# Patient Record
Sex: FEMALE | Race: WHITE | NOT HISPANIC OR LATINO
[De-identification: names, ages, dates, MRNs, and addresses within clinical notes are randomized per-mention and may not be internally consistent; named-entity substitution may affect disease eponyms.]

---

## 2018-01-21 ENCOUNTER — TRANSCRIPTION ENCOUNTER (OUTPATIENT)
Age: 53
End: 2018-01-21

## 2018-10-04 ENCOUNTER — TRANSCRIPTION ENCOUNTER (OUTPATIENT)
Age: 53
End: 2018-10-04

## 2018-10-24 ENCOUNTER — APPOINTMENT (OUTPATIENT)
Dept: OTOLARYNGOLOGY | Facility: CLINIC | Age: 53
End: 2018-10-24
Payer: COMMERCIAL

## 2018-10-24 VITALS
HEART RATE: 101 BPM | SYSTOLIC BLOOD PRESSURE: 132 MMHG | DIASTOLIC BLOOD PRESSURE: 93 MMHG | BODY MASS INDEX: 35.59 KG/M2 | WEIGHT: 211 LBS | HEIGHT: 64.5 IN

## 2018-10-24 DIAGNOSIS — Z82.49 FAMILY HISTORY OF ISCHEMIC HEART DISEASE AND OTHER DISEASES OF THE CIRCULATORY SYSTEM: ICD-10-CM

## 2018-10-24 DIAGNOSIS — Z86.19 PERSONAL HISTORY OF OTHER INFECTIOUS AND PARASITIC DISEASES: ICD-10-CM

## 2018-10-24 DIAGNOSIS — Z80.3 FAMILY HISTORY OF MALIGNANT NEOPLASM OF BREAST: ICD-10-CM

## 2018-10-24 DIAGNOSIS — Z78.9 OTHER SPECIFIED HEALTH STATUS: ICD-10-CM

## 2018-10-24 DIAGNOSIS — Z83.3 FAMILY HISTORY OF DIABETES MELLITUS: ICD-10-CM

## 2018-10-24 DIAGNOSIS — Z87.09 PERSONAL HISTORY OF OTHER DISEASES OF THE RESPIRATORY SYSTEM: ICD-10-CM

## 2018-10-24 DIAGNOSIS — Z83.42 FAMILY HISTORY OF FAMILIAL HYPERCHOLESTEROLEMIA: ICD-10-CM

## 2018-10-24 DIAGNOSIS — Z86.79 PERSONAL HISTORY OF OTHER DISEASES OF THE CIRCULATORY SYSTEM: ICD-10-CM

## 2018-10-24 DIAGNOSIS — Z87.19 PERSONAL HISTORY OF OTHER DISEASES OF THE DIGESTIVE SYSTEM: ICD-10-CM

## 2018-10-24 PROCEDURE — 31231 NASAL ENDOSCOPY DX: CPT

## 2018-10-24 PROCEDURE — 99204 OFFICE O/P NEW MOD 45 MIN: CPT | Mod: 25

## 2018-10-24 PROCEDURE — 92557 COMPREHENSIVE HEARING TEST: CPT

## 2018-10-24 PROCEDURE — 92550 TYMPANOMETRY & REFLEX THRESH: CPT

## 2018-10-24 RX ORDER — FLUTICASONE PROPIONATE 50 UG/1
50 SPRAY, METERED NASAL DAILY
Qty: 1 | Refills: 3 | Status: ACTIVE | COMMUNITY
Start: 2018-10-24

## 2018-10-25 RX ORDER — DM/P-EPHED/ACETAMINOPH/DOXYLAM
CAPSULE ORAL
Refills: 0 | Status: ACTIVE | COMMUNITY

## 2018-10-25 RX ORDER — LOSARTAN POTASSIUM 50 MG/1
50 TABLET, FILM COATED ORAL
Refills: 0 | Status: ACTIVE | COMMUNITY

## 2018-10-31 ENCOUNTER — APPOINTMENT (OUTPATIENT)
Dept: OTOLARYNGOLOGY | Facility: CLINIC | Age: 53
End: 2018-10-31
Payer: COMMERCIAL

## 2018-10-31 VITALS
DIASTOLIC BLOOD PRESSURE: 92 MMHG | BODY MASS INDEX: 34.74 KG/M2 | HEIGHT: 64.5 IN | HEART RATE: 110 BPM | WEIGHT: 206 LBS | SYSTOLIC BLOOD PRESSURE: 127 MMHG

## 2018-10-31 DIAGNOSIS — J01.80 OTHER ACUTE SINUSITIS: ICD-10-CM

## 2018-10-31 DIAGNOSIS — H90.42 SENSORINEURAL HEARING LOSS, UNILATERAL, LEFT EAR, WITH UNRESTRICTED HEARING ON THE CONTRALATERAL SIDE: ICD-10-CM

## 2018-10-31 DIAGNOSIS — H65.02 ACUTE SEROUS OTITIS MEDIA, LEFT EAR: ICD-10-CM

## 2018-10-31 DIAGNOSIS — J32.8 OTHER CHRONIC SINUSITIS: ICD-10-CM

## 2018-10-31 PROBLEM — Z87.19 HISTORY OF GALLBLADDER DISEASE: Status: RESOLVED | Noted: 2018-10-31 | Resolved: 2018-10-31

## 2018-10-31 PROBLEM — Z86.19 HISTORY OF INFECTIOUS MONONUCLEOSIS: Status: RESOLVED | Noted: 2018-10-31 | Resolved: 2018-10-31

## 2018-10-31 PROBLEM — Z87.09 HISTORY OF NASAL POLYP: Status: RESOLVED | Noted: 2018-10-31 | Resolved: 2018-10-31

## 2018-10-31 PROBLEM — Z86.19 HISTORY OF HERPES ZOSTER: Status: RESOLVED | Noted: 2018-10-31 | Resolved: 2018-10-31

## 2018-10-31 PROCEDURE — 99214 OFFICE O/P EST MOD 30 MIN: CPT

## 2018-10-31 PROCEDURE — 92550 TYMPANOMETRY & REFLEX THRESH: CPT

## 2018-10-31 PROCEDURE — 92557 COMPREHENSIVE HEARING TEST: CPT

## 2018-10-31 RX ORDER — PREDNISONE 20 MG/1
20 TABLET ORAL DAILY
Qty: 3 | Refills: 0 | Status: DISCONTINUED | COMMUNITY
Start: 2018-10-24 | End: 2018-10-31

## 2018-10-31 RX ORDER — ESCITALOPRAM OXALATE 20 MG/1
20 TABLET, FILM COATED ORAL
Refills: 0 | Status: ACTIVE | COMMUNITY

## 2018-10-31 RX ORDER — ASPIRIN 81 MG
81 TABLET, DELAYED RELEASE (ENTERIC COATED) ORAL
Refills: 0 | Status: ACTIVE | COMMUNITY

## 2018-12-05 PROBLEM — J32.8 OTHER CHRONIC SINUSITIS: Status: ACTIVE | Noted: 2018-10-24

## 2018-12-05 PROBLEM — J01.80 OTHER ACUTE SINUSITIS, RECURRENCE NOT SPECIFIED: Status: ACTIVE | Noted: 2018-10-31

## 2018-12-05 PROBLEM — H65.02: Status: ACTIVE | Noted: 2018-10-31

## 2018-12-05 PROBLEM — H90.42 ASYMMETRICAL LEFT SENSORINEURAL HEARING LOSS: Status: ACTIVE | Noted: 2018-10-31

## 2019-02-11 ENCOUNTER — APPOINTMENT (OUTPATIENT)
Dept: GASTROENTEROLOGY | Facility: CLINIC | Age: 54
End: 2019-02-11
Payer: COMMERCIAL

## 2019-02-11 VITALS
HEIGHT: 64.5 IN | DIASTOLIC BLOOD PRESSURE: 64 MMHG | TEMPERATURE: 97.5 F | WEIGHT: 207 LBS | HEART RATE: 118 BPM | SYSTOLIC BLOOD PRESSURE: 110 MMHG | RESPIRATION RATE: 15 BRPM | OXYGEN SATURATION: 98 % | BODY MASS INDEX: 34.91 KG/M2

## 2019-02-11 DIAGNOSIS — Z12.11 ENCOUNTER FOR SCREENING FOR MALIGNANT NEOPLASM OF COLON: ICD-10-CM

## 2019-02-11 PROCEDURE — 99203 OFFICE O/P NEW LOW 30 MIN: CPT

## 2019-02-11 RX ORDER — FLUCONAZOLE 150 MG/1
150 TABLET ORAL
Qty: 1 | Refills: 1 | Status: DISCONTINUED | COMMUNITY
Start: 2018-10-24 | End: 2019-02-11

## 2019-02-11 RX ORDER — AMOXICILLIN AND CLAVULANATE POTASSIUM 875; 125 MG/1; MG/1
875-125 TABLET, COATED ORAL TWICE DAILY
Qty: 28 | Refills: 0 | Status: DISCONTINUED | COMMUNITY
Start: 2018-10-24 | End: 2019-02-11

## 2019-02-11 RX ORDER — ROSUVASTATIN CALCIUM 10 MG/1
10 TABLET, FILM COATED ORAL
Refills: 0 | Status: ACTIVE | COMMUNITY

## 2019-02-11 RX ORDER — UBIDECARENONE/VIT E ACET 100MG-5
CAPSULE ORAL
Refills: 0 | Status: ACTIVE | COMMUNITY

## 2019-02-11 RX ORDER — SODIUM SULFATE, POTASSIUM SULFATE, MAGNESIUM SULFATE 17.5; 3.13; 1.6 G/ML; G/ML; G/ML
17.5-3.13-1.6 SOLUTION, CONCENTRATE ORAL
Qty: 1 | Refills: 0 | Status: ACTIVE | COMMUNITY
Start: 2019-02-11 | End: 1900-01-01

## 2019-02-11 NOTE — HISTORY OF PRESENT ILLNESS
[FreeTextEntry1] : This is a 54-year-old female with history of hypertension and hyperlipidemia presenting for colon cancer screening evaluation. She never had a lower GI evaluation. She denies abdominal pain, nausea or vomiting. She denies changes in her bowel habits. She denies rectal bleeding or melena. She denies weight loss or anemia. There is no family history of colon cancer.

## 2019-02-11 NOTE — ASSESSMENT
[FreeTextEntry1] : This is a 54 year old female presenting for colon cancer screening evaluation. I explained to her the risks, alternatives and benefits with colonoscopy. Risk including but not limited to bleeding, perforation, infection adverse medication reaction. Questions were answered. She stated understanding is agreeable to proceed with the planned procedure appeared

## 2019-04-05 ENCOUNTER — APPOINTMENT (OUTPATIENT)
Dept: GASTROENTEROLOGY | Facility: AMBULATORY MEDICAL SERVICES | Age: 54
End: 2019-04-05
Payer: COMMERCIAL

## 2019-04-05 PROCEDURE — 45378 DIAGNOSTIC COLONOSCOPY: CPT

## 2019-08-29 ENCOUNTER — APPOINTMENT (OUTPATIENT)
Dept: RHEUMATOLOGY | Facility: CLINIC | Age: 54
End: 2019-08-29
Payer: COMMERCIAL

## 2019-08-29 DIAGNOSIS — E55.9 VITAMIN D DEFICIENCY, UNSPECIFIED: ICD-10-CM

## 2019-08-29 DIAGNOSIS — M25.541 PAIN IN JOINTS OF RIGHT HAND: ICD-10-CM

## 2019-08-29 DIAGNOSIS — L40.9 PSORIASIS, UNSPECIFIED: ICD-10-CM

## 2019-08-29 DIAGNOSIS — M25.542 PAIN IN JOINTS OF RIGHT HAND: ICD-10-CM

## 2019-08-29 DIAGNOSIS — E66.9 OBESITY, UNSPECIFIED: ICD-10-CM

## 2019-08-29 PROCEDURE — 99204 OFFICE O/P NEW MOD 45 MIN: CPT

## 2019-08-29 RX ORDER — NAPROXEN 500 MG/1
500 TABLET ORAL
Qty: 14 | Refills: 0 | Status: ACTIVE | COMMUNITY
Start: 2019-08-29 | End: 1900-01-01

## 2019-08-29 NOTE — CONSULT LETTER
[Dear  ___] : Dear  [unfilled], [Consult Letter:] : I had the pleasure of evaluating your patient, [unfilled]. [Please see my note below.] : Please see my note below. [Consult Closing:] : Thank you very much for allowing me to participate in the care of this patient.  If you have any questions, please do not hesitate to contact me. [Sincerely,] : Sincerely, [FreeTextEntry2] :  [address], [place] [address]\par Phone: [phone] [FreeTextEntry3] : Carri Kahn MD\par

## 2019-08-29 NOTE — REVIEW OF SYSTEMS
[Fever] : no fever [Chills] : no chills [As Noted in HPI] : as noted in HPI [Negative] : Gastrointestinal [FreeTextEntry1] : fatigue and low energy

## 2019-08-29 NOTE — PHYSICAL EXAM
[Sclera] : the sclera and conjunctiva were normal [General Appearance - Alert] : alert [General Appearance - In No Acute Distress] : in no acute distress [Auscultation Breath Sounds / Voice Sounds] : lungs were clear to auscultation bilaterally [Heart Sounds] : normal S1 and S2 [Abdomen Soft] : soft [Abdomen Tenderness] : non-tender [FreeTextEntry1] : erythematous line at the back of the hair non scaly. no nail pitting  [Oriented To Time, Place, And Person] : oriented to person, place, and time

## 2019-08-29 NOTE — HISTORY OF PRESENT ILLNESS
[FreeTextEntry1] : 54 Juan C, with PMH of HTN, DL, Anxiety, Vitamin D deficiency, Psoriasis, presenting for evaluation of bilateral hand pain.\par \par - Symptoms started over a year ago, pain affecting mostly the dorsum of the hands and fingers. The pain occurs throughout the day but usually exacerbated by activity and worse at the end of the day compared to the morning. No associated redness or swelling, no morning stiffness \par Denies any history of dactylitis, no nail disease except nail of 5th finger (right hand) is always thin.\par She uses tylenol and very rarely Aleve with partial relief. \par - Skin psoriasis has always been limited to the back of the hair, no other skin involvement \par diagnosed by PCP,never seen by a dermatologist \par uses topical steroids with improvement \par \par - She has a longstanding history of low back pain s/p spinal fusion-follows with ortho/spine\par \par \par

## 2019-08-29 NOTE — DATA REVIEWED
[FreeTextEntry1] : Old MRI of the hips reviewed\par Xray of the L-S spine \par No recent blood work\par

## 2019-08-29 NOTE — ASSESSMENT
[FreeTextEntry1] : 53 yo W, with PMH of HTN, DL, Anxiety, Vitamin D deficiency, Psoriasis, presenting for evaluation of bilateral hand pain.\par \par - No evidence of active arthritis on exam \par - No evidence of enthesitis. No nail disease\par - Psoriasis limited to the hair line, treated with topicals\par \par = Discussed with patient that psoriatic arthritis is an inflammatory arthritis therefore would expect features of inflammation ( swelling of the joints, redness/warmth, morning stiffness..)\par Would recommend obtaining baseline Xrays of hands for evaluation of any erosions\par Check ESR/CRP\par = Recommend NSAIDs course \par advised about potential SEs, use with meals and PPIs ( higher risk since of lexapro)\par = Discussed with patient the association between psoriasis/PsA and obesity/ metabolic syndrome and higher CV risk\par advised about the importance of weight loss and exercises\par = Bone Health:\par check Vitamin D, continue supplements\par Had a previous BMD reported as normal\par \par RTO in 2-3 months or earlier if needed\par

## 2020-04-25 ENCOUNTER — MESSAGE (OUTPATIENT)
Age: 55
End: 2020-04-25

## 2020-05-05 ENCOUNTER — APPOINTMENT (OUTPATIENT)
Dept: DISASTER EMERGENCY | Facility: HOSPITAL | Age: 55
End: 2020-05-05

## 2020-05-05 LAB
SARS-COV-2 IGG SERPL IA-ACNC: 0.1 INDEX
SARS-COV-2 IGG SERPL QL IA: NEGATIVE

## 2020-08-27 ENCOUNTER — APPOINTMENT (OUTPATIENT)
Dept: OBGYN | Facility: CLINIC | Age: 55
End: 2020-08-27
Payer: COMMERCIAL

## 2020-08-27 VITALS
SYSTOLIC BLOOD PRESSURE: 105 MMHG | HEIGHT: 64 IN | DIASTOLIC BLOOD PRESSURE: 72 MMHG | BODY MASS INDEX: 30.56 KG/M2 | WEIGHT: 179 LBS

## 2020-08-27 PROCEDURE — 99386 PREV VISIT NEW AGE 40-64: CPT

## 2020-08-27 NOTE — HISTORY OF PRESENT ILLNESS
[Last Bone Density ___] : Last bone density studies [unfilled] [Last Colonoscopy ___] : Last colonoscopy [unfilled] [Last Pap ___] : Last cervical pap smear was [unfilled]

## 2020-08-27 NOTE — PHYSICAL EXAM
[Awake] : awake [LAD] : no lymphadenopathy [Acute Distress] : no acute distress [Alert] : alert [Thyroid Nodule] : no thyroid nodule [Goiter] : no goiter [Mass] : no breast mass [Nipple Discharge] : no nipple discharge [Axillary LAD] : no axillary lymphadenopathy [Soft] : soft [Tender] : non tender [Distended] : not distended [H/Smegaly] : no hepatosplenomegaly [Oriented x3] : oriented to person, place, and time [Flat Affect] : affect not flat [Depressed Mood] : not depressed [No Bleeding] : there was no active vaginal bleeding [Normal] : uterus [Exam Deferred] : was deferred [Uterine Adnexae] : were not tender and not enlarged

## 2020-08-31 LAB — HPV HIGH+LOW RISK DNA PNL CVX: NOT DETECTED

## 2020-09-02 LAB — CYTOLOGY CVX/VAG DOC THIN PREP: ABNORMAL

## 2021-10-09 ENCOUNTER — TRANSCRIPTION ENCOUNTER (OUTPATIENT)
Age: 56
End: 2021-10-09

## 2021-10-09 ENCOUNTER — APPOINTMENT (OUTPATIENT)
Dept: OBGYN | Facility: CLINIC | Age: 56
End: 2021-10-09
Payer: COMMERCIAL

## 2021-10-09 VITALS
WEIGHT: 203 LBS | HEART RATE: 102 BPM | DIASTOLIC BLOOD PRESSURE: 85 MMHG | SYSTOLIC BLOOD PRESSURE: 116 MMHG | BODY MASS INDEX: 34.66 KG/M2 | HEIGHT: 64 IN

## 2021-10-09 DIAGNOSIS — N95.2 POSTMENOPAUSAL ATROPHIC VAGINITIS: ICD-10-CM

## 2021-10-09 DIAGNOSIS — Z78.0 ENCOUNTER FOR SCREENING FOR OSTEOPOROSIS: ICD-10-CM

## 2021-10-09 DIAGNOSIS — Z13.820 ENCOUNTER FOR SCREENING FOR OSTEOPOROSIS: ICD-10-CM

## 2021-10-09 DIAGNOSIS — Z12.31 ENCOUNTER FOR SCREENING MAMMOGRAM FOR MALIGNANT NEOPLASM OF BREAST: ICD-10-CM

## 2021-10-09 PROCEDURE — 99396 PREV VISIT EST AGE 40-64: CPT

## 2021-10-09 PROCEDURE — 99214 OFFICE O/P EST MOD 30 MIN: CPT | Mod: 25

## 2021-10-09 NOTE — PHYSICAL EXAM
[Awake] : awake [Alert] : alert [Soft] : soft [Oriented x3] : oriented to person, place, and time [Normal] : uterus [No Bleeding] : there was no active vaginal bleeding [Uterine Adnexae] : were not tender and not enlarged [Exam Deferred] : was deferred [Vulvar Atrophy] : vulvar atrophy [Atrophy] : atrophy [Acute Distress] : no acute distress [LAD] : no lymphadenopathy [Thyroid Nodule] : no thyroid nodule [Goiter] : no goiter [Mass] : no breast mass [Nipple Discharge] : no nipple discharge [Axillary LAD] : no axillary lymphadenopathy [Tender] : non tender [Distended] : not distended [H/Smegaly] : no hepatosplenomegaly [Depressed Mood] : not depressed [Flat Affect] : affect not flat

## 2021-10-12 LAB — HPV HIGH+LOW RISK DNA PNL CVX: NOT DETECTED

## 2021-10-14 LAB — BACTERIA UR CULT: NORMAL

## 2021-10-15 LAB — CYTOLOGY CVX/VAG DOC THIN PREP: ABNORMAL

## 2021-11-19 ENCOUNTER — APPOINTMENT (OUTPATIENT)
Dept: OTOLARYNGOLOGY | Facility: CLINIC | Age: 56
End: 2021-11-19
Payer: COMMERCIAL

## 2021-11-19 VITALS
WEIGHT: 206 LBS | BODY MASS INDEX: 35.17 KG/M2 | HEIGHT: 64 IN | HEART RATE: 106 BPM | TEMPERATURE: 98 F | SYSTOLIC BLOOD PRESSURE: 122 MMHG | DIASTOLIC BLOOD PRESSURE: 87 MMHG

## 2021-11-19 DIAGNOSIS — R42 DIZZINESS AND GIDDINESS: ICD-10-CM

## 2021-11-19 DIAGNOSIS — H90.5 UNSPECIFIED SENSORINEURAL HEARING LOSS: ICD-10-CM

## 2021-11-19 DIAGNOSIS — H69.82 OTHER SPECIFIED DISORDERS OF EUSTACHIAN TUBE, LEFT EAR: ICD-10-CM

## 2021-11-19 PROCEDURE — 99204 OFFICE O/P NEW MOD 45 MIN: CPT

## 2021-11-19 NOTE — END OF VISIT
[FreeTextEntry3] : I personally saw and examined CAROLEE AN in detail. I spoke to ELIU Young regarding the assessment and plan of care. I reviewed the above assessment and plan of care, and agree. I have made changes in changes in the body of the note where appropriate.I personally reviewed the HPI, PMH, FH, SH, ROS and medications/allergies. I have spoken to ELIU Young regarding the history and have personally determined the assessment and plan of care, and documented this myself. I was present and participated in all key portions of the encounter and all procedures noted above. I have made changes in the body of the note where appropriate.\par \par Attesting Faculty: See Attending Signature Below \par \par \par  [Time Spent: ___ minutes] : I have spent [unfilled] minutes of time on the encounter.

## 2021-11-19 NOTE — PHYSICAL EXAM
[Midline] : trachea located in midline position [Normal] : horizontal positional vertigo maneuver was normal [Fukuda Step Test] : Fukuda Step Test was Positive [Hearing Loss Right Only] : normal [Hearing Loss Left Only] : normal [Nystagmus] : ~T no ~M nystagmus was seen [Gregor-Hallharishke] : [place]-Hallpike: Negative [de-identified] : abn [de-identified] : sway to the right

## 2021-11-19 NOTE — HISTORY OF PRESENT ILLNESS
[de-identified] : 57 yo female\par Patient complains of vertigo x 1 month. States it started off by getting dizzy when laying down and getting up, now turning any directions makes her dizzy. When she first got dizzy episodes would last several hours now they last several seconds. In between episodes she feels off balance. She complains that she feels very nauseas with the dizziness. No change in hearing or tinnitus with vertigo. She saw , he did an audio which was wnl as per patient and he did the Epley and she had no improvement. Pt has no ear pain, ear drainage, hearing loss, tinnitus, nasal congestion, nasal discharge, epistaxis, sinus infections, facial pain, facial pressure, throat pain, dysphagia or fevers\par \par  [Vertigo] : vertigo [Dizziness] : dizziness [Eustachian Tube Dysfunction] : no eustachian tube dysfunction [Cholesteatoma] : no cholesteatoma [Early Onset Hearing Loss] : no early onset hearing loss [Stroke] : no stroke [Allergic Rhinitis] : no allergic rhinitis [Asthma] : no asthma [Adenoidectomy] : no adenoidectomy [Hyperthyroidism] : no hyperthyroidism [Sialadenitis] : no sialadenitis [Hodgkin Disease] : no hodgkin disease [Non-Hodgkin Lymphoma] : no non-hodgkin lymphoma [None] : No risk factors have been identified. [Graves Disease] : no graves disease [Thyroid Cancer] : no thyroid cancer

## 2021-11-19 NOTE — ASSESSMENT
[FreeTextEntry1] : Patient complains of vertigo x 1 month, the first 2 weeks dizziness last several hours now its lasting seconds to minutes when looking to the left,right and up .\par \par Menieres Disease vs Vestibulopathy:\par -low salt diet handout given\par -EcoG ordered \par -will obtain previous audio from  \par \par f/u 2 weeks or prn

## 2021-12-10 ENCOUNTER — APPOINTMENT (OUTPATIENT)
Dept: OTOLARYNGOLOGY | Facility: CLINIC | Age: 56
End: 2021-12-10
Payer: COMMERCIAL

## 2021-12-10 PROCEDURE — 92584 ELECTROCOCHLEOGRAPHY: CPT

## 2021-12-10 PROCEDURE — ZZZZZ: CPT

## 2021-12-14 ENCOUNTER — NON-APPOINTMENT (OUTPATIENT)
Age: 56
End: 2021-12-14

## 2022-02-15 ENCOUNTER — INPATIENT (INPATIENT)
Facility: HOSPITAL | Age: 57
LOS: 0 days | Discharge: HOME | DRG: 694 | End: 2022-02-16
Attending: INTERNAL MEDICINE | Admitting: HOSPITALIST
Payer: COMMERCIAL

## 2022-02-15 VITALS
TEMPERATURE: 98 F | DIASTOLIC BLOOD PRESSURE: 95 MMHG | OXYGEN SATURATION: 97 % | WEIGHT: 210.1 LBS | HEART RATE: 113 BPM | SYSTOLIC BLOOD PRESSURE: 147 MMHG | RESPIRATION RATE: 20 BRPM | HEIGHT: 64 IN

## 2022-02-15 DIAGNOSIS — N20.0 CALCULUS OF KIDNEY: ICD-10-CM

## 2022-02-15 LAB
ALBUMIN SERPL ELPH-MCNC: 4.8 G/DL — SIGNIFICANT CHANGE UP (ref 3.3–5)
ALP SERPL-CCNC: 101 U/L — SIGNIFICANT CHANGE UP (ref 40–120)
ALT FLD-CCNC: 33 U/L — SIGNIFICANT CHANGE UP (ref 10–45)
ANION GAP SERPL CALC-SCNC: 15 MMOL/L — SIGNIFICANT CHANGE UP (ref 5–17)
APPEARANCE UR: CLEAR — SIGNIFICANT CHANGE UP
AST SERPL-CCNC: 29 U/L — SIGNIFICANT CHANGE UP (ref 10–40)
BACTERIA # UR AUTO: NEGATIVE — SIGNIFICANT CHANGE UP
BASE EXCESS BLDV CALC-SCNC: 2.8 MMOL/L — HIGH (ref -2–2)
BASOPHILS # BLD AUTO: 0.1 K/UL — SIGNIFICANT CHANGE UP (ref 0–0.2)
BASOPHILS NFR BLD AUTO: 0.9 % — SIGNIFICANT CHANGE UP (ref 0–2)
BILIRUB SERPL-MCNC: 0.4 MG/DL — SIGNIFICANT CHANGE UP (ref 0.2–1.2)
BILIRUB UR-MCNC: NEGATIVE — SIGNIFICANT CHANGE UP
BUN SERPL-MCNC: 18 MG/DL — SIGNIFICANT CHANGE UP (ref 7–23)
CA-I SERPL-SCNC: 1.3 MMOL/L — SIGNIFICANT CHANGE UP (ref 1.15–1.33)
CALCIUM SERPL-MCNC: 11.3 MG/DL — HIGH (ref 8.4–10.5)
CHLORIDE BLDV-SCNC: 104 MMOL/L — SIGNIFICANT CHANGE UP (ref 96–108)
CHLORIDE SERPL-SCNC: 102 MMOL/L — SIGNIFICANT CHANGE UP (ref 96–108)
CO2 BLDV-SCNC: 31 MMOL/L — HIGH (ref 22–26)
CO2 SERPL-SCNC: 26 MMOL/L — SIGNIFICANT CHANGE UP (ref 22–31)
COD CRY URNS QL: ABNORMAL
COLOR SPEC: YELLOW — SIGNIFICANT CHANGE UP
COMMENT - URINE: SIGNIFICANT CHANGE UP
CREAT SERPL-MCNC: 0.91 MG/DL — SIGNIFICANT CHANGE UP (ref 0.5–1.3)
DIFF PNL FLD: ABNORMAL
EOSINOPHIL # BLD AUTO: 0.17 K/UL — SIGNIFICANT CHANGE UP (ref 0–0.5)
EOSINOPHIL NFR BLD AUTO: 1.5 % — SIGNIFICANT CHANGE UP (ref 0–6)
EPI CELLS # UR: 3 /HPF — SIGNIFICANT CHANGE UP
GAS PNL BLDV: 136 MMOL/L — SIGNIFICANT CHANGE UP (ref 136–145)
GAS PNL BLDV: SIGNIFICANT CHANGE UP
GAS PNL BLDV: SIGNIFICANT CHANGE UP
GLUCOSE BLDV-MCNC: 116 MG/DL — HIGH (ref 70–99)
GLUCOSE SERPL-MCNC: 108 MG/DL — HIGH (ref 70–99)
GLUCOSE UR QL: NEGATIVE — SIGNIFICANT CHANGE UP
HCO3 BLDV-SCNC: 29 MMOL/L — SIGNIFICANT CHANGE UP (ref 22–29)
HCT VFR BLD CALC: 45.5 % — HIGH (ref 34.5–45)
HCT VFR BLDA CALC: 42 % — SIGNIFICANT CHANGE UP (ref 34.5–46.5)
HGB BLD CALC-MCNC: 14.1 G/DL — SIGNIFICANT CHANGE UP (ref 11.7–16.1)
HGB BLD-MCNC: 14.9 G/DL — SIGNIFICANT CHANGE UP (ref 11.5–15.5)
HYALINE CASTS # UR AUTO: 3 /LPF — HIGH (ref 0–2)
IMM GRANULOCYTES NFR BLD AUTO: 0.3 % — SIGNIFICANT CHANGE UP (ref 0–1.5)
KETONES UR-MCNC: NEGATIVE — SIGNIFICANT CHANGE UP
LACTATE BLDV-MCNC: 1.6 MMOL/L — SIGNIFICANT CHANGE UP (ref 0.7–2)
LEUKOCYTE ESTERASE UR-ACNC: ABNORMAL
LIDOCAIN IGE QN: 24 U/L — SIGNIFICANT CHANGE UP (ref 7–60)
LYMPHOCYTES # BLD AUTO: 27.1 % — SIGNIFICANT CHANGE UP (ref 13–44)
LYMPHOCYTES # BLD AUTO: 3.15 K/UL — SIGNIFICANT CHANGE UP (ref 1–3.3)
MCHC RBC-ENTMCNC: 28 PG — SIGNIFICANT CHANGE UP (ref 27–34)
MCHC RBC-ENTMCNC: 32.7 GM/DL — SIGNIFICANT CHANGE UP (ref 32–36)
MCV RBC AUTO: 85.5 FL — SIGNIFICANT CHANGE UP (ref 80–100)
MONOCYTES # BLD AUTO: 0.99 K/UL — HIGH (ref 0–0.9)
MONOCYTES NFR BLD AUTO: 8.5 % — SIGNIFICANT CHANGE UP (ref 2–14)
NEUTROPHILS # BLD AUTO: 7.17 K/UL — SIGNIFICANT CHANGE UP (ref 1.8–7.4)
NEUTROPHILS NFR BLD AUTO: 61.7 % — SIGNIFICANT CHANGE UP (ref 43–77)
NITRITE UR-MCNC: NEGATIVE — SIGNIFICANT CHANGE UP
NRBC # BLD: 0 /100 WBCS — SIGNIFICANT CHANGE UP (ref 0–0)
PCO2 BLDV: 52 MMHG — HIGH (ref 39–42)
PH BLDV: 7.36 — SIGNIFICANT CHANGE UP (ref 7.32–7.43)
PH UR: 5.5 — SIGNIFICANT CHANGE UP (ref 5–8)
PLATELET # BLD AUTO: 310 K/UL — SIGNIFICANT CHANGE UP (ref 150–400)
PO2 BLDV: 29 MMHG — SIGNIFICANT CHANGE UP (ref 25–45)
POTASSIUM BLDV-SCNC: 4.4 MMOL/L — SIGNIFICANT CHANGE UP (ref 3.5–5.1)
POTASSIUM SERPL-MCNC: 4.6 MMOL/L — SIGNIFICANT CHANGE UP (ref 3.5–5.3)
POTASSIUM SERPL-SCNC: 4.6 MMOL/L — SIGNIFICANT CHANGE UP (ref 3.5–5.3)
PROT SERPL-MCNC: 7.9 G/DL — SIGNIFICANT CHANGE UP (ref 6–8.3)
PROT UR-MCNC: ABNORMAL
RBC # BLD: 5.32 M/UL — HIGH (ref 3.8–5.2)
RBC # FLD: 12.4 % — SIGNIFICANT CHANGE UP (ref 10.3–14.5)
RBC CASTS # UR COMP ASSIST: 1 /HPF — SIGNIFICANT CHANGE UP (ref 0–4)
SAO2 % BLDV: 46.4 % — LOW (ref 67–88)
SODIUM SERPL-SCNC: 143 MMOL/L — SIGNIFICANT CHANGE UP (ref 135–145)
SP GR SPEC: 1.03 — HIGH (ref 1.01–1.02)
UROBILINOGEN FLD QL: NEGATIVE — SIGNIFICANT CHANGE UP
WBC # BLD: 11.62 K/UL — HIGH (ref 3.8–10.5)
WBC # FLD AUTO: 11.62 K/UL — HIGH (ref 3.8–10.5)
WBC UR QL: 5 /HPF — SIGNIFICANT CHANGE UP (ref 0–5)

## 2022-02-15 PROCEDURE — 99285 EMERGENCY DEPT VISIT HI MDM: CPT

## 2022-02-15 PROCEDURE — 74176 CT ABD & PELVIS W/O CONTRAST: CPT | Mod: 26,MA

## 2022-02-15 RX ORDER — KETOROLAC TROMETHAMINE 30 MG/ML
15 SYRINGE (ML) INJECTION ONCE
Refills: 0 | Status: DISCONTINUED | OUTPATIENT
Start: 2022-02-15 | End: 2022-02-15

## 2022-02-15 RX ORDER — MORPHINE SULFATE 50 MG/1
4 CAPSULE, EXTENDED RELEASE ORAL ONCE
Refills: 0 | Status: DISCONTINUED | OUTPATIENT
Start: 2022-02-15 | End: 2022-02-15

## 2022-02-15 RX ORDER — ONDANSETRON 8 MG/1
4 TABLET, FILM COATED ORAL ONCE
Refills: 0 | Status: COMPLETED | OUTPATIENT
Start: 2022-02-15 | End: 2022-02-15

## 2022-02-15 RX ORDER — SODIUM CHLORIDE 9 MG/ML
1000 INJECTION, SOLUTION INTRAVENOUS ONCE
Refills: 0 | Status: COMPLETED | OUTPATIENT
Start: 2022-02-15 | End: 2022-02-15

## 2022-02-15 RX ORDER — ONDANSETRON 8 MG/1
4 TABLET, FILM COATED ORAL ONCE
Refills: 0 | Status: DISCONTINUED | OUTPATIENT
Start: 2022-02-15 | End: 2022-02-15

## 2022-02-15 RX ORDER — HYDROMORPHONE HYDROCHLORIDE 2 MG/ML
0.5 INJECTION INTRAMUSCULAR; INTRAVENOUS; SUBCUTANEOUS ONCE
Refills: 0 | Status: DISCONTINUED | OUTPATIENT
Start: 2022-02-15 | End: 2022-02-15

## 2022-02-15 RX ORDER — TAMSULOSIN HYDROCHLORIDE 0.4 MG/1
0.4 CAPSULE ORAL ONCE
Refills: 0 | Status: COMPLETED | OUTPATIENT
Start: 2022-02-15 | End: 2022-02-15

## 2022-02-15 RX ADMIN — TAMSULOSIN HYDROCHLORIDE 0.4 MILLIGRAM(S): 0.4 CAPSULE ORAL at 21:16

## 2022-02-15 RX ADMIN — HYDROMORPHONE HYDROCHLORIDE 0.5 MILLIGRAM(S): 2 INJECTION INTRAMUSCULAR; INTRAVENOUS; SUBCUTANEOUS at 18:58

## 2022-02-15 RX ADMIN — Medication 15 MILLIGRAM(S): at 17:05

## 2022-02-15 RX ADMIN — MORPHINE SULFATE 4 MILLIGRAM(S): 50 CAPSULE, EXTENDED RELEASE ORAL at 18:04

## 2022-02-15 RX ADMIN — MORPHINE SULFATE 4 MILLIGRAM(S): 50 CAPSULE, EXTENDED RELEASE ORAL at 18:29

## 2022-02-15 RX ADMIN — SODIUM CHLORIDE 1000 MILLILITER(S): 9 INJECTION, SOLUTION INTRAVENOUS at 21:16

## 2022-02-15 RX ADMIN — SODIUM CHLORIDE 1000 MILLILITER(S): 9 INJECTION, SOLUTION INTRAVENOUS at 15:20

## 2022-02-15 RX ADMIN — Medication 15 MILLIGRAM(S): at 14:42

## 2022-02-15 RX ADMIN — ONDANSETRON 4 MILLIGRAM(S): 8 TABLET, FILM COATED ORAL at 14:42

## 2022-02-15 RX ADMIN — Medication 15 MILLIGRAM(S): at 17:29

## 2022-02-15 RX ADMIN — ONDANSETRON 4 MILLIGRAM(S): 8 TABLET, FILM COATED ORAL at 18:29

## 2022-02-15 RX ADMIN — SODIUM CHLORIDE 1000 MILLILITER(S): 9 INJECTION, SOLUTION INTRAVENOUS at 14:41

## 2022-02-15 RX ADMIN — Medication 15 MILLIGRAM(S): at 15:20

## 2022-02-15 NOTE — CONSULT NOTE ADULT - ASSESSMENT
57 year old female with a 4mm right ureteral calculus    -IV fluid hydration  -flomax qd  -analgesia as needed  -NPO after midnight  -re-eval in AM  -case d/w 57 year old female with a 4mm right ureteral calculus    -IV fluid hydration  -flomax qd  -analgesia as needed  -NPO after midnight  -re-eval in AM  -case d/w Dr Brown

## 2022-02-15 NOTE — ED PROVIDER NOTE - ATTENDING CONTRIBUTION TO CARE
56 yo female remote (1990s) hx of renal colic p/w intermittent moderate R flank pain a/w n/v.  uncomfortable on exam, strongly suspect recurrent renal colic.  check labs, UA, CT and reassess.  not torsion.

## 2022-02-15 NOTE — ED PROVIDER NOTE - CLINICAL SUMMARY MEDICAL DECISION MAKING FREE TEXT BOX
Detail Level: Zone Plan: New diagnosis of LS&A, likely of long standing\\n- will start topical steroid twice daily to affected areas for 2 weeks \\n- likely flaring in setting of recent illness with loose stool \\n- recheck again in 3 mo \\n- advised her to perform once monthly self checks to feel for firm, raw or painful areas in genital or perianal area \\n\\nCc PCP Plan: Tinea pedis (“athlete’s foot”) in setting of onychomycosis (“nail fungus”)\\n- skin of feet completely clear on econazole 1%\\n- continue econazole 2-3 applications per week to keep the feet skin clear 58 yo F hx of renal colic, hld, htn presents with intermittent rlq pain accompanied frequency- pt tachycardic, no abd tenderness. suspicion for renal colic vs appendicitis. low suspicion for torsion given age.

## 2022-02-15 NOTE — ED ADULT NURSE REASSESSMENT NOTE - NS ED NURSE REASSESS COMMENT FT1
Pt asleep but arouses to verbal stimuli. A&ox4, spontaneous breathing and equal chest rise. She reports 1/10 RLQ pain at this time. VSS on RA. Will continue to monitor pt.

## 2022-02-15 NOTE — ED ADULT NURSE NOTE - OBJECTIVE STATEMENT
Pt presents to ED c/o of RLQ pain since Saturday. Pt denies any vomiting or diarrhea. Pt have some nausea. Pt states pain comes and goes. Had constipation took senna.

## 2022-02-15 NOTE — ED PROVIDER NOTE - PHYSICAL EXAMINATION
GENERAL: +acute distress due to pain, non-toxic appearing  HEAD: normocephalic, atraumatic  HEENT: normal conjunctiva, oral mucosa moist, neck supple  CARDIAC: regular rate and rhythm, normal S1 and S2,  no appreciable murmurs  PULM: clear to ascultation bilaterally, no crackles, rales, rhonchi, or wheezing  GI: abdomen nondistended, soft, nontender, no guarding or rebound tenderness  : no CVA tenderness, no suprapubic tenderness  NEURO: alert and oriented x 3, normal speech, moving all extremities   MSK: no visible deformities, no peripheral edema, calf tenderness/redness/swelling  SKIN: no visible rashes, dry, well-perfused  PSYCH: appropriate mood and affect

## 2022-02-15 NOTE — ED PROVIDER NOTE - NS ED ROS FT
GENERAL: no fever, no chills, no weight loss  EYES: no change in vision, no irritation, no discharge, no redness, no pain  HEENT: no trouble swallowing or speaking, no throat pain, no ear pain  CARDIAC: no chest pain, no palpitations   PULMONARY: no cough, no shortness of breath, no wheezing  GI: +rlq abdominal pain, + nausea, + vomiting, no diarrhea, no constipation, no melena, no hematochezia, no hematemesis  : +frequency, no changes in urination, no dysuria, no hematuria, no discharge  SKIN: no rashes  NEURO: no headache, no numbness, no weakness  MSK: no joint pain, no muscle pain, no back pain, no calf pain

## 2022-02-15 NOTE — CONSULT NOTE ADULT - SUBJECTIVE AND OBJECTIVE BOX
UROLOGY CONSULT NOTE    HPI:  This is a 57y old Female with PMHx of HTN, HLD, nephrolithiasis who presents with right renal colic x 4 days.  States the pain was intermittent and associated with increased urinary urgency and nausea + vomiting today.  She denies fevers, chills, hematuria.  Currently not pain controlled after receiving IV toradol, morphine, and dilaudid.  States she passed a stone in .      PAST MEDICAL HISTORY    No pertinent past medical history      PAST SURGICAL HISTORY    No significant past surgical history      FAMILY HISTORY    noncontributory      SOCIAL HISTORY    No smoking history      HOME MEDICATIONS    Losartan  Crestor  Lexapro    DRUG ALLERGIES    NKDA    REVIEW OF SYSTEMS: Pertinent positives and negatives as stated in HPI, otherwise negative      VITAL SIGNS    T(F): 97.6, Max: 97.6 (02-15-22 @ 14:00)  HR: 113  BP: 147/95  RR: 20  SpO2: 97%      PHYSICAL EXAM    Gen: Well groomed, well dressed, well nourished  Abd: Soft, NT/ND  Back: no CVAT bilaterally      LABS:                        14.9   11.62 )-----------( 310               45.5     143  |  102  |  18  ----------------------------<  108  4.6   |  26  |  0.91    Ca    11.3    TPro  7.9  /  Alb  4.8  /  TBili  0.4  /  DBili  x   /  AST  29  /  ALT  33  /  AlkPhos  101          Urinalysis Basic:    Color: Yellow / Appearance: Clear / S.026 / pH: x  Gluc: x / Ketone: Negative  / Bili: Negative / Urobili: Negative   Blood: x / Protein: Trace / Nitrite: Negative   Leuk Esterase: Small / RBC: 1 /hpf / WBC 5 /HPF   Sq Epi: x / Non Sq Epi: 3 /hpf / Bacteria: Negative      Urine culture: pending      IMAGING:    CT: Mild right hydroureteronephrosis to the level of an obstructing 0.4 cm calculus at the right ureterovesical junction.

## 2022-02-15 NOTE — ED PROVIDER NOTE - PROGRESS NOTE DETAILS
Patient continues to have pain despite 15 and then 15 of Toradol and 4 of morphine. Urology to see patient.    Sola Fuller MD, PGY1

## 2022-02-15 NOTE — ED PROVIDER NOTE - OBJECTIVE STATEMENT
56 yo hx of HTN, HLD presenting with RLQ pain, started 11.30 am after she ate something. Accompanied by nausea, vomiting, urinary frequency. Had this pain in the last couple of days. No fevers, vaginal bleeding/discharge, hematuria.

## 2022-02-16 ENCOUNTER — TRANSCRIPTION ENCOUNTER (OUTPATIENT)
Age: 57
End: 2022-02-16

## 2022-02-16 VITALS
OXYGEN SATURATION: 96 % | HEART RATE: 94 BPM | TEMPERATURE: 98 F | RESPIRATION RATE: 18 BRPM | DIASTOLIC BLOOD PRESSURE: 69 MMHG | SYSTOLIC BLOOD PRESSURE: 105 MMHG

## 2022-02-16 DIAGNOSIS — F41.9 ANXIETY DISORDER, UNSPECIFIED: ICD-10-CM

## 2022-02-16 DIAGNOSIS — N20.0 CALCULUS OF KIDNEY: ICD-10-CM

## 2022-02-16 DIAGNOSIS — E78.5 HYPERLIPIDEMIA, UNSPECIFIED: ICD-10-CM

## 2022-02-16 DIAGNOSIS — Z29.9 ENCOUNTER FOR PROPHYLACTIC MEASURES, UNSPECIFIED: ICD-10-CM

## 2022-02-16 DIAGNOSIS — I10 ESSENTIAL (PRIMARY) HYPERTENSION: ICD-10-CM

## 2022-02-16 DIAGNOSIS — Z98.890 OTHER SPECIFIED POSTPROCEDURAL STATES: Chronic | ICD-10-CM

## 2022-02-16 DIAGNOSIS — Z90.49 ACQUIRED ABSENCE OF OTHER SPECIFIED PARTS OF DIGESTIVE TRACT: Chronic | ICD-10-CM

## 2022-02-16 LAB
ALBUMIN SERPL ELPH-MCNC: 4 G/DL — SIGNIFICANT CHANGE UP (ref 3.3–5)
ALP SERPL-CCNC: 78 U/L — SIGNIFICANT CHANGE UP (ref 40–120)
ALT FLD-CCNC: 23 U/L — SIGNIFICANT CHANGE UP (ref 10–45)
ANION GAP SERPL CALC-SCNC: 10 MMOL/L — SIGNIFICANT CHANGE UP (ref 5–17)
APTT BLD: 28.4 SEC — SIGNIFICANT CHANGE UP (ref 27.5–35.5)
AST SERPL-CCNC: 27 U/L — SIGNIFICANT CHANGE UP (ref 10–40)
BILIRUB SERPL-MCNC: 0.4 MG/DL — SIGNIFICANT CHANGE UP (ref 0.2–1.2)
BUN SERPL-MCNC: 24 MG/DL — HIGH (ref 7–23)
CALCIUM SERPL-MCNC: 9.6 MG/DL — SIGNIFICANT CHANGE UP (ref 8.4–10.5)
CHLORIDE SERPL-SCNC: 104 MMOL/L — SIGNIFICANT CHANGE UP (ref 96–108)
CO2 SERPL-SCNC: 25 MMOL/L — SIGNIFICANT CHANGE UP (ref 22–31)
CREAT SERPL-MCNC: 1.24 MG/DL — SIGNIFICANT CHANGE UP (ref 0.5–1.3)
GLUCOSE SERPL-MCNC: 118 MG/DL — HIGH (ref 70–99)
HCT VFR BLD CALC: 39.1 % — SIGNIFICANT CHANGE UP (ref 34.5–45)
HGB BLD-MCNC: 12.8 G/DL — SIGNIFICANT CHANGE UP (ref 11.5–15.5)
INR BLD: 1.08 RATIO — SIGNIFICANT CHANGE UP (ref 0.88–1.16)
MAGNESIUM SERPL-MCNC: 1.8 MG/DL — SIGNIFICANT CHANGE UP (ref 1.6–2.6)
MCHC RBC-ENTMCNC: 28.8 PG — SIGNIFICANT CHANGE UP (ref 27–34)
MCHC RBC-ENTMCNC: 32.7 GM/DL — SIGNIFICANT CHANGE UP (ref 32–36)
MCV RBC AUTO: 87.9 FL — SIGNIFICANT CHANGE UP (ref 80–100)
NRBC # BLD: 0 /100 WBCS — SIGNIFICANT CHANGE UP (ref 0–0)
PHOSPHATE SERPL-MCNC: 4.4 MG/DL — SIGNIFICANT CHANGE UP (ref 2.5–4.5)
PLATELET # BLD AUTO: 220 K/UL — SIGNIFICANT CHANGE UP (ref 150–400)
POTASSIUM SERPL-MCNC: 4.4 MMOL/L — SIGNIFICANT CHANGE UP (ref 3.5–5.3)
POTASSIUM SERPL-SCNC: 4.4 MMOL/L — SIGNIFICANT CHANGE UP (ref 3.5–5.3)
PROT SERPL-MCNC: 6.4 G/DL — SIGNIFICANT CHANGE UP (ref 6–8.3)
PROTHROM AB SERPL-ACNC: 12.9 SEC — SIGNIFICANT CHANGE UP (ref 10.6–13.6)
RBC # BLD: 4.45 M/UL — SIGNIFICANT CHANGE UP (ref 3.8–5.2)
RBC # FLD: 12.4 % — SIGNIFICANT CHANGE UP (ref 10.3–14.5)
SARS-COV-2 RNA SPEC QL NAA+PROBE: SIGNIFICANT CHANGE UP
SODIUM SERPL-SCNC: 139 MMOL/L — SIGNIFICANT CHANGE UP (ref 135–145)
WBC # BLD: 11.06 K/UL — HIGH (ref 3.8–10.5)
WBC # FLD AUTO: 11.06 K/UL — HIGH (ref 3.8–10.5)

## 2022-02-16 PROCEDURE — 12345: CPT | Mod: NC

## 2022-02-16 PROCEDURE — 85610 PROTHROMBIN TIME: CPT

## 2022-02-16 PROCEDURE — 84295 ASSAY OF SERUM SODIUM: CPT

## 2022-02-16 PROCEDURE — 83690 ASSAY OF LIPASE: CPT

## 2022-02-16 PROCEDURE — 96376 TX/PRO/DX INJ SAME DRUG ADON: CPT

## 2022-02-16 PROCEDURE — 83735 ASSAY OF MAGNESIUM: CPT

## 2022-02-16 PROCEDURE — 96374 THER/PROPH/DIAG INJ IV PUSH: CPT

## 2022-02-16 PROCEDURE — 85025 COMPLETE CBC W/AUTO DIFF WBC: CPT

## 2022-02-16 PROCEDURE — 96375 TX/PRO/DX INJ NEW DRUG ADDON: CPT

## 2022-02-16 PROCEDURE — 99232 SBSQ HOSP IP/OBS MODERATE 35: CPT

## 2022-02-16 PROCEDURE — 81001 URINALYSIS AUTO W/SCOPE: CPT

## 2022-02-16 PROCEDURE — 82803 BLOOD GASES ANY COMBINATION: CPT

## 2022-02-16 PROCEDURE — 82330 ASSAY OF CALCIUM: CPT

## 2022-02-16 PROCEDURE — 84100 ASSAY OF PHOSPHORUS: CPT

## 2022-02-16 PROCEDURE — 85027 COMPLETE CBC AUTOMATED: CPT

## 2022-02-16 PROCEDURE — 99285 EMERGENCY DEPT VISIT HI MDM: CPT | Mod: 25

## 2022-02-16 PROCEDURE — 80053 COMPREHEN METABOLIC PANEL: CPT

## 2022-02-16 PROCEDURE — 85730 THROMBOPLASTIN TIME PARTIAL: CPT

## 2022-02-16 PROCEDURE — 82947 ASSAY GLUCOSE BLOOD QUANT: CPT

## 2022-02-16 PROCEDURE — 36415 COLL VENOUS BLD VENIPUNCTURE: CPT

## 2022-02-16 PROCEDURE — 84132 ASSAY OF SERUM POTASSIUM: CPT

## 2022-02-16 PROCEDURE — 83605 ASSAY OF LACTIC ACID: CPT

## 2022-02-16 PROCEDURE — 74176 CT ABD & PELVIS W/O CONTRAST: CPT | Mod: MA

## 2022-02-16 PROCEDURE — 82435 ASSAY OF BLOOD CHLORIDE: CPT

## 2022-02-16 PROCEDURE — 87635 SARS-COV-2 COVID-19 AMP PRB: CPT

## 2022-02-16 PROCEDURE — 85018 HEMOGLOBIN: CPT

## 2022-02-16 PROCEDURE — 87086 URINE CULTURE/COLONY COUNT: CPT

## 2022-02-16 PROCEDURE — 85014 HEMATOCRIT: CPT

## 2022-02-16 PROCEDURE — 99223 1ST HOSP IP/OBS HIGH 75: CPT

## 2022-02-16 RX ORDER — LOSARTAN POTASSIUM 100 MG/1
25 TABLET, FILM COATED ORAL DAILY
Refills: 0 | Status: DISCONTINUED | OUTPATIENT
Start: 2022-02-16 | End: 2022-02-16

## 2022-02-16 RX ORDER — HEPARIN SODIUM 5000 [USP'U]/ML
5000 INJECTION INTRAVENOUS; SUBCUTANEOUS EVERY 12 HOURS
Refills: 0 | Status: DISCONTINUED | OUTPATIENT
Start: 2022-02-16 | End: 2022-02-16

## 2022-02-16 RX ORDER — TAMSULOSIN HYDROCHLORIDE 0.4 MG/1
1 CAPSULE ORAL
Qty: 30 | Refills: 0
Start: 2022-02-16 | End: 2022-03-17

## 2022-02-16 RX ORDER — ACETAMINOPHEN 500 MG
650 TABLET ORAL ONCE
Refills: 0 | Status: COMPLETED | OUTPATIENT
Start: 2022-02-16 | End: 2022-02-16

## 2022-02-16 RX ORDER — HYDROMORPHONE HYDROCHLORIDE 2 MG/ML
0.5 INJECTION INTRAMUSCULAR; INTRAVENOUS; SUBCUTANEOUS EVERY 4 HOURS
Refills: 0 | Status: DISCONTINUED | OUTPATIENT
Start: 2022-02-16 | End: 2022-02-16

## 2022-02-16 RX ORDER — SIMETHICONE 80 MG/1
80 TABLET, CHEWABLE ORAL ONCE
Refills: 0 | Status: DISCONTINUED | OUTPATIENT
Start: 2022-02-16 | End: 2022-02-16

## 2022-02-16 RX ORDER — ESCITALOPRAM OXALATE 10 MG/1
40 TABLET, FILM COATED ORAL DAILY
Refills: 0 | Status: DISCONTINUED | OUTPATIENT
Start: 2022-02-16 | End: 2022-02-16

## 2022-02-16 RX ORDER — ACETAMINOPHEN 500 MG
650 TABLET ORAL EVERY 6 HOURS
Refills: 0 | Status: DISCONTINUED | OUTPATIENT
Start: 2022-02-16 | End: 2022-02-16

## 2022-02-16 RX ORDER — TAMSULOSIN HYDROCHLORIDE 0.4 MG/1
0.4 CAPSULE ORAL AT BEDTIME
Refills: 0 | Status: DISCONTINUED | OUTPATIENT
Start: 2022-02-16 | End: 2022-02-16

## 2022-02-16 RX ORDER — OXYCODONE HYDROCHLORIDE 5 MG/1
5 TABLET ORAL EVERY 6 HOURS
Refills: 0 | Status: DISCONTINUED | OUTPATIENT
Start: 2022-02-16 | End: 2022-02-16

## 2022-02-16 RX ORDER — ATORVASTATIN CALCIUM 80 MG/1
40 TABLET, FILM COATED ORAL AT BEDTIME
Refills: 0 | Status: DISCONTINUED | OUTPATIENT
Start: 2022-02-16 | End: 2022-02-16

## 2022-02-16 RX ORDER — ONDANSETRON 8 MG/1
4 TABLET, FILM COATED ORAL EVERY 4 HOURS
Refills: 0 | Status: DISCONTINUED | OUTPATIENT
Start: 2022-02-16 | End: 2022-02-16

## 2022-02-16 RX ORDER — HYDROMORPHONE HYDROCHLORIDE 2 MG/ML
0.5 INJECTION INTRAMUSCULAR; INTRAVENOUS; SUBCUTANEOUS
Refills: 0 | Status: DISCONTINUED | OUTPATIENT
Start: 2022-02-16 | End: 2022-02-16

## 2022-02-16 RX ORDER — SODIUM CHLORIDE 9 MG/ML
1000 INJECTION, SOLUTION INTRAVENOUS
Refills: 0 | Status: DISCONTINUED | OUTPATIENT
Start: 2022-02-16 | End: 2022-02-16

## 2022-02-16 RX ORDER — OXYCODONE AND ACETAMINOPHEN 5; 325 MG/1; MG/1
1 TABLET ORAL EVERY 6 HOURS
Refills: 0 | Status: DISCONTINUED | OUTPATIENT
Start: 2022-02-16 | End: 2022-02-16

## 2022-02-16 RX ADMIN — OXYCODONE HYDROCHLORIDE 5 MILLIGRAM(S): 5 TABLET ORAL at 16:48

## 2022-02-16 RX ADMIN — Medication 650 MILLIGRAM(S): at 12:00

## 2022-02-16 RX ADMIN — HYDROMORPHONE HYDROCHLORIDE 0.5 MILLIGRAM(S): 2 INJECTION INTRAMUSCULAR; INTRAVENOUS; SUBCUTANEOUS at 01:44

## 2022-02-16 RX ADMIN — HYDROMORPHONE HYDROCHLORIDE 0.5 MILLIGRAM(S): 2 INJECTION INTRAMUSCULAR; INTRAVENOUS; SUBCUTANEOUS at 01:42

## 2022-02-16 RX ADMIN — Medication 650 MILLIGRAM(S): at 11:20

## 2022-02-16 RX ADMIN — ONDANSETRON 4 MILLIGRAM(S): 8 TABLET, FILM COATED ORAL at 15:23

## 2022-02-16 RX ADMIN — Medication 1 TABLET(S): at 11:20

## 2022-02-16 RX ADMIN — SODIUM CHLORIDE 120 MILLILITER(S): 9 INJECTION, SOLUTION INTRAVENOUS at 11:20

## 2022-02-16 RX ADMIN — Medication 650 MILLIGRAM(S): at 01:58

## 2022-02-16 RX ADMIN — OXYCODONE HYDROCHLORIDE 5 MILLIGRAM(S): 5 TABLET ORAL at 15:24

## 2022-02-16 RX ADMIN — HEPARIN SODIUM 5000 UNIT(S): 5000 INJECTION INTRAVENOUS; SUBCUTANEOUS at 05:39

## 2022-02-16 RX ADMIN — ONDANSETRON 4 MILLIGRAM(S): 8 TABLET, FILM COATED ORAL at 01:44

## 2022-02-16 RX ADMIN — ONDANSETRON 4 MILLIGRAM(S): 8 TABLET, FILM COATED ORAL at 10:04

## 2022-02-16 RX ADMIN — ESCITALOPRAM OXALATE 40 MILLIGRAM(S): 10 TABLET, FILM COATED ORAL at 12:36

## 2022-02-16 RX ADMIN — ONDANSETRON 4 MILLIGRAM(S): 8 TABLET, FILM COATED ORAL at 05:39

## 2022-02-16 RX ADMIN — SODIUM CHLORIDE 120 MILLILITER(S): 9 INJECTION, SOLUTION INTRAVENOUS at 02:04

## 2022-02-16 RX ADMIN — LOSARTAN POTASSIUM 25 MILLIGRAM(S): 100 TABLET, FILM COATED ORAL at 05:39

## 2022-02-16 NOTE — H&P ADULT - NSHPREVIEWOFSYSTEMS_GEN_ALL_CORE
CONSTITUTIONAL: No fever, no chills  EYES: No eye pain, no acute blindness  ENMT: no pain in mouth, no cuts on tongue  RESPIRATORY: No cough, No sob  CARDIOVASCULAR: No CP, no palpitations  GASTROINTESTINAL: abdominal pain+, nausea+  GENITOURINARY: No dysuria, no hematuria, increased urinary frequency+  Heme/Lymph: No easy bruising, no swelling of neck lymph nodes  NEUROLOGICAL: No seizure, No acute paralysis  SKIN: No itching, no rashes  MUSCULOSKELETAL: No acute joint pain, no joint swelling

## 2022-02-16 NOTE — H&P ADULT - ASSESSMENT
57F w/ hx of kidney stone, HTN, HLD, anxiety/depression p/w abdominal pain and urinary frequency found to have nephrolithiasis admit to medicine for further management

## 2022-02-16 NOTE — DISCHARGE NOTE PROVIDER - NSDCCPCAREPLAN_GEN_ALL_CORE_FT
PRINCIPAL DISCHARGE DIAGNOSIS  Diagnosis: Kidney stone  Assessment and Plan of Treatment: she was found on Ct imaging showing a  4mm right ureteral calculus now pain controlled   -tolerating PO challenge   -Percocet for severe pain prn if needed and continue flomax daily   -UA negative, cleared by Urology  -Follow up with  urologist Dr. Brown as an outpatient  in 1 week       PRINCIPAL DISCHARGE DIAGNOSIS  Diagnosis: Kidney stone  Assessment and Plan of Treatment: she was found on Ct imaging showing a  4mm right ureteral calculus now pain controlled   -tolerating PO challenge   -Percocet for severe pain prn if needed and continue flomax daily   -UA negative, cleared by Urology  -Follow up with  urologist Dr. Brown as an outpatient  in 1 week.   Please call your Dr. or report to the ER if you develop fever >100.4 severe persistent nausea, vomiting, diarrhea, chest pain, shortness of breath, abdomnial pain, weakness, difficulty urinating.

## 2022-02-16 NOTE — H&P ADULT - NSHPADDITIONALINFOADULT_GEN_ALL_CORE
Jeffery Boyer MD  Medicine Attending  Department of Hospital Medicine  pager: [phone] (available from 20:00 to 08:00)

## 2022-02-16 NOTE — DISCHARGE NOTE PROVIDER - CARE PROVIDERS DIRECT ADDRESSES
,DirectAddress_Unknown,[email or web address]

## 2022-02-16 NOTE — DISCHARGE NOTE NURSING/CASE MANAGEMENT/SOCIAL WORK - NSDCPEFALRISK_GEN_ALL_CORE
For information on Fall & Injury Prevention, visit: [email or web address] OR  [email or web address] OR  https://www.cdc.gov/steadi/patient.html

## 2022-02-16 NOTE — DISCHARGE NOTE PROVIDER - PROVIDER TOKENS
FREE:[LAST:[diana],FIRST:[darrel],PHONE:[phone]],FAX:[(   )    -],ADDRESS:[Address: [address] [place] [address]  Phone: [phone]],FOLLOWUP:[1 week]],PROVIDER:[TOKEN:[[id number]],FOLLOWUP:[1 week]]

## 2022-02-16 NOTE — H&P ADULT - HISTORY OF PRESENT ILLNESS
57F w/ hx of kidney stone, HTN, HLD, anxiety/depression p/w abdominal pain and urinary frequency. The patient reports that on 2/11 she began experiencing intermittent nonradiating RUQ crampy abdominal pain initially moderate severity and then intensifying associated w/ urinary frequency w/o painful urination or bloody urination and associated w/ nausea/NBNB emesis. No reported fever, chills, cp, palpitations, sob, cough, or diarrhea.

## 2022-02-16 NOTE — DISCHARGE NOTE NURSING/CASE MANAGEMENT/SOCIAL WORK - PATIENT PORTAL LINK FT
You can access the FollowMyHealth Patient Portal offered by [facility] by registering at the following website: [email or web address]. By joining [employer]’s FollowMyHealth portal, you will also be able to view your health information using other applications (apps) compatible with our system.

## 2022-02-16 NOTE — PROGRESS NOTE ADULT - ASSESSMENT
57F w/ hx of kidney stone, HTN, HLD, anxiety/depression p/w abdominal pain and urinary frequency found to have nephrolithiasis admit to medicine for further management
58y/o female with a 4mm right ureteral calculus now pain controlled     -PO challenge   -Percocet for severe pain if needed and continue flomax daily   -Strain urine   -Follow up with Dr. Brown as an outpatient   -Case d/w Dr. Brown    [facility]  [address] [place] [phone]

## 2022-02-16 NOTE — H&P ADULT - NSHPPHYSICALEXAM_GEN_ALL_CORE
Vital Signs Last 24 Hrs  T(C): 36.7 (16 Feb 2022 01:46), Max: 36.7 (16 Feb 2022 01:46)  T(F): 98.1 (16 Feb 2022 01:46), Max: 98.1 (16 Feb 2022 01:46)  HR: 101 (16 Feb 2022 01:46) (97 - 113)  BP: 127/86 (16 Feb 2022 01:46) (127/86 - 149/98)  BP(mean): --  RR: 16 (16 Feb 2022 01:46) (16 - 20)  SpO2: 97% (16 Feb 2022 01:46) (95% - 97%)    GENERAL: NAD, well-developed  HEAD:  Atraumatic, Normocephalic  EYES: EOMI, PERRL, conjunctiva and sclera clear  ENMT: MMM, good dentition  NECK: Supple, trachea midline  Lung: normal work of breathing, cta b/l  Cardiovascular: S1&S2+, rrr, no m/r/g appreciated; no pitting edema appreciated in b/l LE  ABDOMEN: bs+, soft, nt, nd, no appreciable masses  : No monae catheter  Neuro: A&Ox3, no flaccid paralysis in extremities appreciated  SKIN: warm and dry, no visible purulence in exposed areas, normal skin turgor

## 2022-02-16 NOTE — H&P ADULT - NSICDXPASTMEDICALHX_GEN_ALL_CORE_FT
PAST MEDICAL HISTORY:  Anxiety and depression     HLD (hyperlipidemia)     HTN (hypertension)     Nephrolithiasis

## 2022-02-16 NOTE — ED ADULT NURSE REASSESSMENT NOTE - NS ED NURSE REASSESS COMMENT FT1
Pt asleep but arouses to verbal stimuli. Pt appears comfortable, spontaneous breathing with equal chest rise. Pt receiving continuous IVF. Pt to go to ED orange holding.

## 2022-02-16 NOTE — DISCHARGE NOTE PROVIDER - HOSPITAL COURSE
57yoF PMHx of HTN, HLD p/w RLQ pain, started 11.30 am after she ate something a/w nausea, vomiting, urinary frequency. Had this pain in the last couple of days.    she was found on Ct imaging showing a  4mm right ureteral calculus now pain controlled     -PO challenge   -Percocet for severe pain prn if needed and continue flomax daily   -UA negative  -Follow up with Dr. Brown as an outpatient

## 2022-02-16 NOTE — PROGRESS NOTE ADULT - NSPROGADDITIONALINFOA_GEN_ALL_CORE
Karla Colorado   HOspitalist   I have spoken with the Urology team( Rhoda/ [phone])who recommended to change her to regular diet, change to oral pain meds as she is currently pain free and she tolerates oral intake and no pain, then she can be DC and F/up  with the urologist , as per urology team , pt will need to call the office and schedule the apt and the mild increase in creatinine is of no major concern as per urology.  will prepare for DC / DC time 45mns if she is dc today           Karla Colorado   HOspitalist  [phone]

## 2022-02-16 NOTE — H&P ADULT - PROBLEM SELECTOR PLAN 1
- evaluated by urology. NPO for now. IV fluids  - pain control with hydromorphone 0.5mg IV every 4 hr as needed for severe pain, acetaminophen for mild to moderate pain  - mild hypercalcemia on initial cmp- repeat in am to confirm. if present will need hypercalcemia w/o

## 2022-02-16 NOTE — DISCHARGE NOTE PROVIDER - CARE PROVIDER_API CALL
darrel ortiz  Address: [address] [place] [address]  Phone: [phone]  Phone: [phone]  Fax: (   )    -  Follow Up Time: 1 week    Maryam Brown)  Urology  [address] [place] [address]  Phone: [phone]  Fax: [phone]  Follow Up Time: 1 week

## 2022-02-16 NOTE — DISCHARGE NOTE PROVIDER - NSDCMRMEDTOKEN_GEN_ALL_CORE_FT
Lexapro 20 mg oral tablet: 2 tab(s) orally once a day  losartan 25 mg oral tablet: 1 tab(s) orally once a day  Multiple Vitamins oral tablet: 1 tab(s) orally once a day  oxycodone-acetaminophen 5 mg-325 mg oral tablet: 1 tab(s) orally every 6 hours, As needed, Severe Pain (7 - 10) MDD:4 tabs  rosuvastatin 10 mg oral capsule: 1 cap(s) orally once a day  tamsulosin 0.4 mg oral capsule: 1 cap(s) orally once a day (at bedtime)

## 2022-02-16 NOTE — PROGRESS NOTE ADULT - PROBLEM SELECTOR PLAN 1
- evaluated by urology. NPO/except for meds for now. IV fluids/ FLomax /   - pain control with hydromorphone 0.5mg IV every 4 hr as needed for severe pain, acetaminophen for mild to moderate pain   resolved Hypercalcemia

## 2022-02-16 NOTE — ED ADULT NURSE REASSESSMENT NOTE - NS ED NURSE REASSESS COMMENT FT1
Pt awake and alert. Reports 6/10 abdominal pain and  nausea. RN to notify provider for medication order. VSS on RA. Will continue to closely monitor pt.

## 2022-02-16 NOTE — PROGRESS NOTE ADULT - SUBJECTIVE AND OBJECTIVE BOX
UROLOGY DAILY PROGRESS NOTE:     Subjective: Patient seen and examined at bedside. Currently pain controlled with Tylenol, voiding well. No IV pain meds since 1am. Vomiting has resolved as well.    Objective:  Vital signs  T(F): , Max: 98.2 (02-16-22 @ 05:23)  HR: 88 (02-16-22 @ 10:59)  BP: 101/65 (02-16-22 @ 10:59)  SpO2: 95% (02-16-22 @ 10:59)     Gen: NAD  Pulm: No respiratory distress, no subcostal retractions  CV: RRR, no JVD  Abd: Soft, NT, ND  MSK: No CVAT b/l    Labs:  02-16  11.06 / 39.1  /1.24   02-15  11.62 / 45.5  /0.91                           12.8   11.06 )-----------( 220      ( 16 Feb 2022 05:13 )             39.1     02-16    139  |  104  |  24<H>  ----------------------------<  118<H>  4.4   |  25  |  1.24    Ca    9.6      16 Feb 2022 05:13  Phos  4.4     02-16  Mg     1.8     02-16    TPro  6.4  /  Alb  4.0  /  TBili  0.4  /  DBili  x   /  AST  27  /  ALT  23  /  AlkPhos  78  02-16    PT/INR - ( 16 Feb 2022 05:13 )   PT: 12.9 sec;   INR: 1.08 ratio         PTT - ( 16 Feb 2022 05:13 )  PTT:28.4 sec    Urine Cx: pending   
  Patient is a 57y old  Female who presents with a chief complaint of 57F p/w abdominal pain, urinary frequency (2022 02:03)      SUBJECTIVE / OVERNIGHT EVENTS:    57F w/ hx of kidney stone, HTN, HLD, anxiety/depression p/w abdominal pain and urinary frequency. The patient reports that on  she began experiencing intermittent nonradiating RUQ crampy abdominal pain initially moderate severity and then intensifying associated w/ urinary frequency w/o painful urination or bloody urination and associated w/ nausea/NBNB emesis. No reported fever, chills, cp, palpitations, sob, cough, or diarrhea.  In the ED , patient is afebrile and hemodynamically stable.  She was seen by Urology team who recommends for patient is to be placed on NPO and awaiting reevaluation.    T Max: 98.2F  HR: 88  (88 - 113)  BP: 101/65 (95/66 - 149/98)  RR: 18  (16 - 20)  SpO2: 95% (93% - 97%)  Patient is seen now and states to be without pain.        ADDITIONAL REVIEW OF SYSTEMS: Negative except for above    MEDICATIONS  (STANDING):  atorvastatin 40 milliGRAM(s) Oral at bedtime  escitalopram 40 milliGRAM(s) Oral daily  heparin   Injectable 5000 Unit(s) SubCutaneous every 12 hours  lactated ringers. 1000 milliLiter(s) (120 mL/Hr) IV Continuous <Continuous>  losartan 25 milliGRAM(s) Oral daily  multivitamin 1 Tablet(s) Oral daily  ondansetron Injectable 4 milliGRAM(s) IV Push every 4 hours  tamsulosin 0.4 milliGRAM(s) Oral at bedtime    MEDICATIONS  (PRN):  acetaminophen     Tablet .. 650 milliGRAM(s) Oral every 6 hours PRN Mild Pain (1 - 3), Moderate Pain (4 - 6)  HYDROmorphone  Injectable 0.5 milliGRAM(s) IV Push every 4 hours PRN Severe Pain (7 - 10)      CAPILLARY BLOOD GLUCOSE        I&O's Summary      PHYSICAL EXAM:  Vital Signs Last 24 Hrs  T(C): 36.5 (2022 10:59), Max: 36.8 (2022 05:23)  T(F): 97.7 (2022 10:59), Max: 98.2 (2022 05:23)  HR: 88 (2022 10:59) (88 - 113)  BP: 101/65 (2022 10:59) (95/66 - 149/98)  BP(mean): --  RR: 18 (2022 10:59) (16 - 20)  SpO2: 95% (2022 10:59) (93% - 97%)    PHYSICAL EXAM:  GENERAL: NAD, well-developed  HEAD:  Atraumatic, Normocephalic  EYES:  conjunctiva and sclera clear  NECK: Supple, No JVD  CHEST/LUNG: Clear to auscultation bilaterally; No wheeze  HEART: Regular rate and rhythm; No murmurs, rubs, or gallops  ABDOMEN: Soft, Nontender, Nondistended; Bowel sounds present  EXTREMITIES:  2+ Peripheral Pulses, No clubbing, cyanosis, or edema  PSYCH: AAOx3  NEUROLOGY: non-focal  SKIN: No rashes or lesions      LABS:                        12.8   11.06 )-----------( 220      ( 2022 05:13 )             39.1     02-16    139  |  104  |  24<H>  ----------------------------<  118<H>  4.4   |  25  |  1.24    Ca    9.6      2022 05:13  Phos  4.4     02-16  Mg     1.8     02-16    TPro  6.4  /  Alb  4.0  /  TBili  0.4  /  DBili  x   /  AST  27  /  ALT  23  /  AlkPhos  78  02-16    PT/INR - ( 2022 05:13 )   PT: 12.9 sec;   INR: 1.08 ratio         PTT - ( 2022 05:13 )  PTT:28.4 sec      Urinalysis Basic - ( 15 Feb 2022 16:41 )    Color: Yellow / Appearance: Clear / S.026 / pH: x  Gluc: x / Ketone: Negative  / Bili: Negative / Urobili: Negative   Blood: x / Protein: Trace / Nitrite: Negative   Leuk Esterase: Small / RBC: 1 /hpf / WBC 5 /HPF   Sq Epi: x / Non Sq Epi: 3 /hpf / Bacteria: Negative          RADIOLOGY & ADDITIONAL TESTS:    Imaging Personally Reviewed:    Electrocardiogram Personally Reviewed:    COORDINATION OF CARE:  Care Discussed with Consultants/Other Providers [Y/N]:  Prior or Outpatient Records Reviewed [Y/N]:

## 2022-02-16 NOTE — H&P ADULT - NSHPLABSRESULTS_GEN_ALL_CORE
Personally reviewed available labs, imaging and ekg  [1]  CBC Full  -  ( 15 Feb 2022 14:44 )  WBC Count : 11.62 K/uL  RBC Count : 5.32 M/uL  Hemoglobin : 14.9 g/dL  Hematocrit : 45.5 %  Platelet Count - Automated : 310 K/uL  Mean Cell Volume : 85.5 fl  Mean Cell Hemoglobin : 28.0 pg  Mean Cell Hemoglobin Concentration : 32.7 gm/dL  Auto Neutrophil # : 7.17 K/uL  Auto Lymphocyte # : 3.15 K/uL  Auto Monocyte # : 0.99 K/uL  Auto Eosinophil # : 0.17 K/uL  Auto Basophil # : 0.10 K/uL  Auto Neutrophil % : 61.7 %  Auto Lymphocyte % : 27.1 %  Auto Monocyte % : 8.5 %  Auto Eosinophil % : 1.5 %  Auto Basophil % : 0.9 %    02-15    143  |  102  |  18  ----------------------------<  108<H>  4.6   |  26  |  0.91    Ca    11.3<H>      15 Feb 2022 14:44    TPro  7.9  /  Alb  4.8  /  TBili  0.4  /  DBili  x   /  AST  29  /  ALT  33  /  AlkPhos  101  02-15      Imaging:  [ 2] I independently reviewed CT a/p and NO bowel obstruction appreciated.   < from: CT Abdomen and Pelvis No Cont (02.15.22 @ 15:25) >    IMPRESSION:  Mild right hydroureteronephrosis to the level of an obstructing 0.4 cm   calculus at the right ureterovesicle junction.    < end of copied text >      EKG:  [2] I independently reviewed bedside cardiac tracing and NSR appreciated

## 2022-02-17 PROBLEM — E78.5 HYPERLIPIDEMIA, UNSPECIFIED: Chronic | Status: ACTIVE | Noted: 2022-02-16

## 2022-02-17 PROBLEM — N20.0 CALCULUS OF KIDNEY: Chronic | Status: ACTIVE | Noted: 2022-02-16

## 2022-02-17 PROBLEM — I10 ESSENTIAL (PRIMARY) HYPERTENSION: Chronic | Status: ACTIVE | Noted: 2022-02-16

## 2022-02-17 PROBLEM — F41.9 ANXIETY DISORDER, UNSPECIFIED: Chronic | Status: ACTIVE | Noted: 2022-02-16

## 2022-02-17 LAB
CULTURE RESULTS: SIGNIFICANT CHANGE UP
SPECIMEN SOURCE: SIGNIFICANT CHANGE UP

## 2022-02-18 ENCOUNTER — APPOINTMENT (OUTPATIENT)
Dept: UROLOGY | Facility: CLINIC | Age: 57
End: 2022-02-18
Payer: COMMERCIAL

## 2022-02-18 VITALS
DIASTOLIC BLOOD PRESSURE: 79 MMHG | SYSTOLIC BLOOD PRESSURE: 113 MMHG | BODY MASS INDEX: 35.85 KG/M2 | WEIGHT: 210 LBS | HEART RATE: 101 BPM | HEIGHT: 64 IN

## 2022-02-18 DIAGNOSIS — R11.0 NAUSEA: ICD-10-CM

## 2022-02-18 DIAGNOSIS — R39.15 URGENCY OF URINATION: ICD-10-CM

## 2022-02-18 PROCEDURE — 99204 OFFICE O/P NEW MOD 45 MIN: CPT

## 2022-02-18 RX ORDER — ONDANSETRON 4 MG/1
4 TABLET, ORALLY DISINTEGRATING ORAL EVERY 8 HOURS
Qty: 10 | Refills: 0 | Status: ACTIVE | COMMUNITY
Start: 2022-02-18 | End: 1900-01-01

## 2022-02-19 ENCOUNTER — EMERGENCY (EMERGENCY)
Facility: HOSPITAL | Age: 57
LOS: 1 days | Discharge: HOME | End: 2022-02-19
Attending: EMERGENCY MEDICINE
Payer: COMMERCIAL

## 2022-02-19 VITALS
RESPIRATION RATE: 18 BRPM | WEIGHT: 210.1 LBS | TEMPERATURE: 98 F | HEART RATE: 95 BPM | OXYGEN SATURATION: 96 % | DIASTOLIC BLOOD PRESSURE: 77 MMHG | SYSTOLIC BLOOD PRESSURE: 120 MMHG | HEIGHT: 64 IN

## 2022-02-19 DIAGNOSIS — Z90.49 ACQUIRED ABSENCE OF OTHER SPECIFIED PARTS OF DIGESTIVE TRACT: Chronic | ICD-10-CM

## 2022-02-19 DIAGNOSIS — Z98.890 OTHER SPECIFIED POSTPROCEDURAL STATES: Chronic | ICD-10-CM

## 2022-02-19 LAB
ALBUMIN SERPL ELPH-MCNC: 4.3 G/DL — SIGNIFICANT CHANGE UP (ref 3.3–5.2)
ALP SERPL-CCNC: 84 U/L — SIGNIFICANT CHANGE UP (ref 40–120)
ALT FLD-CCNC: 45 U/L — HIGH
ANION GAP SERPL CALC-SCNC: 11 MMOL/L — SIGNIFICANT CHANGE UP (ref 5–17)
AST SERPL-CCNC: 50 U/L — HIGH
BILIRUB SERPL-MCNC: 0.4 MG/DL — SIGNIFICANT CHANGE UP (ref 0.4–2)
BUN SERPL-MCNC: 12.6 MG/DL — SIGNIFICANT CHANGE UP (ref 8–20)
CALCIUM SERPL-MCNC: 9.8 MG/DL — SIGNIFICANT CHANGE UP (ref 8.6–10.2)
CHLORIDE SERPL-SCNC: 101 MMOL/L — SIGNIFICANT CHANGE UP (ref 98–107)
CO2 SERPL-SCNC: 27 MMOL/L — SIGNIFICANT CHANGE UP (ref 22–29)
CREAT SERPL-MCNC: 0.82 MG/DL — SIGNIFICANT CHANGE UP (ref 0.5–1.3)
GLUCOSE SERPL-MCNC: 107 MG/DL — HIGH (ref 70–99)
POTASSIUM SERPL-MCNC: 5.3 MMOL/L — SIGNIFICANT CHANGE UP (ref 3.5–5.3)
POTASSIUM SERPL-SCNC: 5.3 MMOL/L — SIGNIFICANT CHANGE UP (ref 3.5–5.3)
PROT SERPL-MCNC: 7.6 G/DL — SIGNIFICANT CHANGE UP (ref 6.6–8.7)
SODIUM SERPL-SCNC: 139 MMOL/L — SIGNIFICANT CHANGE UP (ref 135–145)

## 2022-02-19 PROCEDURE — 80053 COMPREHEN METABOLIC PANEL: CPT

## 2022-02-19 PROCEDURE — 99283 EMERGENCY DEPT VISIT LOW MDM: CPT

## 2022-02-19 PROCEDURE — 36415 COLL VENOUS BLD VENIPUNCTURE: CPT

## 2022-02-19 PROCEDURE — 93010 ELECTROCARDIOGRAM REPORT: CPT

## 2022-02-19 PROCEDURE — 93005 ELECTROCARDIOGRAM TRACING: CPT

## 2022-02-19 PROCEDURE — 99284 EMERGENCY DEPT VISIT MOD MDM: CPT

## 2022-02-19 NOTE — ED STATDOCS - PATIENT PORTAL LINK FT
You can access the FollowMyHealth Patient Portal offered by [facility] by registering at the following website: [email or web address]. By joining [employer]’s FollowMyHealth portal, you will also be able to view your health information using other applications (apps) compatible with our system.

## 2022-02-19 NOTE — ED ADULT TRIAGE NOTE - AS TEMP SITE
Problem: Patient Care Overview  Goal: Plan of Care Review  Outcome: Ongoing (interventions implemented as appropriate)   09/20/18 0128   Coping/Psychosocial   Plan of Care Reviewed With patient   Plan of Care Review   Progress no change   OTHER   Outcome Summary Patient arrived to unit during this shift. VS stable. Patient has no c/o pain or shows any s/s of distress at this time. Will continue to monitor this patient.      Goal: Discharge Needs Assessment  Outcome: Ongoing (interventions implemented as appropriate)      Problem: Fall Risk (Adult)  Goal: Identify Related Risk Factors and Signs and Symptoms  Outcome: Ongoing (interventions implemented as appropriate)    Goal: Absence of Fall  Outcome: Ongoing (interventions implemented as appropriate)      Problem: Skin Injury Risk (Adult)  Goal: Identify Related Risk Factors and Signs and Symptoms  Outcome: Ongoing (interventions implemented as appropriate)    Goal: Skin Health and Integrity  Outcome: Ongoing (interventions implemented as appropriate)      Problem: Anemia (Adult)  Goal: Identify Related Risk Factors and Signs and Symptoms  Outcome: Ongoing (interventions implemented as appropriate)    Goal: Symptom Improvement  Outcome: Ongoing (interventions implemented as appropriate)         oral

## 2022-02-19 NOTE — ED STATDOCS - NSICDXPASTMEDICALHX_GEN_ALL_CORE_FT
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

## 2022-02-19 NOTE — ED ADULT TRIAGE NOTE - CHIEF COMPLAINT QUOTE
Patient arrived to ED today with c/o after blood work at MD office yesterday showed K of 6.9.  Patient reports nausea and vomiting for the past 4 days and she was told has a stone after CT.

## 2022-02-19 NOTE — ED STATDOCS - OBJECTIVE STATEMENT
56 Y/O female w/ PMHx. of HTN, HLD, spinal stenosis, colitis presents to ED 56 Y/O female w/ PMHx. of HTN, HLD, spinal stenosis, colitis presents to ED s/p urology visit yesterday for passing kidney stone and lab work now w/ potassium of 6.9. PT endorses normal creatinine results and normal renal function. PT denies weakness or muscle pain.

## 2022-02-19 NOTE — ED STATDOCS - ATTENDING CONTRIBUTION TO CARE
I, Dory Xiong, performed the initial face to face bedside interview with this patient regarding history of present illness, review of symptoms and relevant past medical, social and family history.  I completed an independent physical examination.  I was the initial provider who evaluated this patient. I have signed out the follow up of any pending tests (i.e. labs, radiological studies) to the ACP.  I have communicated the patient’s plan of care and disposition with the ACP.  The history, relevant review of systems, past medical and surgical history, medical decision making, and physical examination was documented by the scribe in my presence and I attest to the accuracy of the documentation.

## 2022-02-19 NOTE — ED STATDOCS - PROGRESS NOTE DETAILS
EKG and Potassium wnl. Outpt potassium lvl likely elevated due to being hemolyzed. PT stable for d/c.

## 2022-02-21 ENCOUNTER — TRANSCRIPTION ENCOUNTER (OUTPATIENT)
Age: 57
End: 2022-02-21

## 2022-02-21 PROBLEM — R39.15 URINARY URGENCY: Status: ACTIVE | Noted: 2022-02-21

## 2022-02-21 LAB
ANION GAP SERPL CALC-SCNC: 12 MMOL/L
BASOPHILS # BLD AUTO: 0.07 K/UL
BASOPHILS NFR BLD AUTO: 1.1 %
BUN SERPL-MCNC: 13 MG/DL
CALCIUM SERPL-MCNC: 9.4 MG/DL
CHLORIDE SERPL-SCNC: 103 MMOL/L
CO2 SERPL-SCNC: 26 MMOL/L
CREAT SERPL-MCNC: 0.92 MG/DL
EOSINOPHIL # BLD AUTO: 0.2 K/UL
EOSINOPHIL NFR BLD AUTO: 3.1 %
ESTIMATED AVERAGE GLUCOSE: 126 MG/DL
GLUCOSE SERPL-MCNC: 82 MG/DL
HBA1C MFR BLD HPLC: 6 %
HCT VFR BLD CALC: 41.5 %
HGB BLD-MCNC: 13.3 G/DL
IMM GRANULOCYTES NFR BLD AUTO: 0.2 %
LYMPHOCYTES # BLD AUTO: 1.61 K/UL
LYMPHOCYTES NFR BLD AUTO: 25 %
MAN DIFF?: NORMAL
MCHC RBC-ENTMCNC: 28.6 PG
MCHC RBC-ENTMCNC: 32 GM/DL
MCV RBC AUTO: 89.2 FL
MONOCYTES # BLD AUTO: 0.53 K/UL
MONOCYTES NFR BLD AUTO: 8.2 %
NEUTROPHILS # BLD AUTO: 4.01 K/UL
NEUTROPHILS NFR BLD AUTO: 62.4 %
PLATELET # BLD AUTO: 232 K/UL
POTASSIUM SERPL-SCNC: 6.9 MMOL/L
RBC # BLD: 4.65 M/UL
RBC # FLD: 12.5 %
SODIUM SERPL-SCNC: 140 MMOL/L
WBC # FLD AUTO: 6.43 K/UL

## 2022-02-21 NOTE — HISTORY OF PRESENT ILLNESS
[FreeTextEntry1] : 58 yo F presents with history of right flank pain starting about a week ago - intermittent\par Became severe with vomiting about 3 days ago - went to ER\par CT showed 4mm right mid ureteral stone\par Pain is controlled and last took a percocet yesterday\par Still feeling nauseous so not drinking much fluids\par Does have LUTS at baseline - urinary urgency for 6 months\par Voiding every 3 hours, nocturia 1/night\par no dysuria, no gross hematuria\par Drinks 3-4 cups of water, 1-2 cups of black tea daily\par History of stones starting  - s/p spontaneous passage\par  - s/p spontaneous passage\par no history of procedures for stones\par LMP = 13 yrs ago, 1 child, \par Not sexually active\par taking tamsulosin

## 2022-02-21 NOTE — ASSESSMENT
[FreeTextEntry1] : 58 yo F with nephrolithiasis, LUTS\par \par - Reviewed CT scan from ER visit through PACS and confirmed findings of right ureteral stone\par - Reviewed labwork \par -I discussed the different treatment modalities for nephrolithiasis with the patient, including medical management, spontaneous stone passage, percutaneous stone extraction, extracorporeal shock wave lithotripsy, and ureteroscopy with laser lithotripsy and stone extraction. Given the size and location of the stone, I recommend and the patient opted to proceed with ureteroscopy. The risks, benefits, and alternatives to ureteroscopy were discussed with the patient, including but not limited to pain, infection, bleeding, bladder injury, ureteral injury, renal injury, and treatment failure. I also discussed the possible need for a temporary ureteral stent. I discussed the possible side effects of a temporary ureteral stent, including flank pain, hematuria, and bladder spasms. The patient understands that a stent is a temporary implant that must be removed in the future. The patient wishes to proceed and we will schedule the surgery for the near future. \par - Schedule right URS/HLL in the near future\par - CBC, BMP, hgb a1c\par - Discussed possible etiologies for LUTS. Discussed ways to manage them including behavioral modifications such as adequate hydration, controlling constipation, restricting fluids in the evening\par - Discussed possible etiologies for nephrolithiasis. Reviewed behavioral modifications including adequate hydration, cutting back on coffee, dark sodas, dark teas, low sodium diet, increasing citrate levels with lemon juice. \par - Discussed importance of seeking medical attention should intractable flank pain with nausea, vomiting or fever occur\par

## 2022-02-22 ENCOUNTER — RESULT REVIEW (OUTPATIENT)
Age: 57
End: 2022-02-22

## 2022-02-22 ENCOUNTER — OUTPATIENT (OUTPATIENT)
Dept: OUTPATIENT SERVICES | Facility: HOSPITAL | Age: 57
LOS: 1 days | End: 2022-02-22
Payer: COMMERCIAL

## 2022-02-22 ENCOUNTER — APPOINTMENT (OUTPATIENT)
Age: 57
End: 2022-02-22

## 2022-02-22 VITALS
TEMPERATURE: 98 F | OXYGEN SATURATION: 98 % | DIASTOLIC BLOOD PRESSURE: 74 MMHG | HEART RATE: 98 BPM | SYSTOLIC BLOOD PRESSURE: 111 MMHG | RESPIRATION RATE: 20 BRPM

## 2022-02-22 VITALS
WEIGHT: 210.1 LBS | DIASTOLIC BLOOD PRESSURE: 79 MMHG | HEIGHT: 64 IN | OXYGEN SATURATION: 97 % | RESPIRATION RATE: 18 BRPM | SYSTOLIC BLOOD PRESSURE: 110 MMHG | TEMPERATURE: 99 F | HEART RATE: 102 BPM

## 2022-02-22 DIAGNOSIS — Z98.890 OTHER SPECIFIED POSTPROCEDURAL STATES: Chronic | ICD-10-CM

## 2022-02-22 DIAGNOSIS — N20.0 CALCULUS OF KIDNEY: ICD-10-CM

## 2022-02-22 DIAGNOSIS — Z91.89 OTHER SPECIFIED PERSONAL RISK FACTORS, NOT ELSEWHERE CLASSIFIED: ICD-10-CM

## 2022-02-22 DIAGNOSIS — Z90.49 ACQUIRED ABSENCE OF OTHER SPECIFIED PARTS OF DIGESTIVE TRACT: Chronic | ICD-10-CM

## 2022-02-22 DIAGNOSIS — R73.03 PREDIABETES: ICD-10-CM

## 2022-02-22 DIAGNOSIS — I10 ESSENTIAL (PRIMARY) HYPERTENSION: ICD-10-CM

## 2022-02-22 DIAGNOSIS — F41.9 ANXIETY DISORDER, UNSPECIFIED: ICD-10-CM

## 2022-02-22 DIAGNOSIS — E78.5 HYPERLIPIDEMIA, UNSPECIFIED: ICD-10-CM

## 2022-02-22 DIAGNOSIS — H81.09 MENIERE'S DISEASE, UNSPECIFIED EAR: ICD-10-CM

## 2022-02-22 LAB — GLUCOSE BLDC GLUCOMTR-MCNC: 125 MG/DL — HIGH (ref 70–99)

## 2022-02-22 PROCEDURE — 52352 CYSTOURETERO W/STONE REMOVE: CPT | Mod: 59

## 2022-02-22 PROCEDURE — 52352 CYSTOURETERO W/STONE REMOVE: CPT | Mod: RT

## 2022-02-22 PROCEDURE — 76000 FLUOROSCOPY <1 HR PHYS/QHP: CPT

## 2022-02-22 PROCEDURE — 88300 SURGICAL PATH GROSS: CPT

## 2022-02-22 PROCEDURE — 74420 UROGRAPHY RTRGR +-KUB: CPT | Mod: 26

## 2022-02-22 PROCEDURE — C1889: CPT

## 2022-02-22 PROCEDURE — 88300 SURGICAL PATH GROSS: CPT | Mod: 26

## 2022-02-22 PROCEDURE — 82962 GLUCOSE BLOOD TEST: CPT

## 2022-02-22 PROCEDURE — 82365 CALCULUS SPECTROSCOPY: CPT

## 2022-02-22 DEVICE — CATH URET 5FR 70CM: Type: IMPLANTABLE DEVICE | Site: RIGHT | Status: FUNCTIONAL

## 2022-02-22 DEVICE — STENT URETHRAL PIGTL DBL 6FRX22CM: Type: IMPLANTABLE DEVICE | Site: RIGHT | Status: FUNCTIONAL

## 2022-02-22 DEVICE — STENT URET PERCFLX PLUS 6X26 W/O GDWIRE: Type: IMPLANTABLE DEVICE | Site: RIGHT | Status: FUNCTIONAL

## 2022-02-22 DEVICE — BASKET ZEROTIP NITINOL 1.9FR 120CM X 12MM 4 WIRES: Type: IMPLANTABLE DEVICE | Site: RIGHT | Status: FUNCTIONAL

## 2022-02-22 DEVICE — STENT URET PERCFLX PLUS 6F 2MM 24CM: Type: IMPLANTABLE DEVICE | Site: RIGHT | Status: FUNCTIONAL

## 2022-02-22 DEVICE — GWIRE SENS NIT 0.035INX150CM: Type: IMPLANTABLE DEVICE | Site: RIGHT | Status: FUNCTIONAL

## 2022-02-22 RX ORDER — LOSARTAN POTASSIUM 100 MG/1
1 TABLET, FILM COATED ORAL
Qty: 0 | Refills: 0 | DISCHARGE

## 2022-02-22 RX ORDER — SODIUM CHLORIDE 9 MG/ML
1000 INJECTION, SOLUTION INTRAVENOUS
Refills: 0 | Status: DISCONTINUED | OUTPATIENT
Start: 2022-02-22 | End: 2022-03-01

## 2022-02-22 RX ORDER — AZTREONAM 2 G
1 VIAL (EA) INJECTION
Qty: 6 | Refills: 0
Start: 2022-02-22 | End: 2022-02-24

## 2022-02-22 RX ORDER — IBUPROFEN 200 MG
1 TABLET ORAL
Qty: 0 | Refills: 0 | DISCHARGE

## 2022-02-22 RX ORDER — ESCITALOPRAM OXALATE 10 MG/1
2 TABLET, FILM COATED ORAL
Qty: 0 | Refills: 0 | DISCHARGE

## 2022-02-22 RX ORDER — CHOLECALCIFEROL (VITAMIN D3) 125 MCG
1 CAPSULE ORAL
Qty: 0 | Refills: 0 | DISCHARGE

## 2022-02-22 RX ORDER — FENTANYL CITRATE 50 UG/ML
50 INJECTION INTRAVENOUS
Refills: 0 | Status: DISCONTINUED | OUTPATIENT
Start: 2022-02-22 | End: 2022-02-22

## 2022-02-22 RX ORDER — CHLORHEXIDINE GLUCONATE 213 G/1000ML
1 SOLUTION TOPICAL ONCE
Refills: 0 | Status: COMPLETED | OUTPATIENT
Start: 2022-02-22 | End: 2022-02-22

## 2022-02-22 RX ORDER — ASPIRIN/CALCIUM CARB/MAGNESIUM 324 MG
1 TABLET ORAL
Qty: 0 | Refills: 0 | DISCHARGE

## 2022-02-22 RX ORDER — SODIUM CHLORIDE 9 MG/ML
1000 INJECTION, SOLUTION INTRAVENOUS
Refills: 0 | Status: DISCONTINUED | OUTPATIENT
Start: 2022-02-22 | End: 2022-02-22

## 2022-02-22 RX ORDER — FENTANYL CITRATE 50 UG/ML
25 INJECTION INTRAVENOUS
Refills: 0 | Status: DISCONTINUED | OUTPATIENT
Start: 2022-02-22 | End: 2022-02-22

## 2022-02-22 RX ORDER — ROSUVASTATIN CALCIUM 5 MG/1
1 TABLET ORAL
Qty: 0 | Refills: 0 | DISCHARGE

## 2022-02-22 RX ORDER — ACETAMINOPHEN 500 MG
2 TABLET ORAL
Qty: 24 | Refills: 0
Start: 2022-02-22 | End: 2022-02-24

## 2022-02-22 RX ORDER — ACETAMINOPHEN 500 MG
650 TABLET ORAL EVERY 6 HOURS
Refills: 0 | Status: DISCONTINUED | OUTPATIENT
Start: 2022-02-22 | End: 2022-03-01

## 2022-02-22 RX ORDER — SODIUM CHLORIDE 9 MG/ML
3 INJECTION INTRAMUSCULAR; INTRAVENOUS; SUBCUTANEOUS EVERY 8 HOURS
Refills: 0 | Status: DISCONTINUED | OUTPATIENT
Start: 2022-02-22 | End: 2022-02-22

## 2022-02-22 RX ADMIN — SODIUM CHLORIDE 3 MILLILITER(S): 9 INJECTION INTRAMUSCULAR; INTRAVENOUS; SUBCUTANEOUS at 11:55

## 2022-02-22 RX ADMIN — FENTANYL CITRATE 50 MICROGRAM(S): 50 INJECTION INTRAVENOUS at 16:16

## 2022-02-22 RX ADMIN — FENTANYL CITRATE 50 MICROGRAM(S): 50 INJECTION INTRAVENOUS at 15:12

## 2022-02-22 RX ADMIN — Medication 650 MILLIGRAM(S): at 16:50

## 2022-02-22 RX ADMIN — CHLORHEXIDINE GLUCONATE 1 APPLICATION(S): 213 SOLUTION TOPICAL at 13:04

## 2022-02-22 RX ADMIN — Medication 650 MILLIGRAM(S): at 16:19

## 2022-02-22 NOTE — H&P PST ADULT - NSANTHOSAYNRD_GEN_A_CORE
No. JD screening performed.  STOP BANG Legend: 0-2 = LOW Risk; 3-4 = INTERMEDIATE Risk; 5-8 = HIGH Risk

## 2022-02-22 NOTE — ASU PATIENT PROFILE, ADULT - NSICDXPASTMEDICALHX_GEN_ALL_CORE_FT
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

## 2022-02-22 NOTE — H&P PST ADULT - PROBLEM SELECTOR PLAN 5
Last Hemoglobin ALC 6.0 on 2/19/2022. Perioperative glucose monitoring and coverage as needed. Follow-up with PCP for diabetic management.

## 2022-02-22 NOTE — BRIEF OPERATIVE NOTE - NSICDXBRIEFPROCEDURE_GEN_ALL_CORE_FT
PROCEDURES:  Cystoscopy with ureteroscopy with extraction of calculus using stone retrieval basket 22-Feb-2022 15:28:46  Claudia Castillo  Retrograde pyelogram 22-Feb-2022 15:28:53  Claudia Castillo

## 2022-02-22 NOTE — CONSULT NOTE ADULT - SUBJECTIVE AND OBJECTIVE BOX
DATE OF SERVICE: 2022   Patient was seen,examined and evaluated  by me.ER evaluation, Labs and Hospital course was reviewed,    CHIEF COMPLAINT:Right renal calculi    HPI:57F w/ hx of kidney stone, HTN, HLD, anxiety/depression p/w abdominal pain and urinary frequency. The patient reports that on  she began experiencing intermittent nonradiating RUQ crampy abdominal pain initially moderate severity and then intensifying associated w/ urinary frequency w/o painful urination or bloody urination and associated w/ nausea/NBNB emesis. No reported fever, chills, cp, palpitations, sob, cough, or diarrhea.  In the ED , patient is afebrile and hemodynamically stable.    Now scheduled for Right renal calculus exreaction  No cardiac Hx denies chest pain dyspnea able to walk 6-8 blocks.      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    Colitis, nonspecific    Nephrolithiasis    HTN (hypertension)    HLD (hyperlipidemia)    Anxiety and depression     delivery NOS      S/P cholecystectomy    S/P laminectomy        MEDICATIONS  (STANDING):  · 	losartan 25 mg oral tablet: 1 tab(s) orally once a day  · 	rosuvastatin 10 mg oral capsule: 1 cap(s) orally once a day  · 	Lexapro 20 mg oral tablet: 2 tab(s) orally once a day  · 	Multiple Vitamins oral tablet: 1 tab(s) orally once a day  · 	oxycodone-acetaminophen 5 mg-325 mg oral tablet: 1 tab(s) orally every 6 hours, As needed, Severe Pain (7 - 10) MDD:4 tabs  · 	tamsulosin 0.4 mg oral capsule: 1 cap(s) orally once a day (at bedtime)    FAMILY HISTORY:  No pertinent family history in first degree relatives      No family history of premature coronary artery disease or sudden cardiac death    SOCIAL HISTORY:  Smoking-[ ] Active  [ ] Former [ x] Non Smoker  Alcohol-[x ] Denies [ ] Social [ ] Daily  Ilicit Drug use-[x ] Denies [ ] Active user    REVIEW OF SYSTEMS:  Constitutional: [ ] fever, [ ]weight loss, [ ]fatigue   Activity [ ] Bedbound,[ x] Ambulates [x] Unassisted[ ] Cane/Walker [ ] Assistence.  Effort tolerance:[ ] Excellent [ ] Good [x ] Fair [ ] Poor [ ]  Eyes: [ ] visual changes  Respiratory: [ ]shortness of breath;  [ ] cough, [ ]wheezing, [ ]chills, [ ]hemoptysis  Cardiovascular: [ ] chest pain, [ ]palpitations, [ ]dizziness,  [ ]leg swelling[ ]orthopnea [ ]PND  Gastrointestinal: [ ] abdominal pain, [ ]nausea, [ ]vomiting,  [ ]diarrhea,[ ]constipation  Genitourinary: [ ] dysuria, [ ] hematuria  Neurologic: [ ] headaches [ ] tremors[ ] weakness  Skin: [ ] itching, [ ]burning, [ ] rashes  Endocrine: [ ] heat or cold intolerance  Musculoskeletal: [ ] joint pain or swelling; [ ] muscle, back, or extremity pain  Psychiatric: [ ] depression, [ ]anxiety, [ ]mood swings, or [ ]difficulty sleeping  Hematologic: [ ] easy bruising, [ ] bleeding gums       [ x] All others negative	  [ ] Unable to obtain    Vital Signs  · Temp (F): 98.1  · Temp (C) Temp (C): 36.7  · Temp site Temp Site: oral  · Heart Rate Heart Rate (beats/min): 95  · BP Systolic Systolic: 120  · BP Diastolic Diastolic (mm Hg): 77  · Respiration Rate (breaths/min) Respiration Rate (breaths/min): 18  · SpO2 (%) SpO2 (%): 96  · O2 Delivery/Oxygen Delivery Method Patient On (Oxygen Delivery Method): room air    PHYSICAL EXAM:  General: No acute distress BMI-32  HEENT: EOMI, PERRL[ ] Icteric  Neck: Supple, No JVD  Lungs: Equal air entry bilaterally; [ ] Rales [ ] Rhonchi [ ] Wheezing  Heart: Regular rate and rhythm;[x ] Murmurs-   2/6 [x ] Systolic [ ] Diastolic [ ] Radiation,No rubs, or gallops  Abdomen: Nontender, bowel sounds present  Extremities: No clubbing, cyanosis, or edema[ ] Calf tenderness  Nervous system:  Alert & Oriented X3, no focal deficits  Psychiatric: Normal affect  Skin: No rashes or lesions      LABS:        Creatinine Trend: 0.82<--, 1.24<--, 0.91<--            RADIOLOGY:  CT ABDOMEN  IMPRESSION:  Mild right hydroureteronephrosis to the level of an obstructing 0.4 cm   calculus at the right ureterovesicle junction.      ECG [my interpretation]:  Sinus rhythm  Nonspecific ST and T wave abnormality  Noisy background/poor baseline

## 2022-02-22 NOTE — ASU DISCHARGE PLAN (ADULT/PEDIATRIC) - NS MD DC FALL RISK RISK
For information on Fall & Injury Prevention, visit: [email or web address] OR  [email or web address] OR  https://www.cdc.gov/steadi/patient.html

## 2022-02-22 NOTE — H&P PST ADULT - PROBLEM SELECTOR PROBLEM 2
[Follow-Up] : a follow-up visit for [Complete Atrioventricular Canal] : a complete atrioventricular canal [Patient] : patient [Mother] : mother [FreeTextEntry1] : trisomy 21 At risk for sleep apnea

## 2022-02-22 NOTE — CONSULT NOTE ADULT - TIME BILLING
- Review of records, telemetry, vital signs and daily labs.   - General and cardiovascular physical examination.  - Generation of cardiovascular treatment plan.  - Coordination of care.      Patient was seen and examined by me on 02/22/2022,interim events noted,labs and radiology studies reviewed.  Juan Guerrero MD,FACC.  [address] [place], [place] [id number].  [phone]

## 2022-02-22 NOTE — H&P PST ADULT - PROBLEM SELECTOR PLAN 1
Cystoscopy, right retrograde pyelogram, right ureteroscopy with Holmium laser lithotripsy, right ureteral stent placement on 2/22/2022. Optimized for surgery by Dr Guerrero.   NPO since midnight as per pt. COVID-19 test done on 2/19/2022=negative.

## 2022-02-22 NOTE — H&P PST ADULT - PROBLEM SELECTOR PLAN 2
JD stop bang score 3. Pt at intermediate risk for sleep apnea and at risk for pulmonary complications. Perioperative pulmonary precautions to be maintained.

## 2022-02-22 NOTE — H&P PST ADULT - NSICDXFAMILYHX_GEN_ALL_CORE_FT
FAMILY HISTORY:  Father  Still living? No  Family history of cerebrovascular accident (CVA) in father, Age at diagnosis: Age Unknown  Family history of hypertension, Age at diagnosis: Age Unknown  Family history of renal failure, Age at diagnosis: Age Unknown    Mother  Still living? No  Family history of breast cancer in mother, Age at diagnosis: Age Unknown  Family history of hypertension, Age at diagnosis: Age Unknown    Sibling  Still living? Yes, Estimated age: Age Unknown  Family history of diabetes mellitus, Age at diagnosis: Age Unknown

## 2022-02-22 NOTE — CONSULT NOTE ADULT - ASSESSMENT
57F w/ hx of kidney stone, HTN, HLD, anxiety/depression p/w abdominal pain and urinary frequency found to have nephrolithiasis    Scheduled for Right renal stone extraction  Patient's Revised Cardiac Risk Index (RCRI) score is 0  ( 3.9 % risk)  Patient is at low  risk of  adverse perioperative cardiac events undergoing low  risk surgery. No further cardiac testing is needed prior to patient's surgery.

## 2022-02-22 NOTE — H&P PST ADULT - HISTORY OF PRESENT ILLNESS
This is a 57 yr old female with PMH of HTN, HLD, anxiety presents with c/o lower abdominal quadrant pain and flank pain due to renal stone. Denies any nausea, hematuria, dysuria. Pt for cystoscopy, ureteroscopy with laser lithotripsy  This is a 57 yr old female with PMH of HTN, HLD, prediabetes, meniere's disease, anxiety, depression, PSH of lumbar laminectomy presents with c/o intermittent flank pain and urinary frequency due to renal stone. Reports mild nausea. Denies any hematuria, dysuria. Pt is scheduled for cystoscopy, right retrograde pyelogram, right ureteroscopy with Holmium laser lithotripsy, right ureteral stent placement on 2/22/2022.

## 2022-02-22 NOTE — ASU DISCHARGE PLAN (ADULT/PEDIATRIC) - CARE PROVIDER_API CALL
Claudia Castillo; MPH)  Urology  [address] [facility] [address] [place] [address]  Phone: [phone]  Fax: [phone]  Follow Up Time:

## 2022-02-22 NOTE — H&P PST ADULT - ASSESSMENT
This is a 57 yr old female with PMH of HTN, HLD, prediabetes, meniere's disease, anxiety, depression, PSH of lumbar laminectomy presents with calculus of kidney. Pt is scheduled for cystoscopy, right retrograde pyelogram, right ureteroscopy with Holmium laser lithotripsy, right ureteral stent placement on 2/22/2022.  JD stop bang score 3. Pt never had sleep study done, is at intermediate risk for sleep apnea and at risk for pulmonary complications.

## 2022-02-22 NOTE — H&P PST ADULT - NSICDXPASTMEDICALHX_GEN_ALL_CORE_FT
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

## 2022-02-22 NOTE — ASU PREOP CHECKLIST - VIA
I have reviewed the notes, assessments, and/or procedures performed , I concur with her/his documentation of Anitra Schaefer 
   Procedure: Percutaneous Tibial Nerve Stimulation (PTNS)   Date onset of symptoms SINCE 2-3 YEARS AGO  Behavior modification: NO  Biofeedback: NO  E-Stim: NO  Drugs: VESICARE GAVE HER SIDE EFFECTS SHE COULDN'T TOLERATE  Other: NONE  Session number: ONE MONTH F/U     Patient Goals and Progress   Decreased urgency: YES   Decreased frequency: YES  No accidents: YES  Sleeps through the night: YES  No related health and social factors: NO     Caffeine - cups per day: 0  Alcohol - number of drinks per day: 0   Daytime voids - number per day: 4-5   Nighttime voids - number per night: 1-2  Urgency: 2  Incontinence - episodes per day: 2     Treatment Plan   Increase fluids: NO   Decrease fluids: YES   Decrease caffeine: NO  Urge reduction techniques: YES  Kegels: NO   Toilet every 3-4 hours     No fluids before bed YES     Ankle Used: LEFT     Treatment settin  Duration of treatment: 30 MINUTES  Lead lot #: [id number]  Expiration Date: 2020       Feeling/Response:  Toe flex and foot sensation
stretcher

## 2022-02-28 PROBLEM — H81.09 MENIERE'S DISEASE, UNSPECIFIED EAR: Chronic | Status: ACTIVE | Noted: 2022-02-22

## 2022-03-01 LAB — NIDUS STONE QN: SIGNIFICANT CHANGE UP

## 2022-03-07 LAB — SURGICAL PATHOLOGY STUDY: SIGNIFICANT CHANGE UP

## 2022-03-11 ENCOUNTER — APPOINTMENT (OUTPATIENT)
Age: 57
End: 2022-03-11
Payer: COMMERCIAL

## 2022-03-11 ENCOUNTER — LABORATORY RESULT (OUTPATIENT)
Age: 57
End: 2022-03-11

## 2022-03-11 DIAGNOSIS — N20.0 CALCULUS OF KIDNEY: ICD-10-CM

## 2022-03-11 PROCEDURE — 99213 OFFICE O/P EST LOW 20 MIN: CPT

## 2022-03-11 NOTE — HISTORY OF PRESENT ILLNESS
[FreeTextEntry1] : 56 yo F presents with history of right flank pain starting about a week ago - intermittent\par Became severe with vomiting about 3 days ago - went to ER\par CT showed 4mm right mid ureteral stone\par Pain is controlled and last took a percocet yesterday\par Still feeling nauseous so not drinking much fluids\par Does have LUTS at baseline - urinary urgency for 6 months\par Voiding every 3 hours, nocturia 1/night\par no dysuria, no gross hematuria\par Drinks 3-4 cups of water, 1-2 cups of black tea daily\par History of stones starting  - s/p spontaneous passage\par  - s/p spontaneous passage\par no history of procedures for stones\par LMP = 13 yrs ago, 1 child, \par Not sexually active\par taking tamsulosin\par \par 3/11/22 Interval history: pt is s/p right URS with stone extraction\par Doing well since procedure\par No recurrence of symptoms\par

## 2022-03-11 NOTE — ASSESSMENT
[FreeTextEntry1] : 56 yo F with history of recurrent nephrolithiasis\par \par - Reviewed stone analysis results with pt\par - Discussed diet and behavioral modifications for stone prevention\par - Metabolic stone workup - litholink, PTH, uric acid, BMP\par - FU in 6 months for repeat renal US\par

## 2022-03-14 LAB
ANION GAP SERPL CALC-SCNC: 10 MMOL/L
BUN SERPL-MCNC: 16 MG/DL
CALCIUM SERPL-MCNC: 10.2 MG/DL
CALCIUM SERPL-MCNC: 10.2 MG/DL
CHLORIDE SERPL-SCNC: 105 MMOL/L
CO2 SERPL-SCNC: 25 MMOL/L
CREAT SERPL-MCNC: 0.76 MG/DL
EGFR: 90 ML/MIN/1.73M2
GLUCOSE SERPL-MCNC: 132 MG/DL
PARATHYROID HORMONE INTACT: 25 PG/ML
POTASSIUM SERPL-SCNC: 5.1 MMOL/L
SODIUM SERPL-SCNC: 140 MMOL/L
URATE SERPL-MCNC: 4.7 MG/DL

## 2022-07-05 ENCOUNTER — NON-APPOINTMENT (OUTPATIENT)
Age: 57
End: 2022-07-05

## 2022-08-20 ENCOUNTER — RX RENEWAL (OUTPATIENT)
Age: 57
End: 2022-08-20

## 2022-08-31 ENCOUNTER — EMERGENCY (EMERGENCY)
Facility: HOSPITAL | Age: 57
LOS: 1 days | Discharge: HOME | End: 2022-08-31
Attending: STUDENT IN AN ORGANIZED HEALTH CARE EDUCATION/TRAINING PROGRAM
Payer: COMMERCIAL

## 2022-08-31 VITALS
DIASTOLIC BLOOD PRESSURE: 120 MMHG | HEIGHT: 64 IN | TEMPERATURE: 98 F | RESPIRATION RATE: 16 BRPM | SYSTOLIC BLOOD PRESSURE: 165 MMHG | OXYGEN SATURATION: 97 % | HEART RATE: 93 BPM

## 2022-08-31 DIAGNOSIS — Z98.890 OTHER SPECIFIED POSTPROCEDURAL STATES: Chronic | ICD-10-CM

## 2022-08-31 DIAGNOSIS — Z90.49 ACQUIRED ABSENCE OF OTHER SPECIFIED PARTS OF DIGESTIVE TRACT: Chronic | ICD-10-CM

## 2022-08-31 LAB
ALBUMIN SERPL ELPH-MCNC: 4.6 G/DL — SIGNIFICANT CHANGE UP (ref 3.3–5)
ALP SERPL-CCNC: 99 U/L — SIGNIFICANT CHANGE UP (ref 40–120)
ALT FLD-CCNC: 21 U/L — SIGNIFICANT CHANGE UP (ref 10–45)
ANION GAP SERPL CALC-SCNC: 10 MMOL/L — SIGNIFICANT CHANGE UP (ref 5–17)
APTT BLD: 28.7 SEC — SIGNIFICANT CHANGE UP (ref 27.5–35.5)
AST SERPL-CCNC: 21 U/L — SIGNIFICANT CHANGE UP (ref 10–40)
BASE EXCESS BLDV CALC-SCNC: 4 MMOL/L — HIGH (ref -2–3)
BASOPHILS # BLD AUTO: 0.1 K/UL — SIGNIFICANT CHANGE UP (ref 0–0.2)
BASOPHILS NFR BLD AUTO: 1.1 % — SIGNIFICANT CHANGE UP (ref 0–2)
BILIRUB SERPL-MCNC: 0.3 MG/DL — SIGNIFICANT CHANGE UP (ref 0.2–1.2)
BUN SERPL-MCNC: 17 MG/DL — SIGNIFICANT CHANGE UP (ref 7–23)
CA-I SERPL-SCNC: 1.27 MMOL/L — SIGNIFICANT CHANGE UP (ref 1.15–1.33)
CALCIUM SERPL-MCNC: 9.7 MG/DL — SIGNIFICANT CHANGE UP (ref 8.4–10.5)
CHLORIDE BLDV-SCNC: 102 MMOL/L — SIGNIFICANT CHANGE UP (ref 96–108)
CHLORIDE SERPL-SCNC: 102 MMOL/L — SIGNIFICANT CHANGE UP (ref 96–108)
CO2 BLDV-SCNC: 32 MMOL/L — HIGH (ref 22–26)
CO2 SERPL-SCNC: 26 MMOL/L — SIGNIFICANT CHANGE UP (ref 22–31)
CREAT SERPL-MCNC: 1.08 MG/DL — SIGNIFICANT CHANGE UP (ref 0.5–1.3)
EGFR: 59 ML/MIN/1.73M2 — LOW
EOSINOPHIL # BLD AUTO: 0.24 K/UL — SIGNIFICANT CHANGE UP (ref 0–0.5)
EOSINOPHIL NFR BLD AUTO: 2.7 % — SIGNIFICANT CHANGE UP (ref 0–6)
GAS PNL BLDV: 137 MMOL/L — SIGNIFICANT CHANGE UP (ref 136–145)
GAS PNL BLDV: SIGNIFICANT CHANGE UP
GAS PNL BLDV: SIGNIFICANT CHANGE UP
GLUCOSE BLDV-MCNC: 101 MG/DL — HIGH (ref 70–99)
GLUCOSE SERPL-MCNC: 94 MG/DL — SIGNIFICANT CHANGE UP (ref 70–99)
HCO3 BLDV-SCNC: 31 MMOL/L — HIGH (ref 22–29)
HCT VFR BLD CALC: 41.9 % — SIGNIFICANT CHANGE UP (ref 34.5–45)
HCT VFR BLDA CALC: 42 % — SIGNIFICANT CHANGE UP (ref 34.5–46.5)
HGB BLD CALC-MCNC: 14 G/DL — SIGNIFICANT CHANGE UP (ref 11.7–16.1)
HGB BLD-MCNC: 13.6 G/DL — SIGNIFICANT CHANGE UP (ref 11.5–15.5)
IMM GRANULOCYTES NFR BLD AUTO: 0.3 % — SIGNIFICANT CHANGE UP (ref 0–1.5)
INR BLD: 0.98 RATIO — SIGNIFICANT CHANGE UP (ref 0.88–1.16)
LACTATE BLDV-MCNC: 1.1 MMOL/L — SIGNIFICANT CHANGE UP (ref 0.5–2)
LYMPHOCYTES # BLD AUTO: 2.45 K/UL — SIGNIFICANT CHANGE UP (ref 1–3.3)
LYMPHOCYTES # BLD AUTO: 27.8 % — SIGNIFICANT CHANGE UP (ref 13–44)
MCHC RBC-ENTMCNC: 28.5 PG — SIGNIFICANT CHANGE UP (ref 27–34)
MCHC RBC-ENTMCNC: 32.5 GM/DL — SIGNIFICANT CHANGE UP (ref 32–36)
MCV RBC AUTO: 87.8 FL — SIGNIFICANT CHANGE UP (ref 80–100)
MONOCYTES # BLD AUTO: 0.63 K/UL — SIGNIFICANT CHANGE UP (ref 0–0.9)
MONOCYTES NFR BLD AUTO: 7.2 % — SIGNIFICANT CHANGE UP (ref 2–14)
NEUTROPHILS # BLD AUTO: 5.36 K/UL — SIGNIFICANT CHANGE UP (ref 1.8–7.4)
NEUTROPHILS NFR BLD AUTO: 60.9 % — SIGNIFICANT CHANGE UP (ref 43–77)
NRBC # BLD: 0 /100 WBCS — SIGNIFICANT CHANGE UP (ref 0–0)
PCO2 BLDV: 53 MMHG — HIGH (ref 39–42)
PH BLDV: 7.37 — SIGNIFICANT CHANGE UP (ref 7.32–7.43)
PLATELET # BLD AUTO: 299 K/UL — SIGNIFICANT CHANGE UP (ref 150–400)
PO2 BLDV: 19 MMHG — LOW (ref 25–45)
POTASSIUM BLDV-SCNC: 5 MMOL/L — SIGNIFICANT CHANGE UP (ref 3.5–5.1)
POTASSIUM SERPL-MCNC: 4.7 MMOL/L — SIGNIFICANT CHANGE UP (ref 3.5–5.3)
POTASSIUM SERPL-SCNC: 4.7 MMOL/L — SIGNIFICANT CHANGE UP (ref 3.5–5.3)
PROT SERPL-MCNC: 7.4 G/DL — SIGNIFICANT CHANGE UP (ref 6–8.3)
PROTHROM AB SERPL-ACNC: 11.4 SEC — SIGNIFICANT CHANGE UP (ref 10.5–13.4)
RBC # BLD: 4.77 M/UL — SIGNIFICANT CHANGE UP (ref 3.8–5.2)
RBC # FLD: 12.6 % — SIGNIFICANT CHANGE UP (ref 10.3–14.5)
SAO2 % BLDV: 27.6 % — LOW (ref 67–88)
SARS-COV-2 RNA SPEC QL NAA+PROBE: SIGNIFICANT CHANGE UP
SODIUM SERPL-SCNC: 138 MMOL/L — SIGNIFICANT CHANGE UP (ref 135–145)
TROPONIN T, HIGH SENSITIVITY RESULT: 6 NG/L — SIGNIFICANT CHANGE UP (ref 0–51)
WBC # BLD: 8.81 K/UL — SIGNIFICANT CHANGE UP (ref 3.8–10.5)
WBC # FLD AUTO: 8.81 K/UL — SIGNIFICANT CHANGE UP (ref 3.8–10.5)

## 2022-08-31 PROCEDURE — 70498 CT ANGIOGRAPHY NECK: CPT | Mod: 26,MA

## 2022-08-31 PROCEDURE — 71045 X-RAY EXAM CHEST 1 VIEW: CPT | Mod: 26

## 2022-08-31 PROCEDURE — 70496 CT ANGIOGRAPHY HEAD: CPT | Mod: 26,MA

## 2022-08-31 PROCEDURE — 70551 MRI BRAIN STEM W/O DYE: CPT | Mod: 26,MA

## 2022-08-31 PROCEDURE — 99204 OFFICE O/P NEW MOD 45 MIN: CPT

## 2022-08-31 PROCEDURE — 95816 EEG AWAKE AND DROWSY: CPT | Mod: 26

## 2022-08-31 RX ORDER — ATORVASTATIN CALCIUM 80 MG/1
80 TABLET, FILM COATED ORAL AT BEDTIME
Refills: 0 | Status: DISCONTINUED | OUTPATIENT
Start: 2022-08-31 | End: 2022-09-03

## 2022-08-31 RX ORDER — ASPIRIN/CALCIUM CARB/MAGNESIUM 324 MG
81 TABLET ORAL DAILY
Refills: 0 | Status: DISCONTINUED | OUTPATIENT
Start: 2022-08-31 | End: 2022-09-03

## 2022-08-31 RX ORDER — PIPERACILLIN AND TAZOBACTAM 4; .5 G/20ML; G/20ML
3.38 INJECTION, POWDER, LYOPHILIZED, FOR SOLUTION INTRAVENOUS ONCE
Refills: 0 | Status: DISCONTINUED | OUTPATIENT
Start: 2022-08-31 | End: 2022-08-31

## 2022-08-31 RX ORDER — DIAZEPAM 5 MG
5 TABLET ORAL ONCE
Refills: 0 | Status: DISCONTINUED | OUTPATIENT
Start: 2022-08-31 | End: 2022-08-31

## 2022-08-31 RX ORDER — ONDANSETRON 8 MG/1
4 TABLET, FILM COATED ORAL ONCE
Refills: 0 | Status: COMPLETED | OUTPATIENT
Start: 2022-08-31 | End: 2022-08-31

## 2022-08-31 RX ADMIN — ATORVASTATIN CALCIUM 80 MILLIGRAM(S): 80 TABLET, FILM COATED ORAL at 23:50

## 2022-08-31 RX ADMIN — ONDANSETRON 4 MILLIGRAM(S): 8 TABLET, FILM COATED ORAL at 10:50

## 2022-08-31 RX ADMIN — Medication 5 MILLIGRAM(S): at 21:38

## 2022-08-31 NOTE — ED ADULT TRIAGE NOTE - DIRECT TO ROOM CARE INITIATED:
Subjective   Jose Miguel Faustin is a 72 y.o. male. Presents today for   Chief Complaint   Patient presents with   • Edema     pt's legs have been swelling, was having difficulty walking too       Leg Swelling   This is a recurrent (Fell 1 month ago, and thinks swelling worse since then.) problem. Episode onset: 1 month. The problem occurs intermittently. The problem has been waxing and waning. Associated symptoms include arthralgias and weakness (feels more weak in legs). Pertinent negatives include no abdominal pain, chest pain, chills, coughing, fever, nausea or vomiting. Associated symptoms comments: Patient with active history.  No calf or leg pain.     Drank more water yesterday and swelling better this am, so thinks needs drink more.  Is more soa this am per patient.  I discussed hx of chf, do not push fluids.    Has hx of COPD as well;  Needing more oxygen of late.    Patient has hx of a. Fib and is on chronic anticoagulation of which currently managed by oncology as hx of lung cancer (active stage IV, non-small cell).  Patient reports INR 2.1 7/17/17.. Exacerbated by: Sitting long periods. Treatments tried: on diuretic. The treatment provided mild relief.   Congestive Heart Failure   Presents for follow-up visit. Associated symptoms include edema, nocturia and shortness of breath. Pertinent negatives include no abdominal pain, chest pain, chest pressure, near-syncope, orthopnea, palpitations, paroxysmal nocturnal dyspnea or unexpected weight change. Symptom course: waxing and waning. Compliance with total regimen is %. Compliance with medications is %.       Review of Systems   Constitutional: Negative for chills, fever and unexpected weight change.   Respiratory: Positive for shortness of breath. Negative for cough and wheezing.    Cardiovascular: Positive for leg swelling. Negative for chest pain, palpitations and near-syncope.   Gastrointestinal: Negative for abdominal pain, nausea and vomiting.    Genitourinary: Positive for nocturia.   Musculoskeletal: Positive for arthralgias and gait problem.   Neurological: Positive for weakness (feels more weak in legs). Negative for syncope.       The following portions of the patient's history were reviewed and updated as appropriate: allergies, current medications, past medical history and problem list.    Patient Active Problem List   Diagnosis   • Anemia   • Atrial fibrillation   • Benign essential hypertension   • Gastroesophageal reflux disease with esophagitis   • Chronic kidney disease, stage III (moderate)   • Acute on chronic diastolic congestive heart failure   • COPD with chronic bronchitis   • Type 2 diabetes mellitus with chronic kidney disease, with long-term current use of insulin   • Dry skin dermatitis   • Shortness of breath   • Edema   • Chronic gout of multiple sites   • Hyperlipidemia   • Hypoxia   • Adiposity   • Obstructive sleep apnea syndrome   • Pulmonary hypertension   • Reactive airway disease   • Tinea pedis   • Chronic obstructive pulmonary disease   • Left heart failure   • Venous stasis   • Injury of kidney   • Echocardiogram abnormal   • Iron deficiency anemia   • Pericardial effusion   • Lung mass   • Iron malabsorption   • Non-small cell carcinoma of lung, stage 4   • Pleural effusion       No Known Allergies    Current Outpatient Prescriptions on File Prior to Visit   Medication Sig Dispense Refill   • albuterol (PROVENTIL) (2.5 MG/3ML) 0.083% nebulizer solution Albuterol Sulfate (2.5 MG/3ML) 0.083% Inhalation Nebulization Solution; Patient Sig: Albuterol Sulfate (2.5 MG/3ML) 0.083% Inhalation Nebulization Solution USE 1 UNIT DOSE IN NEBULIZER EVERY 4 TO 6 HOURS AS NEEDED.; 1; 11; 28-Apr-2014; Active     • allopurinol (ZYLOPRIM) 300 MG tablet TAKE 1 TABLET DAILY 90 tablet 3   • BIOTIN 5000 PO Take  by mouth.     • Calcium Citrate-Vitamin D (CALCIUM CITRATE + D3 PO) Take  by mouth.     • Cholecalciferol (VITAMIN D3) 5000 UNITS  capsule capsule Take 5,000 Units by mouth daily.     • Chromium-Cinnamon (CINNAMON PLUS CHROMIUM PO) Take  by mouth.     • diltiaZEM (CARDIZEM) 60 MG tablet Take 1 tablet by mouth 3 (Three) Times a Day. 270 tablet 1   • furosemide (LASIX) 40 MG tablet TAKE 1 TABLET TWICE DAILY 180 tablet 3   • Glucosamine-Chondroit-Vit C-Mn (GLUCOSAMINE CHONDR 1500 COMPLX PO) Take  by mouth.     • Glucose Blood (BLOOD GLUCOSE TEST) strip Check twice daily. Disp Dm2 testing supplies as needs.  Dx: E11.9 Type 2 diabetes on long-term insulin use. [facility] [phone]. 100 each 12   • glucose blood test strip 1 each by Other route 2 (Two) Times a Day. 100 each 3   • hydrALAZINE (APRESOLINE) 100 MG tablet Take 1 tablet by mouth 3 (Three) Times a Day.     • Lancet Devices (ACCU-CHEK SOFTCLIX) lancets Test bid 100 each 3   • Multiple Vitamins-Minerals (MULTIVITAMIN ADULT PO) Take  by mouth.     • Omega-3 Fatty Acids (FISH OIL) 1000 MG capsule capsule Take  by mouth daily with breakfast.     • potassium chloride (K-DUR,KLOR-CON) 20 MEQ CR tablet take 1 tablet by mouth once daily  0   • pravastatin (PRAVACHOL) 80 MG tablet TAKE 1 TABLET EVERY DAY 90 tablet 3   • saxagliptin (ONGLYZA) 5 MG tablet Take  by mouth.     • tiotropium (SPIRIVA) 18 MCG per inhalation capsule daily.     • vitamin E 400 UNIT capsule Take 400 Units by mouth daily.     • warfarin (COUMADIN) 5 MG tablet Currently on 7mg daily, mgmt by Cardiology     • insulin glargine (LANTUS) 100 UNIT/ML injection Inject 8 Units under the skin every night (Patient taking differently: Inject 10 Units under the skin Every Night.) 1 each 12   • [DISCONTINUED] amLODIPine (NORVASC) 10 MG tablet TAKE 1 TABLET EVERY DAY 90 tablet 3   • [DISCONTINUED] diltiaZEM (CARDIZEM) 60 MG tablet TAKE 1 TABLET BY MOUTH THREE TIMES DAILY 90 tablet 0   • [DISCONTINUED] Ferrous Gluconate 325 (36 FE) MG tablet Take  by mouth.     • [DISCONTINUED] glipiZIDE (GLUCOTROL) 10 MG tablet TAKE 2 TABLETS BEFORE BREAKFAST AND  "TAKE 1 TABLET BEFORE SUPPER 270 tablet 3     No current facility-administered medications on file prior to visit.        Objective   Vitals:    07/26/17 0852   BP: 104/64   BP Location: Right arm   Patient Position: Sitting   Cuff Size: Large Adult   Pulse: 63   Temp: 97.7 °F (36.5 °C)   TempSrc: Oral   SpO2: 95%   Weight: 250 lb (113 kg)   Height: 74\" (188 cm)       Physical Exam   Constitutional: He appears well-developed and well-nourished.   HENT:   Head: Normocephalic and atraumatic.   Neck: Neck supple. No JVD present. No thyromegaly present.   Cardiovascular: Normal rate, regular rhythm and normal heart sounds.  Exam reveals no gallop and no friction rub.    No murmur heard.  Pulmonary/Chest: Effort normal. No respiratory distress. He has decreased breath sounds in the right lower field and the left lower field. He has no wheezes. He has rales in the right lower field and the left lower field.   Abdominal: Soft. Bowel sounds are normal. He exhibits no distension. There is no tenderness. There is no rebound and no guarding.   Musculoskeletal: He exhibits edema (symmetric;  Negative calf pain with squeeze;  negative tyshawn's sign.).   Neurological: He is alert.   Skin: Skin is warm and dry.   Psychiatric: He has a normal mood and affect. His behavior is normal.   Nursing note and vitals reviewed.  CT CHEST: IMPRESSION:  1. There is a large, greater than 9 cm mass involving the posterior aspect of the right upper lobe but also the superior segment of the right lower lobe highly indicative of a primary lung cancer. There is surrounding satellite nodularity as well as   bulky right hilar and subcarinal adenopathy most compatible with metastatic disease. There are a few additional indeterminate noncalcified nodules involving the right lung. Tissue sampling by CT guided biopsy is recommended.  2. Mild pulmonary edema.  3. Bilateral pleural effusions, larger on the right.  4. Large pericardial pericardial " 31-Aug-2022 10:38 effusion.  5. Groundglass infiltrate involving the right upper lobe and right middle lobe most compatible with postobstructive pneumonitis.  6. Enlarged multinodular thyroid goiter    Dictated by: Yoni Wise M.D.    Images and Report reviewed and interpreted by: Yoni Wise M.D.    <PS><Electronically signed by: Yoni Wise M.D.>    ECHO 01/19/2017 1525 - prior 2015 diastolic dysfunction  Conclusions:  Technically difficult/limited study.  The left ventricular chamber size is normal.  There is normal left ventricular systolic function.  The EF 4ch was calculated at 58%.  The left atrium is mildly enlarged.  The right atrial cavity size is mildly enlarged.  There is a trace of mitral regurgitation.  There is trivial to mild tricuspid regurgitation observed.  The right ventricular systolic pressure is calculated at 44 mmHg.   There is evidence of mild to moderate pulmonary hypertension.      Thank you for the courtesy of this referral.  Electronically signed by:  _______________________________  James Kammerling MD     01/23/2017 21:47:13    XRAY: cxr  INDICATION:  Dyspnea, hx of lung cancer; hx of chf  Wet read:  pulm vasc congestion, RUL mass  No Comparative data  Imagine independently viewed by myself  Radiology reading pending.    POC CBC   Order: [id number]   Status:  Final result   Visible to patient:  No (Not Released) Dx:  Shortness of breath      Ref Range & Units 9:41 AM     Hematocrit % 36.8   Hemoglobin g/dL 11.5   RBC  4.09   WBC  6.7   MCV  89.9   MCH  28.1   MCHC  31.2 (L)   RDW-CV  18.6 (H)   Platelets 10*3/mm3 155   MPV  6.6 (L)   Resulting [facility]      Specimen Collected: 07/26/17  9:41 AM Last Resulted: 07/26/17  9:41 AM                            POC INR   Order: [id number]   Status:  Final result   Visible to patient:  No (Not Released) Dx:  Chronic anticoagulation      Ref Range & Units 9:33 AM     INR 0.9 - 1.1 1.8 (A)   Resulting [facility]      Specimen Collected: 07/26/17  9:33 AM Last Resulted: 07/26/17  9:33 AM                Assessment/Plan   Jose Miguel was seen today for edema.    Diagnoses and all orders for this visit:    Shortness of breath  -     POC CBC  -     Comprehensive Metabolic Panel  -     BNP  -     XR Chest PA & Lateral    Benign essential hypertension    Acute on chronic diastolic congestive heart failure  -     BNP  -     XR Chest PA & Lateral    Non-small cell lung cancer, unspecified laterality  -     XR Chest PA & Lateral  -     Cancel: Duplex Venous Upper Extremity - Bilateral CAR; Future  -     Duplex Venous Lower Extremity - Bilateral CAR; Future    Chronic anticoagulation  -     POC INR    Swelling of lower extremity  -     Cancel: Duplex Venous Upper Extremity - Bilateral CAR; Future  -     Duplex Venous Lower Extremity - Bilateral CAR; Future    Subtherapeutic international normalized ratio (INR)  -     Cancel: Duplex Venous Upper Extremity - Bilateral CAR; Future  -     Duplex Venous Lower Extremity - Bilateral CAR; Future        Increase lasix to 40mg 2 tabs twice daily x 5 days, then 1 twice daily;  If wrosens, go ER;  WBC normal, no cough or infectious symptoms.  Suspect in volume overload.  No cp or tachycardia to suggest PE;  Does have lower ext edema, but is anticoagulated.  I doubt DVT, however given hx of cancer and INR subtherapeutic will check dopplers as certainly at risk.  I d/w patient and wife to not push fluids as will worsen volume overload.  I also directed to contact oncology regarding INR of 1.8, will fax results to as well.       -Follow up: 2 weeks       Current Outpatient Prescriptions:   •  albuterol (PROVENTIL) (2.5 MG/3ML) 0.083% nebulizer solution, Albuterol Sulfate (2.5 MG/3ML) 0.083% Inhalation Nebulization Solution; Patient Sig: Albuterol Sulfate (2.5 MG/3ML) 0.083% Inhalation Nebulization Solution USE 1 UNIT DOSE IN NEBULIZER EVERY 4 TO 6 HOURS AS NEEDED.; 1; 11;  28-Apr-2014; Active, Disp: , Rfl:   •  allopurinol (ZYLOPRIM) 300 MG tablet, TAKE 1 TABLET DAILY, Disp: 90 tablet, Rfl: 3  •  BIOTIN 5000 PO, Take  by mouth., Disp: , Rfl:   •  Calcium Citrate-Vitamin D (CALCIUM CITRATE + D3 PO), Take  by mouth., Disp: , Rfl:   •  Cholecalciferol (VITAMIN D3) 5000 UNITS capsule capsule, Take 5,000 Units by mouth daily., Disp: , Rfl:   •  Chromium-Cinnamon (CINNAMON PLUS CHROMIUM PO), Take  by mouth., Disp: , Rfl:   •  diltiaZEM (CARDIZEM) 60 MG tablet, Take 1 tablet by mouth 3 (Three) Times a Day., Disp: 270 tablet, Rfl: 1  •  furosemide (LASIX) 40 MG tablet, TAKE 1 TABLET TWICE DAILY, Disp: 180 tablet, Rfl: 3  •  Glucosamine-Chondroit-Vit C-Mn (GLUCOSAMINE CHONDR 1500 COMPLX PO), Take  by mouth., Disp: , Rfl:   •  Glucose Blood (BLOOD GLUCOSE TEST) strip, Check twice daily. Disp Dm2 testing supplies as needs.  Dx: E11.9 Type 2 diabetes on long-term insulin use. NPI [id number]., Disp: 100 each, Rfl: 12  •  glucose blood test strip, 1 each by Other route 2 (Two) Times a Day., Disp: 100 each, Rfl: 3  •  hydrALAZINE (APRESOLINE) 100 MG tablet, Take 1 tablet by mouth 3 (Three) Times a Day., Disp: , Rfl:   •  Lancet Devices (ACCU-CHEK SOFTCLIX) lancets, Test bid, Disp: 100 each, Rfl: 3  •  Multiple Vitamins-Minerals (MULTIVITAMIN ADULT PO), Take  by mouth., Disp: , Rfl:   •  Omega-3 Fatty Acids (FISH OIL) 1000 MG capsule capsule, Take  by mouth daily with breakfast., Disp: , Rfl:   •  ondansetron (ZOFRAN) 8 MG tablet, Take  by mouth Every 8 (Eight) Hours As Needed for Nausea or Vomiting., Disp: , Rfl:   •  potassium chloride (K-DUR,KLOR-CON) 20 MEQ CR tablet, take 1 tablet by mouth once daily, Disp: , Rfl: 0  •  pravastatin (PRAVACHOL) 80 MG tablet, TAKE 1 TABLET EVERY DAY, Disp: 90 tablet, Rfl: 3  •  promethazine (PHENERGAN) 25 MG tablet, Take 25 mg by mouth Every 6 (Six) Hours As Needed for Nausea or Vomiting., Disp: , Rfl:   •  raNITIdine (ZANTAC) 150 MG tablet, Take 150 mg by mouth 2  (Two) Times a Day., Disp: , Rfl:   •  saxagliptin (ONGLYZA) 5 MG tablet, Take  by mouth., Disp: , Rfl:   •  tiotropium (SPIRIVA) 18 MCG per inhalation capsule, daily., Disp: , Rfl:   •  vitamin B-12 (CYANOCOBALAMIN) 100 MCG tablet, Take 100 mcg by mouth Daily., Disp: , Rfl:   •  vitamin E 400 UNIT capsule, Take 400 Units by mouth daily., Disp: , Rfl:   •  warfarin (COUMADIN) 5 MG tablet, Currently on 7mg daily, mgmt by Cardiology, Disp: , Rfl:   •  insulin glargine (LANTUS) 100 UNIT/ML injection, Inject 8 Units under the skin every night (Patient taking differently: Inject 10 Units under the skin Every Night.), Disp: 1 each, Rfl: 12

## 2022-08-31 NOTE — ED CDU PROVIDER INITIAL DAY NOTE - ATTENDING APP SHARED VISIT CONTRIBUTION OF CARE
I, Gama Moreno, performed a history and physical exam of the patient and discussed their management with the resident and/or advanced care provider. I reviewed the resident and/or advanced care provider's note and agree with the documented findings and plan of care except where noted. I was present and available for all procedures.     I wrote mdm. see above

## 2022-08-31 NOTE — ED CDU PROVIDER INITIAL DAY NOTE - MEDICAL DECISION MAKING DETAILS
Gama Moreno MD.   56 yo R handed F PMHx HTN, HLD, presents to ED c/o confusion, word finding difficulty, last known well 9:30AM 8/31/22 (today). Pt was working when this occurred. Pt feels like her symptoms are improving since onset. She denies slurred speech, facial droop, n/v/d, headache, blurred vision, diplopia, fevers, neck pain, abd pain, cough, cp, sob.   Pt neurologically intact. CTs negative. Neuro recommending CDU for MR, frequent VS checks.

## 2022-08-31 NOTE — ED PROVIDER NOTE - ATTENDING CONTRIBUTION TO CARE
I, Gama Moreno, performed a history and physical exam of the patient and discussed their management with the resident and/or advanced care provider. I reviewed the resident and/or advanced care provider's note and agree with the documented findings and plan of care except where noted. I was present and available for all procedures.     Patient is a 57 year old female with past medical history significant for hypertension, anxiety, and depression presents to the Emergency Department with chief complaint of confusion.  Approximately 1 hour and 15 minutes prior to presentation, the patient acutely developed confusion and word finding difficulty.  Patient was in her usual state of health prior to onset of symptoms.  Patient denies any weakness, fever, headache, or other physical complaints.  No sick contacts or recent travel.  No gross focal neurologic deficits although pt states still feels a little confused. possible CVA/TIA. possible TGA. possible metabolic cause. code stroke was called. neuro at bedside. will f/u CTs, labs, reassess

## 2022-08-31 NOTE — ED CDU PROVIDER INITIAL DAY NOTE - NS ED ATTENDING STATEMENT MOD
This was a shared visit with the SLY. I reviewed and verified the documentation and independently performed the documented:

## 2022-08-31 NOTE — CONSULT NOTE ADULT - SUBJECTIVE AND OBJECTIVE BOX
Neurology - Consult Note    -  Spectra: [phone] ([facility]), [phone] ([facility])  -    HPI: Patient CAROLEE AN is a 57y (1965) wo/man with a PMHx significant for ***      Review of Systems:  INCOMPLETE   CONSTITUTIONAL: No fevers or chills  EYES AND ENT: No visual changes or no throat pain   NECK: No pain or stiffness  RESPIRATORY: No hemoptysis or shortness of breath  CARDIOVASCULAR: No chest pain or palpitations  GASTROINTESTINAL: No melena or hematochezia  GENITOURINARY: No dysuria or hematuria  NEUROLOGICAL: +As stated in HPI above  SKIN: No itching, burning, rashes, or lesions   All other review of systems is negative unless indicated above.    Allergies:      PMHx/PSHx/Family Hx: As above, otherwise see below   Spinal stenosis    Hip bursitis    HTN (hypertension)    Kidney stones    Heartburn    Depression    Anxiety    Hypercholesterolemia    Syncope    Colitis, nonspecific    No pertinent past medical history    Nephrolithiasis    HTN (hypertension)    HLD (hyperlipidemia)    Anxiety and depression    Menieres disease        Social Hx:  No current use of tobacco, alcohol, or illicit drugs  Lives with ***    Medications:  MEDICATIONS  (STANDING):    MEDICATIONS  (PRN):      Vitals:  T(C): 36.7 (08-31-22 @ 10:35), Max: 36.7 (08-31-22 @ 10:35)  HR: 93 (08-31-22 @ 10:35) (93 - 93)  BP: 165/120 (08-31-22 @ 10:35) (165/120 - 165/120)  RR: 16 (08-31-22 @ 10:35) (16 - 16)  SpO2: 97% (08-31-22 @ 10:35) (97% - 97%)    Physical Examination: INCOMPLETE  General - NAD  Cardiovascular - Peripheral pulses palpable, no edema  Eyes - Fundoscopy with flat, sharp optic discs and no hemorrhage or exudates; Fundoscopy not well visualized; Fundoscopy not performed due to safety precautions in the setting of the COVID-19 pandemic    Neurologic Exam:  Mental status - Awake, Alert, Oriented to person, place, and time. Speech fluent, repetition and naming intact. Follows simple and complex commands. Attention/concentration, recent and remote memory (including registration and recall), and fund of knowledge intact    Cranial nerves - PERRLA, VFF, EOMI, face sensation (V1-V3) intact b/l, facial strength intact without asymmetry b/l, hearing intact b/l, palate with symmetric elevation, trapezius OR sternocleidomastiod 5/5 strength b/l, tongue midline on protrusion with full lateral movement    Motor - Normal bulk and tone throughout. No pronator drift.  Strength testing            Deltoid      Biceps      Triceps     Wrist Extension    Wrist Flexion     Interossei         R            5                 5               5                     5                              5                        5                 5  L             5                 5               5                     5                              5                        5                 5              Hip Flexion    Hip Extension    Knee Flexion    Knee Extension    Dorsiflexion    Plantar Flexion  R              5                           5                       5                           5                            5                          5  L              5                           5                        5                           5                            5                          5    Sensation - Light touch/temperature OR pain/vibration intact throughout    DTR's -             Biceps      Triceps     Brachioradialis      Patellar    Ankle    Toes/plantar response  R             2+             2+                  2+                       2+            2+                 Down  L              2+             2+                 2+                        2+           2+                 Down    Coordination - Finger to Nose intact b/l. No tremors appreciated    Gait and station - Normal casual gait. Romberg (-)    Labs:          CAPILLARY BLOOD GLUCOSE      POCT Blood Glucose.: 92 mg/dL (31 Aug 2022 10:36)          CSF:                  Radiology:     Neurology - Consult Note    -  Spectra: [id number] ([facility]), [phone] ([facility])  -    HPI: A 57 year old R handed female with PMH of HTN, HLD has presented to the ED as a code stroke for reported trouble remember recent things and word finding difficulty since 9:30 AM on 8/31/22 (LKW). Currently symptoms resolved. Pt has been able to tell me where she was born, where she went to college and other hx from the past. Pt works in the hospital. Per primary team, pt has a similar episode in the past, at that time concerning for seizures. Pt denies any HA, n/v, numbness, tingling, focal weakness, changes in vision/hearing, head trauma, hx of strokes, seizures, AC/AP use. Pt not a TPA candidate as deficits has resolved.     NIH 0  preMRS 0  ABCD 3        Review of Systems:   CONSTITUTIONAL: No fevers or chills  EYES AND ENT: No visual changes or no throat pain   NECK: No pain or stiffness  RESPIRATORY: No hemoptysis or shortness of breath  CARDIOVASCULAR: No chest pain or palpitations  GASTROINTESTINAL: No melena or hematochezia  GENITOURINARY: No dysuria or hematuria  NEUROLOGICAL: +As stated in HPI above  SKIN: No itching, burning, rashes, or lesions   All other review of systems is negative unless indicated above.    Allergies:      PMHx/PSHx/Family Hx: As above, otherwise see below   Spinal stenosis    Hip bursitis    HTN (hypertension)    Kidney stones    Heartburn    Depression    Anxiety    Hypercholesterolemia    Syncope    Colitis, nonspecific    No pertinent past medical history    Nephrolithiasis    HTN (hypertension)    HLD (hyperlipidemia)    Anxiety and depression    Menieres disease        Social Hx:  No current use of tobacco, alcohol, or illicit drugs  Lives with ***    Medications:  MEDICATIONS  (STANDING):    MEDICATIONS  (PRN):      Vitals:  T(C): 36.7 (08-31-22 @ 10:35), Max: 36.7 (08-31-22 @ 10:35)  HR: 93 (08-31-22 @ 10:35) (93 - 93)  BP: 165/120 (08-31-22 @ 10:35) (165/120 - 165/120)  RR: 16 (08-31-22 @ 10:35) (16 - 16)  SpO2: 97% (08-31-22 @ 10:35) (97% - 97%)    Physical Examination:   General - NAD  Cardiovascular - Peripheral pulses palpable, no edema  Eyes - Fundoscopy with flat, sharp optic discs and no hemorrhage or exudates; Fundoscopy not well visualized; Fundoscopy not performed due to safety precautions in the setting of the COVID-19 pandemic    Neurologic Exam:  Mental status - Awake, Alert, Oriented to person, place, and time. Speech fluent, repetition and naming intact. Follows simple and complex commands. Attention/concentration, recent and remote memory (including registration and recall), and fund of knowledge intact    Cranial nerves - PERRLA, VFF, EOMI, face sensation (V1-V3) intact b/l, facial strength intact without asymmetry b/l, hearing intact b/l, palate with symmetric elevation, trapezius OR sternocleidomastiod 5/5 strength b/l, tongue midline on protrusion with full lateral movement    Motor - Normal bulk and tone throughout. No pronator drift.  Strength testing            Deltoid      Biceps      Triceps     Wrist Extension    Wrist Flexion     Interossei         R            5                 5               5                     5                              5                        5                 5  L             5                 5               5                     5                              5                        5                 5              Hip Flexion    Hip Extension    Knee Flexion    Knee Extension    Dorsiflexion    Plantar Flexion  R              5                           5                       5                           5                            5                          5  L              5                           5                        5                           5                            5                          5    Sensation - Light touch/temperature OR pain/vibration intact throughout    DTR's -             Biceps      Triceps     Brachioradialis      Patellar    Ankle    Toes/plantar response  R             2+             2+                  2+                       2+            2+                 Down  L              2+             2+                 2+                        2+           2+                 Down    Coordination - Finger to Nose intact b/l. No tremors appreciated    Gait and station - Normal casual gait. Romberg (-)    Labs:          CAPILLARY BLOOD GLUCOSE      POCT Blood Glucose.: 92 mg/dL (31 Aug 2022 10:36)          CSF:                  Radiology:     Neurology - Consult Note    -  Spectra: [phone] ([facility]), [phone] ([facility])  -    HPI: A 57 year old R handed female with PMH of HTN, HLD has presented to the ED as a code stroke for reported trouble remember recent things and word finding difficulty since 9:30 AM on 8/31/22 (LKW). Currently symptoms resolved, symptoms lasted about an hour. Pt has been able to tell me where she was born, where she went to college and other hx from the past. Pt works in the hospital. Per primary team, pt has a similar episode in the past, at that time concerning for seizures. Pt denies any HA, n/v, numbness, tingling, focal weakness, changes in vision/hearing, head trauma, hx of strokes, seizures, AC/AP use. Pt not a TPA candidate as deficits has resolved.     NIH 0  preMRS 0  ABCD 3        Review of Systems:   CONSTITUTIONAL: No fevers or chills  EYES AND ENT: No visual changes or no throat pain   NECK: No pain or stiffness  RESPIRATORY: No hemoptysis or shortness of breath  CARDIOVASCULAR: No chest pain or palpitations  GASTROINTESTINAL: No melena or hematochezia  GENITOURINARY: No dysuria or hematuria  NEUROLOGICAL: +As stated in HPI above  SKIN: No itching, burning, rashes, or lesions   All other review of systems is negative unless indicated above.    Allergies:      PMHx/PSHx/Family Hx: As above, otherwise see below   Spinal stenosis    Hip bursitis    HTN (hypertension)    Kidney stones    Heartburn    Depression    Anxiety    Hypercholesterolemia    Syncope    Colitis, nonspecific    No pertinent past medical history    Nephrolithiasis    HTN (hypertension)    HLD (hyperlipidemia)    Anxiety and depression    Menieres disease        Social Hx:  No current use of tobacco, alcohol, or illicit drugs  Lives with ***    Medications:  MEDICATIONS  (STANDING):    MEDICATIONS  (PRN):      Vitals:  T(C): 36.7 (08-31-22 @ 10:35), Max: 36.7 (08-31-22 @ 10:35)  HR: 93 (08-31-22 @ 10:35) (93 - 93)  BP: 165/120 (08-31-22 @ 10:35) (165/120 - 165/120)  RR: 16 (08-31-22 @ 10:35) (16 - 16)  SpO2: 97% (08-31-22 @ 10:35) (97% - 97%)    Physical Examination:   General - NAD  Cardiovascular - Peripheral pulses palpable, no edema  Eyes - Fundoscopy with flat, sharp optic discs and no hemorrhage or exudates; Fundoscopy not well visualized; Fundoscopy not performed due to safety precautions in the setting of the COVID-19 pandemic    Neurologic Exam:  Mental status - Awake, Alert, Oriented to person, place, and time. Speech fluent, repetition and naming intact. Follows simple and complex commands. Attention/concentration, recent and remote memory (including registration and recall), and fund of knowledge intact    Cranial nerves - PERRLA, VFF, EOMI, face sensation (V1-V3) intact b/l, facial strength intact without asymmetry b/l, hearing intact b/l, palate with symmetric elevation, trapezius OR sternocleidomastiod 5/5 strength b/l, tongue midline on protrusion with full lateral movement    Motor - Normal bulk and tone throughout. No pronator drift.  Strength testing            Deltoid      Biceps      Triceps     Wrist Extension    Wrist Flexion     Interossei         R            5                 5               5                     5                              5                        5                 5  L             5                 5               5                     5                              5                        5                 5              Hip Flexion    Hip Extension    Knee Flexion    Knee Extension    Dorsiflexion    Plantar Flexion  R              5                           5                       5                           5                            5                          5  L              5                           5                        5                           5                            5                          5    Sensation - Light touch/temperature OR pain/vibration intact throughout    DTR's -             Biceps      Triceps     Brachioradialis      Patellar    Ankle    Toes/plantar response  R             2+             2+                  2+                       2+            2+                 Down  L              2+             2+                 2+                        2+           2+                 Down    Coordination - Finger to Nose intact b/l. No tremors appreciated    Gait and station - Normal casual gait. Romberg (-)    Labs:          CAPILLARY BLOOD GLUCOSE      POCT Blood Glucose.: 92 mg/dL (31 Aug 2022 10:36)          CSF:                  Radiology:     Neurology - Consult Note    -  Spectra: [phone] ([facility]), [phone] ([facility])  -    HPI: A 57 year old R handed female with PMH of HTN, HLD has presented to the ED as a code stroke for reported trouble remember recent things and word finding difficulty since 9:30 AM on 8/31/22 (LKW). Currently symptoms resolved, symptoms lasted about an hour. Pt has been able to tell me where she was born, where she went to college and other hx from the past. Pt works in the hospital. Per primary team, pt has a similar episode in the past, at that time concerning for seizures. Pt denies any HA, n/v, numbness, tingling, focal weakness, changes in vision/hearing, head trauma, hx of strokes, seizures, AC/AP use. Pt not a TPA candidate as deficits has resolved.     NIH 0  preMRS 0  ABCD 3        Review of Systems:   CONSTITUTIONAL: No fevers or chills  EYES AND ENT: No visual changes or no throat pain   NECK: No pain or stiffness  RESPIRATORY: No hemoptysis or shortness of breath  CARDIOVASCULAR: No chest pain or palpitations  GASTROINTESTINAL: No melena or hematochezia  GENITOURINARY: No dysuria or hematuria  NEUROLOGICAL: +As stated in HPI above  SKIN: No itching, burning, rashes, or lesions   All other review of systems is negative unless indicated above.    Allergies: NKDA      PMHx/PSHx/Family Hx: As above, otherwise see below   Spinal stenosis    Hip bursitis    HTN (hypertension)    Kidney stones    Heartburn    Depression    Anxiety    Hypercholesterolemia    Syncope    Colitis, nonspecific    No pertinent past medical history    Nephrolithiasis    HTN (hypertension)    HLD (hyperlipidemia)    Anxiety and depression    Menieres disease        Social Hx:  No current use of tobacco, alcohol, or illicit drugs  Lives with     Medications:  MEDICATIONS  (STANDING):    MEDICATIONS  (PRN):      Vitals:  T(C): 36.7 (08-31-22 @ 10:35), Max: 36.7 (08-31-22 @ 10:35)  HR: 93 (08-31-22 @ 10:35) (93 - 93)  BP: 165/120 (08-31-22 @ 10:35) (165/120 - 165/120)  RR: 16 (08-31-22 @ 10:35) (16 - 16)  SpO2: 97% (08-31-22 @ 10:35) (97% - 97%)    Physical Examination:   General - NAD  Cardiovascular - Peripheral pulses palpable, no edema  Eyes - Fundoscopy not well visualized    Neurologic Exam:  Mental status - Awake, Alert, Oriented to person, place, and time. Speech fluent, repetition and naming intact. Follows simple and complex commands. Attention/concentration, recent and remote memory (including registration and recall), and fund of knowledge intact    Cranial nerves - PERRLA, VFF, EOMI, face sensation (V1-V3) intact b/l, facial strength intact without asymmetry b/l, hearing intact b/l, palate with symmetric elevation, trapezius OR sternocleidomastiod 5/5 strength b/l, tongue midline on protrusion with full lateral movement    Motor - Normal bulk and tone throughout. No pronator drift.  Strength testing            Deltoid      Biceps      Triceps     Wrist Extension    Wrist Flexion     Interossei         R            5                 5               5                     5                              5                        5                 5  L             5                 5               5                     5                              5                        5                 5              Hip Flexion    Hip Extension    Knee Flexion    Knee Extension    Dorsiflexion    Plantar Flexion  R              5                           5                       5                           5                            5                          5  L              5                           5                        5                           5                            5                          5    Sensation - Light touch/temperature OR pain/vibration intact throughout    DTR's -             Biceps      Triceps     Brachioradialis      Patellar    Ankle    Toes/plantar response  R             2+             2+                  2+                       2+            2+                 Down  L              2+             2+                 2+                        2+           2+                 Down    Coordination - Finger to Nose intact b/l. No tremors appreciated    Gait and station - Normal casual gait. Romberg (-)    Labs:          CAPILLARY BLOOD GLUCOSE      POCT Blood Glucose.: 92 mg/dL (31 Aug 2022 10:36)          CSF:                  Radiology:

## 2022-08-31 NOTE — ED CDU PROVIDER DISPOSITION NOTE - CLINICAL COURSE
Patient is a 57 year old female with past medical history significant for hypertension, anxiety, and depression presents to the Emergency Department with chief complaint of confusion.  Approximately 1 hour and 15 minutes prior to presentation, the patient acutely developed confusion and word finding difficulty.  Patient was in her usual state of health prior to onset of symptoms.  Patient denies any weakness, fever, headache, or other physical complaints.  No sick contacts or recent travel.  ED course: CTs negative for acute pathology. Neurology consulted. Plan for MRI and EEG in CDU. Patient is a 57 year old female with past medical history significant for hypertension, anxiety, and depression presents to the Emergency Department with chief complaint of confusion.  Approximately 1 hour and 15 minutes prior to presentation, the patient acutely developed confusion and word finding difficulty.  Patient was in her usual state of health prior to onset of symptoms.  Patient denies any weakness, fever, headache, or other physical complaints.  No sick contacts or recent travel.  ED course: CTs negative for acute pathology. Neurology consulted. Plan for MRI and EEG in CDU.  In the CDU, Patient reports that she is feeling well and back to her baseline.  No cardiac events noted on tele overnight.  MRI negative for acute pathology.  EEG negative.  Neuro recommending DC home with close outpatient follow up.  Strict return precautions provided.

## 2022-08-31 NOTE — EEG REPORT - NS EEG TEXT BOX
[facility]  Com[facility] Ep[facility]  Report of Routine EEG with Video    [facility]: [address], [place] [address], Phone [phone]  [place] [facility]: [address], [address], [place] [address] Phone [phone]    [facility]: [address] [place] [address], Phone [phone]  [place] Office: [address] [place] [address], Phone [phone]    Patient Name: Ro Bullock    Age: 57 year  : 1965  Patient ID: -, MRN #: -, Location: CDU Bed 49CDU Bed 49  Referring Physician: -  EEG #: [id number]    Study Date: 2022     Technical Information:					  On Instrument: -  Placement and Labeling of Electrodes:  The EEG was performed utilizing 20 channels referential EEG connections (coronal over temporal over parasagittal montage) using all standard 10-20 electrode placements.  Recording was at a sampling rate of 256 samples per second per channel.  Time synchronized digital video recording was done simultaneously with EEG recording.  A low light infrared camera was used for low light recording.  Joseph and seizure detection algorithms were utilized.    History:  Routine study performed bedside  COR: Awake, alert, drowsy  No HV due to COVID Protocol  No PHOTIC due to headache  56 Y/O Female  P/W: Seizures  H/O: Anxiety, depression, HLD, HTN, Nephrolithasis, syncope, spinal stenosis    Pertinent Medication  Zofran  Lipitor    Study Interpretation:  Findings: The background was continuous and reactive. During wakefulness, the posterior dominant rhythm consisted of symmetric, well-modulated 10 Hz activity, with amplitude to 30 uV, that attenuated to eye opening.     Background Slowing:  No generalized background slowing was present.    Focal Slowing:   None were present.    Sleep Background:  Drowsiness was characterized by fragmentation, attenuation, and slowing of the background activity.    Stage II sleep transients were not recorded.    Other Non-Epileptiform Findings:  None were present.    Interictal Epileptiform Activity:   None were present.    Events:  No event or seizure recorded.    Activation Procedures:   Hyperventilation was not performed.    Photic stimulation was not performed.     Artifacts:  Intermittent myogenic and movement artifacts were noted.    EEG Summary / Classification:  Normal EEG     EEG Impression / Clinical Correlate:  Normal EEG study.  No epileptiform pattern or seizure seen.    ________________________________________    Javan De La Paz MD  Attending Physician, [facility]

## 2022-08-31 NOTE — ED PROVIDER NOTE - PROGRESS NOTE DETAILS
DO Ashwin: patient re-assessed and reports resolution of symptoms at this time.  patient agrees with plan for placement in the observation unit for further evaluation and testing.  all questions answered at this time.

## 2022-08-31 NOTE — ED PROVIDER NOTE - OBJECTIVE STATEMENT
Patient is a 57 year old female with past medical history significant for hypertension, anxiety, and depression presents to the Emergency Department with chief complaint of confusion.  Approximately 1 hour and 15 minutes prior to presentation, the patient acutely developed confusion and word finding difficulty.  Patient was in her usual state of health prior to onset of symptoms.  Patient denies any weakness, fever, headache, or other physical complaints.  No sick contacts or recent travel.

## 2022-08-31 NOTE — ED PROVIDER NOTE - PHYSICAL EXAMINATION
PHYSICAL EXAM:  GENERAL: NAD, lying in bed comfortably  HEAD:  Atraumatic, Normocephalic  EYES: EOMI, PERRLA, conjunctiva and sclera clear  ENT: No erythema/pallor/petechiae/lesions; TMs clear b/l  NECK: Supple, No JVD  LUNG: CTA b/l; no r/r/w  HEART: RRR, +S1/S2; No m/r/g  ABDOMEN: soft, NT/ND; BS audible   EXTREMITIES:  2+ Peripheral Pulses, brisk cap refill. No clubbing, cyanosis, or edema  NERVOUS SYSTEM:  AAOx3, word finding difficulty; cranial nerves 2-12 grossly intact bilaterally, negative pronator drift, cerebellar signs intact - finger-to-nose exam, no meningismus, ambulates with steady gait   MSK: FROM all 4 extremities, full and equal strength  SKIN: No rashes or lesions

## 2022-08-31 NOTE — ED CDU PROVIDER DISPOSITION NOTE - ATTENDING APP SHARED VISIT CONTRIBUTION OF CARE
Attending MD Thomas:   I personally have seen and examined this patient.  Physician assistant note reviewed and agree on plan of care and except where noted.  See below for details.     Seen in CDU Edmond Mcgregor 47    57F with PMH/PSH including HTN, HLD, anxiety, depression sent to the CDU after presenting to the ED with confusion, word finding difficulty.  Reports has not had return of symptoms while in CDU.  Denies change in vision, double vision, sudden loss of vision, headache.  Denies loss of urinary or bowel continence. Denies numbness, weakness or tingling in extremities. Denies chest pain, shortness of breath, abdominal pain, nausea, vomiting, diarrhea, urinary complaints.     Exam:   General: NAD  HENT: head NCAT, airway patent  Eyes: PERRL, EOMI  Lungs: lungs CTAB with good inspiratory effort, no wheezing, no rhonchi, no rales  Cardiac: +S1S2, no obvious m/r/g  GI: abdomen soft with +BS, NT, ND  MSK: FROM at neck, no calf tenderness, swelling, erythema or warmth  Neuro: moving all extremities spontaneously with 5/5 strength, sensory grossly intact, no gross neuro deficits, able to repeat "no ifs, ands or buts", nonfocal  Psych: normal mood and affect     A/P: 57F with confusion, word finding difficulties. Patient feeling improved.  No acute issues at  this time.  Lab and radiology tests reviewed with patient including MRI which is nonactionable.  Seen by neuro, recommend outpatient follow up.  Stable for discharge. Follow up instructions given, importance of follow up emphasized, return to ED parameters reviewed and patient verbalized understanding.  All questions answered, all concerns addressed.

## 2022-08-31 NOTE — ED PROVIDER NOTE - NS ED ROS FT
CONSTITUTIONAL: No weakness, fevers or chills  EYES/ENT: No visual changes;  No vertigo or throat pain   NECK: No pain or stiffness  RESPIRATORY: No cough, wheezing, hemoptysis; No shortness of breath  CARDIOVASCULAR: No chest pain or palpitations  GASTROINTESTINAL: No abdominal or epigastric pain. No nausea, vomiting, or hematemesis; No diarrhea or constipation. No melena or hematochezia.  GENITOURINARY: No dysuria, frequency or hematuria  NEUROLOGICAL: + confusion, word finding difficulty   SKIN: No itching, burning, rashes, or lesions   All other review of systems is negative unless indicated above.

## 2022-08-31 NOTE — ED ADULT NURSE NOTE - OBJECTIVE STATEMENT
56 y/o Female presents to ED wit c/o confusion and forgetfulness. Pt states 1 hour and 15 minutes ago began having difficulty speaking and forgetting her words. PMH HTN, HLD, anxiety, depression. 56 y/o Female presents to ED wit c/o confusion and forgetfulness. Pt states 1 hour and 15 minutes ago began having difficulty speaking and forgetting her words. PMH HTN, HLD, anxiety, depression. Denies SOB, chest pain, N/V/D, dizziness, lightheaded, HA, vision changes, numbness, tingling, hematuria, dysuria. No recent falls or head hit. A&Ox3, airway patent with spontaneous unlabored breathing, equal B/L upper and lower extremity strength. Pt able to follow commands and ambulates independently with no difficulty. Placed on continuous cardiac monitor NSR, IV inserted labs drawn and sent. Safety maintained, bed in lowest position and locked, call bell in reach. 58 y/o Female presents to ED wit c/o confusion and forgetfulness. Pt states 1 hour and 15 minutes ago began having difficulty speaking and forgetting her words. PMH HTN, HLD, anxiety, depression. Denies SOB, chest pain, N/V/D, dizziness, lightheaded, HA, vision changes, numbness, tingling, hematuria, dysuria. No recent falls or head hit. A&Ox3, airway patent with spontaneous unlabored breathing, equal B/L upper and lower extremity strength. Pt able to follow commands and ambulates independently with no difficulty. Placed on continuous cardiac monitor NSR, IV inserted labs drawn and sent. Safety maintained, bed in lowest position and locked, call bell in reach. See neuro flow sheet.

## 2022-08-31 NOTE — ED CDU PROVIDER DISPOSITION NOTE - NSFOLLOWUPINSTRUCTIONS_ED_ALL_ED_FT
Hydrate.     Continue taking Asprin 81mg daily.   **Statin?    We recommend you follow up with your primary care provider within the next 2-3 days, please bring all of your results with you.     You can also follow up with neurology Dr. Santana. Call the office to make a follow up appointment.  [address] [facility], [address]  [place] [address]  Phone: [phone]  Fax: [phone]    Please return to the Emergency Department with new, worsening, or concerning symptoms, such as:  -Shortness of breath or trouble breathing  -Pressure, pain, tightness in chest  -Facial drooping, arm weakness, or speech difficulty   -Head injury or loss of consciousness     *More detailed information regarding your visit and discharge can be found by reviewing this packet 1.  Stay well hydrated  2.  Continue current home medications  3.  Follow up with your PMD in 2-3 days. Bring a copy of your results with you to your appointment  4.  Follow up with neurology upon discharge.  [address] [place] [address]. Call [phone] to schedule an appointment  5. Return to the ER for confusion, slurred speech, weakness, numbness or any other concerning symptoms

## 2022-08-31 NOTE — CONSULT NOTE ADULT - ATTENDING COMMENTS
HPI as per resident note, personally verified by me. Patient with ~1 hour of speech disturbance and "foggy thinking" but denies any true amnesia. No focal neurologic deficits at the time or now and no abnormal movements. She had a similar episode in 1981 with unrevealing work-up that was attributed to stress. Wants to go home.    Neurologic exam as per resident note with additions as below:  AAO x3, speech fluent  CN's with PERRLA, VFF, EOMI, no gross deficits  Motor strength 5/5 all  Sens intact all  FtN intact b/l    MRI brain w/o unrevealing  rEEG normal    A/P:  Other amnesia  Speech disturbance  HTN    - Etiology for episode is unclear. She lacks true amnesia, which would significantly go against transient global amnesia. Event not localizable to any specific brain region and MRI is unrevealing so would not expect cerebrovascular. Normal rEEG would go against seizure but could not rule it out completely. Could also consider toxic/metabolic and/or functional. Regardless, she is back to neurologic baseline  - No neurologic contraindications to discharge if patient is otherwise medically stable. She can follow-up with neurology as an outpatient  - Continue to address above medical problems, as you are doing  - Will sign off, please call with additional questions or concerns

## 2022-08-31 NOTE — CONSULT NOTE ADULT - ASSESSMENT
NIH 0  preMRS 0  ABCD 3      INCOMPLETE      Impression TGA vs. TIA    Recommendations:  Imaging:    CDU with MRI brain w/o contrast  routine EEG   -TTE  -Can consider STAT CT head non-contrast for change in neuro exam    Secondary prevention of stroke:  -Permissive HTN up to 220/110 for 24 hours from symptom onset, gradual normotension over 2-3 days  -Start 81 mg ASA daily  -Atorvastatin 80 mg daily (long-term goal LDL < 70)  -Tight glucose control (long-term goal HgbA1c < 6%)    Misc/additional recs:   [] Serum TSH, folate/B12, B1, B2, B5, B6, B12, MMA, homocysteine, Vit E, ammonia, Ca,  UA, ESR, CRP, tox screen,  infectious workup ( UA/Ucx ,CXR, blood cultures), iron studies   -toxic/metabolic/infectious w/u per primary team  -Neuro checks Q4  -Dysphagia screen  -Speech and swallow eval if dysphagia screen fails  -NPO except meds until dysphagia screen passed  -Head of bed > 30 degrees for aspiration prevention and aspiration precautions ordered.  -Fall precautions, aspiration precautions  -Continuous  telemetry to monitor for arrhythmia  -Stroke labwork: HgbA1C, fasting lipid panel, CBC, CMP, coag pannel, troponin  -Baseline EKG  -PT/OT  -DVT Prophylaxis: Lovenox 40 mg Sq daily unless contraindicated in which case SCDs   [] Measures to reduce delirium: frequent reorientation, maintain sleep/wake routine, avoid sleeping during the day, keep lights on during the day, use of hearing aids or glasses if patient needs them, regular toileting.     To be discussed with neurology attending and will be formally staffed by attending during morning rounds. Recommendations will be finalized once signed by attending.                                                           HPI: A 57 year old R handed female with PMH of HTN, HLD has presented to the ED as a code stroke for reported trouble remember recent things and word finding difficulty since 9:30 AM on 8/31/22 (LKW). Currently symptoms resolved. Pt has been able to tell me where she was born, where she went to college and other hx from the past. Pt works in the hospital. Per primary team, pt has a similar episode in the past, at that time concerning for seizures. Pt denies any HA, n/v, numbness, tingling, focal weakness, changes in vision/hearing, head trauma, hx of strokes, seizures, AC/AP use. Pt not a TPA candidate as deficits has resolved.     NIH 0  preMRS 0  ABCD 3      NIH 0  preMRS 0  ABCD 3    Impression Resolved AMS with word finding difficulty, trouble remembering things may be 2/2 possible TGA vs. TIA vs. other etiology    CT head negative for acute for acute findings per Radiology.     Recommendations:  Imaging:   -CDU with MRI brain w/o contrast  -routine EEG   -TTE  -Can consider STAT CT head non-contrast for change in neuro exam    Secondary prevention of stroke:  -Permissive HTN up to 220/110 for 24 hours from symptom onset, gradual normotension over 2-3 days  -Start 81 mg ASA daily  -Atorvastatin 80 mg daily (long-term goal LDL < 70)  -Tight glucose control (long-term goal HgbA1c < 6%)    Misc/additional recs:   - Serum TSH, folate/B12, B1, B2, B5, B6, B12, MMA, homocysteine, Vit E, ammonia, Ca,  UA, ESR, CRP, tox screen,  infectious workup ( UA/Ucx ,CXR, blood cultures), iron studies   -toxic/metabolic/infectious w/u per primary team  -Neuro checks Q4  -Dysphagia screen  -Speech and swallow eval if dysphagia screen fails  -NPO except meds until dysphagia screen passed  -Head of bed > 30 degrees for aspiration prevention and aspiration precautions ordered.  -Fall precautions, aspiration precautions  -Continuous  telemetry to monitor for arrhythmia  -Stroke labwork: HgbA1C, fasting lipid panel, CBC, CMP, coag pannel, troponin  -Baseline EKG  -PT/OT  -DVT Prophylaxis: Lovenox 40 mg Sq daily unless contraindicated in which case SCDs   - Measures to reduce delirium: frequent reorientation, maintain sleep/wake routine, avoid sleeping during the day, keep lights on during the day, use of hearing aids or glasses if patient needs them, regular toileting.     To be discussed with neurology attending and will be formally staffed by attending during morning rounds. Recommendations will be finalized once signed by attending.                                                           HPI: A 57 year old R handed female with PMH of HTN, HLD has presented to the ED as a code stroke for reported trouble remember recent things and word finding difficulty since 9:30 AM on 8/31/22 (LKW). Currently symptoms resolved, symptoms lasted about an hour. Pt has been able to tell me where she was born, where she went to college and other hx from the past. Pt works in the hospital. Per primary team, pt has a similar episode in the past, at that time concerning for seizures. Pt denies any HA, n/v, numbness, tingling, focal weakness, changes in vision/hearing, head trauma, hx of strokes, seizures, AC/AP use. Pt not a TPA candidate as deficits has resolved.     NIH 0  preMRS 0  ABCD 3      CT head negative for acute for acute findings per Radiology.     Impression Resolved AMS with word finding difficulty, trouble remembering things may be 2/2 possible TGA vs. TIA vs. other etiology    Recommendations:  Imaging:   -CDU with MRI brain w/o contrast  -routine EEG   -TTE  -Can consider STAT CT head non-contrast for change in neuro exam    Secondary prevention of stroke:  -Permissive HTN up to 220/110 for 24 hours from symptom onset, gradual normotension over 2-3 days  -Start 81 mg ASA daily  -Atorvastatin 80 mg daily (long-term goal LDL < 70)  -Tight glucose control (long-term goal HgbA1c < 6%)    Misc/additional recs:   - Serum TSH, folate/B12, B1, B2, B5, B6, B12, MMA, homocysteine, Vit E, ammonia, Ca,  UA, ESR, CRP, tox screen,  infectious workup ( UA/Ucx ,CXR, blood cultures), iron studies   -toxic/metabolic/infectious w/u per primary team  -Neuro checks Q4  -Dysphagia screen  -Speech and swallow eval if dysphagia screen fails  -NPO except meds until dysphagia screen passed  -Head of bed > 30 degrees for aspiration prevention and aspiration precautions ordered.  -Fall precautions, aspiration precautions  -Continuous  telemetry to monitor for arrhythmia  -Stroke labwork: HgbA1C, fasting lipid panel, CBC, CMP, coag pannel, troponin  -Baseline EKG  -PT/OT  -DVT Prophylaxis: Lovenox 40 mg Sq daily unless contraindicated in which case SCDs   - Measures to reduce delirium: frequent reorientation, maintain sleep/wake routine, avoid sleeping during the day, keep lights on during the day, use of hearing aids or glasses if patient needs them, regular toileting.     Case discussed with stroke fellow Dr. Virgil Dyer under supervision of stroke attending Dr. Santana.                                                           HPI: A 57 year old R handed female with PMH of HTN, HLD has presented to the ED as a code stroke for reported trouble remember recent things and word finding difficulty since 9:30 AM on 8/31/22 (LKW). Currently symptoms resolved, symptoms lasted about an hour. Pt has been able to tell me where she was born, where she went to college and other hx from the past. Pt works in the hospital. Per primary team, pt has a similar episode in the past, at that time concerning for seizures. Pt denies any HA, n/v, numbness, tingling, focal weakness, changes in vision/hearing, head trauma, hx of strokes, seizures, AC/AP use. Pt not a TPA candidate as deficits has resolved.     NIH 0  preMRS 0  ABCD 3      CT head negative for acute for acute findings per Radiology.     Impression Resolved AMS with word finding difficulty, anterograde amnesia may be 2/2 possible TGA vs. TIA vs. other etiology    Recommendations:  Imaging:   -CDU with MRI brain w/o contrast  -routine EEG   -TTE  -Can consider STAT CT head non-contrast for change in neuro exam    Secondary prevention of stroke:  -Permissive HTN up to 220/110 for 24 hours from symptom onset, gradual normotension over 2-3 days  -Start 81 mg ASA daily  -Atorvastatin 80 mg daily (long-term goal LDL < 70)  -Tight glucose control (long-term goal HgbA1c < 6%)    Misc/additional recs:   - Serum TSH, folate/B12, B1, B2, B5, B6, B12, MMA, homocysteine, Vit E, ammonia, Ca,  UA, ESR, CRP, tox screen,  infectious workup ( UA/Ucx ,CXR, blood cultures), iron studies   -toxic/metabolic/infectious w/u per primary team  -Neuro checks Q4  -Dysphagia screen  -Speech and swallow eval if dysphagia screen fails  -NPO except meds until dysphagia screen passed  -Head of bed > 30 degrees for aspiration prevention and aspiration precautions ordered.  -Fall precautions, aspiration precautions  -Continuous  telemetry to monitor for arrhythmia  -Stroke labwork: HgbA1C, fasting lipid panel, CBC, CMP, coag pannel, troponin  -Baseline EKG  -PT/OT  -DVT Prophylaxis: Lovenox 40 mg Sq daily unless contraindicated in which case SCDs   - Measures to reduce delirium: frequent reorientation, maintain sleep/wake routine, avoid sleeping during the day, keep lights on during the day, use of hearing aids or glasses if patient needs them, regular toileting.     Case discussed with stroke fellow Dr. Virgil Dyer under supervision of stroke attending Dr. Santana.

## 2022-08-31 NOTE — ED PROVIDER NOTE - CLINICAL SUMMARY MEDICAL DECISION MAKING FREE TEXT BOX
Patient is a 57 year old female with past medical history significant for hypertension, anxiety, and depression presents to the Emergency Department with chief complaint of confusion.  Patient was a stroke code upon presentation and was immediately taken to CT scan and assessed by neurology. Patient is a 57 year old female with past medical history significant for hypertension, anxiety, and depression presents to the Emergency Department with chief complaint of confusion.  Patient was a stroke code upon presentation and was immediately taken to CT scan and assessed by neurology.  Scans and testing were negative for acute pathology.  Patient was placed in the observation unit for further testing and evaluation.

## 2022-09-01 VITALS
DIASTOLIC BLOOD PRESSURE: 96 MMHG | TEMPERATURE: 98 F | HEART RATE: 106 BPM | RESPIRATION RATE: 19 BRPM | SYSTOLIC BLOOD PRESSURE: 142 MMHG | OXYGEN SATURATION: 97 %

## 2022-09-01 LAB
A1C WITH ESTIMATED AVERAGE GLUCOSE RESULT: 5.8 % — HIGH (ref 4–5.6)
CHOLEST SERPL-MCNC: 186 MG/DL — SIGNIFICANT CHANGE UP
ESTIMATED AVERAGE GLUCOSE: 120 MG/DL — HIGH (ref 68–114)
HDLC SERPL-MCNC: 47 MG/DL — LOW
LIPID PNL WITH DIRECT LDL SERPL: 107 MG/DL — HIGH
NON HDL CHOLESTEROL: 139 MG/DL — HIGH
TRIGL SERPL-MCNC: 161 MG/DL — HIGH

## 2022-09-01 PROCEDURE — 85018 HEMOGLOBIN: CPT

## 2022-09-01 PROCEDURE — 70496 CT ANGIOGRAPHY HEAD: CPT | Mod: MA

## 2022-09-01 PROCEDURE — 71045 X-RAY EXAM CHEST 1 VIEW: CPT

## 2022-09-01 PROCEDURE — G0378: CPT

## 2022-09-01 PROCEDURE — 82565 ASSAY OF CREATININE: CPT

## 2022-09-01 PROCEDURE — 82435 ASSAY OF BLOOD CHLORIDE: CPT

## 2022-09-01 PROCEDURE — 70551 MRI BRAIN STEM W/O DYE: CPT | Mod: MA

## 2022-09-01 PROCEDURE — 80061 LIPID PANEL: CPT

## 2022-09-01 PROCEDURE — 83036 HEMOGLOBIN GLYCOSYLATED A1C: CPT

## 2022-09-01 PROCEDURE — 96374 THER/PROPH/DIAG INJ IV PUSH: CPT | Mod: XU

## 2022-09-01 PROCEDURE — 85014 HEMATOCRIT: CPT

## 2022-09-01 PROCEDURE — U0005: CPT

## 2022-09-01 PROCEDURE — 95816 EEG AWAKE AND DROWSY: CPT

## 2022-09-01 PROCEDURE — 85025 COMPLETE CBC W/AUTO DIFF WBC: CPT

## 2022-09-01 PROCEDURE — 84484 ASSAY OF TROPONIN QUANT: CPT

## 2022-09-01 PROCEDURE — 82947 ASSAY GLUCOSE BLOOD QUANT: CPT

## 2022-09-01 PROCEDURE — 85610 PROTHROMBIN TIME: CPT

## 2022-09-01 PROCEDURE — 82803 BLOOD GASES ANY COMBINATION: CPT

## 2022-09-01 PROCEDURE — 82330 ASSAY OF CALCIUM: CPT

## 2022-09-01 PROCEDURE — 82962 GLUCOSE BLOOD TEST: CPT

## 2022-09-01 PROCEDURE — 85730 THROMBOPLASTIN TIME PARTIAL: CPT

## 2022-09-01 PROCEDURE — 80053 COMPREHEN METABOLIC PANEL: CPT

## 2022-09-01 PROCEDURE — 99285 EMERGENCY DEPT VISIT HI MDM: CPT | Mod: 25

## 2022-09-01 PROCEDURE — 84132 ASSAY OF SERUM POTASSIUM: CPT

## 2022-09-01 PROCEDURE — U0003: CPT

## 2022-09-01 PROCEDURE — 70450 CT HEAD/BRAIN W/O DYE: CPT | Mod: MA

## 2022-09-01 PROCEDURE — 70498 CT ANGIOGRAPHY NECK: CPT | Mod: MA

## 2022-09-01 PROCEDURE — 84295 ASSAY OF SERUM SODIUM: CPT

## 2022-09-01 PROCEDURE — 83605 ASSAY OF LACTIC ACID: CPT

## 2022-09-01 NOTE — ED CDU PROVIDER SUBSEQUENT DAY NOTE - ATTENDING APP SHARED VISIT CONTRIBUTION OF CARE
Attending MD Thomas:   I personally have seen and examined this patient.  Physician assistant note reviewed and agree on plan of care and except where noted.  See below for details.     Seen in CDU Edmond Mcgregor 47    57F with PMH/PSH including HTN, HLD, anxiety, depression sent to the CDU after presenting to the ED with confusion, word finding difficulty.  Reports has not had return of symptoms while in CDU.  Denies change in vision, double vision, sudden loss of vision, headache.  Denies loss of urinary or bowel continence. Denies numbness, weakness or tingling in extremities. Denies chest pain, shortness of breath, abdominal pain, nausea, vomiting, diarrhea, urinary complaints.     Exam:   General: NAD  HENT: head NCAT, airway patent  Eyes: PERRL, EOMI  Lungs: lungs CTAB with good inspiratory effort, no wheezing, no rhonchi, no rales  Cardiac: +S1S2, no obvious m/r/g  GI: abdomen soft with +BS, NT, ND  MSK: FROM at neck, no calf tenderness, swelling, erythema or warmth  Neuro: moving all extremities spontaneously with 5/5 strength, sensory grossly intact, no gross neuro deficits, able to repeat "no ifs, ands or buts", nonfocal  Psych: normal mood and affect     A/P: 57F with confusion, word finding difficulties. Patient feeling improved.  No acute issues at  this time.  Lab and radiology tests reviewed with patient including MRI which is nonactionable.  Pending neuro eval.  Will await.

## 2022-09-01 NOTE — ED CDU PROVIDER SUBSEQUENT DAY NOTE - PROGRESS NOTE DETAILS
Patient seen at bedside in NAD.  VSS.  Patient resting comfortably without complaints. Patient reports that she is feeling well and back to her baseline.  No cardiac events noted on tele overnight.  MRI negtive for acute pathology.  Neuro recommending DC home with close outpatient follow up.  STrict return precautions provided.  -Sunny Clifford PA-C

## 2022-09-01 NOTE — ED CDU PROVIDER SUBSEQUENT DAY NOTE - NS ED ROS FT
Constitutional: No fever or chills  Eyes: No visual changes, no eye pain   CV: No chest pain or lower extremity edema  Resp: No SOB no cough  GI: No abd pain. No nausea or vomiting. No diarrhea. .   : No dysuria, hematuria.   MSK: No musculoskeletal pain  Skin: No rash  Psych: No complaints   Neuro: +confusion +word finding difficulty No headache. No numbness or tingling. No weakness.  Endo: No known diabetes

## 2022-09-01 NOTE — ED CDU PROVIDER SUBSEQUENT DAY NOTE - PHYSICAL EXAMINATION
GENERAL: NAD, lying in bed comfortably  	HEAD:  Atraumatic, Normocephalic  	EYES: EOMI  	LUNG: CTA b/l  	HEART: RRR, +S1/S2; No m/r/g  	ABDOMEN: soft, nontender   	NERVOUS SYSTEM:  AAOx3, strength equal b/l UE/LE 5/5, sensation grossly intact, steady gait   PSYCH: Appropriate mood and affect   SKIN: No visible rashes or lesions

## 2022-09-01 NOTE — ED CDU PROVIDER SUBSEQUENT DAY NOTE - HISTORY
No interval changes since initial CDU provider note. Pt feels well without complaint. NAD VSS. no events on tele. Pending MRI results, in the setting of episode of confusion, resolved. Neuro following. - ELIU Parker

## 2022-09-01 NOTE — ED ADULT NURSE REASSESSMENT NOTE - NS ED NURSE REASSESS COMMENT FT1
10.45 Pt is evaluated by CDU MD William Fabian  pt is feeling better.  Pt is discharged . Ml out  By ELIU Clifford  explained the follow up care & gave the discharge summary  . Pt has stable vitals steady gait A&OX 4 at the time of Discharge
13.00 Received the Pt from  MALISSA Bettencourt . Pt is Observed for Confusion for MRI& EEG. Received the Pt A&OX 4 obeys commands Rachael N/V/D fever chills cp SOB   Comfort care & safety measures continued  IV site looks clean & dry no signs of infiltration noted pt denies  pain IV site .  Pt is advised to call for help  call bell with in the reach pt verbalized the understanding .  pending CDU  MD pelaez . GCS 15/15 A&OX 4 PERRLA  size 3 Strong upper & lower extremities steady gait   No facial droop  No Hand Leg drop denies numbness tingling Continue to monitor
Pt received from MALISSA Carr. Pt oriented to CDU & plan of care was discussed. Pt A&O x 4. Pt in CDU for tele, neuro checks q 4, MRI/MRA, neuro following. Pt denies any headache, lightheadedness, dizziness, numbness, weakness, visual or speech disturbances. Pt on a cardiac monitor in NSR, HR 80's. On neuro exam, A&O x 4, PERRLA, EOMI, strength equal, sensation intact, clear speech. V/S stable, pt afebrile,  IV in place, patent and free of signs of infiltration. Pt resting in bed. Safety & comfort measures maintained. Call bell in reach. Will continue to monitor.
07.00 Am Received the Pt from  MALISSA Perla . Pt is Observed for confusion & word finding difficulty for MRI & EEG . Received the Pt A&OX 4 obeys commands Rachael N/V/D fever chills cp SOB   Comfort care & safety measures continued  IV site looks clean & dry no signs of infiltration noted pt denies  pain IV site .  Pt is advised to call for help  call bell with in the reach pt verbalized the understanding .  pending CDU  MD pelaez . GCS 15/15 A&OX 4 PERRLA  size 3 Strong upper & lower extremities steady gait   No facial droop  No Hand Leg drop denies numbness tingling  Pt states her symptoms have resolved Continue to monitor
On neuro exam, A&O x 4, PERRLA, EOMI, strength equal, sensation intact, clear speech.

## 2022-09-02 NOTE — ED POST DISCHARGE NOTE - DETAILS
LVM for pt to return call to ED - Kalpana Blake PA-C Spoke to patient and informed of results - Kalpana Blake PA-C

## 2022-09-06 ENCOUNTER — NON-APPOINTMENT (OUTPATIENT)
Age: 57
End: 2022-09-06

## 2022-11-02 ENCOUNTER — APPOINTMENT (OUTPATIENT)
Age: 57
End: 2022-11-02

## 2023-05-13 ENCOUNTER — RX RENEWAL (OUTPATIENT)
Age: 58
End: 2023-05-13

## 2023-08-07 ENCOUNTER — NON-APPOINTMENT (OUTPATIENT)
Age: 58
End: 2023-08-07

## 2023-10-26 ENCOUNTER — NON-APPOINTMENT (OUTPATIENT)
Age: 58
End: 2023-10-26

## 2023-12-02 ENCOUNTER — APPOINTMENT (OUTPATIENT)
Dept: OBGYN | Facility: CLINIC | Age: 58
End: 2023-12-02
Payer: COMMERCIAL

## 2023-12-02 VITALS
BODY MASS INDEX: 29.02 KG/M2 | SYSTOLIC BLOOD PRESSURE: 116 MMHG | HEART RATE: 102 BPM | WEIGHT: 170 LBS | HEIGHT: 64 IN | DIASTOLIC BLOOD PRESSURE: 81 MMHG

## 2023-12-02 DIAGNOSIS — Z01.419 ENCOUNTER FOR GYNECOLOGICAL EXAMINATION (GENERAL) (ROUTINE) W/OUT ABNORMAL FINDINGS: ICD-10-CM

## 2023-12-02 PROCEDURE — 99396 PREV VISIT EST AGE 40-64: CPT

## 2023-12-02 RX ORDER — TIRZEPATIDE 15 MG/.5ML
15 INJECTION, SOLUTION SUBCUTANEOUS
Refills: 0 | Status: ACTIVE | COMMUNITY

## 2023-12-15 LAB — CYTOLOGY CVX/VAG DOC THIN PREP: NORMAL

## 2024-01-16 ENCOUNTER — RX RENEWAL (OUTPATIENT)
Age: 59
End: 2024-01-16

## 2024-01-16 RX ORDER — ESTRADIOL 0.1 MG/G
0.1 CREAM VAGINAL
Qty: 42.5 | Refills: 1 | Status: DISCONTINUED | COMMUNITY
Start: 2021-10-09 | End: 2024-01-16

## 2024-03-23 NOTE — BRIEF OPERATIVE NOTE - SPECIMENS
Message sent to SW to follow up with pt regarding PT/OT recs and discharge plans.    right ureteral stone

## 2024-04-24 ENCOUNTER — NON-APPOINTMENT (OUTPATIENT)
Age: 59
End: 2024-04-24

## 2024-05-05 NOTE — ED PROVIDER NOTE - NSCAREINITIATED _GEN_ER
Tato Christopher(Fellow) amoxicillin-clavulanate 875 mg-125 mg oral tablet: 875 milligram(s) orally every 12 hours Please take 1 tablet twice a day for 3 days MDD: 2  buPROPion 150 mg/24 hours (XL) oral tablet, extended release: 1 tab(s) orally once a day  omeprazole 40 mg oral delayed release capsule: 1 cap(s) orally 2 times a day  Paxil 40 mg oral tablet: 1 tab(s) orally once a day

## 2024-05-06 ENCOUNTER — TRANSCRIPTION ENCOUNTER (OUTPATIENT)
Age: 59
End: 2024-05-06

## 2024-06-05 ENCOUNTER — NON-APPOINTMENT (OUTPATIENT)
Age: 59
End: 2024-06-05

## 2024-06-16 ENCOUNTER — NON-APPOINTMENT (OUTPATIENT)
Age: 59
End: 2024-06-16

## 2024-06-20 ENCOUNTER — APPOINTMENT (OUTPATIENT)
Dept: ORTHOPEDIC SURGERY | Facility: CLINIC | Age: 59
End: 2024-06-20
Payer: COMMERCIAL

## 2024-06-20 VITALS — BODY MASS INDEX: 26.12 KG/M2 | HEIGHT: 64 IN | WEIGHT: 153 LBS

## 2024-06-20 DIAGNOSIS — M51.36 OTHER INTERVERTEBRAL DISC DEGENERATION, LUMBAR REGION: ICD-10-CM

## 2024-06-20 PROCEDURE — 72100 X-RAY EXAM L-S SPINE 2/3 VWS: CPT

## 2024-06-20 PROCEDURE — 99204 OFFICE O/P NEW MOD 45 MIN: CPT

## 2024-06-20 RX ORDER — DICLOFENAC SODIUM 75 MG/1
75 TABLET, DELAYED RELEASE ORAL
Qty: 60 | Refills: 1 | Status: ACTIVE | COMMUNITY
Start: 2024-06-20 | End: 1900-01-01

## 2024-06-20 NOTE — ADDENDUM
[FreeTextEntry1] : This note was written by Ino Haque on 06/20/2024 acting as scribe for Dr. Amadou Forde M.D.  I, Amadou Forde MD, have read and attest that all the information, medical decision making and discharge instructions within are true and accurate.

## 2024-06-20 NOTE — PHYSICAL EXAM
[Normal] : Gait: normal [Sales's Sign] : negative Sales's sign [Pronator Drift] : negative pronator drift [SLR] : negative straight leg raise [de-identified] : 5 out of 5 motor strength, sensation is intact and symmetrical full range of motion flexion extension and rotation, no palpatory tenderness full range of motion of hips knees shoulders and elbows (all four extremities), no atrophy, negative straight leg raise, no pathological reflexes, no swelling, normal ambulation, no apparent distress skin is intact, no palpable lymph nodes, no upper or lower extremity instability, alert and oriented x3 and normal mood. Normal finger-to nose test.  [de-identified] : XR AP Lat Lumbar 06/20/2024 -L3-L5 laminectomy and L4-5 fusion, mild degenerative changes L3-4 since 2013- reviewed with patient.

## 2024-06-20 NOTE — DISCUSSION/SUMMARY
[de-identified] : S/P L3-L5 laminectomy and L4-5 fusion with me in July 2013. Lumbago. L3-4 lumbar degenerative disc disease. Discussed all options. Diclofenac and Methocarbamol PRN. F/U if no better. All options discussed including rest, medicine, home exercise, acupuncture, Chiropractic care, Physical Therapy, Pain management, and last resort surgery. All questions were answered, all alternatives discussed, and the patient is in complete agreement with the treatment plan which the patient contributed to and discussed with me through the shared decision-making process. Follow-up appointment as instructed. Any issues and the patient will call or come in sooner.

## 2024-06-20 NOTE — HISTORY OF PRESENT ILLNESS
[de-identified] : 59 year old female presents for evaluation of chronic lower back pain, which has worsened for the past two weeks. S/P L3-L5 laminectomy and L4-5 fusion with me in July 2013.  She states she was bending over for a period of time, and when she got up she felt the pain.  Pain is localized to the lower back.  Denies numbness/tingling. Walking and standing aggravates the pain. Has been taking NSAIDs and methocarbamol prescribed at an urgent care which helps.  Has last tried PT prior to her surgery in 2013, no recent PT or chiropractic care. S/P LESI prior to her surgery. 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  No fever chills sweats nausea vomiting no bowel or bladder dysfunction, no recent weight loss or gain no night pain. This history is in addition to the intake form that I personally reviewed.  [Stable] : stable

## 2024-07-17 ENCOUNTER — APPOINTMENT (OUTPATIENT)
Dept: UROLOGY | Facility: CLINIC | Age: 59
End: 2024-07-17
Payer: COMMERCIAL

## 2024-07-17 VITALS
HEART RATE: 110 BPM | DIASTOLIC BLOOD PRESSURE: 72 MMHG | OXYGEN SATURATION: 98 % | TEMPERATURE: 97.5 F | SYSTOLIC BLOOD PRESSURE: 102 MMHG

## 2024-07-17 DIAGNOSIS — R39.15 URGENCY OF URINATION: ICD-10-CM

## 2024-07-17 DIAGNOSIS — N20.0 CALCULUS OF KIDNEY: ICD-10-CM

## 2024-07-17 PROCEDURE — 99214 OFFICE O/P EST MOD 30 MIN: CPT

## 2024-07-18 ENCOUNTER — OUTPATIENT (OUTPATIENT)
Dept: OUTPATIENT SERVICES | Facility: HOSPITAL | Age: 59
LOS: 1 days | End: 2024-07-18
Payer: COMMERCIAL

## 2024-07-18 ENCOUNTER — APPOINTMENT (OUTPATIENT)
Dept: CT IMAGING | Facility: IMAGING CENTER | Age: 59
End: 2024-07-18
Payer: COMMERCIAL

## 2024-07-18 DIAGNOSIS — Z98.890 OTHER SPECIFIED POSTPROCEDURAL STATES: Chronic | ICD-10-CM

## 2024-07-18 DIAGNOSIS — Z90.49 ACQUIRED ABSENCE OF OTHER SPECIFIED PARTS OF DIGESTIVE TRACT: Chronic | ICD-10-CM

## 2024-07-18 DIAGNOSIS — N20.0 CALCULUS OF KIDNEY: ICD-10-CM

## 2024-07-18 PROCEDURE — 74176 CT ABD & PELVIS W/O CONTRAST: CPT | Mod: 26

## 2024-07-18 PROCEDURE — 74176 CT ABD & PELVIS W/O CONTRAST: CPT

## 2024-07-19 LAB
APPEARANCE: CLEAR
BACTERIA: NEGATIVE /HPF
BILIRUBIN URINE: NEGATIVE
BLOOD URINE: ABNORMAL
CALCIUM OXALATE CRYSTALS: PRESENT
CAST: 0 /LPF
COLOR: YELLOW
EPITHELIAL CELLS: 3 /HPF
GLUCOSE QUALITATIVE U: NEGATIVE MG/DL
KETONES URINE: ABNORMAL MG/DL
LEUKOCYTE ESTERASE URINE: ABNORMAL
MICROSCOPIC-UA: NORMAL
NITRITE URINE: NEGATIVE
PH URINE: 6
PROTEIN URINE: NORMAL MG/DL
RED BLOOD CELLS URINE: 5 /HPF
REVIEW: NORMAL
SPECIFIC GRAVITY URINE: 1.03
UROBILINOGEN URINE: 0.2 MG/DL
WHITE BLOOD CELLS URINE: 2 /HPF

## 2024-07-19 RX ORDER — TAMSULOSIN HYDROCHLORIDE 0.4 MG/1
0.4 CAPSULE ORAL
Qty: 30 | Refills: 3 | Status: ACTIVE | COMMUNITY
Start: 2024-07-19 | End: 1900-01-01

## 2024-07-20 LAB — BACTERIA UR CULT: NORMAL

## 2024-07-23 ENCOUNTER — APPOINTMENT (OUTPATIENT)
Dept: OBGYN | Facility: CLINIC | Age: 59
End: 2024-07-23
Payer: COMMERCIAL

## 2024-07-23 VITALS
BODY MASS INDEX: 25.48 KG/M2 | SYSTOLIC BLOOD PRESSURE: 126 MMHG | WEIGHT: 149.25 LBS | DIASTOLIC BLOOD PRESSURE: 62 MMHG | HEIGHT: 64 IN

## 2024-07-23 DIAGNOSIS — N95.2 POSTMENOPAUSAL ATROPHIC VAGINITIS: ICD-10-CM

## 2024-07-23 PROCEDURE — 99213 OFFICE O/P EST LOW 20 MIN: CPT

## 2024-07-25 LAB — HPV HIGH+LOW RISK DNA PNL CVX: NOT DETECTED

## 2024-07-29 LAB — CYTOLOGY CVX/VAG DOC THIN PREP: ABNORMAL

## 2024-08-01 ENCOUNTER — OUTPATIENT (OUTPATIENT)
Dept: OUTPATIENT SERVICES | Facility: HOSPITAL | Age: 59
LOS: 1 days | End: 2024-08-01
Payer: COMMERCIAL

## 2024-08-01 ENCOUNTER — APPOINTMENT (OUTPATIENT)
Dept: CT IMAGING | Facility: HOSPITAL | Age: 59
End: 2024-08-01
Payer: COMMERCIAL

## 2024-08-01 DIAGNOSIS — Z98.890 OTHER SPECIFIED POSTPROCEDURAL STATES: Chronic | ICD-10-CM

## 2024-08-01 DIAGNOSIS — N20.0 CALCULUS OF KIDNEY: ICD-10-CM

## 2024-08-01 DIAGNOSIS — Z90.49 ACQUIRED ABSENCE OF OTHER SPECIFIED PARTS OF DIGESTIVE TRACT: Chronic | ICD-10-CM

## 2024-08-01 PROCEDURE — 74178 CT ABD&PLV WO CNTR FLWD CNTR: CPT | Mod: 26

## 2024-08-01 PROCEDURE — 74178 CT ABD&PLV WO CNTR FLWD CNTR: CPT

## 2024-08-13 NOTE — ASU PREOP CHECKLIST - AS BP NONINV SITE
To ED per [facility] from [facility]; hx of pt having unwitnessed fall this am. Pt relates was walking through the doorway and thinks her feet got tangled up. Doesn't think she hit her head. No pain at rest, but c/o pain to left hip with ambulation after the fall.   left upper arm

## 2024-08-21 DIAGNOSIS — N64.89 OTHER SPECIFIED DISORDERS OF BREAST: ICD-10-CM

## 2024-08-27 PROBLEM — N64.89 BREAST ASYMMETRY IN FEMALE: Status: ACTIVE | Noted: 2024-08-27

## 2024-09-13 DIAGNOSIS — R92.1 MAMMOGRAPHIC CALCIFICATION FOUND ON DIAGNOSTIC IMAGING OF BREAST: ICD-10-CM

## 2024-10-03 ENCOUNTER — NON-APPOINTMENT (OUTPATIENT)
Age: 59
End: 2024-10-03

## 2025-01-13 ENCOUNTER — NON-APPOINTMENT (OUTPATIENT)
Age: 60
End: 2025-01-13

## 2025-02-16 ENCOUNTER — NON-APPOINTMENT (OUTPATIENT)
Age: 60
End: 2025-02-16

## 2025-03-14 ENCOUNTER — NON-APPOINTMENT (OUTPATIENT)
Age: 60
End: 2025-03-14

## 2025-03-25 ENCOUNTER — APPOINTMENT (OUTPATIENT)
Dept: ORTHOPEDIC SURGERY | Facility: CLINIC | Age: 60
End: 2025-03-25
Payer: COMMERCIAL

## 2025-03-25 VITALS — WEIGHT: 138 LBS | HEIGHT: 64 IN | BODY MASS INDEX: 23.56 KG/M2

## 2025-03-25 DIAGNOSIS — M16.12 UNILATERAL PRIMARY OSTEOARTHRITIS, LEFT HIP: ICD-10-CM

## 2025-03-25 DIAGNOSIS — M25.552 PAIN IN LEFT HIP: ICD-10-CM

## 2025-03-25 DIAGNOSIS — M25.562 PAIN IN LEFT KNEE: ICD-10-CM

## 2025-03-25 PROCEDURE — G2211 COMPLEX E/M VISIT ADD ON: CPT

## 2025-03-25 PROCEDURE — 73502 X-RAY EXAM HIP UNI 2-3 VIEWS: CPT | Mod: LT

## 2025-03-25 PROCEDURE — 99205 OFFICE O/P NEW HI 60 MIN: CPT

## 2025-03-25 PROCEDURE — 73564 X-RAY EXAM KNEE 4 OR MORE: CPT | Mod: LT

## 2025-04-11 ENCOUNTER — NON-APPOINTMENT (OUTPATIENT)
Age: 60
End: 2025-04-11

## 2025-05-20 ENCOUNTER — NON-APPOINTMENT (OUTPATIENT)
Age: 60
End: 2025-05-20

## 2025-05-21 ENCOUNTER — OUTPATIENT (OUTPATIENT)
Dept: OUTPATIENT SERVICES | Facility: HOSPITAL | Age: 60
LOS: 1 days | End: 2025-05-21
Payer: COMMERCIAL

## 2025-05-21 ENCOUNTER — APPOINTMENT (OUTPATIENT)
Dept: GASTROENTEROLOGY | Facility: CLINIC | Age: 60
End: 2025-05-21
Payer: COMMERCIAL

## 2025-05-21 VITALS
OXYGEN SATURATION: 97 % | WEIGHT: 137 LBS | SYSTOLIC BLOOD PRESSURE: 117 MMHG | HEIGHT: 64 IN | DIASTOLIC BLOOD PRESSURE: 62 MMHG | HEART RATE: 95 BPM | BODY MASS INDEX: 23.39 KG/M2 | RESPIRATION RATE: 16 BRPM

## 2025-05-21 VITALS
OXYGEN SATURATION: 97 % | DIASTOLIC BLOOD PRESSURE: 88 MMHG | TEMPERATURE: 98 F | HEART RATE: 81 BPM | HEIGHT: 64 IN | SYSTOLIC BLOOD PRESSURE: 120 MMHG | RESPIRATION RATE: 16 BRPM | WEIGHT: 138.89 LBS

## 2025-05-21 DIAGNOSIS — M16.12 UNILATERAL PRIMARY OSTEOARTHRITIS, LEFT HIP: ICD-10-CM

## 2025-05-21 DIAGNOSIS — Z01.818 ENCOUNTER FOR OTHER PREPROCEDURAL EXAMINATION: ICD-10-CM

## 2025-05-21 DIAGNOSIS — Z98.890 OTHER SPECIFIED POSTPROCEDURAL STATES: Chronic | ICD-10-CM

## 2025-05-21 DIAGNOSIS — Z96.0 PRESENCE OF UROGENITAL IMPLANTS: Chronic | ICD-10-CM

## 2025-05-21 DIAGNOSIS — Z90.49 ACQUIRED ABSENCE OF OTHER SPECIFIED PARTS OF DIGESTIVE TRACT: Chronic | ICD-10-CM

## 2025-05-21 DIAGNOSIS — Z12.11 ENCOUNTER FOR SCREENING FOR MALIGNANT NEOPLASM OF COLON: ICD-10-CM

## 2025-05-21 DIAGNOSIS — K59.09 OTHER CONSTIPATION: ICD-10-CM

## 2025-05-21 LAB
A1C WITH ESTIMATED AVERAGE GLUCOSE RESULT: 5.3 % — SIGNIFICANT CHANGE UP (ref 4–5.6)
ANION GAP SERPL CALC-SCNC: 11 MMOL/L — SIGNIFICANT CHANGE UP (ref 5–17)
BUN SERPL-MCNC: 18 MG/DL — SIGNIFICANT CHANGE UP (ref 7–23)
CALCIUM SERPL-MCNC: 9.7 MG/DL — SIGNIFICANT CHANGE UP (ref 8.4–10.5)
CHLORIDE SERPL-SCNC: 102 MMOL/L — SIGNIFICANT CHANGE UP (ref 96–108)
CO2 SERPL-SCNC: 23 MMOL/L — SIGNIFICANT CHANGE UP (ref 22–31)
CREAT SERPL-MCNC: 0.85 MG/DL — SIGNIFICANT CHANGE UP (ref 0.5–1.3)
EGFR: 77 ML/MIN/1.73M2 — SIGNIFICANT CHANGE UP
EGFR: 77 ML/MIN/1.73M2 — SIGNIFICANT CHANGE UP
ESTIMATED AVERAGE GLUCOSE: 105 MG/DL — SIGNIFICANT CHANGE UP (ref 68–114)
GLUCOSE SERPL-MCNC: 96 MG/DL — SIGNIFICANT CHANGE UP (ref 70–99)
HCT VFR BLD CALC: 40.5 % — SIGNIFICANT CHANGE UP (ref 34.5–45)
HGB BLD-MCNC: 13.4 G/DL — SIGNIFICANT CHANGE UP (ref 11.5–15.5)
MCHC RBC-ENTMCNC: 29.1 PG — SIGNIFICANT CHANGE UP (ref 27–34)
MCHC RBC-ENTMCNC: 33.1 G/DL — SIGNIFICANT CHANGE UP (ref 32–36)
MCV RBC AUTO: 87.9 FL — SIGNIFICANT CHANGE UP (ref 80–100)
MRSA PCR RESULT.: SIGNIFICANT CHANGE UP
NRBC BLD AUTO-RTO: 0 /100 WBCS — SIGNIFICANT CHANGE UP (ref 0–0)
PLATELET # BLD AUTO: 251 K/UL — SIGNIFICANT CHANGE UP (ref 150–400)
POTASSIUM SERPL-MCNC: 4.2 MMOL/L — SIGNIFICANT CHANGE UP (ref 3.5–5.3)
POTASSIUM SERPL-SCNC: 4.2 MMOL/L — SIGNIFICANT CHANGE UP (ref 3.5–5.3)
RBC # BLD: 4.61 M/UL — SIGNIFICANT CHANGE UP (ref 3.8–5.2)
RBC # FLD: 12.3 % — SIGNIFICANT CHANGE UP (ref 10.3–14.5)
S AUREUS DNA NOSE QL NAA+PROBE: SIGNIFICANT CHANGE UP
SODIUM SERPL-SCNC: 136 MMOL/L — SIGNIFICANT CHANGE UP (ref 135–145)
WBC # BLD: 6.15 K/UL — SIGNIFICANT CHANGE UP (ref 3.8–10.5)
WBC # FLD AUTO: 6.15 K/UL — SIGNIFICANT CHANGE UP (ref 3.8–10.5)

## 2025-05-21 PROCEDURE — 36415 COLL VENOUS BLD VENIPUNCTURE: CPT

## 2025-05-21 PROCEDURE — 83036 HEMOGLOBIN GLYCOSYLATED A1C: CPT

## 2025-05-21 PROCEDURE — G2211 COMPLEX E/M VISIT ADD ON: CPT

## 2025-05-21 PROCEDURE — G0463: CPT

## 2025-05-21 PROCEDURE — 87641 MR-STAPH DNA AMP PROBE: CPT

## 2025-05-21 PROCEDURE — 87640 STAPH A DNA AMP PROBE: CPT

## 2025-05-21 PROCEDURE — 85027 COMPLETE CBC AUTOMATED: CPT

## 2025-05-21 PROCEDURE — 80048 BASIC METABOLIC PNL TOTAL CA: CPT

## 2025-05-21 PROCEDURE — 99203 OFFICE O/P NEW LOW 30 MIN: CPT

## 2025-05-21 RX ORDER — LIDOCAINE HCL/PF 10 MG/ML
0.2 VIAL (ML) INJECTION ONCE
Refills: 0 | Status: DISCONTINUED | OUTPATIENT
Start: 2025-06-09 | End: 2025-06-23

## 2025-05-21 RX ORDER — SODIUM CHLORIDE 9 G/1000ML
1000 INJECTION, SOLUTION INTRAVENOUS
Refills: 0 | Status: DISCONTINUED | OUTPATIENT
Start: 2025-06-09 | End: 2025-06-23

## 2025-05-21 NOTE — H&P PST ADULT - NSICDXPASTMEDICALHX_GEN_ALL_CORE_FT
PAST MEDICAL HISTORY:  Anxiety and depression     Colitis, nonspecific     Diverticulosis     Enlarged thyroid     HLD (hyperlipidemia)     HTN (hypertension)     Kidney stones x 1 pt denies surgical intervention " in my 20 s '    Menieres disease     Nephrolithiasis     Spinal stenosis     Unilateral primary osteoarthritis, left hip     Vasovagal syncope

## 2025-05-21 NOTE — H&P PST ADULT - ASSESSMENT
DASI score: 5.07 - restricted due to left hip pain  DASI activity: Able to walk 2-3 blocks, ADLs, Grocery Shopping, 1 Flight of Stairs, 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[employer]  Loose teeth or denture: denies CAPRINI SCORE    AGE RELATED RISK FACTORS                                                             [ ] Age 41-60 years                                            (1 Point)  [ ] Age: 61-74 years                                           (2 Points)                 [ ] Age= 75 years                                                (3 Points)             DISEASE RELATED RISK FACTORS                                                       [ ] Edema in the lower extremities                 (1 Point)                     [ ] Varicose veins                                               (1 Point)                                 [ ] BMI > 25 Kg/m2                                            (1 Point)                                  [ ] Serious infection (ie PNA)                            (1 Point)                     [ ] Lung disease ( COPD, Emphysema)            (1 Point)                                                                          [ ] Acute myocardial infarction                         (1 Point)                  [ ] Congestive heart failure (in the previous month)  (1 Point)         [ ] Inflammatory bowel disease                            (1 Point)                  [ ] Central venous access, PICC or Port               (2 points)       (within the last month)                                                                [ ] Stroke (in the previous month)                        (5 Points)    [ ] Previous or present malignancy                       (2 points)                                                                                                                                                         HEMATOLOGY RELATED FACTORS                                                         [ ] Prior episodes of VTE                                     (3 Points)                     [ ] Positive family history for VTE                      (3 Points)                  [ ] Prothrombin 70173 A                                     (3 Points)                     [ ] Factor V Leiden                                                (3 Points)                        [ ] Lupus anticoagulants                                      (3 Points)                                                           [ ] Anticardiolipin antibodies                              (3 Points)                                                       [ ] High homocysteine in the blood                   (3 Points)                                             [ ] Other congenital or acquired thrombophilia      (3 Points)                                                [ ] Heparin induced thrombocytopenia                  (3 Points)                                        MOBILITY RELATED FACTORS  [ ] Bed rest                                                         (1 Point)  [ ] Plaster cast                                                    (2 points)  [ ] Bed bound for more than 72 hours           (2 Points)    GENDER SPECIFIC FACTORS  [ ] Pregnancy or had a baby within the last month   (1 Point)  [ ] Post-partum < 6 weeks                                   (1 Point)  [ ] Hormonal therapy  or oral contraception   (1 Point)  [ ] History of pregnancy complications              (1 point)  [ ] Unexplained or recurrent              (1 Point)    OTHER RISK FACTORS                                           (1 Point)  [ ] BMI >40, smoking, diabetes requiring insulin, chemotherapy  blood transfusions and length of surgery over 2 hours    SURGERY RELATED RISK FACTORS  [ ]  Section within the last month     (1 Point)  [ ] Minor surgery                                                  (1 Point)  [ ] Arthroscopic surgery                                       (2 Points)  [ ] Planned major surgery lasting more            (2 Points)      than 45 minutes     [ ] Elective hip or knee joint replacement       (5 points)       surgery                                                TRAUMA RELATED RISK FACTORS  [ ] Fracture of the hip, pelvis, or leg                       (5 Points)  [ ] Spinal cord injury resulting in paralysis             (5 points)       (in the previous month)    [ ] Paralysis  (less than 1 month)                             (5 Points)  [ ] Multiple Trauma within 1 month                        (5 Points)    Total Score [        ]    Caprini Score 0-2: Low Risk, NO VTE prophylaxis required for most patients, encourage ambulation  Caprini Score 3-6: Moderate Risk , pharmacologic VTE prophylaxis is indicated for most patients (in the absence of contraindications)  Caprini Score Greater than or =7: High risk, pharmocologic VTE prophylaxis indicated for most patients (in the absence of contraindications) DASI score: 5.07 - restricted due to left hip pain  DASI activity: Able to walk 2-3 blocks, ADLs, Grocery Shopping, 1 Flight of Stairs, 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[employer]  Loose teeth or denture: denies CAPRINI SCORE    AGE RELATED RISK FACTORS                                                             [ ] Age 41-60 years                                            (1 Point)  [X ] Age: 61-74 years                                           (2 Points)                 [ ] Age= 75 years                                                (3 Points)             DISEASE RELATED RISK FACTORS                                                       [ ] Edema in the lower extremities                 (1 Point)                     [ ] Varicose veins                                               (1 Point)                                 [ ] BMI > 25 Kg/m2                                            (1 Point)                                  [ ] Serious infection (ie PNA)                            (1 Point)                     [ ] Lung disease ( COPD, Emphysema)            (1 Point)                                                                          [ ] Acute myocardial infarction                         (1 Point)                  [ ] Congestive heart failure (in the previous month)  (1 Point)         [ ] Inflammatory bowel disease                            (1 Point)                  [ ] Central venous access, PICC or Port               (2 points)       (within the last month)                                                                [ ] Stroke (in the previous month)                        (5 Points)    [ ] Previous or present malignancy                       (2 points)                                                                                                                                                         HEMATOLOGY RELATED FACTORS                                                         [ ] Prior episodes of VTE                                     (3 Points)                     [ ] Positive family history for VTE                      (3 Points)                  [ ] Prothrombin 35840 A                                     (3 Points)                     [ ] Factor V Leiden                                                (3 Points)                        [ ] Lupus anticoagulants                                      (3 Points)                                                           [ ] Anticardiolipin antibodies                              (3 Points)                                                       [ ] High homocysteine in the blood                   (3 Points)                                             [ ] Other congenital or acquired thrombophilia      (3 Points)                                                [ ] Heparin induced thrombocytopenia                  (3 Points)                                        MOBILITY RELATED FACTORS  [ ] Bed rest                                                         (1 Point)  [ ] Plaster cast                                                    (2 points)  [ ] Bed bound for more than 72 hours           (2 Points)    GENDER SPECIFIC FACTORS  [ ] Pregnancy or had a baby within the last month   (1 Point)  [ ] Post-partum < 6 weeks                                   (1 Point)  [ ] Hormonal therapy  or oral contraception   (1 Point)  [ ] History of pregnancy complications              (1 point)  [ ] Unexplained or recurrent              (1 Point)    OTHER RISK FACTORS                                           (1 Point)  [ ] BMI >40, smoking, diabetes requiring insulin, chemotherapy  blood transfusions and length of surgery over 2 hours    SURGERY RELATED RISK FACTORS  [ ]  Section within the last month     (1 Point)  [ ] Minor surgery                                                  (1 Point)  [ ] Arthroscopic surgery                                       (2 Points)  [ ] Planned major surgery lasting more            (2 Points)      than 45 minutes     [X ] Elective hip or knee joint replacement       (5 points)       surgery                                                TRAUMA RELATED RISK FACTORS  [ ] Fracture of the hip, pelvis, or leg                       (5 Points)  [ ] Spinal cord injury resulting in paralysis             (5 points)       (in the previous month)    [ ] Paralysis  (less than 1 month)                             (5 Points)  [ ] Multiple Trauma within 1 month                        (5 Points)    Total Score [    7    ]    Caprini Score 0-2: Low Risk, NO VTE prophylaxis required for most patients, encourage ambulation  Caprini Score 3-6: Moderate Risk , pharmacologic VTE prophylaxis is indicated for most patients (in the absence of contraindications)  Caprini Score Greater than or =7: High risk, pharmocologic VTE prophylaxis indicated for most patients (in the absence of contraindications)

## 2025-05-21 NOTE — H&P PST ADULT - NSICDXPASTSURGICALHX_GEN_ALL_CORE_FT
PAST SURGICAL HISTORY:   delivery NOS     S/P cholecystectomy     S/P cystoscopy with ureteral stent placement     S/P laminectomy      PAST SURGICAL HISTORY:   delivery NOS     History of hand surgery     S/P cholecystectomy     S/P cystoscopy with ureteral stent placement     S/P laminectomy

## 2025-05-21 NOTE — H&P PST ADULT - PROBLEM SELECTOR PLAN 1
Pt is scheduled for a left total hip arthroplasty, direct anterior approach with Dr. Brooks on 6/9/25 at the ambulatory care center  CBC, BMP, HGA1C and MRSA/MSSA ordered and obtained at [facility]  FS on admit  ERP medications ordered DOS  ERP diet instructions provided and reviewed with teach back understanding  Chlorhexidine soap and instructions provided and reviewed with teach back understanding   Continue ASA 81 mg DOS Pt is scheduled for a left total hip arthroplasty, direct anterior approach with Dr. Brooks on 6/9/25 at the ambulatory care center  CBC, BMP, HGA1C and MRSA/MSSA ordered and obtained at [facility]  FS on admit  ERP medications ordered DOS  ERP diet instructions provided and reviewed with teach back understanding  Chlorhexidine soap and instructions provided and reviewed with teach back understanding   Continue ASA 81 mg DOS  Pt denies any nausea, vomiting or early satiety with GLP-1 agonist medication. Hold Mounjaro 1 week prior to upcoming procedure. As per pt, she was advised by prescriber of medication to hold 2 weeks preoperatively. Pt will hold dose with last injection on 5/24/25

## 2025-05-21 NOTE — H&P PST ADULT - REASON FOR ADMISSION
"I am having a left hip replacement."  Patient Goal: "To be able to do things without all of this pain."

## 2025-05-21 NOTE — H&P PST ADULT - HISTORY OF PRESENT ILLNESS
60 year old female with PMH HTN, HLD and anxiety/depression (on lexapro) reports c/o left hip for several months that comes and goes based on how active she is. She describes the pain as a steady pain that originates in her left groin and radiates to her left knee. At rest, she rates her pain 2/10 and with activity or at its worse, she rates her pain 8/10. Climbing up and down stairs and heavy lifting will make the pain worse. Heating pads makes the pain better temporarily. Pt now presents to PST for scheduled left total hip arthroplasty, direct anterior approach with Dr. Brooks on 6/9/25 at the ambulatory care center. 60 year old female with PMH HTN, HLD, obesity (currently on mounjaro for 1.5 years with 80 lb wt loss - BMI now 23.8) and anxiety/depression (on lexapro) reports c/o left hip for several months that comes and goes based on how active she is. She describes the pain as a steady pain that originates in her left groin and radiates down to her left knee. At rest, she rates her pain 2/10 and with activity or at its worse, she rates her pain 8/10. Climbing up and down stairs and heavy lifting will make the pain worse. Heating pads and using a jacuzzi makes the pain better temporarily. Pt now presents to PST for scheduled left total hip arthroplasty, direct anterior approach with Dr. Brooks on 6/9/25 at the ambulatory care center.

## 2025-06-04 NOTE — ED ADULT TRIAGE NOTE - ESI TRIAGE ACUITY LEVEL, MLM
China Saavedra MD P Fatima, Z  Nurse Msg Pool  3 nodules reported.  Nodule 2 meets criteria for biopsy.  Please refer to endocrinology for expert management.    Referral placed.    Please call and schedule an appointment with Endocrinology below:  Inez:         Dr. Georgia Gong/Madison Hurd NP    [facility]      [address] [place] [address]        Office: [phone]      Fax: [phone]          LM for patient to return call.     LM for patient to return call. 2nd attempt    Spoke with patient/family member regarding test results. The patient/family member verbalized understanding and is agreeable to the plan. No questions or concerns at this time. Encouraged the patient/family member to call our office with any questions or concerns.        
3

## 2025-06-05 DIAGNOSIS — Z96.642 PRESENCE OF LEFT ARTIFICIAL HIP JOINT: ICD-10-CM

## 2025-06-05 RX ORDER — PANTOPRAZOLE 40 MG/1
40 TABLET, DELAYED RELEASE ORAL DAILY
Qty: 30 | Refills: 1 | Status: ACTIVE | COMMUNITY
Start: 2025-06-05 | End: 1900-01-01

## 2025-06-05 RX ORDER — NAPROXEN 500 MG/1
500 TABLET ORAL
Qty: 60 | Refills: 2 | Status: ACTIVE | COMMUNITY
Start: 2025-06-05 | End: 1900-01-01

## 2025-06-05 RX ORDER — TRAMADOL HYDROCHLORIDE 50 MG/1
50 TABLET, COATED ORAL
Qty: 28 | Refills: 0 | Status: ACTIVE | COMMUNITY
Start: 2025-06-05 | End: 1900-01-01

## 2025-06-05 RX ORDER — OXYCODONE 5 MG/1
5 TABLET ORAL
Qty: 28 | Refills: 0 | Status: ACTIVE | COMMUNITY
Start: 2025-06-05 | End: 1900-01-01

## 2025-06-05 RX ORDER — NAPROXEN SODIUM 275 MG
0 TABLET ORAL
Qty: 0 | Refills: 0 | DISCHARGE
Start: 2025-06-05

## 2025-06-05 RX ORDER — ASPIRIN 81 MG/1
81 TABLET, DELAYED RELEASE ORAL
Qty: 60 | Refills: 0 | Status: ACTIVE | COMMUNITY
Start: 2025-06-05 | End: 1900-01-01

## 2025-06-06 PROBLEM — R55 SYNCOPE AND COLLAPSE: Chronic | Status: ACTIVE | Noted: 2025-05-21

## 2025-06-06 PROBLEM — M16.12 UNILATERAL PRIMARY OSTEOARTHRITIS, LEFT HIP: Chronic | Status: ACTIVE | Noted: 2025-05-21

## 2025-06-06 PROBLEM — K57.90 DIVERTICULOSIS OF INTESTINE, PART UNSPECIFIED, WITHOUT PERFORATION OR ABSCESS WITHOUT BLEEDING: Chronic | Status: ACTIVE | Noted: 2025-05-21

## 2025-06-06 PROBLEM — E04.9 NONTOXIC GOITER, UNSPECIFIED: Chronic | Status: ACTIVE | Noted: 2025-05-21

## 2025-06-08 NOTE — ASU DISCHARGE PLAN (ADULT/PEDIATRIC) - FINANCIAL ASSISTANCE
[facility] provides services at a reduced cost to those who are determined to be eligible through [facility]’s financial assistance program. Information regarding [facility]’s financial assistance program can be found by going to [email or web address] or by calling [phone].

## 2025-06-08 NOTE — ASU DISCHARGE PLAN (ADULT/PEDIATRIC) - CARE PROVIDER_API CALL
Blade Brooks  Joint Reconstruction  [address], [address]  [place] [address]  Phone: [phone]  Fax: [phone]  Follow Up Time: 2 weeks

## 2025-06-08 NOTE — ASU DISCHARGE PLAN (ADULT/PEDIATRIC) - ASU DC SPECIAL INSTRUCTIONSFT
May shower right away (bandage is waterproof)...Remove bandage 10 days after surgery and you may continue to shower after bandage removal...For at least the first week after surgery, you should take 1,000mg of Tylenol with breakfast, lunch and dinner PLUS Naproxen 500mg late morning and late afternoon...These should be taken regardless of pain level...For moderate pain take Tramadol and for more severe pain use Oxycodone. May shower right away (bandage is waterproof)...Remove bandage 10 days after surgery and you may continue to shower after bandage removal...For at least the first week after surgery, you should take 975mg of Tylenol with breakfast, lunch and dinner PLUS Naproxen 500mg late morning and late afternoon...These should be taken regardless of pain level...For moderate pain take Tramadol and for more severe pain use Oxycodone. May shower right away (bandage is waterproof)...Remove bandage 10 days after surgery and you may continue to shower after bandage removal...For at least the first week after surgery, you should take 975mg of Tylenol with breakfast, lunch and dinner PLUS Naproxen 500mg late morning and late afternoon...These should be taken regardless of pain level...For moderate pain take Tramadol and for more severe pain use Oxycodone.    Next dose of tylenol at/after____________. Do not exceed 4000mg in a 24hour  period.   Next dose of naproxen  at/after ________ _. every 12 hours   next dose of tramadol _______________ as needed moderate pain  next dose of oxycodone _____________ as needed severe pain

## 2025-06-08 NOTE — ASU DISCHARGE PLAN (ADULT/PEDIATRIC) - CALL YOUR DOCTOR IF YOU HAVE ANY OF THE FOLLOWING:
Pain not relieved by Medications/Wound/Surgical Site with redness, or foul smelling discharge or pus/Numbness, tingling, color or temperature change to extremity Pain not relieved by Medications/Fever greater than (need to indicate Fahrenheit or Celsius)/Wound/Surgical Site with redness, or foul smelling discharge or pus/Numbness, tingling, color or temperature change to extremity/Unable to urinate

## 2025-06-08 NOTE — ASU DISCHARGE PLAN (ADULT/PEDIATRIC) - NS MD DC FALL RISK RISK
For information on Fall & Injury Prevention, visit: [email or web address] OR  [email or web address] OR  https://www.cdc.gov/steadi/patient.html

## 2025-06-09 ENCOUNTER — OUTPATIENT (OUTPATIENT)
Dept: OUTPATIENT SERVICES | Facility: HOSPITAL | Age: 60
LOS: 1 days | End: 2025-06-09
Payer: COMMERCIAL

## 2025-06-09 ENCOUNTER — APPOINTMENT (OUTPATIENT)
Dept: ORTHOPEDIC SURGERY | Facility: HOSPITAL | Age: 60
End: 2025-06-09

## 2025-06-09 ENCOUNTER — RESULT REVIEW (OUTPATIENT)
Age: 60
End: 2025-06-09

## 2025-06-09 ENCOUNTER — TRANSCRIPTION ENCOUNTER (OUTPATIENT)
Age: 60
End: 2025-06-09

## 2025-06-09 VITALS
OXYGEN SATURATION: 96 % | HEART RATE: 82 BPM | WEIGHT: 138.89 LBS | DIASTOLIC BLOOD PRESSURE: 69 MMHG | TEMPERATURE: 98 F | RESPIRATION RATE: 16 BRPM | HEIGHT: 64 IN | SYSTOLIC BLOOD PRESSURE: 104 MMHG

## 2025-06-09 VITALS
HEART RATE: 88 BPM | TEMPERATURE: 99 F | SYSTOLIC BLOOD PRESSURE: 110 MMHG | DIASTOLIC BLOOD PRESSURE: 68 MMHG | RESPIRATION RATE: 16 BRPM | OXYGEN SATURATION: 100 %

## 2025-06-09 DIAGNOSIS — Z98.890 OTHER SPECIFIED POSTPROCEDURAL STATES: Chronic | ICD-10-CM

## 2025-06-09 DIAGNOSIS — Z90.49 ACQUIRED ABSENCE OF OTHER SPECIFIED PARTS OF DIGESTIVE TRACT: Chronic | ICD-10-CM

## 2025-06-09 DIAGNOSIS — Z96.0 PRESENCE OF UROGENITAL IMPLANTS: Chronic | ICD-10-CM

## 2025-06-09 DIAGNOSIS — M16.12 UNILATERAL PRIMARY OSTEOARTHRITIS, LEFT HIP: ICD-10-CM

## 2025-06-09 PROCEDURE — 72170 X-RAY EXAM OF PELVIS: CPT

## 2025-06-09 PROCEDURE — 27130 TOTAL HIP ARTHROPLASTY: CPT

## 2025-06-09 PROCEDURE — 27130 TOTAL HIP ARTHROPLASTY: CPT | Mod: LT

## 2025-06-09 PROCEDURE — 82962 GLUCOSE BLOOD TEST: CPT

## 2025-06-09 PROCEDURE — 97165 OT EVAL LOW COMPLEX 30 MIN: CPT

## 2025-06-09 PROCEDURE — C1776: CPT

## 2025-06-09 PROCEDURE — 72170 X-RAY EXAM OF PELVIS: CPT | Mod: 26

## 2025-06-09 PROCEDURE — 97161 PT EVAL LOW COMPLEX 20 MIN: CPT

## 2025-06-09 DEVICE — LINER ACET TRIDENT X3 0 DEG 32MM D: Type: IMPLANTABLE DEVICE | Site: LEFT | Status: FUNCTIONAL

## 2025-06-09 DEVICE — SHELL ACT MULTIHOLE TRIDENT II D 48MM: Type: IMPLANTABLE DEVICE | Site: LEFT | Status: FUNCTIONAL

## 2025-06-09 DEVICE — HEAD BIOLOX DELTA CERAMIC V40 32MM PLUS0: Type: IMPLANTABLE DEVICE | Site: LEFT | Status: FUNCTIONAL

## 2025-06-09 DEVICE — IMPLANTABLE DEVICE: Type: IMPLANTABLE DEVICE | Site: LEFT | Status: FUNCTIONAL

## 2025-06-09 RX ORDER — HYDROMORPHONE/SOD CHLOR,ISO/PF 2 MG/10 ML
0.5 SYRINGE (ML) INJECTION
Refills: 0 | Status: DISCONTINUED | OUTPATIENT
Start: 2025-06-09 | End: 2025-06-09

## 2025-06-09 RX ORDER — OXYCODONE HYDROCHLORIDE 30 MG/1
1 TABLET ORAL
Qty: 0 | Refills: 0 | DISCHARGE
Start: 2025-06-09

## 2025-06-09 RX ORDER — ACETAMINOPHEN 500 MG/5ML
3 LIQUID (ML) ORAL
Qty: 63 | Refills: 0
Start: 2025-06-09 | End: 2025-06-15

## 2025-06-09 RX ORDER — POLYETHYLENE GLYCOL 3350 17 G/17G
17 POWDER, FOR SOLUTION ORAL
Qty: 0 | Refills: 0 | DISCHARGE
Start: 2025-06-09

## 2025-06-09 RX ORDER — SENNA 187 MG
2 TABLET ORAL
Qty: 0 | Refills: 0 | DISCHARGE
Start: 2025-06-09

## 2025-06-09 RX ORDER — TRAMADOL HYDROCHLORIDE 50 MG/1
50 TABLET, FILM COATED ORAL EVERY 6 HOURS
Refills: 0 | Status: DISCONTINUED | OUTPATIENT
Start: 2025-06-09 | End: 2025-06-09

## 2025-06-09 RX ORDER — ASPIRIN 325 MG
1 TABLET ORAL
Qty: 0 | Refills: 0 | DISCHARGE
Start: 2025-06-09

## 2025-06-09 RX ORDER — OXYCODONE HYDROCHLORIDE 30 MG/1
5 TABLET ORAL EVERY 4 HOURS
Refills: 0 | Status: DISCONTINUED | OUTPATIENT
Start: 2025-06-09 | End: 2025-06-09

## 2025-06-09 RX ORDER — POLYETHYLENE GLYCOL 3350 17 G/17G
17 POWDER, FOR SOLUTION ORAL AT BEDTIME
Refills: 0 | Status: DISCONTINUED | OUTPATIENT
Start: 2025-06-09 | End: 2025-06-23

## 2025-06-09 RX ORDER — ONDANSETRON HCL/PF 4 MG/2 ML
4 VIAL (ML) INJECTION ONCE
Refills: 0 | Status: COMPLETED | OUTPATIENT
Start: 2025-06-09 | End: 2025-06-09

## 2025-06-09 RX ORDER — ESCITALOPRAM OXALATE 20 MG/1
20 TABLET ORAL DAILY
Refills: 0 | Status: DISCONTINUED | OUTPATIENT
Start: 2025-06-09 | End: 2025-06-23

## 2025-06-09 RX ORDER — CEFAZOLIN SODIUM IN 0.9 % NACL 3 G/100 ML
2000 INTRAVENOUS SOLUTION, PIGGYBACK (ML) INTRAVENOUS ONCE
Refills: 0 | Status: COMPLETED | OUTPATIENT
Start: 2025-06-09 | End: 2025-06-09

## 2025-06-09 RX ORDER — OXYCODONE HYDROCHLORIDE 30 MG/1
10 TABLET ORAL EVERY 4 HOURS
Refills: 0 | Status: DISCONTINUED | OUTPATIENT
Start: 2025-06-09 | End: 2025-06-09

## 2025-06-09 RX ORDER — TRAMADOL HYDROCHLORIDE 50 MG/1
1 TABLET, FILM COATED ORAL
Qty: 0 | Refills: 0 | DISCHARGE
Start: 2025-06-09

## 2025-06-09 RX ORDER — ONDANSETRON HCL/PF 4 MG/2 ML
4 VIAL (ML) INJECTION EVERY 6 HOURS
Refills: 0 | Status: DISCONTINUED | OUTPATIENT
Start: 2025-06-09 | End: 2025-06-23

## 2025-06-09 RX ORDER — ROSUVASTATIN CALCIUM 20 MG/1
10 TABLET, FILM COATED ORAL AT BEDTIME
Refills: 0 | Status: DISCONTINUED | OUTPATIENT
Start: 2025-06-09 | End: 2025-06-23

## 2025-06-09 RX ORDER — NAPROXEN SODIUM 275 MG
1 TABLET ORAL
Qty: 30 | Refills: 0
Start: 2025-06-09

## 2025-06-09 RX ORDER — TRAMADOL HYDROCHLORIDE 50 MG/1
50 TABLET, FILM COATED ORAL ONCE
Refills: 0 | Status: DISCONTINUED | OUTPATIENT
Start: 2025-06-09 | End: 2025-06-09

## 2025-06-09 RX ORDER — METHOCARBAMOL 500 MG/1
2 TABLET, FILM COATED ORAL
Refills: 0 | DISCHARGE

## 2025-06-09 RX ORDER — CEFAZOLIN SODIUM IN 0.9 % NACL 3 G/100 ML
2000 INTRAVENOUS SOLUTION, PIGGYBACK (ML) INTRAVENOUS EVERY 8 HOURS
Refills: 0 | Status: COMPLETED | OUTPATIENT
Start: 2025-06-09 | End: 2025-06-09

## 2025-06-09 RX ORDER — SENNA 187 MG
2 TABLET ORAL AT BEDTIME
Refills: 0 | Status: DISCONTINUED | OUTPATIENT
Start: 2025-06-09 | End: 2025-06-23

## 2025-06-09 RX ORDER — LOSARTAN POTASSIUM 100 MG/1
50 TABLET, FILM COATED ORAL DAILY
Refills: 0 | Status: DISCONTINUED | OUTPATIENT
Start: 2025-06-09 | End: 2025-06-23

## 2025-06-09 RX ORDER — ASPIRIN 325 MG
81 TABLET ORAL
Refills: 0 | Status: DISCONTINUED | OUTPATIENT
Start: 2025-06-09 | End: 2025-06-23

## 2025-06-09 RX ORDER — KETOROLAC TROMETHAMINE 30 MG/ML
30 INJECTION, SOLUTION INTRAMUSCULAR; INTRAVENOUS EVERY 6 HOURS
Refills: 0 | Status: COMPLETED | OUTPATIENT
Start: 2025-06-09 | End: 2025-06-10

## 2025-06-09 RX ORDER — TIRZEPATIDE 7.5 MG/.5ML
12.5 INJECTION, SOLUTION SUBCUTANEOUS
Refills: 0 | DISCHARGE

## 2025-06-09 RX ORDER — ACETAMINOPHEN 500 MG/5ML
1000 LIQUID (ML) ORAL ONCE
Refills: 0 | Status: DISCONTINUED | OUTPATIENT
Start: 2025-06-09 | End: 2025-06-23

## 2025-06-09 RX ORDER — DICLOFENAC SODIUM 75 MG/1
1 TABLET, DELAYED RELEASE ORAL
Refills: 0 | DISCHARGE

## 2025-06-09 RX ADMIN — TRAMADOL HYDROCHLORIDE 50 MILLIGRAM(S): 50 TABLET, FILM COATED ORAL at 14:30

## 2025-06-09 RX ADMIN — Medication 1 APPLICATION(S): at 05:37

## 2025-06-09 RX ADMIN — SODIUM CHLORIDE 100 MILLILITER(S): 9 INJECTION, SOLUTION INTRAVENOUS at 05:37

## 2025-06-09 RX ADMIN — Medication 4 MILLIGRAM(S): at 13:03

## 2025-06-09 RX ADMIN — Medication 20 MILLIGRAM(S): at 13:03

## 2025-06-09 RX ADMIN — Medication 4 MILLIGRAM(S): at 09:25

## 2025-06-09 RX ADMIN — SODIUM CHLORIDE 100 MILLILITER(S): 9 INJECTION, SOLUTION INTRAVENOUS at 10:05

## 2025-06-09 RX ADMIN — Medication 500 MILLILITER(S): at 09:10

## 2025-06-09 RX ADMIN — Medication 500 MILLILITER(S): at 14:00

## 2025-06-09 RX ADMIN — Medication 100 MILLIGRAM(S): at 15:00

## 2025-06-09 RX ADMIN — Medication 40 MILLIGRAM(S): at 05:39

## 2025-06-09 RX ADMIN — TRAMADOL HYDROCHLORIDE 50 MILLIGRAM(S): 50 TABLET, FILM COATED ORAL at 07:21

## 2025-06-09 RX ADMIN — TRAMADOL HYDROCHLORIDE 50 MILLIGRAM(S): 50 TABLET, FILM COATED ORAL at 13:54

## 2025-06-09 NOTE — OCCUPATIONAL THERAPY INITIAL EVALUATION ADULT - LIGHT TOUCH SENSATION, RLE, REHAB EVAL
"Referring provider: Dr. Marbella Nina  Reason for visit:  Behavioral and qualitative analysis of voice and resonance with videostroboscopy (CPT 83391, 84335)     Subjective / History    Martin Mac is a 66 y.o. male referred for voice evaluation ([facility] [address], [id number]) by Dr. Nina.  within normal limits

## 2025-06-09 NOTE — ASU PATIENT PROFILE, ADULT - MENTAL HEALTH CONDITIONS/SYMPTOMS, PROFILE
Critical results called to this RN at 1448. WBC elevated to 31.4. MD paged and orders received to collect another CBC and total bilirubin at 24-hours of age.     Will continue to monitor.    none

## 2025-06-09 NOTE — OCCUPATIONAL THERAPY INITIAL EVALUATION ADULT - ADAPTIVE EQUIPMENT NEEDED
General Sunscreen Counseling: I recommended a broad spectrum sunscreen with a SPF of 30 or higher.  Sunscreens should be applied at least 15 minutes prior to expected sun exposure and then every 2 hours after that as long as sun exposure continues. If swimming or exercising, sunscreen should be reapplied every 45 minutes to an hour after getting wet or sweating.  One ounce, or the equivalent of a shot glass full of sunscreen, is adequate to protect the skin not covered by a bathing suit. I also recommended a lip balm with a sunscreen. Products Recommended: sunscreens containing zinc and/or titanium Detail Level: Detailed Shower chair/yes

## 2025-06-09 NOTE — PHYSICAL THERAPY INITIAL EVALUATION ADULT - PERTINENT HX OF CURRENT PROBLEM, REHAB EVAL
60 year old female with PMH HTN, HLD, obesity (currently on mounjaro for 1.5 years with 80 lb wt loss - BMI now 23.8) and anxiety/depression (on lexapro) reports c/o left hip for several months that comes and goes based on how active she is. She describes the pain as a steady pain that originates in her left groin and radiates down to her left knee. At rest, she rates her pain 2/10 and with activity or at its worse, she rates her pain 8/10. Climbing up and down stairs and heavy lifting will make the pain worse. Heating pads and using a jacuzzi makes the pain better temporarily.   Pt now presents s/p scheduled left total hip arthroplasty, direct anterior approach with Dr. Brooks on 6/9/25 at the ambulatory care center.

## 2025-06-09 NOTE — ASU PATIENT PROFILE, ADULT - NSICDXPASTSURGICALHX_GEN_ALL_CORE_FT
PAST SURGICAL HISTORY:   delivery NOS     History of hand surgery     S/P cholecystectomy     S/P cystoscopy with ureteral stent placement     S/P laminectomy

## 2025-06-09 NOTE — PHYSICAL THERAPY INITIAL EVALUATION ADULT - ADDITIONAL COMMENTS
Pt lives with her  in a private house with 3 DAMI +HRs, 1 flight inside. Pt was independent with all ADLS and functional mobility PTA without an AD. Owns rolling walker, shower chair.

## 2025-06-09 NOTE — ASU PATIENT PROFILE, ADULT - FALL HARM RISK - HARM RISK INTERVENTIONS

## 2025-06-09 NOTE — OCCUPATIONAL THERAPY INITIAL EVALUATION ADULT - LIVES WITH, PROFILE
Pt lives with  in  with 3 DAMI and 2nd floor set-up. Pt has a walk in shower with shower chair./spouse

## 2025-06-09 NOTE — OCCUPATIONAL THERAPY INITIAL EVALUATION ADULT - PERTINENT HX OF CURRENT PROBLEM, REHAB EVAL
60 year old female with PMH HTN, HLD, obesity (currently on mounjaro for 1.5 years with 80 lb wt loss - BMI now 23.8) and anxiety/depression (on lexapro) reports c/o left hip for several months that comes and goes based on how active she is. She describes the pain as a steady pain that originates in her left groin and radiates down to her left knee. At rest, she rates her pain 2/10 and with activity or at its worse, she rates her pain 8/10. Climbing up and down stairs and heavy lifting will make the pain worse. Heating pads and using a jacuzzi makes the pain better temporarily. Pt now s/p scheduled left total hip arthroplasty, direct anterior approach with Dr. Brooks on 6/9/25 at the ambulatory care center.

## 2025-06-11 ENCOUNTER — NON-APPOINTMENT (OUTPATIENT)
Age: 60
End: 2025-06-11

## 2025-06-15 ENCOUNTER — INPATIENT (INPATIENT)
Facility: HOSPITAL | Age: 60
LOS: 14 days | Discharge: HOME | DRG: 392 | End: 2025-06-30
Attending: STUDENT IN AN ORGANIZED HEALTH CARE EDUCATION/TRAINING PROGRAM | Admitting: HOSPITALIST
Payer: COMMERCIAL

## 2025-06-15 VITALS
OXYGEN SATURATION: 95 % | SYSTOLIC BLOOD PRESSURE: 101 MMHG | WEIGHT: 134.92 LBS | RESPIRATION RATE: 20 BRPM | TEMPERATURE: 98 F | DIASTOLIC BLOOD PRESSURE: 70 MMHG | HEIGHT: 64 IN | HEART RATE: 114 BPM

## 2025-06-15 DIAGNOSIS — Z96.0 PRESENCE OF UROGENITAL IMPLANTS: Chronic | ICD-10-CM

## 2025-06-15 DIAGNOSIS — Z90.49 ACQUIRED ABSENCE OF OTHER SPECIFIED PARTS OF DIGESTIVE TRACT: Chronic | ICD-10-CM

## 2025-06-15 DIAGNOSIS — Z98.890 OTHER SPECIFIED POSTPROCEDURAL STATES: Chronic | ICD-10-CM

## 2025-06-15 LAB
ALBUMIN SERPL ELPH-MCNC: 2.8 G/DL — LOW (ref 3.3–5.2)
ALBUMIN SERPL ELPH-MCNC: 3.1 G/DL — LOW (ref 3.3–5.2)
ALP SERPL-CCNC: 169 U/L — HIGH (ref 40–120)
ALP SERPL-CCNC: 220 U/L — HIGH (ref 40–120)
ALT FLD-CCNC: 356 U/L — HIGH
ALT FLD-CCNC: 449 U/L — HIGH
ANION GAP SERPL CALC-SCNC: 17 MMOL/L — SIGNIFICANT CHANGE UP (ref 5–17)
ANION GAP SERPL CALC-SCNC: 21 MMOL/L — HIGH (ref 5–17)
APTT BLD: 35 SEC — SIGNIFICANT CHANGE UP (ref 26.1–36.8)
AST SERPL-CCNC: 160 U/L — HIGH
AST SERPL-CCNC: 212 U/L — HIGH
BASOPHILS # BLD AUTO: 0.07 K/UL — SIGNIFICANT CHANGE UP (ref 0–0.2)
BASOPHILS # BLD MANUAL: 0 K/UL — SIGNIFICANT CHANGE UP (ref 0–0.2)
BASOPHILS NFR BLD AUTO: 0.3 % — SIGNIFICANT CHANGE UP (ref 0–2)
BASOPHILS NFR BLD MANUAL: 0 % — SIGNIFICANT CHANGE UP (ref 0–2)
BILIRUB SERPL-MCNC: 0.5 MG/DL — SIGNIFICANT CHANGE UP (ref 0.4–2)
BILIRUB SERPL-MCNC: 0.6 MG/DL — SIGNIFICANT CHANGE UP (ref 0.4–2)
BLD GP AB SCN SERPL QL: SIGNIFICANT CHANGE UP
BUN SERPL-MCNC: 62.2 MG/DL — HIGH (ref 8–20)
BUN SERPL-MCNC: 64 MG/DL — HIGH (ref 8–20)
BURR CELLS BLD QL SMEAR: SLIGHT — SIGNIFICANT CHANGE UP
CALCIUM SERPL-MCNC: 10 MG/DL — SIGNIFICANT CHANGE UP (ref 8.4–10.5)
CALCIUM SERPL-MCNC: 9.1 MG/DL — SIGNIFICANT CHANGE UP (ref 8.4–10.5)
CHLORIDE SERPL-SCNC: 93 MMOL/L — LOW (ref 96–108)
CHLORIDE SERPL-SCNC: 96 MMOL/L — SIGNIFICANT CHANGE UP (ref 96–108)
CK MB CFR SERPL CALC: 4.1 NG/ML — SIGNIFICANT CHANGE UP (ref 0–6.7)
CK SERPL-CCNC: 200 U/L — HIGH (ref 25–170)
CO2 SERPL-SCNC: 19 MMOL/L — LOW (ref 22–29)
CO2 SERPL-SCNC: 21 MMOL/L — LOW (ref 22–29)
CREAT SERPL-MCNC: 5 MG/DL — HIGH (ref 0.5–1.3)
CREAT SERPL-MCNC: 5.22 MG/DL — HIGH (ref 0.5–1.3)
EGFR: 9 ML/MIN/1.73M2 — LOW
EOSINOPHIL # BLD AUTO: 0.03 K/UL — SIGNIFICANT CHANGE UP (ref 0–0.5)
EOSINOPHIL # BLD MANUAL: 0 K/UL — SIGNIFICANT CHANGE UP (ref 0–0.5)
EOSINOPHIL NFR BLD AUTO: 0.1 % — SIGNIFICANT CHANGE UP (ref 0–6)
EOSINOPHIL NFR BLD MANUAL: 0 % — SIGNIFICANT CHANGE UP (ref 0–6)
FLUAV AG NPH QL: SIGNIFICANT CHANGE UP
FLUBV AG NPH QL: SIGNIFICANT CHANGE UP
GLUCOSE SERPL-MCNC: 102 MG/DL — HIGH (ref 70–99)
GLUCOSE SERPL-MCNC: 95 MG/DL — SIGNIFICANT CHANGE UP (ref 70–99)
HCT VFR BLD CALC: 40 % — SIGNIFICANT CHANGE UP (ref 34.5–45)
HGB BLD-MCNC: 13.6 G/DL — SIGNIFICANT CHANGE UP (ref 11.5–15.5)
IMM GRANULOCYTES # BLD AUTO: 0.52 K/UL — HIGH (ref 0–0.07)
IMM GRANULOCYTES NFR BLD AUTO: 2.2 % — HIGH (ref 0–0.9)
INR BLD: 1 RATIO — SIGNIFICANT CHANGE UP (ref 0.85–1.16)
LACTATE BLDV-MCNC: 1.8 MMOL/L — SIGNIFICANT CHANGE UP (ref 0.5–2)
LIDOCAIN IGE QN: 18 U/L — LOW (ref 22–51)
LYMPHOCYTES # BLD AUTO: 1.75 K/UL — SIGNIFICANT CHANGE UP (ref 1–3.3)
LYMPHOCYTES # BLD MANUAL: 3.35 K/UL — HIGH (ref 1–3.3)
LYMPHOCYTES NFR BLD AUTO: 7.6 % — LOW (ref 13–44)
LYMPHOCYTES NFR BLD MANUAL: 14.5 % — SIGNIFICANT CHANGE UP (ref 13–44)
MCHC RBC-ENTMCNC: 29.1 PG — SIGNIFICANT CHANGE UP (ref 27–34)
MCHC RBC-ENTMCNC: 34 G/DL — SIGNIFICANT CHANGE UP (ref 32–36)
MCV RBC AUTO: 85.5 FL — SIGNIFICANT CHANGE UP (ref 80–100)
MONOCYTES # BLD AUTO: 2.15 K/UL — HIGH (ref 0–0.9)
MONOCYTES # BLD MANUAL: 1.78 K/UL — HIGH (ref 0–0.9)
MONOCYTES NFR BLD AUTO: 9.3 % — SIGNIFICANT CHANGE UP (ref 2–14)
MONOCYTES NFR BLD MANUAL: 7.7 % — SIGNIFICANT CHANGE UP (ref 2–14)
NEUTROPHILS # BLD AUTO: 18.6 K/UL — HIGH (ref 1.8–7.4)
NEUTROPHILS # BLD MANUAL: 17.99 K/UL — HIGH (ref 1.8–7.4)
NEUTROPHILS NFR BLD AUTO: 80.5 % — HIGH (ref 43–77)
NEUTROPHILS NFR BLD MANUAL: 77.8 % — HIGH (ref 43–77)
NRBC # BLD AUTO: 0 K/UL — SIGNIFICANT CHANGE UP (ref 0–0)
NRBC # FLD: 0 K/UL — SIGNIFICANT CHANGE UP (ref 0–0)
NRBC BLD AUTO-RTO: 0 /100 WBCS — SIGNIFICANT CHANGE UP (ref 0–0)
OVALOCYTES BLD QL SMEAR: SLIGHT — SIGNIFICANT CHANGE UP
PLAT MORPH BLD: NORMAL — SIGNIFICANT CHANGE UP
PLATELET # BLD AUTO: 312 K/UL — SIGNIFICANT CHANGE UP (ref 150–400)
PMV BLD: 9.6 FL — SIGNIFICANT CHANGE UP (ref 7–13)
POIKILOCYTOSIS BLD QL AUTO: SLIGHT — SIGNIFICANT CHANGE UP
POTASSIUM SERPL-MCNC: 6 MMOL/L — HIGH (ref 3.5–5.3)
POTASSIUM SERPL-MCNC: 6.5 MMOL/L — CRITICAL HIGH (ref 3.5–5.3)
POTASSIUM SERPL-SCNC: 6 MMOL/L — HIGH (ref 3.5–5.3)
POTASSIUM SERPL-SCNC: 6.5 MMOL/L — CRITICAL HIGH (ref 3.5–5.3)
PROT SERPL-MCNC: 5.5 G/DL — LOW (ref 6.6–8.7)
PROT SERPL-MCNC: 6.9 G/DL — SIGNIFICANT CHANGE UP (ref 6.6–8.7)
PROTHROM AB SERPL-ACNC: 11.6 SEC — SIGNIFICANT CHANGE UP (ref 9.9–13.4)
RBC # BLD: 4.68 M/UL — SIGNIFICANT CHANGE UP (ref 3.8–5.2)
RBC # FLD: 12.8 % — SIGNIFICANT CHANGE UP (ref 10.3–14.5)
RBC BLD AUTO: ABNORMAL
RSV RNA NPH QL NAA+NON-PROBE: SIGNIFICANT CHANGE UP
SARS-COV-2 RNA SPEC QL NAA+PROBE: SIGNIFICANT CHANGE UP
SODIUM SERPL-SCNC: 133 MMOL/L — LOW (ref 135–145)
SODIUM SERPL-SCNC: 134 MMOL/L — LOW (ref 135–145)
SOURCE RESPIRATORY: SIGNIFICANT CHANGE UP
WBC # BLD: 23.12 K/UL — HIGH (ref 3.8–10.5)
WBC # FLD AUTO: 23.12 K/UL — HIGH (ref 3.8–10.5)

## 2025-06-15 PROCEDURE — 99291 CRITICAL CARE FIRST HOUR: CPT

## 2025-06-15 PROCEDURE — 74176 CT ABD & PELVIS W/O CONTRAST: CPT | Mod: 26

## 2025-06-15 PROCEDURE — 71045 X-RAY EXAM CHEST 1 VIEW: CPT | Mod: 26

## 2025-06-15 RX ORDER — DEXTROSE 50 % IN WATER 50 %
12.5 SYRINGE (ML) INTRAVENOUS ONCE
Refills: 0 | Status: DISCONTINUED | OUTPATIENT
Start: 2025-06-15 | End: 2025-06-30

## 2025-06-15 RX ORDER — DEXTROSE 50 % IN WATER 50 %
25 SYRINGE (ML) INTRAVENOUS ONCE
Refills: 0 | Status: DISCONTINUED | OUTPATIENT
Start: 2025-06-15 | End: 2025-06-30

## 2025-06-15 RX ORDER — PIPERACILLIN-TAZO-DEXTROSE,ISO 3.375G/5
3.38 IV SOLUTION, PIGGYBACK PREMIX FROZEN(ML) INTRAVENOUS ONCE
Refills: 0 | Status: COMPLETED | OUTPATIENT
Start: 2025-06-15 | End: 2025-06-15

## 2025-06-15 RX ORDER — ONDANSETRON HCL/PF 4 MG/2 ML
4 VIAL (ML) INJECTION ONCE
Refills: 0 | Status: COMPLETED | OUTPATIENT
Start: 2025-06-15 | End: 2025-06-15

## 2025-06-15 RX ORDER — CALCIUM GLUCONATE 20 MG/ML
2 INJECTION, SOLUTION INTRAVENOUS ONCE
Refills: 0 | Status: COMPLETED | OUTPATIENT
Start: 2025-06-15 | End: 2025-06-15

## 2025-06-15 RX ORDER — SODIUM ZIRCONIUM CYCLOSILICATE 5 G/5G
10 POWDER, FOR SUSPENSION ORAL ONCE
Refills: 0 | Status: COMPLETED | OUTPATIENT
Start: 2025-06-15 | End: 2025-06-15

## 2025-06-15 RX ORDER — SODIUM CHLORIDE 9 G/1000ML
2000 INJECTION, SOLUTION INTRAVENOUS ONCE
Refills: 0 | Status: COMPLETED | OUTPATIENT
Start: 2025-06-15 | End: 2025-06-15

## 2025-06-15 RX ORDER — ACETAMINOPHEN 500 MG/5ML
1000 LIQUID (ML) ORAL ONCE
Refills: 0 | Status: COMPLETED | OUTPATIENT
Start: 2025-06-15 | End: 2025-06-15

## 2025-06-15 RX ORDER — CALCIUM GLUCONATE 20 MG/ML
2 INJECTION, SOLUTION INTRAVENOUS ONCE
Refills: 0 | Status: DISCONTINUED | OUTPATIENT
Start: 2025-06-15 | End: 2025-06-15

## 2025-06-15 RX ORDER — SODIUM CHLORIDE 9 G/1000ML
1000 INJECTION, SOLUTION INTRAVENOUS
Refills: 0 | Status: DISCONTINUED | OUTPATIENT
Start: 2025-06-15 | End: 2025-06-30

## 2025-06-15 RX ORDER — DEXTROSE 50 % IN WATER 50 %
50 SYRINGE (ML) INTRAVENOUS ONCE
Refills: 0 | Status: COMPLETED | OUTPATIENT
Start: 2025-06-15 | End: 2025-06-15

## 2025-06-15 RX ORDER — SODIUM CHLORIDE 9 G/1000ML
1000 INJECTION, SOLUTION INTRAVENOUS ONCE
Refills: 0 | Status: COMPLETED | OUTPATIENT
Start: 2025-06-15 | End: 2025-06-15

## 2025-06-15 RX ADMIN — Medication 4 MILLIGRAM(S): at 20:33

## 2025-06-15 RX ADMIN — Medication 400 MILLIGRAM(S): at 20:34

## 2025-06-15 RX ADMIN — Medication 5 UNIT(S): at 22:14

## 2025-06-15 RX ADMIN — Medication 4 MILLIGRAM(S): at 20:34

## 2025-06-15 RX ADMIN — SODIUM CHLORIDE 2000 MILLILITER(S): 9 INJECTION, SOLUTION INTRAVENOUS at 20:33

## 2025-06-15 RX ADMIN — SODIUM CHLORIDE 1000 MILLILITER(S): 9 INJECTION, SOLUTION INTRAVENOUS at 23:31

## 2025-06-15 RX ADMIN — Medication 4 MILLIGRAM(S): at 22:36

## 2025-06-15 RX ADMIN — Medication 200 GRAM(S): at 22:36

## 2025-06-15 RX ADMIN — Medication 50 MILLILITER(S): at 22:05

## 2025-06-15 RX ADMIN — SODIUM ZIRCONIUM CYCLOSILICATE 10 GRAM(S): 5 POWDER, FOR SUSPENSION ORAL at 22:36

## 2025-06-15 RX ADMIN — CALCIUM GLUCONATE 200 GRAM(S): 20 INJECTION, SOLUTION INTRAVENOUS at 22:05

## 2025-06-15 NOTE — ED ADULT TRIAGE NOTE - CHIEF COMPLAINT QUOTE
ABUNDIO Patient had Left hip replacement on 6/9 since Saturday morning patient has been experiencing abdominal pain, N/V/D. HX of orthostatic HTN. Denies CP, SOB, Denies blood thinners. ABUNDIO Patient had Left hip replacement on 6/9 since Saturday morning patient has been experiencing generalized  abdominal pain, N/V/D. HX of orthostatic HTN. Denies CP, SOB, Denies blood thinners.

## 2025-06-15 NOTE — ED PROVIDER NOTE - CARE PLAN
Principal Discharge DX:	Acute colitis  Secondary Diagnosis:	Sepsis with acute renal failure  Secondary Diagnosis:	Acute hyperkalemia  Secondary Diagnosis:	Severe dehydration   1

## 2025-06-15 NOTE — ED PROVIDER NOTE - PHYSICAL EXAMINATION
Gen: mild to moderate distress, AOx3  Head: NCAT  HEENT: EOMI, oral mucosa moist, normal conjunctiva, neck supple  Lung: CTAB, no respiratory distress  CV: tachycardic, regular rhythm, no murmur, Normal perfusion  Abd: soft, ND, +suprapubic TTP. No guarding, no rigidity  MSK: No edema. L LICHA dressing clean/dry/intact, no erythema or TTP around dressing.   Neuro: No focal neurologic deficits  Skin: No rash   Psych: normal affect

## 2025-06-15 NOTE — ED ADULT NURSE NOTE - CHIEF COMPLAINT QUOTE
ABUNDIO Patient had Left hip replacement on 6/9 since Saturday morning patient has been experiencing generalized  abdominal pain, N/V/D. HX of orthostatic HTN. Denies CP, SOB, Denies blood thinners.

## 2025-06-15 NOTE — ED ADULT NURSE NOTE - NSFALLHARMRISKINTERV_ED_ALL_ED
Assistance OOB with selected safe patient handling equipment if applicable/Communicate risk of Fall with Harm to all staff, patient, and family/Provide visual cue: red socks, yellow wristband, yellow gown, etc/Reinforce activity limits and safety measures with patient and family/Bed in lowest position, wheels locked, appropriate side rails in place/Call bell, personal items and telephone in reach/Instruct patient to call for assistance before getting out of bed/chair/stretcher/Non-slip footwear applied when patient is off stretcher/[place] to call system/Physically safe environment - no spills, clutter or unnecessary equipment/Purposeful Proactive Rounding/Room/bathroom lighting operational, light cord in reach

## 2025-06-15 NOTE — ED PROVIDER NOTE - ATTENDING CONTRIBUTION TO CARE
I, Samina Miller DO, have personally provided 40 minutes of critical care time exclusive of time spent on separately billable procedures. Time includes review of laboratory data, radiology results, discussion with consultants, and monitoring for potential decompensation. Interventions were performed as documented above.     I personally saw the patient with the resident, and completed the key components of the history and physical exam. I then discussed the management plan with the resident.    59 y/o F with PMH HTN, HLD, s/p recent L LICHA on 6/9/25 [facility] presents for abdominal pain, vomiting and diarrhea, decreased PO intake, syncope, no urine output for the past 12 hours. She complains of abdominal pain.    Non-toxic appearing, tachycardic, no respiratory distress, abd soft, + suprapubic TTP, no LE edema.    Patient found to be in acute renal failure, monae placed with no urine output - hyperK cocktail given - will monitor on tele, CT A/P, aggressive IV hydration.

## 2025-06-15 NOTE — ED ADULT NURSE NOTE - OBJECTIVE STATEMENT
Pt. BIBEMS c/o cramping 5/10 abdominal pain since 1pm Saturday.  Pt. had L hip replacement outpatient on Friday, no issues noted with surgery as per patient. Pt. is not on antibiotics currently. Pt. states abdominal pain is generalized, also c/o N+V+D.

## 2025-06-15 NOTE — ED PROVIDER NOTE - OBJECTIVE STATEMENT
59 yo F PMH HTN, HLD, obesity (on mounjaro), s/p recent L LICHA on 6/9/25 ([facility], Dr. Brooks), presents for abdominal pain. Pt was discharged after L LICHA, sent with tramadol, oxycodone, senna, miralax. Taking all meds and did not have any BM until yesterday. Pt states that she suddenly felt diffuse abdominal cramping yesterday and had nausea with emesis, followed by multiple episodes of diarrhea. Pt appears to have syncopized in bathroom and was on ground for ~10 hours until family member found her this morning. Pt has been passing flatus and continues to hae multiple episodes of nonbloody diarrhea. No fevers, sweats, chills. Not tolerating any PO intake, vomiting anything she takes in. Has not taken her medications since day before yesterday. Unable to urinate since onset of this pain and diarrhea yesterday. No chest pain, SOB.

## 2025-06-15 NOTE — ED PROVIDER NOTE - CLINICAL SUMMARY MEDICAL DECISION MAKING FREE TEXT BOX
59 yo F PMH HTN, HLD, obesity (on mounjaro), s/p recent L LICHA on 6/9/25 ([facility], Dr. Brooks), presents for abdominal pain with nonbloody vomiting and diarrhea, inability to tolerate PO intake. Pt syncopized and on ground for ~10 hours. Tachycardic on arrival, afebrile. +suprapubic TTP. Will obtain labs, lipase, lactate, UA via straight cath as pt unable to urinate, CK level, EKG, CTAP w IV con. IVF bolus, zofran, pain control. NPO, cardiac monitor, pulse ox.

## 2025-06-16 DIAGNOSIS — K52.9 NONINFECTIVE GASTROENTERITIS AND COLITIS, UNSPECIFIED: ICD-10-CM

## 2025-06-16 LAB
ACETONE SERPL-MCNC: NEGATIVE — SIGNIFICANT CHANGE UP
ALBUMIN SERPL ELPH-MCNC: 2.6 G/DL — LOW (ref 3.3–5.2)
ALBUMIN SERPL ELPH-MCNC: 2.6 G/DL — LOW (ref 3.3–5.2)
ALP SERPL-CCNC: 140 U/L — HIGH (ref 40–120)
ALP SERPL-CCNC: 153 U/L — HIGH (ref 40–120)
ALT FLD-CCNC: 261 U/L — HIGH
ALT FLD-CCNC: 315 U/L — HIGH
ANION GAP SERPL CALC-SCNC: 18 MMOL/L — HIGH (ref 5–17)
ANION GAP SERPL CALC-SCNC: 19 MMOL/L — HIGH (ref 5–17)
ANISOCYTOSIS BLD QL: SLIGHT — SIGNIFICANT CHANGE UP
AST SERPL-CCNC: 103 U/L — HIGH
AST SERPL-CCNC: 129 U/L — HIGH
B-OH-BUTYR SERPL-SCNC: 0.7 MMOL/L — HIGH
BASOPHILS # BLD AUTO: 0.09 K/UL — SIGNIFICANT CHANGE UP (ref 0–0.2)
BASOPHILS # BLD MANUAL: 0 K/UL — SIGNIFICANT CHANGE UP (ref 0–0.2)
BASOPHILS NFR BLD AUTO: 0.7 % — SIGNIFICANT CHANGE UP (ref 0–2)
BASOPHILS NFR BLD MANUAL: 0 % — SIGNIFICANT CHANGE UP (ref 0–2)
BILIRUB SERPL-MCNC: 0.4 MG/DL — SIGNIFICANT CHANGE UP (ref 0.4–2)
BILIRUB SERPL-MCNC: 0.5 MG/DL — SIGNIFICANT CHANGE UP (ref 0.4–2)
BUN SERPL-MCNC: 62 MG/DL — HIGH (ref 8–20)
BUN SERPL-MCNC: 62.5 MG/DL — HIGH (ref 8–20)
BURR CELLS BLD QL SMEAR: SLIGHT — SIGNIFICANT CHANGE UP
C DIFF BY PCR RESULT: SIGNIFICANT CHANGE UP
CALCIUM SERPL-MCNC: 8.9 MG/DL — SIGNIFICANT CHANGE UP (ref 8.4–10.5)
CALCIUM SERPL-MCNC: 9.5 MG/DL — SIGNIFICANT CHANGE UP (ref 8.4–10.5)
CHLORIDE SERPL-SCNC: 96 MMOL/L — SIGNIFICANT CHANGE UP (ref 96–108)
CHLORIDE SERPL-SCNC: 96 MMOL/L — SIGNIFICANT CHANGE UP (ref 96–108)
CK SERPL-CCNC: 137 U/L — SIGNIFICANT CHANGE UP (ref 25–170)
CO2 SERPL-SCNC: 18 MMOL/L — LOW (ref 22–29)
CO2 SERPL-SCNC: 19 MMOL/L — LOW (ref 22–29)
CREAT SERPL-MCNC: 5.08 MG/DL — HIGH (ref 0.5–1.3)
CREAT SERPL-MCNC: 5.16 MG/DL — HIGH (ref 0.5–1.3)
EGFR: 9 ML/MIN/1.73M2 — LOW
EOSINOPHIL # BLD AUTO: 0.29 K/UL — SIGNIFICANT CHANGE UP (ref 0–0.5)
EOSINOPHIL # BLD MANUAL: 0.23 K/UL — SIGNIFICANT CHANGE UP (ref 0–0.5)
EOSINOPHIL NFR BLD AUTO: 2.1 % — SIGNIFICANT CHANGE UP (ref 0–6)
EOSINOPHIL NFR BLD MANUAL: 1.7 % — SIGNIFICANT CHANGE UP (ref 0–6)
GAS PNL BLDV: SIGNIFICANT CHANGE UP
GLUCOSE BLDC GLUCOMTR-MCNC: 90 MG/DL — SIGNIFICANT CHANGE UP (ref 70–99)
GLUCOSE SERPL-MCNC: 64 MG/DL — LOW (ref 70–99)
GLUCOSE SERPL-MCNC: 84 MG/DL — SIGNIFICANT CHANGE UP (ref 70–99)
HCT VFR BLD CALC: 29.2 % — LOW (ref 34.5–45)
HGB BLD-MCNC: 9.9 G/DL — LOW (ref 11.5–15.5)
IMM GRANULOCYTES # BLD AUTO: 0.16 K/UL — HIGH (ref 0–0.07)
IMM GRANULOCYTES NFR BLD AUTO: 1.2 % — HIGH (ref 0–0.9)
LYMPHOCYTES # BLD AUTO: 2.04 K/UL — SIGNIFICANT CHANGE UP (ref 1–3.3)
LYMPHOCYTES # BLD MANUAL: 2.64 K/UL — SIGNIFICANT CHANGE UP (ref 1–3.3)
LYMPHOCYTES NFR BLD AUTO: 14.9 % — SIGNIFICANT CHANGE UP (ref 13–44)
LYMPHOCYTES NFR BLD MANUAL: 19.3 % — SIGNIFICANT CHANGE UP (ref 13–44)
MAGNESIUM SERPL-MCNC: 2.2 MG/DL — SIGNIFICANT CHANGE UP (ref 1.6–2.6)
MANUAL METAMYELOCYTE #: 0.11 K/UL — HIGH (ref 0–0)
MANUAL NEUTROPHIL BANDS #: 0.34 K/UL — SIGNIFICANT CHANGE UP (ref 0–0.84)
MANUAL REACTIVE LYMPHOCYTES #: 0.23 K/UL — SIGNIFICANT CHANGE UP (ref 0–0.63)
MCHC RBC-ENTMCNC: 28.9 PG — SIGNIFICANT CHANGE UP (ref 27–34)
MCHC RBC-ENTMCNC: 33.9 G/DL — SIGNIFICANT CHANGE UP (ref 32–36)
MCV RBC AUTO: 85.4 FL — SIGNIFICANT CHANGE UP (ref 80–100)
METAMYELOCYTES # FLD: 0.8 % — HIGH (ref 0–0)
METAMYELOCYTES NFR BLD: 0.8 % — HIGH (ref 0–0)
MONOCYTES # BLD AUTO: 1.17 K/UL — HIGH (ref 0–0.9)
MONOCYTES # BLD MANUAL: 0.58 K/UL — SIGNIFICANT CHANGE UP (ref 0–0.9)
MONOCYTES NFR BLD AUTO: 8.5 % — SIGNIFICANT CHANGE UP (ref 2–14)
MONOCYTES NFR BLD MANUAL: 4.2 % — SIGNIFICANT CHANGE UP (ref 2–14)
NEUTROPHILS # BLD AUTO: 9.95 K/UL — HIGH (ref 1.8–7.4)
NEUTROPHILS # BLD MANUAL: 9.56 K/UL — HIGH (ref 1.8–7.4)
NEUTROPHILS NFR BLD AUTO: 72.6 % — SIGNIFICANT CHANGE UP (ref 43–77)
NEUTROPHILS NFR BLD MANUAL: 69.8 % — SIGNIFICANT CHANGE UP (ref 43–77)
NEUTS BAND # BLD: 2.5 % — SIGNIFICANT CHANGE UP (ref 0–8)
NEUTS BAND NFR BLD: 2.5 % — SIGNIFICANT CHANGE UP (ref 0–8)
NRBC # BLD AUTO: 0 K/UL — SIGNIFICANT CHANGE UP (ref 0–0)
NRBC # FLD: 0 K/UL — SIGNIFICANT CHANGE UP (ref 0–0)
NRBC BLD AUTO-RTO: 0 /100 WBCS — SIGNIFICANT CHANGE UP (ref 0–0)
OSMOLALITY SERPL: 303 MOSMOL/KG — HIGH (ref 280–301)
OVALOCYTES BLD QL SMEAR: SLIGHT — SIGNIFICANT CHANGE UP
PHOSPHATE SERPL-MCNC: 5.5 MG/DL — HIGH (ref 2.4–4.7)
PLAT MORPH BLD: NORMAL — SIGNIFICANT CHANGE UP
PLATELET # BLD AUTO: 262 K/UL — SIGNIFICANT CHANGE UP (ref 150–400)
PMV BLD: 9.8 FL — SIGNIFICANT CHANGE UP (ref 7–13)
POIKILOCYTOSIS BLD QL AUTO: SLIGHT — SIGNIFICANT CHANGE UP
POLYCHROMASIA BLD QL SMEAR: SLIGHT — SIGNIFICANT CHANGE UP
POTASSIUM SERPL-MCNC: 4.5 MMOL/L — SIGNIFICANT CHANGE UP (ref 3.5–5.3)
POTASSIUM SERPL-MCNC: 4.9 MMOL/L — SIGNIFICANT CHANGE UP (ref 3.5–5.3)
POTASSIUM SERPL-SCNC: 4.5 MMOL/L — SIGNIFICANT CHANGE UP (ref 3.5–5.3)
POTASSIUM SERPL-SCNC: 4.9 MMOL/L — SIGNIFICANT CHANGE UP (ref 3.5–5.3)
PROT SERPL-MCNC: 5.1 G/DL — LOW (ref 6.6–8.7)
PROT SERPL-MCNC: 5.3 G/DL — LOW (ref 6.6–8.7)
RBC # BLD: 3.42 M/UL — LOW (ref 3.8–5.2)
RBC # FLD: 12.8 % — SIGNIFICANT CHANGE UP (ref 10.3–14.5)
RBC BLD AUTO: ABNORMAL
SODIUM SERPL-SCNC: 133 MMOL/L — LOW (ref 135–145)
SODIUM SERPL-SCNC: 133 MMOL/L — LOW (ref 135–145)
URATE SERPL-MCNC: 5.8 MG/DL — HIGH (ref 2.4–5.7)
VARIANT LYMPHS # BLD: 1.7 % — SIGNIFICANT CHANGE UP (ref 0–6)
VARIANT LYMPHS NFR BLD MANUAL: 1.7 % — SIGNIFICANT CHANGE UP (ref 0–6)
WBC # BLD: 13.7 K/UL — HIGH (ref 3.8–10.5)
WBC # FLD AUTO: 13.7 K/UL — HIGH (ref 3.8–10.5)

## 2025-06-16 PROCEDURE — 99223 1ST HOSP IP/OBS HIGH 75: CPT

## 2025-06-16 RX ORDER — HEPARIN SODIUM 1000 [USP'U]/ML
5000 INJECTION INTRAVENOUS; SUBCUTANEOUS EVERY 8 HOURS
Refills: 0 | Status: DISCONTINUED | OUTPATIENT
Start: 2025-06-16 | End: 2025-06-30

## 2025-06-16 RX ORDER — DEXTROSE 50 % IN WATER 50 %
15 SYRINGE (ML) INTRAVENOUS ONCE
Refills: 0 | Status: DISCONTINUED | OUTPATIENT
Start: 2025-06-16 | End: 2025-06-30

## 2025-06-16 RX ORDER — PIPERACILLIN-TAZO-DEXTROSE,ISO 3.375G/5
3.38 IV SOLUTION, PIGGYBACK PREMIX FROZEN(ML) INTRAVENOUS EVERY 12 HOURS
Refills: 0 | Status: DISCONTINUED | OUTPATIENT
Start: 2025-06-16 | End: 2025-06-17

## 2025-06-16 RX ORDER — ESCITALOPRAM OXALATE 20 MG/1
20 TABLET ORAL DAILY
Refills: 0 | Status: DISCONTINUED | OUTPATIENT
Start: 2025-06-16 | End: 2025-06-30

## 2025-06-16 RX ORDER — MAGNESIUM, ALUMINUM HYDROXIDE 200-200 MG
30 TABLET,CHEWABLE ORAL EVERY 4 HOURS
Refills: 0 | Status: DISCONTINUED | OUTPATIENT
Start: 2025-06-16 | End: 2025-06-30

## 2025-06-16 RX ORDER — MELATONIN 5 MG
3 TABLET ORAL AT BEDTIME
Refills: 0 | Status: DISCONTINUED | OUTPATIENT
Start: 2025-06-16 | End: 2025-06-30

## 2025-06-16 RX ORDER — ACETAMINOPHEN 500 MG/5ML
650 LIQUID (ML) ORAL EVERY 6 HOURS
Refills: 0 | Status: DISCONTINUED | OUTPATIENT
Start: 2025-06-16 | End: 2025-06-30

## 2025-06-16 RX ORDER — SODIUM CHLORIDE 9 G/1000ML
1000 INJECTION, SOLUTION INTRAVENOUS
Refills: 0 | Status: DISCONTINUED | OUTPATIENT
Start: 2025-06-16 | End: 2025-06-17

## 2025-06-16 RX ORDER — ONDANSETRON HCL/PF 4 MG/2 ML
4 VIAL (ML) INJECTION EVERY 8 HOURS
Refills: 0 | Status: DISCONTINUED | OUTPATIENT
Start: 2025-06-16 | End: 2025-06-30

## 2025-06-16 RX ORDER — GLUCAGON 3 MG/1
1 POWDER NASAL ONCE
Refills: 0 | Status: DISCONTINUED | OUTPATIENT
Start: 2025-06-16 | End: 2025-06-30

## 2025-06-16 RX ORDER — ALBUMIN (HUMAN) 12.5 G/50ML
50 INJECTION, SOLUTION INTRAVENOUS ONCE
Refills: 0 | Status: COMPLETED | OUTPATIENT
Start: 2025-06-16 | End: 2025-06-16

## 2025-06-16 RX ADMIN — Medication 25 GRAM(S): at 17:19

## 2025-06-16 RX ADMIN — Medication 650 MILLIGRAM(S): at 23:09

## 2025-06-16 RX ADMIN — ESCITALOPRAM OXALATE 20 MILLIGRAM(S): 20 TABLET ORAL at 11:07

## 2025-06-16 RX ADMIN — Medication 25 GRAM(S): at 06:06

## 2025-06-16 RX ADMIN — Medication 4 MILLIGRAM(S): at 04:39

## 2025-06-16 RX ADMIN — Medication 1000 MILLILITER(S): at 02:14

## 2025-06-16 RX ADMIN — Medication 100 MILLILITER(S): at 02:55

## 2025-06-16 RX ADMIN — Medication 4 MILLIGRAM(S): at 23:09

## 2025-06-16 RX ADMIN — SODIUM CHLORIDE 100 MILLILITER(S): 9 INJECTION, SOLUTION INTRAVENOUS at 18:10

## 2025-06-16 RX ADMIN — Medication 650 MILLIGRAM(S): at 04:39

## 2025-06-16 RX ADMIN — Medication 4 MILLIGRAM(S): at 14:09

## 2025-06-16 RX ADMIN — Medication 650 MILLIGRAM(S): at 12:06

## 2025-06-16 RX ADMIN — Medication 650 MILLIGRAM(S): at 11:06

## 2025-06-16 RX ADMIN — ALBUMIN (HUMAN) 50 MILLILITER(S): 12.5 INJECTION, SOLUTION INTRAVENOUS at 02:13

## 2025-06-16 RX ADMIN — HEPARIN SODIUM 5000 UNIT(S): 1000 INJECTION INTRAVENOUS; SUBCUTANEOUS at 21:30

## 2025-06-16 RX ADMIN — HEPARIN SODIUM 5000 UNIT(S): 1000 INJECTION INTRAVENOUS; SUBCUTANEOUS at 13:51

## 2025-06-16 RX ADMIN — Medication 100 MILLILITER(S): at 06:23

## 2025-06-16 RX ADMIN — Medication 100 MILLILITER(S): at 13:51

## 2025-06-16 NOTE — CONSULT NOTE ADULT - SUBJECTIVE AND OBJECTIVE BOX
Patient is a 60y old  Female who presents with a chief complaint of nausea/vomiting     HPI:  60F with hx of nephrolithiasis, HTN, HLD, anxiety/depression, s/p cholecystectomy, vasovagal syncope, s/p L anterior LICHA  brought into the ED after she was found down by family this AM. Pt reports she was overall recovering from recent surgery but did endorse constipation for which she was taking bowel regimen. After 4 days with no BM, she started developing recurrent episodes of non bloody diarrhea beginning early Saturday AM. She also noted associated nausea and recurrent episodes of NBNB vomiting. She had no PO intake after onset of symptoms as was unable to tolerate anything. She denies associated fevers/chills. Denies chest pain/shortness of breath. She reports that while in her bathroom early AM, she felt lightheadedness/dizziness and lowered herself to the ground but denies any LOC or trauma. She was unable to get the attention of her family or get up on her own and was reportedly on the ground x 10hrs. She denies any worsening of pain at the LICHA site. She reports she was taking naproxen 500mg twice daily since Tuesday.   In the ED, pt was afebrile, hemodynamically stable. Labs notable for WBC 23.       PAST MEDICAL & SURGICAL HISTORY:  Spinal stenosis      Kidney stones  x 1 pt denies surgical intervention " in my 20 s '      Colitis, nonspecific      Nephrolithiasis      HTN (hypertension)      HLD (hyperlipidemia)      Anxiety and depression      Menieres disease      Vasovagal syncope      Unilateral primary osteoarthritis, left hip      Enlarged thyroid      Diverticulosis       delivery NOS        S/P cholecystectomy      S/P laminectomy      S/P cystoscopy with ureteral stent placement      History of hand surgery            Medications:                  dextrose 50% Injectable 25 Gram(s) IV Push once  dextrose 50% Injectable 12.5 Gram(s) IV Push once  dextrose 50% Injectable 25 Gram(s) IV Push once    albumin human 25% IVPB 50 milliLiter(s) IV Intermittent Once  dextrose 5%. 1000 milliLiter(s) IV Continuous <Continuous>  sodium chloride 0.9% Bolus 1000 milliLiter(s) IV Bolus once  sodium chloride 0.9%. 1000 milliLiter(s) IV Continuous <Continuous>                ICU Vital Signs Last 24 Hrs  T(C): 36.7 (15 Quique 2025 23:17), Max: 36.9 (15 Quique 2025 18:39)  T(F): 98 (15 Quique 2025 23:17), Max: 98.5 (15 Quique 2025 18:39)  HR: 89 (15 Quique 2025 23:17) (89 - 114)  BP: 92/63 (15 Quique 2025 23:17) (92/63 - 101/70)  BP(mean): --  ABP: --  ABP(mean): --  RR: 20 (15 Quique 2025 23:17) (20 - 20)  SpO2: 95% (15 Quique 2025 23:17) (95% - 95%)    O2 Parameters below as of 15 Quique 2025 23:17  Patient On (Oxygen Delivery Method): room air                I&O's Detail        LABS:                        13.6   23.12 )-----------( 312      ( 15 Quique 2025 20:24 )             40.0     06-16    133[L]  |  96  |  62.0[H]  ----------------------------<  64[L]  4.9   |  19.0[L]  |  5.08[H]    Ca    9.5      2025 00:45    TPro  5.3[L]  /  Alb  2.6[L]  /  TBili  0.5  /  DBili  x   /  AST  129[H]  /  ALT  315[H]  /  AlkPhos  153[H]  06-16      CARDIAC MARKERS ( 15 Quique 2025 20:24 )  x     / x     / x     / x     / 4.1 ng/mL      CAPILLARY BLOOD GLUCOSE        PT/INR - ( 15 Quique 2025 20:24 )   PT: 11.6 sec;   INR: 1.00 ratio         PTT - ( 15 Quique 2025 20:24 )  PTT:35.0 sec  Urinalysis Basic - ( 2025 00:45 )    Color: x / Appearance: x / SG: x / pH: x  Gluc: 64 mg/dL / Ketone: x  / Bili: x / Urobili: x   Blood: x / Protein: x / Nitrite: x   Leuk Esterase: x / RBC: x / WBC x   Sq Epi: x / Non Sq Epi: x / Bacteria: x      CULTURES:      Physical Examination:    General: No acute distress.      HEENT: Pupils equal, reactive to light.  Symmetric.    PULM: Clear to auscultation bilaterally, no significant sputum production    NECK: Supple, no lymphadenopathy, trachea midline    CVS: Regular rate and rhythm, no murmurs, rubs, or gallops    ABD: Soft, nondistended, nontender, normoactive bowel sounds, no masses    EXT: No edema, nontender    SKIN: Warm and well perfused, no rashes noted.    NEURO: Alert, oriented, interactive, nonfocal    DEVICES:     RADIOLOGY: ***    CRITICAL CARE TIME SPENT: ***   Patient is a 60y old  Female who presents with a chief complaint of nausea/vomiting     HPI:  60F with hx of nephrolithiasis, HTN, HLD, anxiety/depression, s/p cholecystectomy, vasovagal syncope, s/p L anterior LICHA  brought into the ED after she was found down by family this AM. Pt reports she was overall recovering from recent surgery but did endorse constipation for which she was taking bowel regimen. After 4 days with no BM, she started developing recurrent episodes of non bloody diarrhea beginning early Saturday AM. She also noted associated nausea and recurrent episodes of NBNB vomiting. She had no PO intake after onset of symptoms as was unable to tolerate anything. She denies associated fevers/chills. Denies chest pain/shortness of breath. She reports that while in her bathroom early AM, she felt lightheadedness/dizziness and lowered herself to the ground but denies any LOC or trauma. She was unable to get the attention of her family or get up on her own and was reportedly on the ground x 10hrs. She denies any worsening of pain at the LICHA site. She reports she was taking naproxen 500mg twice daily since Tuesday.   In the ED, pt was afebrile, hemodynamically stable. Labs notable for WBC 23, K 6.5, HCO3 19, BUN/Cr 64/5.22, Alk phos 220, AST//449, . CT Abd/pelvis demonstrated mild colitis of the left descending and sigmoid colon. Repeat chem confirmed K 6 and pt was given calcium/insulin and Lokelma. Pt was given 3L fluids and Bernard placed with no urine output as of time of eval. Nephrology consulted overnight with plan for continued fluid resuscitation and re-evaluation for HD needs pending course. Pt reports slight interval improvement with no further vomiting or diarrhea since presentation but still mild LLQ abdominal discomfort.      PAST MEDICAL & SURGICAL HISTORY:  Spinal stenosis      Kidney stones  x 1 pt denies surgical intervention " in my 20 s '      Colitis, nonspecific      Nephrolithiasis      HTN (hypertension)      HLD (hyperlipidemia)      Anxiety and depression      Menieres disease      Vasovagal syncope      Unilateral primary osteoarthritis, left hip      Enlarged thyroid      Diverticulosis       delivery NOS        S/P cholecystectomy      S/P laminectomy      S/P cystoscopy with ureteral stent placement      History of hand surgery      Medications:  dextrose 50% Injectable 25 Gram(s) IV Push once  dextrose 50% Injectable 12.5 Gram(s) IV Push once  dextrose 50% Injectable 25 Gram(s) IV Push once    albumin human 25% IVPB 50 milliLiter(s) IV Intermittent Once  dextrose 5%. 1000 milliLiter(s) IV Continuous <Continuous>  sodium chloride 0.9% Bolus 1000 milliLiter(s) IV Bolus once  sodium chloride 0.9%. 1000 milliLiter(s) IV Continuous <Continuous>    Allergies: NKDA     Social: never smoker, denies EtOH abuse, denies illicit drug use     FH: FAMILY HISTORY:  Family history of renal failure (Father)    Family history of hypertension (Father, Mother)    Family history of cerebrovascular accident (CVA) in father (Father)    Family history of breast cancer in mother (Mother)    Family history of diabetes mellitus (Sibling)    ICU Vital Signs Last 24 Hrs  T(C): 36.7 (15 Quique 2025 23:17), Max: 36.9 (15 Quique 2025 18:39)  T(F): 98 (15 Quique 2025 23:17), Max: 98.5 (15 Quique 2025 18:39)  HR: 89 (15 Quique 2025 23:17) (89 - 114)  BP: 92/63 (15 Quique 2025 23:17) (92/63 - 101/70)  BP(mean): --  ABP: --  ABP(mean): --  RR: 20 (15 Quique 2025 23:17) (20 - 20)  SpO2: 95% (15 Quique 2025 23:17) (95% - 95%)    O2 Parameters below as of 15 Quique 2025 23:17  Patient On (Oxygen Delivery Method): room air      LABS:                        13.6   23.12 )-----------( 312      ( 15 Quique 2025 20:24 )             40.0     06-16    133[L]  |  96  |  62.0[H]  ----------------------------<  64[L]  4.9   |  19.0[L]  |  5.08[H]    Ca    9.5      2025 00:45    TPro  5.3[L]  /  Alb  2.6[L]  /  TBili  0.5  /  DBili  x   /  AST  129[H]  /  ALT  315[H]  /  AlkPhos  153[H]  06-16      CARDIAC MARKERS ( 15 Quique 2025 20:24 )  x     / x     / x     / x     / 4.1 ng/mL      CAPILLARY BLOOD GLUCOSE        PT/INR - ( 15 Quique 2025 20:24 )   PT: 11.6 sec;   INR: 1.00 ratio         PTT - ( 15 Quique 2025 20:24 )  PTT:35.0 sec  Urinalysis Basic - ( 2025 00:45 )    Color: x / Appearance: x / SG: x / pH: x  Gluc: 64 mg/dL / Ketone: x  / Bili: x / Urobili: x   Blood: x / Protein: x / Nitrite: x   Leuk Esterase: x / RBC: x / WBC x   Sq Epi: x / Non Sq Epi: x / Bacteria: x      Physical Examination:    General: No acute distress.      HEENT: Pupils equal, reactive to light.  Symmetric.    PULM: Clear to auscultation bilaterally, no significant sputum production    NECK: Supple, no lymphadenopathy, trachea midline    CVS: Regular rate and rhythm, no murmurs, rubs, or gallops    ABD: Soft, nondistended, nontender, normoactive bowel sounds, no masses    EXT: No edema, nontender    SKIN: Warm and well perfused, no rashes noted.    NEURO: Alert, oriented, interactive, nonfocal    ROS: negative except as per HPI     RADIOLOGY:   < from: CT Abdomen and Pelvis No Cont (06.15.25 @ 22:14) >  FINDINGS:  LOWER CHEST: Minimal linear dependent atelectatic changes. Normal-sized   heart.    LIVER: Within normal limits.  BILE DUCTS: Normal caliber.  GALLBLADDER: Cholecystectomy.  SPLEEN: Within normal limits. Normal size. Splenule.  PANCREAS: Within normal limits.  ADRENALS: Within normal limits.  KIDNEYS/URETERS: No hydronephrosis, hydroureter or nephroureterolithiasis.    BLADDER: Decompressed with a Bernard catheter in place.  REPRODUCTIVE ORGANS: Uterus and adnexa within normal limits.    BOWEL: Small hiatal hernia. Stomach and duodenum have grossly normal in   appearance. Small bowel is not dilated. No bowel obstruction. Appendix is   normal. No acute appendicitis. Thickening of the sigmoid colonic wall   with several no-inflamed diverticula, no peridiverticular fat stranding   or inflammation to suggest acute diverticulitis. There is decompressed   descending colon with of mild wall thickening and minimal fat stranding   in the mesocolon and mesosigmoid.    PERITONEUM/RETROPERITONEUM: No pneumoperitoneum, ascites or loculated   fluid collections. No abscess.  VESSELS: Within normal limits. Normal caliber of the abdominal aorta.  LYMPH NODES: No lymphadenopathy.  ABDOMINAL WALL: Within normal limits.  BONES: Sacral Tarlov cysts. Anterolisthesis L4 over L5 with spinal fusion   hardware in this segment. Posterior decompression L4. 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 Pt reports she was overall recovering from recent surgery but did endorse constipation for which she was taking bowel regimen. After 4 days with no BM, she started developing recurrent episodes of non bloody diarrhea beginning early Saturday AM. She also noted associated nausea and recurrent episodes of NBNB vomiting. She had no PO intake after onset of symptoms as was unable to tolerate anything. She denies associated fevers/chills. Denies chest pain/shortness of breath. She reports that while in her bathroom early AM, she felt lightheadedness/dizziness and lowered herself to the ground but denies any LOC or trauma. She was unable to get the attention of her family or get up on her own and was reportedly on the ground x 10hrs. She denies any worsening of pain at the LICHA site. She reports she was taking naproxen 500mg twice daily since Tuesday.   In the ED, pt was afebrile, hemodynamically stable. Labs notable for WBC 23, K 6.5, HCO3 19, BUN/Cr 64/5.22, Alk phos 220, AST//449, . CT Abd/pelvis demonstrated mild colitis of the left descending and sigmoid colon. Repeat chem confirmed K 6 and pt was given calcium/insulin and Lokelma. Pt was given 3L fluids and Bernard placed with no urine output as of time of eval. Nephrology consulted overnight with plan for continued fluid resuscitation and re-evaluation for HD needs pending course. Pt reports slight interval improvement with no further vomiting or diarrhea since presentation but still mild LLQ abdominal discomfort.      PAST MEDICAL & SURGICAL HISTORY:  Spinal stenosis      Kidney stones  x 1 pt denies surgical intervention " in my 20 s '      Colitis, nonspecific      Nephrolithiasis      HTN (hypertension)      HLD (hyperlipidemia)      Anxiety and depression      Menieres disease      Vasovagal syncope      Unilateral primary osteoarthritis, left hip      Enlarged thyroid      Diverticulosis       delivery NOS        S/P cholecystectomy      S/P laminectomy      S/P cystoscopy with ureteral stent placement      History of hand surgery      Medications:  dextrose 50% Injectable 25 Gram(s) IV Push once  dextrose 50% Injectable 12.5 Gram(s) IV Push once  dextrose 50% Injectable 25 Gram(s) IV Push once    albumin human 25% IVPB 50 milliLiter(s) IV Intermittent Once  dextrose 5%. 1000 milliLiter(s) IV Continuous <Continuous>  sodium chloride 0.9% Bolus 1000 milliLiter(s) IV Bolus once  sodium chloride 0.9%. 1000 milliLiter(s) IV Continuous <Continuous>    Allergies: NKDA     Social: never smoker, denies EtOH abuse, denies illicit drug use     FH: FAMILY HISTORY:  Family history of renal failure (Father)    Family history of hypertension (Father, Mother)    Family history of cerebrovascular accident (CVA) in father (Father)    Family history of breast cancer in mother (Mother)    Family history of diabetes mellitus (Sibling)    ICU Vital Signs Last 24 Hrs  T(C): 36.7 (15 Quique 2025 23:17), Max: 36.9 (15 Quique 2025 18:39)  T(F): 98 (15 Quique 2025 23:17), Max: 98.5 (15 Quique 2025 18:39)  HR: 89 (15 Quique 2025 23:17) (89 - 114)  BP: 92/63 (15 Quique 2025 23:17) (92/63 - 101/70)  BP(mean): --  ABP: --  ABP(mean): --  RR: 20 (15 Quique 2025 23:17) (20 - 20)  SpO2: 95% (15 Quique 2025 23:17) (95% - 95%)    O2 Parameters below as of 15 Quique 2025 23:17  Patient On (Oxygen Delivery Method): room air      LABS:                        13.6   23.12 )-----------( 312      ( 15 Quique 2025 20:24 )             40.0     06-16    133[L]  |  96  |  62.0[H]  ----------------------------<  64[L]  4.9   |  19.0[L]  |  5.08[H]    Ca    9.5      2025 00:45    TPro  5.3[L]  /  Alb  2.6[L]  /  TBili  0.5  /  DBili  x   /  AST  129[H]  /  ALT  315[H]  /  AlkPhos  153[H]  06-16      CARDIAC MARKERS ( 15 Quique 2025 20:24 )  x     / x     / x     / x     / 4.1 ng/mL      CAPILLARY BLOOD GLUCOSE        PT/INR - ( 15 Quique 2025 20:24 )   PT: 11.6 sec;   INR: 1.00 ratio         PTT - ( 15 Quique 2025 20:24 )  PTT:35.0 sec  Urinalysis Basic - ( 2025 00:45 )    Color: x / Appearance: x / SG: x / pH: x  Gluc: 64 mg/dL / Ketone: x  / Bili: x / Urobili: x   Blood: x / Protein: x / Nitrite: x   Leuk Esterase: x / RBC: x / WBC x   Sq Epi: x / Non Sq Epi: x / Bacteria: x      Physical Examination:    General: awake, alert, in NAD     HEENT: NC/AT, anicteric, dry MM    PULM: Clear to auscultation anteriorly, no accessory muscle use     NECK: Supple, trachea midline    CVS: S1S2, RRR     ABD: Soft, nondistended, mildly TTP LLQ, normoactive bowel sounds    EXT: No edema, nontender    SKIN: Warm and well perfused, no rashes noted    NEURO: Alert, oriented, interactive, nonfocal    ROS: negative except as per HPI     RADIOLOGY:   < from: CT Abdomen and Pelvis No Cont (06.15.25 @ 22:14) >  FINDINGS:  LOWER CHEST: Minimal linear dependent atelectatic changes. Normal-sized   heart.    LIVER: Within normal limits.  BILE DUCTS: Normal caliber.  GALLBLADDER: Cholecystectomy.  SPLEEN: Within normal limits. Normal size. Splenule.  PANCREAS: Within normal limits.  ADRENALS: Within normal limits.  KIDNEYS/URETERS: No hydronephrosis, hydroureter or nephroureterolithiasis.    BLADDER: Decompressed with a Bernard catheter in place.  REPRODUCTIVE ORGANS: Uterus and adnexa within normal limits.    BOWEL: Small hiatal hernia. Stomach and duodenum have grossly normal in   appearance. Small bowel is not dilated. No bowel obstruction. Appendix is   normal. No acute appendicitis. Thickening of the sigmoid colonic wall   with several no-inflamed diverticula, no peridiverticular fat stranding   or inflammation to suggest acute diverticulitis. There is decompressed   descending colon with of mild wall thickening and minimal fat stranding   in the mesocolon and mesosigmoid.    PERITONEUM/RETROPERITONEUM: No pneumoperitoneum, ascites or loculated   fluid collections. No abscess.  VESSELS: Within normal limits. Normal caliber of the abdominal aorta.  LYMPH NODES: No lymphadenopathy.  ABDOMINAL WALL: Within normal limits.  BONES: Sacral Tarlov cysts. Anterolisthesis L4 over L5 with spinal fusion   hardware in this segment. Posterior decompression L4. 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

## 2025-06-16 NOTE — CONSULT NOTE ADULT - TIME BILLING
review of chart notes, labs, imaging, bedside assessment, discussion with ED nurse/MD and documentation

## 2025-06-16 NOTE — PATIENT PROFILE ADULT - IS THERE A SUSPICION OF ABUSE/NEGLIGENCE?
----- Message from Trace Benitez sent at 9/16/2019 12:54 PM CDT -----  Contact: Pt  Pt would like to be called back regarding home health clearance and other concerns.     Pt can be reached at [phone].   
no

## 2025-06-16 NOTE — H&P ADULT - ASSESSMENT
59 y/o F w/ PMH of HTN, HLD, prior obesity (currently on mounjaro for 1.5 yrs w/ 80lb wt loss) and anxiety/depression (on lexapro) s/p L-LICHA (6/9/25 at [facility]) presents c/o nausea, NBNB emesis and large vol watery nonbloody diarrhea w/ associated LLQ abd pain that started 3 days ago.  Pt also noticed she stopped making urine Saturday and since arrival at the hospital has not had a BM.  pt has been on naproxen 500mg BID since L-hip surgery 6/9 and is also on losartan.  VS notable for soft SBP in 90's, sinus tachy and RR 20 but not hypoxic or febrile.  Clinically dehydrated appearing w/ mild LLQ tenderness to palpation.  Ebrnard in place w/ no urine.  Initial w/u significant for WBC 23.12, K 6.5-->4.9, AGMA, BUN 64, Cr 5.22-->5.08 (baseline 0.85), Transaminitis , lipase nL, CT a/p w/ possible mild colitis.  s/p zosyn, albumin, 3L LR and 1L NS, 10g lokelma, 2g Ca gluconate, 4mg IV morphine and 4mg IV zofran in ED. MICU consulted and recommending SDU.  Nephro consulted.  Will admit for acute renal failure.    61 y/o F w/ PMH of HTN, HLD, prior obesity (currently on mounjaro for 1.5 yrs w/ 80lb wt loss) and anxiety/depression (on lexapro) s/p L-LICHA (6/9/25 at [facility]) presents c/o nausea, NBNB emesis and large vol watery nonbloody diarrhea w/ associated LLQ abd pain that started 3 days ago.  Pt also noticed she stopped making urine Saturday and since arrival at the hospital has not had a BM.  pt has been on naproxen 500mg BID since L-hip surgery 6/9 and is also on losartan.  VS notable for soft SBP in 90's, sinus tachy and RR 20 but not hypoxic or febrile.  Clinically dehydrated appearing w/ mild LLQ tenderness to palpation.  Bernard in place w/ no urine.  Initial w/u significant for WBC 23.12, K 6.5-->4.9, AGMA, BUN 64, Cr 5.22-->5.08 (baseline 0.85), Transaminitis , lipase nL, CT a/p w/ possible mild colitis.  s/p zosyn, albumin, 3L LR and 1L NS, 10g lokelma, 2g Ca gluconate, 4mg IV morphine and 4mg IV zofran in ED. MICU consulted and recommending SDU.  Nephro consulted.  Will admit for acute renal failure.       Acute renal failure w/ mild rhabdo suspect multifactorial from N/V/D while on NSAIDs and ARB   Hypovolemic mild hyponatremic hypochloremia likely 2/2 N/V/D  - Pt is on naproxin 500mg bid and ARB outpt; will d/c both at this time given renal function   - BUN 64/Cr 5.22-->62/5.08 w/ last known Cr 0.85 5/2025 s/p 3L LR bolus and 1L NS bolus, 1x dose of albumin then placed on NS maintenance fluids   - Lactate normal and CPK mildly elevated   - Bernard placed in ED on arrival and no urine output noted so far   - CT a/p w/ no  hydronephrosis, hydroureter or nephroureterolithiasis but noted to have acute colitis  - UA w/ microscopy, urine studies, osms, uric acid and repeat CPK and CMP ordered   - Will c/w NS pending ABG to assess pH, CPK and repeat CMP; change back to LR if CPK stable, not alkalotic and K nL   - Strict I/O's, avoid nephrotoxic meds or renally dose if needed  - c/w prn antiemetics and encourage po intake w/ clears for now   - Nephro consulted       Sepsis likely 2/2 acute likely infectious colitis   - Sinus tachy, tachypnea and WBC 23K w/ L-shift   - Has mild LLQ tenderness to palpation  - Lactate normal   - Flu/RSV/Covid negative   - UA pending and blood cultures collected   - CT a/p w/ findings compatible with mild acute colitis in the left descending and sigmoid colon   - GI pcr, stool culture, cdiff ordered   - c/w zosyn empirically and c/w IVF maintenance fluids   - Monitor CBC and temperature        AGMA likely 2/2 uremia/renal failure   - May also have starvation ketosis contributing from poor po intake and N/V  - Lactate normal and UA pending   - Will order acetone, uric acid and ABG   - Repeat CMP in AM       Hyperkalemia, resolved   - Likely from acute renal failure and ARB   - K 6.5-->4.9 s/p 10g lokelma   - Also received 2g Calcium gluconate   - Hold ARB given renal function   - Monitor on telemetry       Transaminitis w/ hepatocellular distribution   - Could be from soft BP/hypovolemia   - , ,  w/ nL tbili on initial LFTs  - Repeat LFTs show improving transaminases   - Lipase not elevated   - CT a/p w/ normal liver, bile ducts nL caliber and s/p cholecystectomy  - Will order hepatitis panel, CMV, EBV to r/u infectious etiology   - Trend LFTs and avoid hepatotoxic medications       HTN / HLD  - ARB held in light of renal function  - BP soft therefore no need for antihypertensive at this time   - Hold statin for now given LFTs      Former obesity  - On moujaro w/ 80lb weight loss and now BMI 23  - Hold moujaro while inpt       VTE ppx:  Heparin sq    Dispo: Acute.  Low threshold for MICU reassessment if needed.

## 2025-06-16 NOTE — H&P ADULT - HISTORY OF PRESENT ILLNESS
61 y/o F w/ PMH of HTN, HLD, prior obesity (currently on mounjaro for 1.5 yrs w/ 80lb wt loss) and anxiety/depression (on lexapro) s/p L-LICHA (6/9/25 at [facility]) presents c/o nausea, NBNB emesis and large vol watery nonbloody diarrhea w/ associated LLQ abd pain that started 3 days ago.  Pt also noticed she stopped making urine Saturday and since arrival at the hospital has not had a BM.  Pt denies sick contacts or recent travel.  She was in the hospital 6/9 for elective L-hip replacement and pt has been on naproxen 500mg bid since procedure and is also on losartan.  Pt also denies cp, palpitations, dysuria, fevers, chills, sob, cough.

## 2025-06-16 NOTE — PATIENT PROFILE ADULT - NSPROIMPLANTSMEDDEV_GEN_A_NUR
No pertinent past medical history <<----- Click to add NO pertinent Past Medical History hardware in spine, marker right breast, left hip artifical joint./Artificial joint

## 2025-06-16 NOTE — PATIENT PROFILE ADULT - VISION (WITH CORRECTIVE LENSES IF THE PATIENT USUALLY WEARS THEM):
I was physically present and participated in this delivery.
Normal vision: sees adequately in most situations; can see medication labels, newsprint

## 2025-06-16 NOTE — PROVIDER CONTACT NOTE (OTHER) - BACKGROUND
----- Message from Corazon Vo sent at 7/26/2018  8:09 AM CDT -----  Contact: Ms Fajardo [phone]  Patient need to schedule  Post Op appointment for Monday 7/30/2018.   Ms Fajardo can be reached at Ms Fajardo [phone]   pt need abg for PH  for aniongap

## 2025-06-16 NOTE — ED ADULT NURSE REASSESSMENT NOTE - NS ED NURSE REASSESS COMMENT FT1
pt received in bed alert and oriented x4, denies pain, no complaints, VSS, on cardiac monitor. awaiting bed in the Step Down Unit.

## 2025-06-16 NOTE — H&P ADULT - NSHPLABSRESULTS_GEN_ALL_CORE
13.6   23.12 )-----------( 312      ( 15 Quique 2025 20:24 )             40.0         06-16    133[L]  |  96  |  62.0[H]  ----------------------------<  64[L]  4.9   |  19.0[L]  |  5.08[H]    Ca    9.5      16 Jun 2025 00:45    TPro  5.3[L]  /  Alb  2.6[L]  /  TBili  0.5  /  DBili  x   /  AST  129[H]  /  ALT  315[H]  /  AlkPhos  153[H]  06-16        < from: CT Abdomen and Pelvis No Cont (06.15.25 @ 22:14) >    IMPRESSION:  Findings compatible with mild acute colitis in the left descending and   sigmoid colon which may be of inflammatory, infectious or ischemic   etiology.    < end of copied text >

## 2025-06-16 NOTE — H&P ADULT - NSHPPHYSICALEXAM_GEN_ALL_CORE
GENERAL: pt examined bedside, laying comfortably in bed in NAD  HEENT: NC/AT, moist oral mucosa, clear conjunctiva, sclera nonicteric  RESPIRATORY: Normal respiratory effort; CTA b/l, no wheezing, rhonchi, rales  CARDIOVASCULAR: RRR, normal S1 and S2, no murmur/rub/gallop  ABDOMEN: soft, NT/ND, normoactive bowel sounds, no rebound/guarding  MSK: No joint deformities, edema, erythema  EXTREMITIES: No cynaosis, no clubbing, no lower extremity edema; Peripheral pulses are 2+ bilaterally  PSYCH: affect appropriate and cooperative  NEUROLOGY: A+O to person, place, and time, no focal neurologic deficits appreciated  SKIN: No rashes or no palpable lesions GENERAL: pt examined bedside, laying comfortably in bed in NAD  HEENT: NC/AT, very dry oral mucosa, clear conjunctiva, sclera nonicteric  RESPIRATORY: Normal respiratory effort, no wheezing, rhonchi, rales  CARDIOVASCULAR: RRR, normal S1 and S2  ABDOMEN: soft, nondistended, mild tenderness to palpation of LLQ, normoactive bowel sounds, no rebound/guarding  MSK: No joint deformities, edema, erythema  EXTREMITIES: No cynaosis, no clubbing, no lower extremity edema  NEUROLOGY: A+O to person, place, and time, no focal neurologic deficits appreciated  SKIN: poor turgor

## 2025-06-16 NOTE — CONSULT NOTE ADULT - ASSESSMENT
60F with hx of nephrolithiasis, HTN, HLD, anxiety/depression, s/p cholecystectomy, vasovagal syncope, s/p L anterior LICHA 6/9 with post operative nausea/vomiting/diarrhea with presyncopal episode found to have acute renal failure and transaminitis     NAVI, secondary to pre-renal/possible ATN   Metabolic acidosis, secondary to renal failure and diarrhea and probable starvation ketosis   Transaminitis   Mild colitis     s/p 3L fluids; pt still appears volume down, will give additional 1L fluid bolus and albumin and start mIVF   s/p medical tx for hyperkalemia with appropriate downtrend    trend chem 8 q4h   monitor strict I/Os   renally dose all medications   check VBG   can switch mIVF to bicarb gtt if HCO3 remains low   if remains anuric, can attempt diuretic trial   No current plan for emergent HD per nephrology   hold losartan   c/w empiric abx for colitis   check GI pcr   f/u blood cx   serial abdominal exam   avoid further morphine given renal failure   monitor FS   telemetry monitoring      as pt currently hemodynamically stable with no current indication for emergent HD, pt does not require ICU level of care at this time; can monitor on SDU   please call back with any concerns or change in clinical condition

## 2025-06-16 NOTE — CONSULT NOTE ADULT - SUBJECTIVE AND OBJECTIVE BOX
[facility] DIVISION OF KIDNEY DISEASES AND HYPERTENSION -- INITIAL CONSULT NOTE  --------------------------------------------------------------------------------  HPI:  61 y/o F w/ PMH of HTN, HLD, prior obesity (currently on mounjaro for 1.5 yrs w/ 80lb wt loss) and anxiety/depression (on lexapro) s/p L-LICHA (25 at [facility]) presents c/o nausea, NBNB emesis and large vol watery nonbloody diarrhea w/ associated LLQ abd pain that started 3 days ago.     Nephrology is consulted for acute kidney injury.  His creatinine was normal in May.  On admission her creatinine was 5.2.  She was also hyperkalemic and acidotic potassium was 6.5 on admission she received medical management and her potassium has improved.  She is still not made much urine.  She had hip replacement surgery last week and was prescribed naproxen.  She has been taking it twice a day.  She is also being severe nausea vomiting and diarrhea since Friday.  She appears to be and fatigued.CT abdomen showed no evidence of hydronephrosis but also showed mild colitis    PAST HISTORY  --------------------------------------------------------------------------------  PAST MEDICAL & SURGICAL HISTORY:  Spinal stenosis      Kidney stones  x 1 pt denies surgical intervention " in my 20 s '      Colitis, nonspecific      Nephrolithiasis      HTN (hypertension)      HLD (hyperlipidemia)      Anxiety and depression      Menieres disease      Vasovagal syncope      Unilateral primary osteoarthritis, left hip      Enlarged thyroid      Diverticulosis       delivery NOS        S/P cholecystectomy      S/P laminectomy      S/P cystoscopy with ureteral stent placement      History of hand surgery        FAMILY HISTORY:  Family history of renal failure (Father)    Family history of hypertension (Father, Mother)    Family history of cerebrovascular accident (CVA) in father (Father)    Family history of breast cancer in mother (Mother)    Family history of diabetes mellitus (Sibling)      PAST SOCIAL HISTORY:    ALLERGIES & MEDICATIONS  --------------------------------------------------------------------------------  Allergies    No Known Allergies    Intolerances      Standing Inpatient Medications  dextrose 5%. 1000 milliLiter(s) IV Continuous <Continuous>  dextrose 50% Injectable 25 Gram(s) IV Push once  dextrose 50% Injectable 12.5 Gram(s) IV Push once  dextrose 50% Injectable 25 Gram(s) IV Push once  dextrose Oral Gel 15 Gram(s) Oral once  escitalopram 20 milliGRAM(s) Oral daily  glucagon  Injectable 1 milliGRAM(s) IntraMuscular once  heparin   Injectable 5000 Unit(s) SubCutaneous every 8 hours  piperacillin/tazobactam IVPB.. 3.375 Gram(s) IV Intermittent every 12 hours  sodium chloride 0.9%. 1000 milliLiter(s) IV Continuous <Continuous>    PRN Inpatient Medications  acetaminophen     Tablet .. 650 milliGRAM(s) Oral every 6 hours PRN  aluminum hydroxide/magnesium hydroxide/simethicone Suspension 30 milliLiter(s) Oral every 4 hours PRN  melatonin 3 milliGRAM(s) Oral at bedtime PRN  ondansetron Injectable 4 milliGRAM(s) IV Push every 8 hours PRN      REVIEW OF SYSTEMS  --------------------------------------------------------------------------------  Gen: No weight changes, fatigue, fevers/chills, weakness  Skin: No rashes  Head/Eyes/Ears/Mouth: No headache; Normal hearing; Normal vision w/o blurriness; No sinus pain/discomfort, sore throat  Respiratory: No dyspnea, cough, wheezing, hemoptysis  CV: No chest pain, PND, orthopnea  GI: No abdominal pain, diarrhea, constipation, nausea, vomiting, melena, hematochezia  : No increased frequency, dysuria, hematuria, nocturia  MSK: No joint pain/swelling; no back pain; no edema  Neuro: No dizziness/lightheadedness, weakness, seizures, numbness, tingling  Heme: No easy bruising or bleeding      All other systems were reviewed and are negative, except as noted.    VITALS/PHYSICAL EXAM  --------------------------------------------------------------------------------  T(C): 37.2 (25 @ 15:31), Max: 37.2 (25 @ 15:31)  HR: 89 (25 15:31) (89 - 114)  BP: 115/75 (25 @ 15:31) (92/63 - 115/75)  RR: 18 (25 @ 15:31) (18 - 20)  SpO2: 97% (25 15:31) (95% - 100%)  Wt(kg): --  Height (cm): 162.6 (06-15-25 @ 18:39)  Weight (kg): 61.2 (06-15-25 @ 18:39)  BMI (kg/m2): 23.1 (06-15-25 @ 18:39)  BSA (m2): 1.66 (06-15-25 @ 18:39)      Physical Exam:  Gen: Alert and Oriented *3  HEENT: PERRL, supple neck, clear oropharynx  Pulm: CTA B/L  CV: RRR, S1S2  Abd: +BS, soft, + tender lower abdomen  Extremities: no edema    LABS/STUDIES  --------------------------------------------------------------------------------              9.9    13.70 >-----------<  262      [25 @ 09:45]              29.2     133  |  96  |  62.5  ----------------------------<  84      [25 04:30]  4.5   |  18.0  |  5.16        Ca     8.9     [25 04:30]      Mg     2.2     [25 04:30]      Phos  5.5     [25 04:30]    TPro  5.1  /  Alb  2.6  /  TBili  0.4  /  DBili  x   /  AST  103  /  ALT  261  /  AlkPhos  140  [25 04:30]    PT/INR: PT 11.6 , INR 1.00       [06-15-25 @ 20:24]  PTT: 35.0       [06-15-25 @ 20:24]    Uric acid 5.8      [25 04:30]  Serum Osmolality 303      [25 04:30]    Creatinine Trend:  SCr 5.16 [ 04:30]  SCr 5.08 [ 00:45]  SCr 5.00 [06-15 @ 21:31]  SCr 5.22 [06-15 @ 20:24]  SCr 0.85 [ 16:38]    Urinalysis - [25 04:30]      Color  / Appearance  / SG  / pH       Gluc 84 / Ketone   / Bili  / Urobili        Blood  / Protein  / Leuk Est  / Nitrite       RBC  / WBC  / Hyaline  / Gran  / Sq Epi  / Non Sq Epi  / Bacteria

## 2025-06-16 NOTE — CHART NOTE - NSCHARTNOTEFT_GEN_A_CORE
Patient admitted overnight with Acute renal failure likely multifactorial, seen by nephrology, no current urgent indication for HD, continues on IVF, adjusted to plasmalyte as per Nephrology. Pending GI PCR / Stool and urine studies, patient without a BM yet this AM thus collection remains pending. Continues on Abx for suspected mild colitis, improvement in CBC noted. Hold NSAIDs / nephrotoxic medications at this time.

## 2025-06-16 NOTE — CONSULT NOTE ADULT - ASSESSMENT
1.  Acute kidney injury with hyperkalemia and acidosis: Multifactorial likely combination of prerenal/ATN/NSAID nephrotoxicity along with ARB use.  Ultrasound shows no evidence of hydronephrosis.  Would recommend hydration continue IV fluids but would switch to Plasma-Lyte.  Potassium has improved with medical management no indication for emergent dialysis at this time.  Counseled that the end if the renal function does not improve over the next few days she may need to be started on renal replacement therapy.  2.  Hypertension hold losartan.  Blood pressure on the lower side.  Continue IV fluids  3.  Status post hip replacement.  Hold NSAIDs per Ortho.  4.  Colitis noted on CT abdomen.  On antibiotics per primary team.      Thank you for the consultation    Otoniel Schmitz MD, IRA

## 2025-06-17 LAB
ALBUMIN SERPL ELPH-MCNC: 2.7 G/DL — LOW (ref 3.3–5.2)
ALP SERPL-CCNC: 122 U/L — HIGH (ref 40–120)
ALT FLD-CCNC: 141 U/L — HIGH
ANION GAP SERPL CALC-SCNC: 19 MMOL/L — HIGH (ref 5–17)
AST SERPL-CCNC: 39 U/L — HIGH
BASOPHILS # BLD AUTO: 0.07 K/UL — SIGNIFICANT CHANGE UP (ref 0–0.2)
BASOPHILS NFR BLD AUTO: 0.7 % — SIGNIFICANT CHANGE UP (ref 0–2)
BILIRUB SERPL-MCNC: 0.4 MG/DL — SIGNIFICANT CHANGE UP (ref 0.4–2)
BUN SERPL-MCNC: 73.4 MG/DL — HIGH (ref 8–20)
CALCIUM SERPL-MCNC: 8.3 MG/DL — LOW (ref 8.4–10.5)
CHLORIDE SERPL-SCNC: 98 MMOL/L — SIGNIFICANT CHANGE UP (ref 96–108)
CMV DNA CSF QL NAA+PROBE: SIGNIFICANT CHANGE UP IU/ML
CMV DNA SPEC NAA+PROBE-LOG#: SIGNIFICANT CHANGE UP LOG10IU/ML
CO2 SERPL-SCNC: 16 MMOL/L — LOW (ref 22–29)
CREAT SERPL-MCNC: 7.38 MG/DL — HIGH (ref 0.5–1.3)
EBV DNA SERPL NAA+PROBE-ACNC: SIGNIFICANT CHANGE UP IU/ML
EBVPCR LOG: SIGNIFICANT CHANGE UP LOG10IU/ML
EGFR: 6 ML/MIN/1.73M2 — LOW
EGFR: 6 ML/MIN/1.73M2 — LOW
EOSINOPHIL # BLD AUTO: 0.3 K/UL — SIGNIFICANT CHANGE UP (ref 0–0.5)
EOSINOPHIL NFR BLD AUTO: 3 % — SIGNIFICANT CHANGE UP (ref 0–6)
GI PCR PANEL: SIGNIFICANT CHANGE UP
GLUCOSE SERPL-MCNC: 90 MG/DL — SIGNIFICANT CHANGE UP (ref 70–99)
HAV IGM SER-ACNC: SIGNIFICANT CHANGE UP
HBV CORE IGM SER-ACNC: SIGNIFICANT CHANGE UP
HBV SURFACE AG SER-ACNC: SIGNIFICANT CHANGE UP
HCT VFR BLD CALC: 27.8 % — LOW (ref 34.5–45)
HCV AB S/CO SERPL IA: 0.06 S/CO — SIGNIFICANT CHANGE UP (ref 0–0.79)
HCV AB SERPL-IMP: SIGNIFICANT CHANGE UP
HGB BLD-MCNC: 9.3 G/DL — LOW (ref 11.5–15.5)
IMM GRANULOCYTES # BLD AUTO: 0.03 K/UL — SIGNIFICANT CHANGE UP (ref 0–0.07)
IMM GRANULOCYTES NFR BLD AUTO: 0.3 % — SIGNIFICANT CHANGE UP (ref 0–0.9)
LYMPHOCYTES # BLD AUTO: 1.36 K/UL — SIGNIFICANT CHANGE UP (ref 1–3.3)
LYMPHOCYTES NFR BLD AUTO: 13.7 % — SIGNIFICANT CHANGE UP (ref 13–44)
MCHC RBC-ENTMCNC: 28.4 PG — SIGNIFICANT CHANGE UP (ref 27–34)
MCHC RBC-ENTMCNC: 33.5 G/DL — SIGNIFICANT CHANGE UP (ref 32–36)
MCV RBC AUTO: 85 FL — SIGNIFICANT CHANGE UP (ref 80–100)
MONOCYTES # BLD AUTO: 0.85 K/UL — SIGNIFICANT CHANGE UP (ref 0–0.9)
MONOCYTES NFR BLD AUTO: 8.6 % — SIGNIFICANT CHANGE UP (ref 2–14)
MRSA PCR RESULT.: SIGNIFICANT CHANGE UP
NEUTROPHILS # BLD AUTO: 7.29 K/UL — SIGNIFICANT CHANGE UP (ref 1.8–7.4)
NEUTROPHILS NFR BLD AUTO: 73.7 % — SIGNIFICANT CHANGE UP (ref 43–77)
NRBC # BLD AUTO: 0 K/UL — SIGNIFICANT CHANGE UP (ref 0–0)
NRBC # FLD: 0 K/UL — SIGNIFICANT CHANGE UP (ref 0–0)
NRBC BLD AUTO-RTO: 0 /100 WBCS — SIGNIFICANT CHANGE UP (ref 0–0)
PLATELET # BLD AUTO: 262 K/UL — SIGNIFICANT CHANGE UP (ref 150–400)
PMV BLD: 9.7 FL — SIGNIFICANT CHANGE UP (ref 7–13)
POTASSIUM SERPL-MCNC: 4.8 MMOL/L — SIGNIFICANT CHANGE UP (ref 3.5–5.3)
POTASSIUM SERPL-SCNC: 4.8 MMOL/L — SIGNIFICANT CHANGE UP (ref 3.5–5.3)
PROT SERPL-MCNC: 4.8 G/DL — LOW (ref 6.6–8.7)
RBC # BLD: 3.27 M/UL — LOW (ref 3.8–5.2)
RBC # FLD: 12.6 % — SIGNIFICANT CHANGE UP (ref 10.3–14.5)
S AUREUS DNA NOSE QL NAA+PROBE: SIGNIFICANT CHANGE UP
SODIUM SERPL-SCNC: 133 MMOL/L — LOW (ref 135–145)
WBC # BLD: 9.9 K/UL — SIGNIFICANT CHANGE UP (ref 3.8–10.5)
WBC # FLD AUTO: 9.9 K/UL — SIGNIFICANT CHANGE UP (ref 3.8–10.5)

## 2025-06-17 PROCEDURE — 99233 SBSQ HOSP IP/OBS HIGH 50: CPT

## 2025-06-17 PROCEDURE — 99223 1ST HOSP IP/OBS HIGH 75: CPT

## 2025-06-17 PROCEDURE — G0545: CPT

## 2025-06-17 PROCEDURE — 71045 X-RAY EXAM CHEST 1 VIEW: CPT | Mod: 26

## 2025-06-17 RX ORDER — FUROSEMIDE 10 MG/ML
80 INJECTION INTRAMUSCULAR; INTRAVENOUS ONCE
Refills: 0 | Status: COMPLETED | OUTPATIENT
Start: 2025-06-17 | End: 2025-06-17

## 2025-06-17 RX ORDER — SODIUM CHLORIDE 9 G/1000ML
1000 INJECTION, SOLUTION INTRAVENOUS
Refills: 0 | Status: DISCONTINUED | OUTPATIENT
Start: 2025-06-17 | End: 2025-06-18

## 2025-06-17 RX ORDER — HYDROMORPHONE/SOD CHLOR,ISO/PF 2 MG/10 ML
1 SYRINGE (ML) INJECTION EVERY 4 HOURS
Refills: 0 | Status: DISCONTINUED | OUTPATIENT
Start: 2025-06-17 | End: 2025-06-18

## 2025-06-17 RX ORDER — MIDAZOLAM IN 0.9 % SOD.CHLORID 1 MG/ML
1 PLASTIC BAG, INJECTION (ML) INTRAVENOUS
Refills: 0 | Status: DISCONTINUED | OUTPATIENT
Start: 2025-06-17 | End: 2025-06-18

## 2025-06-17 RX ORDER — HYDROMORPHONE/SOD CHLOR,ISO/PF 2 MG/10 ML
0.5 SYRINGE (ML) INJECTION
Refills: 0 | Status: DISCONTINUED | OUTPATIENT
Start: 2025-06-17 | End: 2025-06-18

## 2025-06-17 RX ADMIN — HEPARIN SODIUM 5000 UNIT(S): 1000 INJECTION INTRAVENOUS; SUBCUTANEOUS at 14:09

## 2025-06-17 RX ADMIN — HEPARIN SODIUM 5000 UNIT(S): 1000 INJECTION INTRAVENOUS; SUBCUTANEOUS at 23:55

## 2025-06-17 RX ADMIN — Medication 1 APPLICATION(S): at 11:48

## 2025-06-17 RX ADMIN — Medication 1 APPLICATION(S): at 06:00

## 2025-06-17 RX ADMIN — Medication 0.5 MILLIGRAM(S): at 18:34

## 2025-06-17 RX ADMIN — Medication 4 MILLIGRAM(S): at 09:36

## 2025-06-17 RX ADMIN — SODIUM CHLORIDE 100 MILLILITER(S): 9 INJECTION, SOLUTION INTRAVENOUS at 23:54

## 2025-06-17 RX ADMIN — FUROSEMIDE 80 MILLIGRAM(S): 10 INJECTION INTRAMUSCULAR; INTRAVENOUS at 23:54

## 2025-06-17 RX ADMIN — Medication 1 MILLIGRAM(S): at 13:31

## 2025-06-17 RX ADMIN — FUROSEMIDE 80 MILLIGRAM(S): 10 INJECTION INTRAMUSCULAR; INTRAVENOUS at 09:37

## 2025-06-17 RX ADMIN — Medication 4 MILLIGRAM(S): at 23:54

## 2025-06-17 RX ADMIN — Medication 0.5 MILLIGRAM(S): at 19:20

## 2025-06-17 RX ADMIN — SODIUM CHLORIDE 100 MILLILITER(S): 9 INJECTION, SOLUTION INTRAVENOUS at 05:12

## 2025-06-17 RX ADMIN — ESCITALOPRAM OXALATE 20 MILLIGRAM(S): 20 TABLET ORAL at 11:48

## 2025-06-17 RX ADMIN — Medication 650 MILLIGRAM(S): at 00:09

## 2025-06-17 RX ADMIN — HEPARIN SODIUM 5000 UNIT(S): 1000 INJECTION INTRAVENOUS; SUBCUTANEOUS at 05:12

## 2025-06-17 RX ADMIN — Medication 25 GRAM(S): at 05:12

## 2025-06-17 NOTE — PROCEDURE NOTE - ADDITIONAL PROCEDURE DETAILS
Micro access technique used.  Dialysis catheter placed using sterile technique.  Patient tolerated procedure well.

## 2025-06-17 NOTE — CONSULT NOTE ADULT - ASSESSMENT
60 year old female with with multifactorial acute kidney injury 2/2 dehydration from gastroenteritis with vomiting and watery diarrhea causing prerenal failure and medication toxicity from NSAID and ARB use.    Vascular team was consulted for hemodialysis catheter placement cue to persistently rising creatinine and BUN and worsening acidemia. Dialysis catheter was placed. The patient tolerated procedure well.    Plan:  Continue care as per primary team.  60 year old female with with multifactorial acute kidney injury 2/2 dehydration from gastroenteritis with vomiting and watery diarrhea causing prerenal failure and medication toxicity from NSAID and ARB use.    Vascular team was consulted for hemodialysis catheter placement cue to persistently rising creatinine and BUN and worsening acidemia. Dialysis catheter was placed. The patient tolerated procedure well.    Plan:  - Dialysis catheter may be used  - Continue care as per primary team  - Please contact vascular team with any dialysis catheter issues

## 2025-06-17 NOTE — PROGRESS NOTE ADULT - ASSESSMENT
1.  Acute kidney injury with hyperkalemia and acidosis: Multifactorial likely combination of prerenal/ATN/NSAID nephrotoxicity along with ARB use.  Ultrasound shows no evidence of hydronephrosis.  renal function worsening. no response to IV lasix. discussed dialysis- risks and benefits. patient agreeable to start HD> will do 2 hour session today. still hopeful for renal recovery. appreciate vascular surgery assistance in catheter placement. HD after line placement   2.  Hypertension hold losartan.  Continue IV fluids  3.  Status post hip replacement.  Hold NSAIDs per Ortho.  4.  Colitis noted on CT abdomen.  On antibiotics per primary team.        Otoniel Schmitz MD, IRA

## 2025-06-17 NOTE — CONSULT NOTE ADULT - ASSESSMENT
Patient seen and examined     FULL CONSULT TO FOLLOW     Constipated post-op, taking Senna and Miralax   Then, patient with massive vomiting/diarrhea leading to NAVI/ATN starting on Sat 6/14  Attended a resort with communal dining prior to her surgery (about one week prior to onset of symptoms). Family without any symptoms.   Has been on Mounjaro for 2 years - no GI side effects     C. diff negative   Leukocytosis on admission probably reactive, now resolved   CT AP with findings compatible with mild colitis in the left descending and sigmoid colon (infectious vs inflammatory vs ischemic)    GI PCR pending   worsening renal function, minimal urine output     concern for gastroenteritis (viral?)  would hold further doses of piperacillin-tazobactam as unclear if beneficial plus worsening renal function  60F with HTN, HLD, s/p L-LICHA on 6/9 presented to the ED on 6/16 with constant vomiting, large volume diarrhea, unable to tolerate oral intake, abd pain/cramping that started on Sat 6/14.      ID consulted for colitis     Constipated post-op, was taking ASA, naproxen, Senna and Miralax   Then, patient with massive vomiting/diarrhea leading to NAVI/ATN starting on Sat 6/14  Attended a resort with buffet dining prior to her surgery (about one week prior to onset of symptoms). Family without any symptoms.   Has been on Mounjaro for 2 years - no GI side effects     C. diff negative   Leukocytosis on admission probably reactive, now resolved   CT AP with findings compatible with mild colitis in the left descending and sigmoid colon (infectious vs inflammatory vs ischemic)  Altered LFTs noted - trending down     GI PCR pending   worsening renal function, minimal urine output     concern for gastroenteritis (viral?)  would hold further doses of piperacillin-tazobactam as unclear if beneficial plus worsening renal function     d/w NP     Will follow

## 2025-06-17 NOTE — PROGRESS NOTE ADULT - SUBJECTIVE AND OBJECTIVE BOX
HOSPITALIST PROGRESS NOTE    CAROLEE AN  [id number]  60yFemale    Patient is a 60y old  Female who presents with a chief complaint of acute renal failure (17 Jun 2025 14:47)      SUBJECTIVE:   Chart reviewed since admission.      Patient seen and examined at bedside for NAVI, Hyperkalemia metabolic acidosis, Anemia, tranasminitis      OBJECTIVE:  Vital Signs Last 24 Hrs  T(C): 36.8 (17 Jun 2025 16:31), Max: 37.3 (17 Jun 2025 00:02)  T(F): 98.2 (17 Jun 2025 16:31), Max: 99.1 (17 Jun 2025 00:02)  HR: 92 (17 Jun 2025 14:00) (89 - 109)  BP: 126/78 (17 Jun 2025 14:00) (102/63 - 129/73)  BP(mean): 92 (17 Jun 2025 14:00) (74 - 92)  RR: 20 (17 Jun 2025 14:00) (12 - 20)  SpO2: 98% (17 Jun 2025 14:00) (95% - 100%)    Parameters below as of 17 Jun 2025 13:45  Patient On (Oxygen Delivery Method): nasal cannula  O2 Flow (L/min): 3      PHYSICAL EXAMINATION  General:   HEENT:    NECK:    CVS:   RESP:    GI:    :   MSK:    CNS:    INTEG:    PSYCH:      MONITOR:  CAPILLARY BLOOD GLUCOSE      POCT Blood Glucose.: 90 mg/dL (16 Jun 2025 16:57)        I&O's Summary    16 Jun 2025 07:01  -  17 Jun 2025 07:00  --------------------------------------------------------  IN: 1100 mL / OUT: 0 mL / NET: 1100 mL    17 Jun 2025 07:01  -  17 Jun 2025 16:48  --------------------------------------------------------  IN: 1180 mL / OUT: 0 mL / NET: 1180 mL      A1C with Estimated Average Glucose Result: 5.3:                       9.3    9.90  )-----------( 262      ( 17 Jun 2025 06:10 )             27.8     PT/INR - ( 15 Quique 2025 20:24 )   PT: 11.6 sec;   INR: 1.00 ratio         PTT - ( 15 Quique 2025 20:24 )  PTT:35.0 sec  06-17    133[L]  |  98  |  73.4[H]  ----------------------------<  90  4.8   |  16.0[L]  |  7.38[H]    Ca    8.3[L]      17 Jun 2025 06:10  Phos  5.5     06-16  Mg     2.2     06-16    TPro  4.8[L]  /  Alb  2.7[L]  /  TBili  0.4  /  DBili  x   /  AST  39[H]  /  ALT  141[H]  /  AlkPhos  122[H]  06-17    CARDIAC MARKERS ( 15 Quique 2025 20:24 )  x     / x     / x     / x     / 4.1 ng/mL      Urinalysis Basic - ( 17 Jun 2025 06:10 )    Color: x / Appearance: x / SG: x / pH: x  Gluc: 90 mg/dL / Ketone: x  / Bili: x / Urobili: x   Blood: x / Protein: x / Nitrite: x   Leuk Esterase: x / RBC: x / WBC x   Sq Epi: x / Non Sq Epi: x / Bacteria: x    Clostridium difficile Toxin by PCR (06.16.25 @ 20:20)   C Diff by PCR Result: NotDetec: T    Culture - Blood (06.15.25 @ 22:25)   Specimen Source: Blood Blood-Peripheral  Culture Results:   No growth at 24 hours    TTE:    RADIOLOGY    < from: CT Abdomen and Pelvis No Cont (06.15.25 @ 22:14) >  IMPRESSION:  Findings compatible with mild acute colitis in the left descending and   sigmoid colon which may be of inflammatory, infectious or ischemic   etiology.    No free perforation.        --- End of Report ---      < end of copied text >    < from: Xray Chest 1 View- PORTABLE-Urgent (Xray Chest 1 View- PORTABLE-Urgent .) (06.15.25 @ 23:36) >  IMPRESSION:   No radiographic evidence of active chest disease..    < end of copied text >    MEDICATIONS  (STANDING):  chlorhexidine 2% Cloths 1 Application(s) Topical <User Schedule>  chlorhexidine 2% Cloths 1 Application(s) Topical <User Schedule>  dextrose 5%. 1000 milliLiter(s) (100 mL/Hr) IV Continuous <Continuous>  dextrose 50% Injectable 25 Gram(s) IV Push once  dextrose 50% Injectable 12.5 Gram(s) IV Push once  dextrose 50% Injectable 25 Gram(s) IV Push once  dextrose Oral Gel 15 Gram(s) Oral once  escitalopram 20 milliGRAM(s) Oral daily  furosemide   Injectable 80 milliGRAM(s) IV Push once  glucagon  Injectable 1 milliGRAM(s) IntraMuscular once  heparin   Injectable 5000 Unit(s) SubCutaneous every 8 hours  multiple electrolytes Injection Type 1 1000 milliLiter(s) (100 mL/Hr) IV Continuous <Continuous>      MEDICATIONS  (PRN):  acetaminophen     Tablet .. 650 milliGRAM(s) Oral every 6 hours PRN Temp greater or equal to 38C (100.4F), Mild Pain (1 - 3)  aluminum hydroxide/magnesium hydroxide/simethicone Suspension 30 milliLiter(s) Oral every 4 hours PRN Dyspepsia  melatonin 3 milliGRAM(s) Oral at bedtime PRN Insomnia  midazolam Injectable 1 milliGRAM(s) IV Push every 5 minutes PRN anxiety  ondansetron Injectable 4 milliGRAM(s) IV Push every 8 hours PRN Nausea and/or Vomiting  sodium chloride 0.9% lock flush 10 milliLiter(s) IV Push every 1 hour PRN Pre/post blood products, medications, blood draw, and to maintain line patency     HOSPITALIST PROGRESS NOTE    CAROLEE AN  [id number]  60yFemale    Patient is a 60y old  Female who presents with a chief complaint of acute renal failure (17 Jun 2025 14:47)      SUBJECTIVE:   Chart reviewed since admission.      Patient seen and examined at bedside for NAVI, Hyperkalemia metabolic acidosis, Anemia, transaminitis.  Nausea and lower abdominal cramping. No longer vomiting  10 bowel movement per day  Denies any fever, chills, dyspnea, chest pain,      OBJECTIVE:  Vital Signs Last 24 Hrs  T(C): 36.8 (17 Jun 2025 16:31), Max: 37.3 (17 Jun 2025 00:02)  T(F): 98.2 (17 Jun 2025 16:31), Max: 99.1 (17 Jun 2025 00:02)  HR: 92 (17 Jun 2025 14:00) (89 - 109)  BP: 126/78 (17 Jun 2025 14:00) (102/63 - 129/73)  BP(mean): 92 (17 Jun 2025 14:00) (74 - 92)  RR: 20 (17 Jun 2025 14:00) (12 - 20)  SpO2: 98% (17 Jun 2025 14:00) (95% - 100%)    Parameters below as of 17 Jun 2025 13:45  Patient On (Oxygen Delivery Method): nasal cannula  O2 Flow (L/min): 3      PHYSICAL EXAMINATION  General: Middle aged female sitting up in bed, comfortable  HEENT:  Anicteric sclera  NECK:  RIJ HD catheter  CVS: regular rate and rhythm S1 S2  RESP:  Fair air entry bilaterally  GI:  Soft nondistended nontender BS+  : No suprapubic tenderness  MSK:  No edema. Moves all extremities  CNS:  Awake, alert, oriented. Fluent speech and follows commands  INTEG:  Warm Skin  PSYCH:  Fair    MONITOR:  CAPILLARY BLOOD GLUCOSE      POCT Blood Glucose.: 90 mg/dL (16 Jun 2025 16:57)        I&O's Summary    16 Jun 2025 07:01  -  17 Jun 2025 07:00  --------------------------------------------------------  IN: 1100 mL / OUT: 0 mL / NET: 1100 mL    17 Jun 2025 07:01  -  17 Jun 2025 16:48  --------------------------------------------------------  IN: 1180 mL / OUT: 0 mL / NET: 1180 mL      A1C with Estimated Average Glucose Result: 5.3:                       9.3    9.90  )-----------( 262      ( 17 Jun 2025 06:10 )             27.8     PT/INR - ( 15 Quique 2025 20:24 )   PT: 11.6 sec;   INR: 1.00 ratio         PTT - ( 15 Quique 2025 20:24 )  PTT:35.0 sec  06-17    133[L]  |  98  |  73.4[H]  ----------------------------<  90  4.8   |  16.0[L]  |  7.38[H]    Ca    8.3[L]      17 Jun 2025 06:10  Phos  5.5     06-16  Mg     2.2     06-16    TPro  4.8[L]  /  Alb  2.7[L]  /  TBili  0.4  /  DBili  x   /  AST  39[H]  /  ALT  141[H]  /  AlkPhos  122[H]  06-17    CARDIAC MARKERS ( 15 Quique 2025 20:24 )  x     / x     / x     / x     / 4.1 ng/mL      Urinalysis Basic - ( 17 Jun 2025 06:10 )    Color: x / Appearance: x / SG: x / pH: x  Gluc: 90 mg/dL / Ketone: x  / Bili: x / Urobili: x   Blood: x / Protein: x / Nitrite: x   Leuk Esterase: x / RBC: x / WBC x   Sq Epi: x / Non Sq Epi: x / Bacteria: x    Clostridium difficile Toxin by PCR (06.16.25 @ 20:20)   C Diff by PCR Result: NotDetec: T    Culture - Blood (06.15.25 @ 22:25)   Specimen Source: Blood Blood-Peripheral  Culture Results:   No growth at 24 hours    TTE:    RADIOLOGY    < from: CT Abdomen and Pelvis No Cont (06.15.25 @ 22:14) >  IMPRESSION:  Findings compatible with mild acute colitis in the left descending and   sigmoid colon which may be of inflammatory, infectious or ischemic   etiology.    No free perforation.        --- End of Report ---      < end of copied text >    < from: Xray Chest 1 View- PORTABLE-Urgent (Xray Chest 1 View- PORTABLE-Urgent .) (06.15.25 @ 23:36) >  IMPRESSION:   No radiographic evidence of active chest disease..    < end of copied text >    MEDICATIONS  (STANDING):  chlorhexidine 2% Cloths 1 Application(s) Topical <User Schedule>  chlorhexidine 2% Cloths 1 Application(s) Topical <User Schedule>  dextrose 5%. 1000 milliLiter(s) (100 mL/Hr) IV Continuous <Continuous>  dextrose 50% Injectable 25 Gram(s) IV Push once  dextrose 50% Injectable 12.5 Gram(s) IV Push once  dextrose 50% Injectable 25 Gram(s) IV Push once  dextrose Oral Gel 15 Gram(s) Oral once  escitalopram 20 milliGRAM(s) Oral daily  furosemide   Injectable 80 milliGRAM(s) IV Push once  glucagon  Injectable 1 milliGRAM(s) IntraMuscular once  heparin   Injectable 5000 Unit(s) SubCutaneous every 8 hours  multiple electrolytes Injection Type 1 1000 milliLiter(s) (100 mL/Hr) IV Continuous <Continuous>      MEDICATIONS  (PRN):  acetaminophen     Tablet .. 650 milliGRAM(s) Oral every 6 hours PRN Temp greater or equal to 38C (100.4F), Mild Pain (1 - 3)  aluminum hydroxide/magnesium hydroxide/simethicone Suspension 30 milliLiter(s) Oral every 4 hours PRN Dyspepsia  melatonin 3 milliGRAM(s) Oral at bedtime PRN Insomnia  midazolam Injectable 1 milliGRAM(s) IV Push every 5 minutes PRN anxiety  ondansetron Injectable 4 milliGRAM(s) IV Push every 8 hours PRN Nausea and/or Vomiting  sodium chloride 0.9% lock flush 10 milliLiter(s) IV Push every 1 hour PRN Pre/post blood products, medications, blood draw, and to maintain line patency

## 2025-06-17 NOTE — CONSULT NOTE ADULT - ASSESSMENT
59 y/o F with PMHX HTN, HLD, obesity and anxiety/depression (on lexapro) s/p L-LICHA (6/9/25 at [facility]) presents complaining of abdominal pain, vomiting and diarrhea    #Colitis  #Elevated liver enzymes  #NAVI   CT Abdomen and Pelvis No Cont (06.15.25 @ 22:14) > thickening of sigmoid colonic wall with several no-inflamed diverticula, no peridiverticular fat stranding or inflammation to suggest acute diverticulitis; decompressed descending colon with mild wall thickening and minimal fat stranding in the mesocolon/sigmoid; findings compatible with mild acute colitis in the left descending and sigmoid colon which may be of inflammatory, infectious or ischemic etiology.   C Diff by PCR Result: NotDetec (06.16.25 @ 20:20)    - Trend CBC  - Trend electrolytes, replete as needed   - Trend LFTs. Avoid hepatotoxins.   - Monitor for fever  - Continue supportive care  - Diet as tolerated (No lactose, low fiber, low fat)   - Stool count  - Stool studies are pending   - Defer abx to ID   - Outpatient colonoscopy in 6-8 weeks when acute inflammation resolves, she can follow up with her gastroenterologist Dr. Beena Jackson,  Further care per primary team / nephrology   GI to sign-off, please reconsult as needed, call for any questions or concerns.   _________________________________________________________________  Assessment and recommendations are final when note is signed by the attending physician.

## 2025-06-17 NOTE — CONSULT NOTE ADULT - SUBJECTIVE AND OBJECTIVE BOX
SURGERY CONSULT    CC: patient is a 60 year old female who presents with a chief complaint of acute renal failure.    HPI:  59 y/o F w/ PMH of HTN, HLD, prior obesity (currently on mounjaro for 1.5 yrs w/ 80lb wt loss) and anxiety/depression (on lexapro) s/p L-LICHA (25 at [facility]) presents c/o nausea, NBNB emesis and large vol watery nonbloody diarrhea w/ associated LLQ abd pain that started 3 days ago.  Pt also noticed she stopped making urine Saturday and since arrival at the hospital has not had a BM.  Pt denies sick contacts or recent travel.  She was in the hospital  for elective L-hip replacement and pt has been on naproxen 500mg bid since procedure and is also on losartan.  Pt also denies cp, palpitations, dysuria, fevers, chills, sob, cough.     (2025 03:37)    Vascular was consulted for central dialysis catheter placement. Her creatinine is 7.38 (06:10 25), baseline 0.85. Bicarb 16.0. Acute renal failure 2/2 gastroenteritis causing vomiting, diarrhea and medication usage naproxen with losartan. The patient is on aspirin 81 mg daily. No history of coagulation disorders, no anatomical abnormalities of vessels, stenosis or thrombosis. CXR reviewed, no stents, mechanical valves, or catheters present.      PAST MEDICAL HISTORY:  Spinal stenosis    Hip bursitis    HTN (hypertension)    Kidney stones    Heartburn    Depression    Anxiety    Hypercholesterolemia    Syncope    Colitis, nonspecific    No pertinent past medical history    Nephrolithiasis    HTN (hypertension)    HLD (hyperlipidemia)    Anxiety and depression    Menieres disease    Vasovagal syncope    Unilateral primary osteoarthritis, left hip    Enlarged thyroid    Diverticulosis        PAST SURGICAL HISTORY:   delivery NOS    No significant past surgical history    S/P cholecystectomy    S/P laminectomy    S/P cystoscopy with ureteral stent placement    History of hand surgery        ALLERGIES:  No Known Allergies      FAMILY HISTORY: Noncontributory    SOCIAL HISTORY: Denies tobacco, EtOH, illicit substance use.     HOME MEDICATIONS:    MEDICATIONS  (STANDING):  chlorhexidine 2% Cloths 1 Application(s) Topical <User Schedule>  chlorhexidine 2% Cloths 1 Application(s) Topical <User Schedule>  dextrose 5%. 1000 milliLiter(s) (100 mL/Hr) IV Continuous <Continuous>  dextrose 50% Injectable 25 Gram(s) IV Push once  dextrose 50% Injectable 12.5 Gram(s) IV Push once  dextrose 50% Injectable 25 Gram(s) IV Push once  dextrose Oral Gel 15 Gram(s) Oral once  escitalopram 20 milliGRAM(s) Oral daily  furosemide   Injectable 80 milliGRAM(s) IV Push once  glucagon  Injectable 1 milliGRAM(s) IntraMuscular once  heparin   Injectable 5000 Unit(s) SubCutaneous every 8 hours  multiple electrolytes Injection Type 1 1000 milliLiter(s) (100 mL/Hr) IV Continuous <Continuous>    MEDICATIONS  (PRN):  acetaminophen     Tablet .. 650 milliGRAM(s) Oral every 6 hours PRN Temp greater or equal to 38C (100.4F), Mild Pain (1 - 3)  aluminum hydroxide/magnesium hydroxide/simethicone Suspension 30 milliLiter(s) Oral every 4 hours PRN Dyspepsia  melatonin 3 milliGRAM(s) Oral at bedtime PRN Insomnia  midazolam Injectable 1 milliGRAM(s) IV Push every 5 minutes PRN anxiety  ondansetron Injectable 4 milliGRAM(s) IV Push every 8 hours PRN Nausea and/or Vomiting  sodium chloride 0.9% lock flush 10 milliLiter(s) IV Push every 1 hour PRN Pre/post blood products, medications, blood draw, and to maintain line patency      VITALS & I/Os:  Vital Signs Last 24 Hrs  T(C): 36.7 (2025 14:11), Max: 37.3 (2025 00:02)  T(F): 98.1 (2025 14:11), Max: 99.1 (2025 00:02)  HR: 92 (2025 14:00) (89 - 109)  BP: 126/78 (2025 14:00) (102/63 - 129/73)  BP(mean): 92 (2025 14:00) (74 - 92)  RR: 20 (2025 14:00) (12 - 20)  SpO2: 98% (2025 14:00) (95% - 100%)    Parameters below as of 2025 13:45  Patient On (Oxygen Delivery Method): nasal cannula  O2 Flow (L/min): 3    CAPILLARY BLOOD GLUCOSE      POCT Blood Glucose.: 90 mg/dL (2025 16:57)      I&O's Summary    2025 07:01  -  2025 07:00  --------------------------------------------------------  IN: 1100 mL / OUT: 0 mL / NET: 1100 mL    2025 07:01  -  2025 15:10  --------------------------------------------------------  IN: 1180 mL / OUT: 0 mL / NET: 1180 mL        GENERAL: Alert, well developed, in no acute distress.  MENTAL STATUS: AAOx3. Appropriate affect.  HEENT: PERRLA. EOMI. MMM.  Trachea midline. No lymph node swelling or tenderness.  RESPIRATORY: CTAB. No wheezing, rales or rhonchi.  CARDIOVASCULAR: RRR. No audible murmurs, rubs or gallops, Internal juglar patent.   GASTROINTESTINAL: Abdomen soft, NT, ND, -R/-G.  No pulsatile mass, no flank tenderness or suprapubic tenderness. No hepatosplenomegaly.  NEUROLOGIC: Cranial nerves II-XII grossly intact. No focal neurological deficits. Moves all extremities spontaneously. Sensation intact bilaterally.  INTEGUMENTARY: No overt rashes or lesions, petechia or purpura. Good turgor. No edema.  MUSCULOSKELETAL: No cyanosis or clubbing. No gross deformities.   LYMPHATIC: Palpation of neck reveals no swelling or tenderness of neck nodes. Palpation of groin reveals no swelling or tenderness of groin nodes.    LABS:                        9.3    9.90  )-----------( 262      ( 2025 06:10 )             27.8     -    133[L]  |  98  |  73.4[H]  ----------------------------<  90  4.8   |  16.0[L]  |  7.38[H]    Ca    8.3[L]      2025 06:10  Phos  5.5     06-16  Mg     2.2     06-16    TPro  4.8[L]  /  Alb  2.7[L]  /  TBili  0.4  /  DBili  x   /  AST  39[H]  /  ALT  141[H]  /  AlkPhos  122[H]  -17    Lactate: acetaminophen     Tablet .. 650 milliGRAM(s) Oral every 6 hours PRN  aluminum hydroxide/magnesium hydroxide/simethicone Suspension 30 milliLiter(s) Oral every 4 hours PRN  chlorhexidine 2% Cloths 1 Application(s) Topical <User Schedule>  chlorhexidine 2% Cloths 1 Application(s) Topical <User Schedule>  dextrose 5%. 1000 milliLiter(s) IV Continuous <Continuous>  dextrose 50% Injectable 25 Gram(s) IV Push once  dextrose 50% Injectable 12.5 Gram(s) IV Push once  dextrose 50% Injectable 25 Gram(s) IV Push once  dextrose Oral Gel 15 Gram(s) Oral once  escitalopram 20 milliGRAM(s) Oral daily  furosemide   Injectable 80 milliGRAM(s) IV Push once  glucagon  Injectable 1 milliGRAM(s) IntraMuscular once  heparin   Injectable 5000 Unit(s) SubCutaneous every 8 hours  melatonin 3 milliGRAM(s) Oral at bedtime PRN  midazolam Injectable 1 milliGRAM(s) IV Push every 5 minutes PRN  multiple electrolytes Injection Type 1 1000 milliLiter(s) IV Continuous <Continuous>  ondansetron Injectable 4 milliGRAM(s) IV Push every 8 hours PRN  sodium chloride 0.9% lock flush 10 milliLiter(s) IV Push every 1 hour PRN     PT/INR - ( 15 Quique 2025 20:24 )   PT: 11.6 sec;   INR: 1.00 ratio         PTT - ( 15 Quique 2025 20:24 )  PTT:35.0 sec    CARDIAC MARKERS ( 15 Quique 2025 20:24 )  x     / x     / x     / x     / 4.1 ng/mL        Urinalysis Basic - ( 2025 06:10 )    Color: x / Appearance: x / SG: x / pH: x  Gluc: 90 mg/dL / Ketone: x  / Bili: x / Urobili: x   Blood: x / Protein: x / Nitrite: x   Leuk Esterase: x / RBC: x / WBC x   Sq Epi: x / Non Sq Epi: x / Bacteria: x            SURGERY CONSULT    CC: patient is a 60 year old female who presents with a chief complaint of acute renal failure.    HPI:  59 y/o F w/ PMH of HTN, HLD, prior obesity (currently on mounjaro for 1.5 yrs w/ 80lb wt loss) and anxiety/depression (on lexapro) s/p L-LICHA (25 at [facility]) presents c/o nausea, NBNB emesis and large vol watery nonbloody diarrhea w/ associated LLQ abd pain that started 3 days ago.  Pt also noticed she stopped making urine Saturday and since arrival at the hospital has not had a BM.  Pt denies sick contacts or recent travel.  She was in the hospital  for elective L-hip replacement and pt has been on naproxen 500mg bid since procedure and is also on losartan.  Pt also denies cp, palpitations, dysuria, fevers, chills, sob, cough.     Vascular was consulted for central dialysis catheter placement. Her creatinine is 7.38 (06:10 25), baseline 0.85. Bicarb 16.0. Acute renal failure likely due to dehydration from  vomiting and diarrhea, and medication usage naproxen with losartan. The patient is on aspirin 81 mg daily. No history of coagulation disorders, no anatomical abnormalities of vessels, stenosis or thrombosis. CXR reviewed, no stents, mechanical valves, or catheters present.      PAST MEDICAL HISTORY:  Spinal stenosis    Hip bursitis    HTN (hypertension)    Kidney stones    Heartburn    Depression    Anxiety    Hypercholesterolemia    Syncope    Colitis, nonspecific    No pertinent past medical history    Nephrolithiasis    HTN (hypertension)    HLD (hyperlipidemia)    Anxiety and depression    Menieres disease    Vasovagal syncope    Unilateral primary osteoarthritis, left hip    Enlarged thyroid    Diverticulosis        PAST SURGICAL HISTORY:   delivery NOS    No significant past surgical history    S/P cholecystectomy    S/P laminectomy    S/P cystoscopy with ureteral stent placement    History of hand surgery        ALLERGIES:  No Known Allergies      FAMILY HISTORY: Noncontributory    SOCIAL HISTORY: Denies tobacco, EtOH, illicit substance use.     HOME MEDICATIONS:    MEDICATIONS  (STANDING):  chlorhexidine 2% Cloths 1 Application(s) Topical <User Schedule>  chlorhexidine 2% Cloths 1 Application(s) Topical <User Schedule>  dextrose 5%. 1000 milliLiter(s) (100 mL/Hr) IV Continuous <Continuous>  dextrose 50% Injectable 25 Gram(s) IV Push once  dextrose 50% Injectable 12.5 Gram(s) IV Push once  dextrose 50% Injectable 25 Gram(s) IV Push once  dextrose Oral Gel 15 Gram(s) Oral once  escitalopram 20 milliGRAM(s) Oral daily  furosemide   Injectable 80 milliGRAM(s) IV Push once  glucagon  Injectable 1 milliGRAM(s) IntraMuscular once  heparin   Injectable 5000 Unit(s) SubCutaneous every 8 hours  multiple electrolytes Injection Type 1 1000 milliLiter(s) (100 mL/Hr) IV Continuous <Continuous>    MEDICATIONS  (PRN):  acetaminophen     Tablet .. 650 milliGRAM(s) Oral every 6 hours PRN Temp greater or equal to 38C (100.4F), Mild Pain (1 - 3)  aluminum hydroxide/magnesium hydroxide/simethicone Suspension 30 milliLiter(s) Oral every 4 hours PRN Dyspepsia  melatonin 3 milliGRAM(s) Oral at bedtime PRN Insomnia  midazolam Injectable 1 milliGRAM(s) IV Push every 5 minutes PRN anxiety  ondansetron Injectable 4 milliGRAM(s) IV Push every 8 hours PRN Nausea and/or Vomiting  sodium chloride 0.9% lock flush 10 milliLiter(s) IV Push every 1 hour PRN Pre/post blood products, medications, blood draw, and to maintain line patency      VITALS & I/Os:  Vital Signs Last 24 Hrs  T(C): 36.7 (2025 14:11), Max: 37.3 (2025 00:02)  T(F): 98.1 (2025 14:11), Max: 99.1 (2025 00:02)  HR: 92 (2025 14:00) (89 - 109)  BP: 126/78 (2025 14:00) (102/63 - 129/73)  BP(mean): 92 (2025 14:00) (74 - 92)  RR: 20 (2025 14:00) (12 - 20)  SpO2: 98% (2025 14:00) (95% - 100%)    Parameters below as of 2025 13:45  Patient On (Oxygen Delivery Method): nasal cannula  O2 Flow (L/min): 3    CAPILLARY BLOOD GLUCOSE      POCT Blood Glucose.: 90 mg/dL (2025 16:57)      I&O's Summary    2025 07:01  -  2025 07:00  --------------------------------------------------------  IN: 1100 mL / OUT: 0 mL / NET: 1100 mL    2025 07:01  -  2025 15:10  --------------------------------------------------------  IN: 1180 mL / OUT: 0 mL / NET: 1180 mL        GENERAL: Alert, well developed, in no acute distress.  MENTAL STATUS: AAOx3. Appropriate affect.  HEENT: PERRLA. EOMI. MMM.  Trachea midline. No lymph node swelling or tenderness.  RESPIRATORY: CTAB. No wheezing, rales or rhonchi.  CARDIOVASCULAR: RRR. No audible murmurs, rubs or gallops, Internal juglar patent.   GASTROINTESTINAL: Abdomen soft, NT, ND, -R/-G.  No pulsatile mass, no flank tenderness or suprapubic tenderness. No hepatosplenomegaly.  NEUROLOGIC: Cranial nerves II-XII grossly intact. No focal neurological deficits. Moves all extremities spontaneously. Sensation intact bilaterally.  INTEGUMENTARY: No overt rashes or lesions, petechia or purpura. Good turgor. No edema.  MUSCULOSKELETAL: No cyanosis or clubbing. No gross deformities.   LYMPHATIC: Palpation of neck reveals no swelling or tenderness of neck nodes. Palpation of groin reveals no swelling or tenderness of groin nodes.    LABS:                        9.3    9.90  )-----------( 262      ( 2025 06:10 )             27.8     06-17    133[L]  |  98  |  73.4[H]  ----------------------------<  90  4.8   |  16.0[L]  |  7.38[H]    Ca    8.3[L]      2025 06:10  Phos  5.5     06-16  Mg     2.2     06-16    TPro  4.8[L]  /  Alb  2.7[L]  /  TBili  0.4  /  DBili  x   /  AST  39[H]  /  ALT  141[H]  /  AlkPhos  122[H]  -    Lactate: acetaminophen     Tablet .. 650 milliGRAM(s) Oral every 6 hours PRN  aluminum hydroxide/magnesium hydroxide/simethicone Suspension 30 milliLiter(s) Oral every 4 hours PRN  chlorhexidine 2% Cloths 1 Application(s) Topical <User Schedule>  chlorhexidine 2% Cloths 1 Application(s) Topical <User Schedule>  dextrose 5%. 1000 milliLiter(s) IV Continuous <Continuous>  dextrose 50% Injectable 25 Gram(s) IV Push once  dextrose 50% Injectable 12.5 Gram(s) IV Push once  dextrose 50% Injectable 25 Gram(s) IV Push once  dextrose Oral Gel 15 Gram(s) Oral once  escitalopram 20 milliGRAM(s) Oral daily  furosemide   Injectable 80 milliGRAM(s) IV Push once  glucagon  Injectable 1 milliGRAM(s) IntraMuscular once  heparin   Injectable 5000 Unit(s) SubCutaneous every 8 hours  melatonin 3 milliGRAM(s) Oral at bedtime PRN  midazolam Injectable 1 milliGRAM(s) IV Push every 5 minutes PRN  multiple electrolytes Injection Type 1 1000 milliLiter(s) IV Continuous <Continuous>  ondansetron Injectable 4 milliGRAM(s) IV Push every 8 hours PRN  sodium chloride 0.9% lock flush 10 milliLiter(s) IV Push every 1 hour PRN     PT/INR - ( 15 Quique 2025 20:24 )   PT: 11.6 sec;   INR: 1.00 ratio         PTT - ( 15 Quique 2025 20:24 )  PTT:35.0 sec    CARDIAC MARKERS ( 15 Quique 2025 20:24 )  x     / x     / x     / x     / 4.1 ng/mL        Urinalysis Basic - ( 2025 06:10 )    Color: x / Appearance: x / SG: x / pH: x  Gluc: 90 mg/dL / Ketone: x  / Bili: x / Urobili: x   Blood: x / Protein: x / Nitrite: x   Leuk Esterase: x / RBC: x / WBC x   Sq Epi: x / Non Sq Epi: x / Bacteria: x

## 2025-06-17 NOTE — PROGRESS NOTE ADULT - SUBJECTIVE AND OBJECTIVE BOX
[facility] DIVISION OF KIDNEY DISEASES AND HYPERTENSION -- PROGRESS NOTE    SUBJECTIVE:   appears ok. not much urine despite lasix     MEDICATIONS    Standing Inpatient Medications  chlorhexidine 2% Cloths 1 Application(s) Topical <User Schedule>  dextrose 5%. 1000 milliLiter(s) IV Continuous <Continuous>  dextrose 50% Injectable 25 Gram(s) IV Push once  dextrose 50% Injectable 12.5 Gram(s) IV Push once  dextrose 50% Injectable 25 Gram(s) IV Push once  dextrose Oral Gel 15 Gram(s) Oral once  escitalopram 20 milliGRAM(s) Oral daily  furosemide   Injectable 80 milliGRAM(s) IV Push once  glucagon  Injectable 1 milliGRAM(s) IntraMuscular once  heparin   Injectable 5000 Unit(s) SubCutaneous every 8 hours  multiple electrolytes Injection Type 1 1000 milliLiter(s) IV Continuous <Continuous>    PRN Inpatient Medications  acetaminophen     Tablet .. 650 milliGRAM(s) Oral every 6 hours PRN  aluminum hydroxide/magnesium hydroxide/simethicone Suspension 30 milliLiter(s) Oral every 4 hours PRN  melatonin 3 milliGRAM(s) Oral at bedtime PRN  midazolam Injectable 1 milliGRAM(s) IV Push every 5 minutes PRN  ondansetron Injectable 4 milliGRAM(s) IV Push every 8 hours PRN      VITALS/PHYSICAL EXAM  --------------------------------------------------------------------------------  T(C): 37.1 (06-17-25 @ 07:44), Max: 37.3 (06-17-25 @ 00:02)  HR: 91 (06-17-25 @ 13:41) (89 - 109)  BP: 108/64 (06-17-25 @ 13:41) (102/63 - 129/73)  RR: 19 (06-17-25 @ 13:41) (12 - 20)  SpO2: 100% (06-17-25 @ 13:41) (95% - 100%)  Wt(kg): --  Height (cm): 162.6 (06-15-25 @ 18:39)  Weight (kg): 61.2 (06-15-25 @ 18:39)  BMI (kg/m2): 23.1 (06-15-25 @ 18:39)  BSA (m2): 1.66 (06-15-25 @ 18:39)      06-16-25 @ 07:01  -  06-17-25 @ 07:00  --------------------------------------------------------  IN: 1100 mL / OUT: 0 mL / NET: 1100 mL    06-17-25 @ 07:01  -  06-17-25 @ 13:59  --------------------------------------------------------  IN: 860 mL / OUT: 0 mL / NET: 860 mL      Physical Exam:  Alert and oriented x3   HEENT: normal  Pulm: CTA B/L  CV: normal S1S2; no murmur  Abd: soft, non-tender  Extremities- no edema    LABS/STUDIES  --------------------------------------------------------------------------------  133  |  98  |  73.4  ----------------------------<  90      [06-17-25 @ 06:10]  4.8   |  16.0  |  7.38        Ca     8.3     [06-17-25 @ 06:10]      Mg     2.2     [06-16-25 @ 04:30]      Phos  5.5     [06-16-25 @ 04:30]    TPro  4.8  /  Alb  2.7  /  TBili  0.4  /  DBili  x   /  AST  39  /  ALT  141  /  AlkPhos  122  [06-17-25 @ 06:10]    PT/INR: PT 11.6 , INR 1.00       [06-15-25 @ 20:24]  PTT: 35.0       [06-15-25 @ 20:24]    Uric acid 5.8      [06-16-25 @ 04:30]  Serum Osmolality 303      [06-16-25 @ 04:30]    Creatinine Trend:  SCr 7.38 [06-17 @ 06:10]  SCr 5.16 [06-16 @ 04:30]  SCr 5.08 [06-16 @ 00:45]  SCr 5.00 [06-15 @ 21:31]  SCr 5.22 [06-15 @ 20:24]

## 2025-06-17 NOTE — PROCEDURE NOTE - NSPOSTPRCRAD_GEN_A_CORE
atrial-caval junction/central line located in the/no pneumothorax/post-procedure radiography performed

## 2025-06-17 NOTE — PROGRESS NOTE ADULT - ASSESSMENT
61 y/o F w/ PMH of HTN, HLD, prior obesity (currently on mounjaro for 1.5 yrs w/ 80lb wt loss) and anxiety/depression (on lexapro) s/p L-LICHA (6/9/25 at [facility]) presents c/o nausea, NBNB emesis and large vol watery nonbloody diarrhea w/ associated LLQ abd pain that started 3 days ago.  Pt also noticed she stopped making urine Saturday and since arrival at the hospital has not had a BM.  pt has been on naproxen 500mg BID since L-hip surgery 6/9 and is also on losartan.  VS notable for soft SBP in 90's, sinus tachy and RR 20 but not hypoxic or febrile.  Clinically dehydrated appearing w/ mild LLQ tenderness to palpation.  Bernard in place w/ no urine.  Initial w/u significant for WBC 23.12, K 6.5-->4.9, AGMA, BUN 64, Cr 5.22-->5.08 (baseline 0.85), Transaminitis , lipase nL, CT a/p w/ possible mild colitis.  s/p zosyn, albumin, 3L LR and 1L NS, 10g lokelma, 2g Ca gluconate, 4mg IV morphine and 4mg IV zofran in ED. MICU consulted and recommending SDU.  Nephro consulted.  Will admit for acute renal failure.       # Acute renal failure w/ mild rhabdo suspect multifactorial from N/V/D while on NSAIDs and ARB   # Hypovolemic mild hyponatremic hypochloremia likely 2/2 N/V/D  - Pt is on naproxin 500mg bid and ARB outpt; will d/c both at this time given renal function   - BUN 64/Cr 5.22-->62/5.08 w/ last known Cr 0.85 5/2025 s/p 3L LR bolus and 1L NS bolus, 1x dose of albumin then placed on NS maintenance fluids   - Lactate normal and CPK mildly elevated   - Bernard placed in ED on arrival and no urine output noted so far   - CT a/p w/ no  hydronephrosis, hydroureter or nephroureterolithiasis but noted to have acute colitis  - UA w/ microscopy, urine studies, osms, uric acid and repeat CPK and CMP ordered   - Will c/w NS pending ABG to assess pH, CPK and repeat CMP; change back to LR if CPK stable, not alkalotic and K nL;    - Strict I/O's, avoid nephrotoxic meds or renally dose if needed  - c/w prn antiemetics and encourage po intake w/ clears for now   - Nephro consulted   - RIJ TLC placed for HD by vascular    # Sepsis   # Colitis ; infectious vs ischemic  - Sinus tachy, tachypnea and WBC 23K w/ L-shift   - Has mild LLQ tenderness to palpation  - Lactate normal   - Flu/RSV/Covid negative   - UA pending and blood cultures collected   - CT a/p w/ findings compatible with mild acute colitis in the left descending and sigmoid colon   - GI pcr, stool culture, cdiff (-)  - c/w zosyn empirically and c/w IVF maintenance fluids; discontinued PipTaz  - Monitor CBC and temperature      # AGMA likely 2/2 uremia/renal failure   Improving with Hydration  - Repeat CMP in AM     # Hyperkalemia, resolved   - Likely from acute renal failure and ARB   - K 6.5-->4.9 s/p 10g lokelma   - Also received 2g Calcium gluconate   - Hold ARB given renal function   - Monitor on telemetry     # Transaminitis w/ hepatocellular distribution   Ischemic. Improving  CT a/p w/ normal liver, bile ducts nL caliber and s/p cholecystectomy  CMV, EBV to r/u infectious etiology - negative  - Trend LFTs and avoid hepatotoxic medications     # HTN / HLD  - ARB held in light of renal function  - BP soft therefore no need for antihypertensive at this time   - Hold statin for now given LFTs    # Former obesity  - On mounjaro w/ 80lb weight loss and now BMI 23  - Hold mounjaro while inpt       VTE ppx:  Heparin sq    Dispo: Acute. pending renal recovery, HD

## 2025-06-17 NOTE — CONSULT NOTE ADULT - SUBJECTIVE AND OBJECTIVE BOX
[facility]  INFECTIOUS DISEASES at [place] and [place]  =====================================================         Bob Duncan MD                                                        Diplomates American Board of Internal Medicine & Infectious Diseases                * [place] Office - Appt - Tel  [phone] Fax [phone]                * [place] Office - Appt - Tel [phone] Fax [phone]                                  Hospital Consult line:  [phone]  =====================================================      [id number]  CAROLEE GRAHARLIN        CC: Patient is a 60y old  Female who presents with a chief complaint of acute renal failure (2025 16:26)    HPI: 61 y/o F w/ PMH of HTN, HLD, prior obesity (currently on mounjaro for 1.5 yrs w/ 80lb wt loss) and anxiety/depression (on lexapro) s/p L-LICHA (25 at [facility]) presents c/o nausea, NBNB emesis and large vol watery nonbloody diarrhea w/ associated LLQ abd pain that started 3 days ago.  Pt also noticed she stopped making urine Saturday and since arrival at the hospital has not had a BM.  Pt denies sick contacts or recent travel.  She was in the hospital  for elective L-hip replacement and pt has been on naproxen 500mg bid since procedure and is also on losartan.  Pt also denies cp, palpitations, dysuria, fevers, chills, sob, cough.     (2025 03:37)    ______________________________________________________  PAST MEDICAL & SURGICAL HISTORY:  Spinal stenosis    Kidney stones    Colitis, nonspecific    Nephrolithiasis    HTN (hypertension)    HLD (hyperlipidemia)    Anxiety and depression    Menieres disease    Vasovagal syncope    Unilateral primary osteoarthritis, left hip    Enlarged thyroid    Diverticulosis     delivery     S/P cholecystectomy    S/P laminectomy    S/P cystoscopy with ureteral stent placement    History of hand surgery        Social History:    Occupation:  Travel:  Pets:  Drugs:  Alcohol:     FAMILY HISTORY:  Family history of renal failure (Father)    Family history of hypertension (Father, Mother)    Family history of cerebrovascular accident (CVA) in father (Father)    Family history of breast cancer in mother (Mother)    Family history of diabetes mellitus (Sibling)        ______________________________________________________  Allergies    No Known Allergies    Intolerances      ______________________________________________________  REVIEW OF SYSTEMS:  CONSTITUTIONAL:  No fever or chills  HEENT:  No diplopia or blurred vision.  No earache, sore throat or runny nose.  CARDIOVASCULAR:  No chest pain  RESPIRATORY:  No cough, shortness of breath  GASTROINTESTINAL:  No nausea, vomiting, abdominal pain or diarrhea.  GENITOURINARY:  No dysuria, frequency or urgency. No blood in urine  MUSCULOSKELETAL:  no joint aches, no muscle pain  SKIN:  No change in skin, hair or nails.  NEUROLOGIC:  No headaches, seizures  PSYCHIATRIC:  No disorder of thought or mood.  ENDOCRINE:  No heat or cold intolerance  HEMATOLOGICAL:  No easy bruising or bleeding.     _____________________________________________________  PHYSICAL EXAM:  GEN: AAOx4. Lying comfortable in bed, in no acute distress   HEENT: normocephalic and atraumatic. EOMI. PERRL.  Anicteric sclerae. Moist mucous membranes. No mucosal lesions. No nasal discharge.   NECK: Supple. No palpable neck masses or LN  LUNGS: eupneic, CTA B/L, no adventitious sounds  HEART: RRR, no m/r/g  ABDOMEN: Soft, NT, ND, no hepatosplenomegally, no palpable masses.  +BS.    : No CVA tenderness, no Bernard catheter  EXTREMITIES: well perfused, without  edema.  MSK: No joint deformity or swelling  LYMPH: no palpable cervical, supraclavicular, axillary or inguinal lymph nodes  NEUROLOGIC: Grossly no motor focal deficits   PSYCHIATRIC: Appropriate affect and mood.  SKIN: No rash, wounds or jaundice  LINES: PIV, no phlebitis     ______________________________________________________      Vitals:  T(F): 98.7 (2025 07:44), Max: 99.1 (2025 00:02)  HR: 96 (2025 10:00)  BP: 116/75 (2025 10:00)  RR: 18 (2025 10:00)  SpO2: 98% (2025 10:00) (95% - 100%)  temp max in last 48H T(F): , Max: 99.1 (25 @ 00:02)    Current Antibiotics:  piperacillin-tazobactam 3.375 mg IV q12h     Other medications:  chlorhexidine 2% Cloths 1 Application(s) Topical <User Schedule>  dextrose 5%. 1000 milliLiter(s) IV Continuous <Continuous>  dextrose 50% Injectable 25 Gram(s) IV Push once  dextrose 50% Injectable 12.5 Gram(s) IV Push once  dextrose 50% Injectable 25 Gram(s) IV Push once  dextrose Oral Gel 15 Gram(s) Oral once  escitalopram 20 milliGRAM(s) Oral daily  glucagon  Injectable 1 milliGRAM(s) IntraMuscular once  heparin   Injectable 5000 Unit(s) SubCutaneous every 8 hours  multiple electrolytes Injection Type 1 1000 milliLiter(s) IV Continuous <Continuous>                            9.3    9.90  )-----------( 262      ( 2025 06:10 )             27.8     06-    133[L]  |  98  |  73.4[H]  ----------------------------<  90  4.8   |  16.0[L]  |  7.38[H]    Ca    8.3[L]      2025 06:10  Phos  5.5       Mg     2.2         TPro  4.8[L]  /  Alb  2.7[L]  /  TBili  0.4  /  DBili  x   /  AST  39[H]  /  ALT  141[H]  /  AlkPhos  122[H]      RECENT CULTURES:  06-15 @ 22:25 Blood Blood-Peripheral     No growth at 24 hours      WBC Count: 9.90 K/uL (25 @ 06:10)  WBC Count: 13.70 K/uL (25 @ 09:45)  WBC Count: 23.12 K/uL (06-15-25 @ 20:24)    Creatinine: 7.38 mg/dL (25 @ 06:10)  Creatinine: 5.16 mg/dL (25 @ 04:30)  Creatinine: 5.08 mg/dL (25 @ 00:45)  Creatinine: 5.00 mg/dL (06-15-25 @ 21:31)  Creatinine: 5.22 mg/dL (06-15-25 @ 20:24)      SARS-CoV-2 Result: NotDetec (06-15-25 @ 22:25)      ______________________________________________________  CARDIOLOGY    ______________________________________________________  RADIOLOGY  < from: CT Abdomen and Pelvis No Cont (06.15.25 @ 22:14) >  FINDINGS:  LOWER CHEST: Minimal linear dependent atelectatic changes. Normal-sized   heart.    LIVER: Within normal limits.  BILE DUCTS: Normal caliber.  GALLBLADDER: Cholecystectomy.  SPLEEN: Within normal limits. Normal size. Splenule.  PANCREAS: Within normal limits.  ADRENALS: Within normal limits.  KIDNEYS/URETERS: No hydronephrosis, hydroureter or nephroureterolithiasis.    BLADDER: Decompressed with a Bernard catheter in place.  REPRODUCTIVE ORGANS: Uterus and adnexa within normal limits.    BOWEL: Small hiatal hernia. Stomach and duodenum have grossly normal in   appearance. Small bowel is not dilated. No bowel obstruction. Appendix is   normal. No acute appendicitis. Thickening of the sigmoid colonic wall   with several no-inflamed diverticula, no peridiverticular fat stranding   or inflammation to suggest acute diverticulitis. There is decompressed   descending colon with of mild wall thickening and minimal fat stranding   in the mesocolon and mesosigmoid.    PERITONEUM/RETROPERITONEUM: No pneumoperitoneum, ascites or loculated   fluid collections. No abscess.  VESSELS: Within normal limits. Normal caliber of the abdominal aorta.  LYMPH NODES: No lymphadenopathy.  ABDOMINAL WALL: Within normal limits.  BONES: Sacral Tarlov cysts. Anterolisthesis L4 over L5 with spinal fusion   hardware in this segment. Posterior decompression L4. 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    No free perforation.    < end of copied text >   [facility]  INFECTIOUS DISEASES at [place] and [place]  =====================================================         Bob Duncan MD                                                        Diplomates American Board of Internal Medicine & Infectious Diseases                * [place] Office - Appt - Tel  [phone] Fax [phone]                * [place] Office - Appt - Tel [phone] Fax [phone]                                  Hospital Consult line:  [phone]  =====================================================      [id number]  CAROLEE GRAHARLIN        CC: Patient is a 60y old  Female who presents with a chief complaint of acute renal failure (2025 16:26)    HPI: 61 y/o F w/ PMH of HTN, HLD, prior obesity (currently on mounjaro for 1.5 yrs w/ 80lb wt loss) and anxiety/depression (on lexapro) s/p L-LICHA (25 at [facility]) presents c/o nausea, NBNB emesis and large vol watery nonbloody diarrhea w/ associated LLQ abd pain that started 3 days ago.  Pt also noticed she stopped making urine Saturday and since arrival at the hospital has not had a BM.  Pt denies sick contacts or recent travel.  She was in the hospital  for elective L-hip replacement and pt has been on naproxen 500mg bid since procedure and is also on losartan.  Pt also denies cp, palpitations, dysuria, fevers, chills, sob, cough.     (2025 03:37)    ID consulted for colitis    Patient seen and examined. Chart reviewed     ______________________________________________________  PAST MEDICAL & SURGICAL HISTORY:  Spinal stenosis    Kidney stones    Colitis, nonspecific    Nephrolithiasis    HTN (hypertension)    HLD (hyperlipidemia)    Anxiety and depression    Menieres disease    Vasovagal syncope    Unilateral primary osteoarthritis, left hip    Enlarged thyroid    Diverticulosis     delivery     S/P cholecystectomy    S/P laminectomy    S/P cystoscopy with ureteral stent placement    History of hand surgery        Social History:  No tobacco     FAMILY HISTORY:  Family history of renal failure (Father)    Family history of hypertension (Father, Mother)    Family history of cerebrovascular accident (CVA) in father (Father)    Family history of breast cancer in mother (Mother)    Family history of diabetes mellitus (Sibling)        ______________________________________________________  Allergies    No Known Allergies    Intolerances      ______________________________________________________  REVIEW OF SYSTEMS:  CONSTITUTIONAL:  very tired and weak   HEENT:  No diplopia or blurred vision.  No earache, sore throat or runny nose.  CARDIOVASCULAR:  No chest pain  RESPIRATORY:  No cough, shortness of breath  GASTROINTESTINAL: as per HPI   GENITOURINARY:  as per HPI   MUSCULOSKELETAL:  no joint aches, no muscle pain  SKIN:  No change in skin, hair or nails.  NEUROLOGIC:  No headaches, seizures  PSYCHIATRIC:  No disorder of thought or mood.  ENDOCRINE:  No heat or cold intolerance  HEMATOLOGICAL:  No easy bruising or bleeding.     _____________________________________________________  PHYSICAL EXAM:  GEN: AAOx4. Weak but in NAD   HEENT: normocephalic and atraumatic.  Anicteric sclerae. Moist mucous membranes. No mucosal lesions.   NECK: Supple. No palpable neck masses or LN  LUNGS: eupneic, CTA B/L, no adventitious sounds  HEART: RRR, no m/r/g  ABDOMEN: Soft, NT, ND, , no palpable masses.  +BS.    :  Bernard catheter  EXTREMITIES: w without  edema.  MSK: No joint deformity or swelling  NEUROLOGIC: Grossly no motor focal deficits   PSYCHIATRIC: Appropriate affect and mood.  SKIN: No jaundice  LINES: PIV, no phlebitis     ______________________________________________________      Vitals:  T(F): 98.7 (2025 07:44), Max: 99.1 (2025 00:02)  HR: 96 (2025 10:00)  BP: 116/75 (2025 10:00)  RR: 18 (2025 10:00)  SpO2: 98% (2025 10:00) (95% - 100%)  temp max in last 48H T(F): , Max: 99.1 (25 @ 00:02)    Current Antibiotics:  piperacillin-tazobactam 3.375 mg IV q12h     Other medications:  chlorhexidine 2% Cloths 1 Application(s) Topical <User Schedule>  dextrose 5%. 1000 milliLiter(s) IV Continuous <Continuous>  dextrose 50% Injectable 25 Gram(s) IV Push once  dextrose 50% Injectable 12.5 Gram(s) IV Push once  dextrose 50% Injectable 25 Gram(s) IV Push once  dextrose Oral Gel 15 Gram(s) Oral once  escitalopram 20 milliGRAM(s) Oral daily  glucagon  Injectable 1 milliGRAM(s) IntraMuscular once  heparin   Injectable 5000 Unit(s) SubCutaneous every 8 hours  multiple electrolytes Injection Type 1 1000 milliLiter(s) IV Continuous <Continuous>                            9.3    9.90  )-----------( 262      ( 2025 06:10 )             27.8     06-    133[L]  |  98  |  73.4[H]  ----------------------------<  90  4.8   |  16.0[L]  |  7.38[H]    Ca    8.3[L]      2025 06:10  Phos  5.5       Mg     2.2         TPro  4.8[L]  /  Alb  2.7[L]  /  TBili  0.4  /  DBili  x   /  AST  39[H]  /  ALT  141[H]  /  AlkPhos  122[H]      RECENT CULTURES:  06-15 @ 22:25 Blood Blood-Peripheral     No growth at 24 hours      WBC Count: 9.90 K/uL (25 @ 06:10)  WBC Count: 13.70 K/uL (25 @ 09:45)  WBC Count: 23.12 K/uL (06-15-25 @ 20:24)    Creatinine: 7.38 mg/dL (25 @ 06:10)  Creatinine: 5.16 mg/dL (25 @ 04:30)  Creatinine: 5.08 mg/dL (25 @ 00:45)  Creatinine: 5.00 mg/dL (06-15-25 @ 21:31)  Creatinine: 5.22 mg/dL (06-15-25 @ 20:24)      SARS-CoV-2 Result: NotDetec (06-15-25 @ 22:25)      ______________________________________________________  CARDIOLOGY    ______________________________________________________  RADIOLOGY  < from: CT Abdomen and Pelvis No Cont (06.15.25 @ 22:14) >  FINDINGS:  LOWER CHEST: Minimal linear dependent atelectatic changes. Normal-sized   heart.    LIVER: Within normal limits.  BILE DUCTS: Normal caliber.  GALLBLADDER: Cholecystectomy.  SPLEEN: Within normal limits. Normal size. Splenule.  PANCREAS: Within normal limits.  ADRENALS: Within normal limits.  KIDNEYS/URETERS: No hydronephrosis, hydroureter or nephro ureterolithiasis.    BLADDER: Decompressed with a Bernard catheter in place.  REPRODUCTIVE ORGANS: Uterus and adnexa within normal limits.    BOWEL: Small hiatal hernia. Stomach and duodenum have grossly normal in   appearance. Small bowel is not dilated. No bowel obstruction. Appendix is   normal. No acute appendicitis. Thickening of the sigmoid colonic wall   with several no-inflamed diverticula, no peridiverticular fat stranding   or inflammation to suggest acute diverticulitis. There is decompressed   descending colon with of mild wall thickening and minimal fat stranding   in the mesocolon and mesosigmoid.    PERITONEUM/RETROPERITONEUM: No pneumoperitoneum, ascites or loculated   fluid collections. No abscess.  VESSELS: Within normal limits. Normal caliber of the abdominal aorta.  LYMPH NODES: No lymphadenopathy.  ABDOMINAL WALL: Within normal limits.  BONES: Sacral Tarlov cysts. Anterolisthesis L4 over L5 with spinal fusion   hardware in this segment. Posterior decompression L4. 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    No free perforation.    < end of copied text >

## 2025-06-17 NOTE — CONSULT NOTE ADULT - SUBJECTIVE AND OBJECTIVE BOX
Chief Complaint:  Patient is a 60y old  Female who presents with a chief complaint of acute renal failure (2025 10:36)        Patient is a 60y old  Female who presents with a chief complaint of acute renal failure (2025 10:36)      HPI: 61 y/o F with PMHX HTN, HLD, obesity and anxiety/depression (on lexapro) s/p L-LICHA (25 at [facility]) presents complaining of abdominal pain, vomiting and diarrhea. Patient reports her symptoms started 3 days ago. She recently had a hip replacement 25 and was discharged on narcotics, naproxen, senna and miralax. Patient reports she felt diffuse abdominal cramping and had multiple episodes of diarrhea. A syncopal episode was reported and patient was on ground for hours until she was found. Patient continues to report left sided abdominal pain and "gurgling." She is still having watery loose stools. She does not see any blood in her stool. She did have a colonoscopy about 5 years ago that was normal per patient with Dr. Beena Jackson, she is scheduled for a colonoscopy in December. She reports minimal urine output. CT imaging shows thickening of sigmoid colonic wall with several no-inflamed diverticula, no peridiverticular fat stranding or inflammation to suggest acute diverticulitis; decompressed descending colon with mild wall thickening and minimal fat stranding in the mesocolon/sigmoid; findings compatible with mild acute colitis in the left descending and sigmoid colon which may be of inflammatory, infectious or ischemic etiology.         PAST MEDICAL & SURGICAL HISTORY:  Spinal stenosis      Kidney stones  x 1 pt denies surgical intervention " in my 20 s '      Colitis, nonspecific      Nephrolithiasis      HTN (hypertension)      HLD (hyperlipidemia)      Anxiety and depression      Menieres disease      Vasovagal syncope      Unilateral primary osteoarthritis, left hip      Enlarged thyroid      Diverticulosis       delivery NOS        S/P cholecystectomy      S/P laminectomy      S/P cystoscopy with ureteral stent placement      History of hand surgery          REVIEW OF SYSTEMS:   General: Negative  HEENT: Negative  CV: Negative  Respiratory: Negative  GI: See HPI  : Negative  MSK: Negative  Hematologic: Negative  Skin: Negative    MEDICATIONS:   MEDICATIONS  (STANDING):  chlorhexidine 2% Cloths 1 Application(s) Topical <User Schedule>  dextrose 5%. 1000 milliLiter(s) (100 mL/Hr) IV Continuous <Continuous>  dextrose 50% Injectable 25 Gram(s) IV Push once  dextrose 50% Injectable 12.5 Gram(s) IV Push once  dextrose 50% Injectable 25 Gram(s) IV Push once  dextrose Oral Gel 15 Gram(s) Oral once  escitalopram 20 milliGRAM(s) Oral daily  furosemide   Injectable 80 milliGRAM(s) IV Push once  glucagon  Injectable 1 milliGRAM(s) IntraMuscular once  heparin   Injectable 5000 Unit(s) SubCutaneous every 8 hours  multiple electrolytes Injection Type 1 1000 milliLiter(s) (100 mL/Hr) IV Continuous <Continuous>    MEDICATIONS  (PRN):  acetaminophen     Tablet .. 650 milliGRAM(s) Oral every 6 hours PRN Temp greater or equal to 38C (100.4F), Mild Pain (1 - 3)  aluminum hydroxide/magnesium hydroxide/simethicone Suspension 30 milliLiter(s) Oral every 4 hours PRN Dyspepsia  melatonin 3 milliGRAM(s) Oral at bedtime PRN Insomnia  midazolam Injectable 1 milliGRAM(s) IV Push every 5 minutes PRN anxiety  ondansetron Injectable 4 milliGRAM(s) IV Push every 8 hours PRN Nausea and/or Vomiting          DIET:  Diet, Low Fiber (25 @ 10:46) [Active]          ALLERGIES:   Allergies    No Known Allergies    Intolerances        Substance Use:   (  ) never used  (  ) other:  Tobacco Usage:  (   ) never smoked   (   ) former smoker   (   ) current smoker  (     ) pack year  (        ) last cigarette date  Alcohol Usage:    Family History   IBD (  ) Yes   (  ) No  GI Malignancy (  )  Yes    (  ) No    Health Management  Last Colonoscopy:  Last Endoscopy:     VITAL SIGNS:   Vital Signs Last 24 Hrs  T(C): 37.1 (2025 07:44), Max: 37.3 (2025 00:02)  T(F): 98.7 (2025 07:44), Max: 99.1 (2025 00:02)  HR: 96 (2025 10:00) (89 - 109)  BP: 116/75 (2025 10:00) (100/64 - 129/73)  BP(mean): 87 (2025 10:00) (76 - 91)  RR: 18 (2025 10:00) (14 - 20)  SpO2: 98% (2025 10:00) (95% - 100%)    Parameters below as of 2025 08:00  Patient On (Oxygen Delivery Method): room air      I&O's Summary    2025 07:01  -  2025 07:00  --------------------------------------------------------  IN: 1100 mL / OUT: 0 mL / NET: 1100 mL    2025 07:01  -  2025 12:10  --------------------------------------------------------  IN: 760 mL / OUT: 0 mL / NET: 760 mL        PHYSICAL EXAM:   GENERAL:  No acute distress  HEENT:  NC/AT, conjunctiva clear, sclera anicteric  CHEST:  No increased effort  HEART:  Regular rate  ABDOMEN:  Soft, LLQ tender, non-distended, no rebound or guarding  SKIN:  Warm, dry  NEURO:  Calm, cooperative    LABS:                        9.3    9.90  )-----------( 262      ( 2025 06:10 )             27.8     Hemoglobin: 9.3 g/dL (25 @ 06:10)  Hemoglobin: 9.9 g/dL (25 @ 09:45)  Hemoglobin: 13.6 g/dL (06-15-25 @ 20:24)        133[L]  |  98  |  73.4[H]  ----------------------------<  90  4.8   |  16.0[L]  |  7.38[H]    Ca    8.3[L]      2025 06:10  Phos  5.5     -  Mg     2.2     -    TPro  4.8[L]  /  Alb  2.7[L]  /  TBili  0.4  /  DBili  x   /  AST  39[H]  /  ALT  141[H]  /  AlkPhos  122[H]      LIVER FUNCTIONS - ( 2025 06:10 )  Alb: 2.7 g/dL / Pro: 4.8 g/dL / ALK PHOS: 122 U/L / ALT: 141 U/L / AST: 39 U/L / GGT: x             PT/INR - ( 15 Quique 2025 20:24 )   PT: 11.6 sec;   INR: 1.00 ratio         PTT - ( 15 Quique 2025 20:24 )  PTT:35.0 sec    Culture - Blood (collected 15 Quique 2025 22:25)  Source: Blood Blood-Peripheral  Preliminary Report (2025 01:03):    No growth at 24 hours                    RADIOLOGY & ADDITIONAL STUDIES:      ACC: [id number] EXAM:  CT ABDOMEN AND PELVIS   ORDERED BY: SABRINA FIERRO     PROCEDURE DATE:  06/15/2025          INTERPRETATION:  CLINICAL INFORMATION: Abdominal pain, vomiting, renal   failure    COMPARISON: CT abdomen pelvis 2024.    CONTRAST/COMPLICATIONS:  IV Contrast: NONE  Oral Contrast: NONE.    PROCEDURE:  CT of the Abdomen and Pelvis was performed.  Sagittal and coronal reformats were performed.    FINDINGS:  LOWER CHEST: Minimal linear dependent atelectatic changes. Normal-sized   heart.    LIVER: Within normal limits.  BILE DUCTS: Normal caliber.  GALLBLADDER: Cholecystectomy.  SPLEEN: Within normal limits. Normal size. Splenule.  PANCREAS: Within normal limits.  ADRENALS: Within normal limits.  KIDNEYS/URETERS: No hydronephrosis, hydroureter or nephroureterolithiasis.    BLADDER: Decompressed with a Bernard catheter in place.  REPRODUCTIVE ORGANS: Uterus and adnexa within normal limits.    BOWEL: Small hiatal hernia. Stomach and duodenum have grossly normal in   appearance. Small bowel is not dilated. No bowel obstruction. Appendix is   normal. No acute appendicitis. Thickening of the sigmoid colonic wall   with several no-inflamed diverticula, no peridiverticular fat stranding   or inflammation to suggest acute diverticulitis. There is decompressed   descending colon with of mild wall thickening and minimal fat stranding   in the mesocolon and mesosigmoid.    PERITONEUM/RETROPERITONEUM: No pneumoperitoneum, ascites or loculated   fluid collections. No abscess.  VESSELS: Within normal limits. Normal caliber of the abdominal aorta.  LYMPH NODES: No lymphadenopathy.  ABDOMINAL WALL: Within normal limits.  BONES: Sacral Tarlov cysts. Anterolisthesis L4 over L5 with spinal fusion   hardware in this segment. Posterior decompression L4. 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    No free perforation.        --- End of Report ---            CONCETTA LEWIS MD; Attending Radiologist  This document has been electronically signed. Quique 15 2025 11:19PM  06-15-25 @ 22:14

## 2025-06-18 LAB
ALBUMIN SERPL ELPH-MCNC: 2.6 G/DL — LOW (ref 3.3–5.2)
ALP SERPL-CCNC: 135 U/L — HIGH (ref 40–120)
ALT FLD-CCNC: 98 U/L — HIGH
ANION GAP SERPL CALC-SCNC: 16 MMOL/L — SIGNIFICANT CHANGE UP (ref 5–17)
AST SERPL-CCNC: 30 U/L — SIGNIFICANT CHANGE UP
BASOPHILS # BLD AUTO: 0.05 K/UL — SIGNIFICANT CHANGE UP (ref 0–0.2)
BASOPHILS NFR BLD AUTO: 0.6 % — SIGNIFICANT CHANGE UP (ref 0–2)
BILIRUB SERPL-MCNC: 0.4 MG/DL — SIGNIFICANT CHANGE UP (ref 0.4–2)
BUN SERPL-MCNC: 44.5 MG/DL — HIGH (ref 8–20)
CALCIUM SERPL-MCNC: 8.2 MG/DL — LOW (ref 8.4–10.5)
CHLORIDE SERPL-SCNC: 97 MMOL/L — SIGNIFICANT CHANGE UP (ref 96–108)
CO2 SERPL-SCNC: 22 MMOL/L — SIGNIFICANT CHANGE UP (ref 22–29)
CREAT SERPL-MCNC: 6.29 MG/DL — HIGH (ref 0.5–1.3)
CULTURE RESULTS: SIGNIFICANT CHANGE UP
EGFR: 7 ML/MIN/1.73M2 — LOW
EGFR: 7 ML/MIN/1.73M2 — LOW
EOSINOPHIL # BLD AUTO: 0.24 K/UL — SIGNIFICANT CHANGE UP (ref 0–0.5)
EOSINOPHIL NFR BLD AUTO: 2.8 % — SIGNIFICANT CHANGE UP (ref 0–6)
GLUCOSE SERPL-MCNC: 98 MG/DL — SIGNIFICANT CHANGE UP (ref 70–99)
HBV SURFACE AB SER-ACNC: 5.2 MIU/ML — LOW
HCT VFR BLD CALC: 29.2 % — LOW (ref 34.5–45)
HGB BLD-MCNC: 9.9 G/DL — LOW (ref 11.5–15.5)
IMM GRANULOCYTES # BLD AUTO: 0.04 K/UL — SIGNIFICANT CHANGE UP (ref 0–0.07)
IMM GRANULOCYTES NFR BLD AUTO: 0.5 % — SIGNIFICANT CHANGE UP (ref 0–0.9)
LYMPHOCYTES # BLD AUTO: 1.5 K/UL — SIGNIFICANT CHANGE UP (ref 1–3.3)
LYMPHOCYTES NFR BLD AUTO: 17.5 % — SIGNIFICANT CHANGE UP (ref 13–44)
MAGNESIUM SERPL-MCNC: 2.4 MG/DL — SIGNIFICANT CHANGE UP (ref 1.6–2.6)
MCHC RBC-ENTMCNC: 28.7 PG — SIGNIFICANT CHANGE UP (ref 27–34)
MCHC RBC-ENTMCNC: 33.9 G/DL — SIGNIFICANT CHANGE UP (ref 32–36)
MCV RBC AUTO: 84.6 FL — SIGNIFICANT CHANGE UP (ref 80–100)
MONOCYTES # BLD AUTO: 0.71 K/UL — SIGNIFICANT CHANGE UP (ref 0–0.9)
MONOCYTES NFR BLD AUTO: 8.3 % — SIGNIFICANT CHANGE UP (ref 2–14)
NEUTROPHILS # BLD AUTO: 6.01 K/UL — SIGNIFICANT CHANGE UP (ref 1.8–7.4)
NEUTROPHILS NFR BLD AUTO: 70.3 % — SIGNIFICANT CHANGE UP (ref 43–77)
NRBC # BLD AUTO: 0 K/UL — SIGNIFICANT CHANGE UP (ref 0–0)
NRBC # FLD: 0 K/UL — SIGNIFICANT CHANGE UP (ref 0–0)
NRBC BLD AUTO-RTO: 0 /100 WBCS — SIGNIFICANT CHANGE UP (ref 0–0)
PHOSPHATE SERPL-MCNC: 4.7 MG/DL — SIGNIFICANT CHANGE UP (ref 2.4–4.7)
PLATELET # BLD AUTO: 247 K/UL — SIGNIFICANT CHANGE UP (ref 150–400)
PMV BLD: 9 FL — SIGNIFICANT CHANGE UP (ref 7–13)
POTASSIUM SERPL-MCNC: 4 MMOL/L — SIGNIFICANT CHANGE UP (ref 3.5–5.3)
POTASSIUM SERPL-SCNC: 4 MMOL/L — SIGNIFICANT CHANGE UP (ref 3.5–5.3)
PROT SERPL-MCNC: 5.1 G/DL — LOW (ref 6.6–8.7)
RBC # BLD: 3.45 M/UL — LOW (ref 3.8–5.2)
RBC # FLD: 12.4 % — SIGNIFICANT CHANGE UP (ref 10.3–14.5)
SODIUM SERPL-SCNC: 135 MMOL/L — SIGNIFICANT CHANGE UP (ref 135–145)
SPECIMEN SOURCE: SIGNIFICANT CHANGE UP
WBC # BLD: 8.55 K/UL — SIGNIFICANT CHANGE UP (ref 3.8–10.5)
WBC # FLD AUTO: 8.55 K/UL — SIGNIFICANT CHANGE UP (ref 3.8–10.5)

## 2025-06-18 PROCEDURE — 99232 SBSQ HOSP IP/OBS MODERATE 35: CPT

## 2025-06-18 PROCEDURE — G0545: CPT

## 2025-06-18 PROCEDURE — 99233 SBSQ HOSP IP/OBS HIGH 50: CPT

## 2025-06-18 PROCEDURE — 90935 HEMODIALYSIS ONE EVALUATION: CPT

## 2025-06-18 RX ORDER — ACETAMINOPHEN 500 MG/5ML
975 LIQUID (ML) ORAL EVERY 8 HOURS
Refills: 0 | Status: DISCONTINUED | OUTPATIENT
Start: 2025-06-18 | End: 2025-06-30

## 2025-06-18 RX ORDER — SIMETHICONE 80 MG
80 TABLET,CHEWABLE ORAL ONCE
Refills: 0 | Status: COMPLETED | OUTPATIENT
Start: 2025-06-18 | End: 2025-06-18

## 2025-06-18 RX ORDER — HYDROMORPHONE/SOD CHLOR,ISO/PF 2 MG/10 ML
0.5 SYRINGE (ML) INJECTION EVERY 4 HOURS
Refills: 0 | Status: DISCONTINUED | OUTPATIENT
Start: 2025-06-18 | End: 2025-06-25

## 2025-06-18 RX ORDER — SODIUM CHLORIDE 9 G/1000ML
1000 INJECTION, SOLUTION INTRAVENOUS
Refills: 0 | Status: DISCONTINUED | OUTPATIENT
Start: 2025-06-18 | End: 2025-06-23

## 2025-06-18 RX ORDER — HYDROMORPHONE/SOD CHLOR,ISO/PF 2 MG/10 ML
2 SYRINGE (ML) INJECTION EVERY 4 HOURS
Refills: 0 | Status: DISCONTINUED | OUTPATIENT
Start: 2025-06-18 | End: 2025-06-25

## 2025-06-18 RX ADMIN — Medication 30 MILLILITER(S): at 23:02

## 2025-06-18 RX ADMIN — Medication 975 MILLIGRAM(S): at 23:02

## 2025-06-18 RX ADMIN — Medication 0.5 MILLIGRAM(S): at 18:07

## 2025-06-18 RX ADMIN — Medication 650 MILLIGRAM(S): at 03:18

## 2025-06-18 RX ADMIN — HEPARIN SODIUM 5000 UNIT(S): 1000 INJECTION INTRAVENOUS; SUBCUTANEOUS at 14:49

## 2025-06-18 RX ADMIN — HEPARIN SODIUM 5000 UNIT(S): 1000 INJECTION INTRAVENOUS; SUBCUTANEOUS at 05:05

## 2025-06-18 RX ADMIN — HEPARIN SODIUM 5000 UNIT(S): 1000 INJECTION INTRAVENOUS; SUBCUTANEOUS at 21:06

## 2025-06-18 RX ADMIN — Medication 0.5 MILLIGRAM(S): at 05:05

## 2025-06-18 RX ADMIN — Medication 650 MILLIGRAM(S): at 11:22

## 2025-06-18 RX ADMIN — Medication 650 MILLIGRAM(S): at 12:22

## 2025-06-18 RX ADMIN — Medication 4 MILLIGRAM(S): at 12:37

## 2025-06-18 RX ADMIN — Medication 1 APPLICATION(S): at 05:06

## 2025-06-18 RX ADMIN — SODIUM CHLORIDE 100 MILLILITER(S): 9 INJECTION, SOLUTION INTRAVENOUS at 11:23

## 2025-06-18 RX ADMIN — Medication 650 MILLIGRAM(S): at 02:14

## 2025-06-18 RX ADMIN — Medication 80 MILLIGRAM(S): at 20:33

## 2025-06-18 RX ADMIN — ESCITALOPRAM OXALATE 20 MILLIGRAM(S): 20 TABLET ORAL at 11:20

## 2025-06-18 RX ADMIN — Medication 0.5 MILLIGRAM(S): at 06:00

## 2025-06-18 NOTE — PROGRESS NOTE ADULT - ASSESSMENT
61 y/o F w/ PMH of HTN, HLD, prior obesity (currently on mounjaro for 1.5 yrs w/ 80lb wt loss) and anxiety/depression (on lexapro) s/p L-LICHA (6/9/25 at [facility]) presents c/o nausea, NBNB emesis and large vol watery nonbloody diarrhea w/ associated LLQ abd pain that started 3 days ago.  Pt also noticed she stopped making urine Saturday and since arrival at the hospital has not had a BM.  pt has been on naproxen 500mg BID since L-hip surgery 6/9 and is also on losartan.  VS notable for soft SBP in 90's, sinus tachy and RR 20 but not hypoxic or febrile.  Clinically dehydrated appearing w/ mild LLQ tenderness to palpation.  Bernard in place w/ no urine.  Initial w/u significant for WBC 23.12, K 6.5-->4.9, AGMA, BUN 64, Cr 5.22-->5.08 (baseline 0.85), Transaminitis , lipase nL, CT a/p w/ possible mild colitis.  s/p zosyn, albumin, 3L LR and 1L NS, 10g lokelma, 2g Ca gluconate, 4mg IV morphine and 4mg IV zofran in ED. MICU consulted and recommending SDU.  Nephro consulted.  Will admit for acute renal failure.       # Acute renal failure w/ mild rhabdo suspect multifactorial from N/V/D while on NSAIDs and ARB   # Hypovolemic mild hyponatremic hypochloremia likely 2/2 N/V/D  - Pt is on Naproxen 500mg bid and ARB outpatientt; will d/c both at this time given renal function   - BUN 64/Cr 5.22-->62/5.08 w/ last known Cr 0.85 5/2025 s/p 3L LR bolus and 1L NS bolus, 1x dose of albumin then placed on NS maintenance fluids   - Lactate normal and CPK mildly elevated   - Bernard placed in ED on arrival and no urine output noted so far   - CT a/p w/ no  hydronephrosis, hydroureter or nephroureterolithiasis but noted to have acute colitis  - Strict I/O's, avoid nephrotoxic meds or renally dose if needed  - c/w prn antiemetics and encourage po intake w/ clears for now   - Nephro consulted   - RIJ TLC placed for HD by vascular; HD initiated  - monitor renal function and HD per nephrology    # Sepsis   # Colitis ; infectious vs ischemic  - Sinus tachy, tachypnea and WBC 23K w/ L-shift   - Has mild LLQ tenderness to palpation  - Lactate normal   - Flu/RSV/Covid negative   - UA pending and blood cultures collected   - CT a/p w/ findings compatible with mild acute colitis in the left descending and sigmoid colon   - GI pcr (-), cdiff (-)  -  zosyn empirically and IVF maintenance fluids; discontinued PipTaz  - Monitor CBC and temperature      # AGMA likely 2/2 uremia/renal failure   Improving with Hydration  - Repeat CMP in AM     # Hyperkalemia, resolved   - Likely from acute renal failure and ARB   - K 6.5-->4.9 s/p 10g lokelma   - Also received 2g Calcium gluconate   - Hold ARB given renal function   - Monitor on telemetry     # Transaminitis w/ hepatocellular distribution   Ischemic. Improving daily  CT a/p w/ normal liver, bile ducts nL caliber and s/p cholecystectomy  CMV, EBV to r/u infectious etiology - negative  - Trend LFTs and avoid hepatotoxic medications     # HTN / HLD  - ARB held in light of renal function  - BP soft therefore no need for antihypertensive at this time   - Hold statin for now given LFTs    # Former obesity  - On mounjaro w/ 80lb weight loss and now BMI 23  - Hold mounjaro while inpt       VTE ppx:  Heparin sq    Dispositon: Acute. Pending renal recovery, HD

## 2025-06-18 NOTE — PROGRESS NOTE ADULT - SUBJECTIVE AND OBJECTIVE BOX
[facility] DIVISION OF KIDNEY DISEASES AND HYPERTENSION -- DIALYSIS NOTE    Patient seen and examined during dialysis  Patient tolerating the procedure well.     VITALS  --------------------------------------------------------------------------------  T(C): 36.7 (06-18-25 @ 15:00), Max: 37.1 (06-17-25 @ 22:25)  HR: 81 (06-18-25 @ 16:00) (81 - 103)  BP: 129/83 (06-18-25 @ 16:00) (98/54 - 139/80)  RR: 17 (06-18-25 @ 16:00) (12 - 34)  SpO2: 100% (06-18-25 @ 16:00) (95% - 100%)  Wt(kg): --     Will continue the current medical management. Next hemodialysis as scheduled.       appears much better  no UF today  hopeful for recovery  continue IVF

## 2025-06-18 NOTE — DIETITIAN INITIAL EVALUATION ADULT - ADD RECOMMEND
- Monitor weights for trend/accuracy  - Continue diet as tolerated.  - Rx MVI daily, vit C 500mg   - Provide encouragement/assistance as needed during mealtimes to inc PO

## 2025-06-18 NOTE — DIETITIAN INITIAL EVALUATION ADULT - PERTINENT LABORATORY DATA
06-18 Na135 mmol/L Glu 98 mg/dL K+ 4.0 mmol/L Cr  6.29 mg/dL[H] BUN 44.5 mg/dL[H] Phos 4.7 mg/dL Alb 2.6 g/dL[L]  A1C with Estimated Average Glucose Result: 5.3 % (05-21-25 @ 16:38)

## 2025-06-18 NOTE — DIETITIAN INITIAL EVALUATION ADULT - OTHER INFO
59 y/o F w/ PMH of HTN, HLD, prior obesity (currently on mounjaro for 1.5 yrs w/ 80lb wt loss) presents c/o nausea, NBNB emesis and large vol watery nonbloody diarrhea w/ associated LLQ abd pain that started 3 days ago. Clinically dehydrated appearing w/ mild LLQ tenderness to palpation. CT a/p w/ possible mild colitis. admitted for renal failure.   # Acute renal failure w/ mild rhabdo suspect multifactorial from N/V/D while on NSAIDs and ARB   # Hypovolemic mild hyponatremic hypochloremia likely 2/2 N/V/D

## 2025-06-18 NOTE — DIETITIAN INITIAL EVALUATION ADULT - ORAL INTAKE PTA/DIET HISTORY
spoke with pt this AM. Diet advanced on 6/17 to low fiber. Was previously on clear liquids. Continues with poor po intake at meals. Endorses that every time she takes a bite of food she feels abdominal pain followed by diarrhea. Endorses feeling the same on clear liquids, however slightly better now. otherwise, good po intake PTA. UBW: 135#. Reported intentional 80# weight loss x 2 years on mounjaro. No overt s/s of muscle/fat wasting. Pt continues with diarrhea. Agreeable to try ensure plus HP BID for additional protein/calories. Ensure clear at bedside, pt encouraged to drink at time of visit. RD to remain available.   spoke with pt this AM. Diet advanced on 6/17 to low fiber. Was previously on clear liquids. Continues with poor po intake at meals. Endorses that every time she takes a bite of food she feels abdominal pain followed by diarrhea. Endorses feeling the same on clear liquids, however slightly better now. otherwise, good po intake PTA. UBW: 135#. Reported intentional 80# weight loss x 2 years on mounjaro. No overt s/s of muscle/fat wasting. Pt continues with diarrhea. Agreeable to try ensure plus HP BID for additional protein/calories. Ensure clear at bedside, pt encouraged to drink at time of visit. Will monitor malnutrition parameters closely. RD to remain available.

## 2025-06-18 NOTE — DIETITIAN INITIAL EVALUATION ADULT - NUTRITIONGOAL OUTCOME1
Pt will meet tolerate diet to meet >75% of estimated energy needs via tolerated route to improve nutrition status

## 2025-06-18 NOTE — PROGRESS NOTE ADULT - SUBJECTIVE AND OBJECTIVE BOX
[facility]  INFECTIOUS DISEASES at [place] and [place]  ===============================================================                  Bob Duncan MD               Diplomates American Board of Internal Medicine & Infectious Diseases                * [place] Office - Appt - Tel  [phone] Fax [phone]                * [place] Office - Appt - Tel [phone] Fax [phone]                                  Hospital Consult line:  [phone]  ==============================================================    JUVE CAROLEE [id number]    Follow up: diarrhea/vomiting, NAVI     Started on HD via RYLAND Oscar   no fever recorded   BP stable   urine output improving - 600cc   Seen during HD     I have personally reviewed the labs and data; pertinent labs and data are listed in this note; please see below.     _______________________________________________________________  REVIEW OF SYSTEMS  Overall feeling much better. Had some loose BMs last night and a couple this morning with some blood (never noticed it before).   Tolerated lunch (low fiber) without vomiting or worsening diarrhea   Tolerating HD   ________________________________________________________________  Allergies:  No Known Allergies    ________________________________________________________________  PHYSICAL EXAM  GEN: in NAD   HEENT: Anicteric sclerae. Moist mucous membranes.   NECK: Shiley   LUNGS: eupneic.   ABDOMEN: Soft, NT, ND,no HSM.  +BS.    : Bernard catheter with clear urine   PSYCHIATRIC: Appropriate affect and mood  SKIN: No rash  LINES: PIV, RIJ Shiley    ________________________________________________________________  Vitals:  T(F): 98.1 (18 Jun 2025 15:00), Max: 98.7 (17 Jun 2025 22:25)  HR: 87 (18 Jun 2025 15:00)  BP: 126/74 (18 Jun 2025 15:00)  RR: 18 (18 Jun 2025 15:00)  SpO2: 100% (18 Jun 2025 15:00) (95% - 100%)  temp max in last 48H T(F): , Max: 99.1 (06-17-25 @ 00:02)    Current Antibiotics:    Other medications:  chlorhexidine 2% Cloths 1 Application(s) Topical <User Schedule>  chlorhexidine 2% Cloths 1 Application(s) Topical <User Schedule>  dextrose 5%. 1000 milliLiter(s) IV Continuous <Continuous>  dextrose 50% Injectable 25 Gram(s) IV Push once  dextrose 50% Injectable 12.5 Gram(s) IV Push once  dextrose 50% Injectable 25 Gram(s) IV Push once  dextrose Oral Gel 15 Gram(s) Oral once  escitalopram 20 milliGRAM(s) Oral daily  glucagon  Injectable 1 milliGRAM(s) IntraMuscular once  heparin   Injectable 5000 Unit(s) SubCutaneous every 8 hours  multiple electrolytes Injection Type 1 1000 milliLiter(s) IV Continuous <Continuous>                            9.9    8.55  )-----------( 247      ( 18 Jun 2025 09:06 )             29.2     06-18    135  |  97  |  44.5[H]  ----------------------------<  98  4.0   |  22.0  |  6.29[H]    Ca    8.2[L]      18 Jun 2025 09:06  Phos  4.7     06-18  Mg     2.4     06-18    TPro  5.1[L]  /  Alb  2.6[L]  /  TBili  0.4  /  DBili  x   /  AST  30  /  ALT  98[H]  /  AlkPhos  135[H]  06-18    RECENT CULTURES:  06-16 @ 20:20 Stool Feces     No Protozoa seen by trichrome stain  No Helminths or Protozoa seen in formalin concentrate  performed by iodine stain  (routine O+P not evaluated for Microsporidia,  Cryptosporidia or Cyclospora)  One negative sample does not necessarily rule  out the presence of a parasitic infection.        06-15 @ 22:25 Blood Blood-Peripheral     No growth at 48 Hours      WBC Count: 8.55 K/uL (06-18-25 @ 09:06)  WBC Count: 9.90 K/uL (06-17-25 @ 06:10)  WBC Count: 13.70 K/uL (06-16-25 @ 09:45)  WBC Count: 23.12 K/uL (06-15-25 @ 20:24)    Creatinine: 6.29 mg/dL (06-18-25 @ 09:06)  Creatinine: 7.38 mg/dL (06-17-25 @ 06:10)  Creatinine: 5.16 mg/dL (06-16-25 @ 04:30)  Creatinine: 5.08 mg/dL (06-16-25 @ 00:45)  Creatinine: 5.00 mg/dL (06-15-25 @ 21:31)  Creatinine: 5.22 mg/dL (06-15-25 @ 20:24)      SARS-CoV-2 Result: NotDetec (06-15-25 @ 22:25)    ________________________________________________________________  CARDIOLOGY   ________________________________________________________________  RADIOLOGY  < from: CT Abdomen and Pelvis No Cont (06.15.25 @ 22:14) >  FINDINGS:  LOWER CHEST: Minimal linear dependent atelectatic changes. Normal-sized   heart.    LIVER: Within normal limits.  BILE DUCTS: Normal caliber.  GALLBLADDER: Cholecystectomy.  SPLEEN: Within normal limits. Normal size. Splenule.  PANCREAS: Within normal limits.  ADRENALS: Within normal limits.  KIDNEYS/URETERS: No hydronephrosis, hydroureter or nephroureterolithiasis.    BLADDER: Decompressed with a Bernard catheter in place.  REPRODUCTIVE ORGANS: Uterus and adnexa within normal limits.    BOWEL: Small hiatal hernia. Stomach and duodenum have grossly normal in   appearance. Small bowel is not dilated. No bowel obstruction. Appendix is   normal. No acute appendicitis. Thickening of the sigmoid colonic wall   with several no-inflamed diverticula, no peridiverticular fat stranding   or inflammation to suggest acute diverticulitis. There is decompressed   descending colon with of mild wall thickening and minimal fat stranding   in the mesocolon and mesosigmoid.    PERITONEUM/RETROPERITONEUM: No pneumoperitoneum, ascites or loculated   fluid collections. No abscess.  VESSELS: Within normal limits. Normal caliber of the abdominal aorta.  LYMPH NODES: No lymphadenopathy.  ABDOMINAL WALL: Within normal limits.  BONES: Sacral Tarlov cysts. Anterolisthesis L4 over L5 with spinal fusion   hardware in this segment. Posterior decompression L4. 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    No free perforation.    < end of copied text >

## 2025-06-18 NOTE — PROGRESS NOTE ADULT - SUBJECTIVE AND OBJECTIVE BOX
HOSPITALIST PROGRESS NOTE    CAROLEE AN  [id number]  60yFemale    Patient is a 60y old  Female who presents with a chief complaint of acute renal failure (18 Jun 2025 09:56)      SUBJECTIVE:   Chart reviewed since last visit.   Patient seen and examined at bedside for NAVI, diarrhea, colitis  HD initiated yesterday - denies any dyspnea.  Continues to have diarrhea without any nausea or vomiting - some blood noted      OBJECTIVE:  Vital Signs Last 24 Hrs  T(C): 36.7 (18 Jun 2025 15:00), Max: 37.1 (17 Jun 2025 22:25)  T(F): 98.1 (18 Jun 2025 15:00), Max: 98.7 (17 Jun 2025 22:25)  HR: 81 (18 Jun 2025 16:00) (81 - 103)  BP: 129/83 (18 Jun 2025 16:00) (98/54 - 139/80)  BP(mean): 89 (18 Jun 2025 16:00) (68 - 97)  RR: 17 (18 Jun 2025 16:00) (12 - 34)  SpO2: 100% (18 Jun 2025 16:00) (95% - 100%)    Parameters below as of 18 Jun 2025 16:00  Patient On (Oxygen Delivery Method): room air      PHYSICAL EXAMINATION  General: Middle aged female sitting up in bed, comfortable  HEENT:  Anicteric sclera  NECK:  RIJ HD catheter  CVS: regular rate and rhythm S1 S2  RESP:  Fair air entry bilaterally  GI:  Soft nondistended nontender BS+  : No suprapubic tenderness  MSK:  No edema. Moves all extremities  CNS:  Awake, alert, oriented. Fluent speech and follows commands  INTEG:  Warm Skin  PSYCH:  Fair    MONITOR:  CAPILLARY BLOOD GLUCOSE            I&O's Summary    17 Jun 2025 07:01  -  18 Jun 2025 07:00  --------------------------------------------------------  IN: 3120 mL / OUT: 600 mL / NET: 2520 mL    18 Jun 2025 07:01  -  18 Jun 2025 17:22  --------------------------------------------------------  IN: 1230 mL / OUT: 445 mL / NET: 785 mL                            9.9    8.55  )-----------( 247      ( 18 Jun 2025 09:06 )             29.2       06-18    135  |  97  |  44.5[H]  ----------------------------<  98  4.0   |  22.0  |  6.29[H]    Ca    8.2[L]      18 Jun 2025 09:06  Phos  4.7     06-18  Mg     2.4     06-18    TPro  5.1[L]  /  Alb  2.6[L]  /  TBili  0.4  /  DBili  x   /  AST  30  /  ALT  98[H]  /  AlkPhos  135[H]  06-18        Urinalysis Basic - ( 18 Jun 2025 09:06 )    Color: x / Appearance: x / SG: x / pH: x  Gluc: 98 mg/dL / Ketone: x  / Bili: x / Urobili: x   Blood: x / Protein: x / Nitrite: x   Leuk Esterase: x / RBC: x / WBC x   Sq Epi: x / Non Sq Epi: x / Bacteria: x            TTE:    RADIOLOGY        MEDICATIONS  (STANDING):  chlorhexidine 2% Cloths 1 Application(s) Topical <User Schedule>  chlorhexidine 2% Cloths 1 Application(s) Topical <User Schedule>  dextrose 5%. 1000 milliLiter(s) (100 mL/Hr) IV Continuous <Continuous>  dextrose 50% Injectable 25 Gram(s) IV Push once  dextrose 50% Injectable 12.5 Gram(s) IV Push once  dextrose 50% Injectable 25 Gram(s) IV Push once  dextrose Oral Gel 15 Gram(s) Oral once  escitalopram 20 milliGRAM(s) Oral daily  glucagon  Injectable 1 milliGRAM(s) IntraMuscular once  heparin   Injectable 5000 Unit(s) SubCutaneous every 8 hours  multiple electrolytes Injection Type 1 1000 milliLiter(s) (100 mL/Hr) IV Continuous <Continuous>      MEDICATIONS  (PRN):  acetaminophen     Tablet .. 650 milliGRAM(s) Oral every 6 hours PRN Temp greater or equal to 38C (100.4F), Mild Pain (1 - 3)  acetaminophen     Tablet .. 975 milliGRAM(s) Oral every 8 hours PRN Moderate Pain (4 - 6)  aluminum hydroxide/magnesium hydroxide/simethicone Suspension 30 milliLiter(s) Oral every 4 hours PRN Dyspepsia  HYDROmorphone   Tablet 2 milliGRAM(s) Oral every 4 hours PRN Severe Pain (7 - 10)  HYDROmorphone  Injectable 0.5 milliGRAM(s) IV Push every 4 hours PRN breakthrough pain  melatonin 3 milliGRAM(s) Oral at bedtime PRN Insomnia  ondansetron Injectable 4 milliGRAM(s) IV Push every 8 hours PRN Nausea and/or Vomiting  sodium chloride 0.9% lock flush 10 milliLiter(s) IV Push every 1 hour PRN Pre/post blood products, medications, blood draw, and to maintain line patency

## 2025-06-18 NOTE — PROGRESS NOTE ADULT - ASSESSMENT
60F with HTN, HLD, s/p L-LICHA on 6/9 presented to the ED on 6/16 with constant vomiting, large volume diarrhea, unable to tolerate oral intake, abd pain/cramping that started on Sat 6/14.      ID consulted for colitis     Constipated post-op, was taking ASA, naproxen, Senna and Miralax   Then, patient with massive vomiting/diarrhea leading to NAVI/ATN starting on Sat 6/14  Attended a resort with buffet dining prior to her surgery (about one week prior to onset of symptoms). Family without any symptoms.   Has been on Mounjaro for 2 years - no GI side effects     C. diff negative   GI PCR negative   Stool O&P - negative x 1   Leukocytosis on admission probably reactive, now resolved. Normal diff   CT AP with findings compatible with mild colitis in the left descending and sigmoid colon (infectious vs inflammatory vs ischemic)  LFTs improving (shock liver?)  Started on HD on 6/17 via RYLAND Oscar     Continue to hold antibiotics unless change in clinical condition   supportive care     d/w NP

## 2025-06-18 NOTE — DIETITIAN INITIAL EVALUATION ADULT - ETIOLOGY
related to inability to meet sufficient protein-energy needs in setting of  related to inability to meet sufficient protein-energy needs in setting of Acute renal failure w/ mild rhabdo suspect multifactorial from N/V/D

## 2025-06-18 NOTE — DIETITIAN INITIAL EVALUATION ADULT - ORAL NUTRITION SUPPLEMENTS
recommend ensure plus BID to optimize po intake and provide an additional 350 kcal, 20g protein per serving

## 2025-06-18 NOTE — CHART NOTE - NSCHARTNOTEFT_GEN_A_CORE
Patient seen and examined after RIJ SHILEY Placement on 6/17. Received HD Yesterday successfully.   no overnight events. HD stable           No further vascular intervention   continue with HD   recommend IR consult for PC placement if long term HD indicated   continue care per primary team

## 2025-06-19 ENCOUNTER — NON-APPOINTMENT (OUTPATIENT)
Age: 60
End: 2025-06-19

## 2025-06-19 LAB
ALBUMIN SERPL ELPH-MCNC: 2.3 G/DL — LOW (ref 3.3–5.2)
ALP SERPL-CCNC: 151 U/L — HIGH (ref 40–120)
ALT FLD-CCNC: 87 U/L — HIGH
ANION GAP SERPL CALC-SCNC: 14 MMOL/L — SIGNIFICANT CHANGE UP (ref 5–17)
AST SERPL-CCNC: 60 U/L — HIGH
BILIRUB SERPL-MCNC: 0.4 MG/DL — SIGNIFICANT CHANGE UP (ref 0.4–2)
BUN SERPL-MCNC: 26.4 MG/DL — HIGH (ref 8–20)
CALCIUM SERPL-MCNC: 7.8 MG/DL — LOW (ref 8.4–10.5)
CHLORIDE SERPL-SCNC: 100 MMOL/L — SIGNIFICANT CHANGE UP (ref 96–108)
CO2 SERPL-SCNC: 25 MMOL/L — SIGNIFICANT CHANGE UP (ref 22–29)
CREAT SERPL-MCNC: 5.2 MG/DL — HIGH (ref 0.5–1.3)
CULTURE RESULTS: SIGNIFICANT CHANGE UP
EGFR: 9 ML/MIN/1.73M2 — LOW
EGFR: 9 ML/MIN/1.73M2 — LOW
GLUCOSE SERPL-MCNC: 94 MG/DL — SIGNIFICANT CHANGE UP (ref 70–99)
HCT VFR BLD CALC: 25.8 % — LOW (ref 34.5–45)
HCT VFR BLD CALC: 26.1 % — LOW (ref 34.5–45)
HGB BLD-MCNC: 8.7 G/DL — LOW (ref 11.5–15.5)
HGB BLD-MCNC: 8.8 G/DL — LOW (ref 11.5–15.5)
MCHC RBC-ENTMCNC: 28.5 PG — SIGNIFICANT CHANGE UP (ref 27–34)
MCHC RBC-ENTMCNC: 33.7 G/DL — SIGNIFICANT CHANGE UP (ref 32–36)
MCV RBC AUTO: 84.6 FL — SIGNIFICANT CHANGE UP (ref 80–100)
NRBC # BLD AUTO: 0 K/UL — SIGNIFICANT CHANGE UP (ref 0–0)
NRBC # FLD: 0 K/UL — SIGNIFICANT CHANGE UP (ref 0–0)
NRBC BLD AUTO-RTO: 0 /100 WBCS — SIGNIFICANT CHANGE UP (ref 0–0)
PLATELET # BLD AUTO: 233 K/UL — SIGNIFICANT CHANGE UP (ref 150–400)
PMV BLD: 9.5 FL — SIGNIFICANT CHANGE UP (ref 7–13)
POTASSIUM SERPL-MCNC: 3.9 MMOL/L — SIGNIFICANT CHANGE UP (ref 3.5–5.3)
POTASSIUM SERPL-SCNC: 3.9 MMOL/L — SIGNIFICANT CHANGE UP (ref 3.5–5.3)
PROT SERPL-MCNC: 4.3 G/DL — LOW (ref 6.6–8.7)
RBC # BLD: 3.05 M/UL — LOW (ref 3.8–5.2)
RBC # FLD: 12.4 % — SIGNIFICANT CHANGE UP (ref 10.3–14.5)
SODIUM SERPL-SCNC: 139 MMOL/L — SIGNIFICANT CHANGE UP (ref 135–145)
SPECIMEN SOURCE: SIGNIFICANT CHANGE UP
WBC # BLD: 9.23 K/UL — SIGNIFICANT CHANGE UP (ref 3.8–10.5)
WBC # FLD AUTO: 9.23 K/UL — SIGNIFICANT CHANGE UP (ref 3.8–10.5)

## 2025-06-19 PROCEDURE — 99233 SBSQ HOSP IP/OBS HIGH 50: CPT | Mod: GC

## 2025-06-19 PROCEDURE — 99232 SBSQ HOSP IP/OBS MODERATE 35: CPT

## 2025-06-19 RX ORDER — B1/B2/B3/B5/B6/B12/VIT C/FOLIC 500-0.5 MG
1 TABLET ORAL DAILY
Refills: 0 | Status: DISCONTINUED | OUTPATIENT
Start: 2025-06-19 | End: 2025-06-30

## 2025-06-19 RX ORDER — SIMETHICONE 80 MG
80 TABLET,CHEWABLE ORAL ONCE
Refills: 0 | Status: COMPLETED | OUTPATIENT
Start: 2025-06-19 | End: 2025-06-19

## 2025-06-19 RX ADMIN — Medication 0.5 MILLIGRAM(S): at 21:00

## 2025-06-19 RX ADMIN — Medication 30 MILLILITER(S): at 13:01

## 2025-06-19 RX ADMIN — ESCITALOPRAM OXALATE 20 MILLIGRAM(S): 20 TABLET ORAL at 13:02

## 2025-06-19 RX ADMIN — Medication 1 APPLICATION(S): at 05:53

## 2025-06-19 RX ADMIN — Medication 30 MILLILITER(S): at 17:29

## 2025-06-19 RX ADMIN — Medication 80 MILLIGRAM(S): at 13:01

## 2025-06-19 RX ADMIN — Medication 4 MILLIGRAM(S): at 13:15

## 2025-06-19 RX ADMIN — Medication 975 MILLIGRAM(S): at 08:53

## 2025-06-19 RX ADMIN — Medication 1 APPLICATION(S): at 05:51

## 2025-06-19 RX ADMIN — Medication 30 MILLILITER(S): at 06:06

## 2025-06-19 RX ADMIN — SODIUM CHLORIDE 100 MILLILITER(S): 9 INJECTION, SOLUTION INTRAVENOUS at 06:07

## 2025-06-19 RX ADMIN — Medication 1 TABLET(S): at 13:01

## 2025-06-19 RX ADMIN — HEPARIN SODIUM 5000 UNIT(S): 1000 INJECTION INTRAVENOUS; SUBCUTANEOUS at 05:52

## 2025-06-19 RX ADMIN — HEPARIN SODIUM 5000 UNIT(S): 1000 INJECTION INTRAVENOUS; SUBCUTANEOUS at 13:09

## 2025-06-19 RX ADMIN — Medication 30 MILLILITER(S): at 21:36

## 2025-06-19 RX ADMIN — Medication 0.5 MILLIGRAM(S): at 20:34

## 2025-06-19 RX ADMIN — Medication 975 MILLIGRAM(S): at 09:53

## 2025-06-19 RX ADMIN — Medication 4 MILLIGRAM(S): at 13:01

## 2025-06-19 RX ADMIN — Medication 4 MILLIGRAM(S): at 21:12

## 2025-06-19 RX ADMIN — Medication 975 MILLIGRAM(S): at 00:02

## 2025-06-19 RX ADMIN — Medication 4 MILLIGRAM(S): at 13:00

## 2025-06-19 RX ADMIN — Medication 0.5 MILLIGRAM(S): at 06:06

## 2025-06-19 RX ADMIN — HEPARIN SODIUM 5000 UNIT(S): 1000 INJECTION INTRAVENOUS; SUBCUTANEOUS at 21:12

## 2025-06-19 RX ADMIN — Medication 500 MILLIGRAM(S): at 13:00

## 2025-06-19 NOTE — PROGRESS NOTE ADULT - ASSESSMENT
1.  Acute kidney injury with hyperkalemia and acidosis: Multifactorial likely combination of prerenal/ATN/NSAID nephrotoxicity along with ARB use.  Ultrasound shows no evidence of hydronephrosis. HD started on 6/17. hold HD today. watch for recovery. lots of diarrhea. continue IV fluids.   2.  Hypertension hold losartan.  Continue IV fluids  3.  Status post hip replacement.  Hold NSAIDs per Ortho.  4.  Colitis noted on CT abdomen.  ongoing diarrhea- per GI        Otoniel Schmitz MD, IRA

## 2025-06-19 NOTE — CONSULT NOTE ADULT - SUBJECTIVE AND OBJECTIVE BOX
Pt Name: CAROLEE AN    MRN: [id number]      Patient is a 60y Female admitted with renal failure and colitis. Patient underwent Left total hip arthroplasty by Dr. Brooks on 6/10/2025. Ortho evaluation per his request. Patient seen and evaluated at bedside. Patient complains of abdominal pain and diarrhea. Patient has undergone 2 sessions of HD. Patient denies any hip pain. She reports making urine. Abdominal pain and appetite are reportedly improving. Patient denies fever, chills, CP, SOB, calf pain, numbness, or weakness. No acute orthopedic complaints are expressed at this time. Abdominal infectious work-up remains negative.   .      REVIEW OF SYSTEMS    General: Alert, responsive, in NAD    Musculoskeletal: SEE HPI.    Neurological: No sensory or motor changes.     ROS is otherwise negative.        PAST MEDICAL & SURGICAL HISTORY:  Spinal stenosis      Kidney stones  x 1 pt denies surgical intervention " in my 20 s '      Colitis, nonspecific      Nephrolithiasis      HTN (hypertension)      HLD (hyperlipidemia)      Anxiety and depression      Menieres disease      Vasovagal syncope      Unilateral primary osteoarthritis, left hip      Enlarged thyroid      Diverticulosis       delivery NOS        S/P cholecystectomy      S/P laminectomy      S/P cystoscopy with ureteral stent placement      History of hand surgery          Allergies: No Known Allergies      Medications: acetaminophen     Tablet .. 650 milliGRAM(s) Oral every 6 hours PRN  acetaminophen     Tablet .. 975 milliGRAM(s) Oral every 8 hours PRN  aluminum hydroxide/magnesium hydroxide/simethicone Suspension 30 milliLiter(s) Oral every 4 hours PRN  ascorbic acid 500 milliGRAM(s) Oral daily  chlorhexidine 2% Cloths 1 Application(s) Topical <User Schedule>  chlorhexidine 2% Cloths 1 Application(s) Topical <User Schedule>  dextrose 5%. 1000 milliLiter(s) IV Continuous <Continuous>  dextrose 50% Injectable 25 Gram(s) IV Push once  dextrose 50% Injectable 12.5 Gram(s) IV Push once  dextrose 50% Injectable 25 Gram(s) IV Push once  dextrose Oral Gel 15 Gram(s) Oral once  escitalopram 20 milliGRAM(s) Oral daily  glucagon  Injectable 1 milliGRAM(s) IntraMuscular once  heparin   Injectable 5000 Unit(s) SubCutaneous every 8 hours  HYDROmorphone   Tablet 2 milliGRAM(s) Oral every 4 hours PRN  HYDROmorphone  Injectable 0.5 milliGRAM(s) IV Push every 4 hours PRN  melatonin 3 milliGRAM(s) Oral at bedtime PRN  multiple electrolytes Injection Type 1 1000 milliLiter(s) IV Continuous <Continuous>  multivitamin 1 Tablet(s) Oral daily  ondansetron Injectable 4 milliGRAM(s) IV Push every 8 hours PRN  sodium chloride 0.9% lock flush 10 milliLiter(s) IV Push every 1 hour PRN      FAMILY HISTORY:  Family history of renal failure (Father)    Family history of hypertension (Father, Mother)    Family history of cerebrovascular accident (CVA) in father (Father)    Family history of breast cancer in mother (Mother)    Family history of diabetes mellitus (Sibling)    : non-contributory                             8.8    x     )-----------( x        ( 2025 14:30 )             26.1       06-19    139  |  100  |  26.4[H]  ----------------------------<  94  3.9   |  25.0  |  5.20[H]    Ca    7.8[L]      2025 05:42  Phos  4.7     06  Mg     2.4     06-18    TPro  4.3[L]  /  Alb  2.3[L]  /  TBili  0.4  /  DBili  x   /  AST  60[H]  /  ALT  87[H]  /  AlkPhos  151[H]  -19      Vital Signs Last 24 Hrs  T(C): 37.2 (2025 08:07), Max: 37.2 (2025 04:26)  T(F): 99 (2025 08:07), Max: 99 (2025 04:26)  HR: 89 (2025 08:07) (87 - 94)  BP: 119/75 (2025 08:07) (119/75 - 131/86)  BP(mean): --  RR: 18 (2025 08:07) (18 - 18)  SpO2: 95% (2025 08:07) (91% - 100%)    Parameters below as of 2025 08:07  Patient On (Oxygen Delivery Method): room air        Daily     Daily       PHYSICAL EXAM:      Appearance: Alert, responsive, in no acute distress.    Musculoskeletal:         Left Lower Extremity: Leg lengths grossly equal. Dressing dry. Surgical incision benign appearing. No erythema, induration, or drainage. No tenderness to palpation about hip. Calf supple. No pain w/ log roll. SILT distally. +TA/GSC/EHL/FHL. +DP pulse.     Labs: WBC: 9.2    A/P: Pt is a 60y Female s/p left total hip arthroplasty.     PLAN:   -Case discussed with Dr. Brooks.  -Pain control as needed.   -WBAT.  -PT/OT, out of bed.  -Incentive jesus.  -ID following.  -Labs/imaging reviewed.  -DVT PPx per primary team.  -Keep incision/dressing C/D/I.  -Given patient has had multiple rounds of HD this predisposes the patient to a higher risk of PJI. If HD is to continue would recommend IV abx so long as this does not worsen renal function. Please seek ID advice regarding appropriate abx.

## 2025-06-19 NOTE — PROGRESS NOTE ADULT - ASSESSMENT
59 y/o F w/ PMH of HTN, HLD, prior obesity (currently on mounjaro for 1.5 yrs w/ 80lb wt loss) and anxiety/depression (on lexapro) s/p L-LICHA (6/9/25 at [facility]) presents c/o nausea, NBNB emesis and large vol watery nonbloody diarrhea w/ associated LLQ abd pain that started 3 days ago.  Pt also noticed she stopped making urine Saturday and since arrival at the hospital has not had a BM.  pt has been on naproxen 500mg BID since L-hip surgery 6/9 and is also on losartan.  VS notable for soft SBP in 90's, sinus tachy and RR 20 but not hypoxic or febrile.  Clinically dehydrated appearing w/ mild LLQ tenderness to palpation.  Monae in place w/ no urine.  Initial w/u significant for WBC 23.12, K 6.5-->4.9, AGMA, BUN 64, Cr 5.22-->5.08 (baseline 0.85), Transaminitis , lipase nL, CT a/p w/ possible mild colitis.  s/p zosyn, albumin, 3L LR and 1L NS, 10g lokelma, 2g Ca gluconate, 4mg IV morphine and 4mg IV zofran in ED. MICU consulted and recommending SDU.  Nephro consulted.  Will admit for acute renal failure.       # Acute renal failure w/ mild rhabdo suspect multifactorial from N/V/D while on NSAIDs and ARB   # Hypovolemic mild hyponatremic hypochloremia likely 2/2 N/V/D  - monae catheter placed in ED  -  Holding outpt Naproxen, losartan   - BUN 26/Cr 5.2 (improving); last known Cr 0.85 5/2025   - s/p HD yesterday (6/18)  - Strict I/O's  - avoid nephrotoxic agents or renally dose if needed  - c/w prn antiemetics  - encourage po intake  - nephrology following       # Sepsis   # Colitis ; infectious vs ischemic  - CT a/p w/ findings compatible with mild acute colitis in the left descending and sigmoid colon  - leukocytosis resolved, afebrile  - 6/15 bld cx: ngtd, prelim read  - 6/16 stool cx: ngtd, prelim read  - f/u UA  - zosyn held  - ID following  - ctm CBC, temp      # AGMA likely 2/2 uremia/renal failure - resolved    # Hyperkalemia likely 2/2 ARF and ARB, - resolved  - holding ARB given renal fxn  - ctm on telemetry     # Transaminitis w/ hepatocellular distribution   Ischemic. Improving daily  CT a/p w/ normal liver, bile ducts nL caliber and s/p cholecystectomy  CMV, EBV to r/u infectious etiology - negative  - Trend LFTs and avoid hepatotoxic medications     # HTN / HLD  - /75, no need for antihypertensive at this time  - holding ARB due to renal fxn   - Holding statin for transaminitis     # Former obesity  - On mounjaro w/ 80lb weight loss and now BMI 23  - Hold mounjaro while inpt       VTE ppx:  Heparin sq  Diet: low fiber. Would like to speak to RD about diet  Dispositon: Acute. Pending renal recovery, HD     59 y/o F w/ PMH of HTN, HLD, prior obesity (currently on mounjaro for 1.5 yrs w/ 80lb wt loss) and anxiety/depression (on lexapro) s/p L-ILCHA (6/9/25 at [facility]) presents c/o nausea, NBNB emesis and large vol watery nonbloody diarrhea w/ associated LLQ abd pain that started 3 days ago.  Pt also noticed she stopped making urine Saturday and since arrival at the hospital has not had a BM.  pt has been on naproxen 500mg BID since L-hip surgery 6/9 and is also on losartan.  VS notable for soft SBP in 90's, sinus tachy and RR 20 but not hypoxic or febrile.  Clinically dehydrated appearing w/ mild LLQ tenderness to palpation.  Monae in place w/ no urine.  Initial w/u significant for WBC 23.12, K 6.5-->4.9, AGMA, BUN 64, Cr 5.22-->5.08 (baseline 0.85), Transaminitis , lipase nL, CT a/p w/ possible mild colitis.  s/p zosyn, albumin, 3L LR and 1L NS, 10g lokelma, 2g Ca gluconate, 4mg IV morphine and 4mg IV zofran in ED. MICU consulted and recommending SDU.  Nephro consulted.  Will admit for acute renal failure.       # Acute renal failure w/ mild rhabdo suspect multifactorial from N/V/D while on NSAIDs and ARB   # Hypovolemic mild hyponatremic hypochloremia likely 2/2 N/V/D  - monae catheter placed in ED  -  Holding outpt Naproxen, losartan   - BUN 26/Cr 5.2 (improving); last known Cr 0.85 5/2025   - s/p HD yesterday (6/18)  - Strict I/O's  - avoid nephrotoxic agents or renally dose if needed  - c/w prn antiemetics  - encourage po intake  - nephrology following       # Sepsis   # Colitis ; infectious vs ischemic  - CT a/p w/ findings compatible with mild acute colitis in the left descending and sigmoid colon  - leukocytosis resolved, afebrile  - 6/15 bld cx: ngtd, prelim read  - 6/16 stool cx: ngtd, prelim read  - reporting 3 bloody BM this AM, vitals stable, f/u H+H  - f/u UA  - zosyn held  - ID following  - ctm CBC, temp        # AGMA likely 2/2 uremia/renal failure - resolved    # Hyperkalemia likely 2/2 ARF and ARB, - resolved  - holding ARB given renal fxn  - ctm on telemetry     # Transaminitis w/ hepatocellular distribution   Ischemic. Improving daily  CT a/p w/ normal liver, bile ducts nL caliber and s/p cholecystectomy  CMV, EBV to r/u infectious etiology - negative  - Trend LFTs and avoid hepatotoxic medications     # HTN / HLD  - /75, no need for antihypertensive at this time  - holding ARB due to renal fxn   - Holding statin for transaminitis     # Former obesity  - On mounjaro w/ 80lb weight loss and now BMI 23  - Hold mounjaro while inpt       VTE ppx:  Heparin sq  Diet: low fiber. Would like to speak to RD about diet  Dispositon: Acute. Pending renal recovery, HD     59 y/o F w/ PMH of HTN, HLD, prior obesity (currently on mounjaro for 1.5 yrs w/ 80lb wt loss) and anxiety/depression (on lexapro) s/p L-LICHA (6/9/25 at [facility]) presents c/o nausea, NBNB emesis and large vol watery nonbloody diarrhea w/ associated LLQ abd pain x 3 days ago associated w/ anuria, admitted for acute renal failure and sepsis 2/2 colitis      # Acute renal failure w/ mild rhabdo suspect multifactorial from N/V/D while on NSAIDs and ARB   # Hypovolemic mild hyponatremic hypochloremia likely 2/2 N/V/D  - monae catheter placed in ED  -  Holding outpt Naproxen, losartan   - BUN 26/Cr 5.2 (improving); last known Cr 0.85 5/2025   - s/p HD yesterday (6/18)  - Strict I/O's  - avoid nephrotoxic agents or renally dose if needed  - c/w prn antiemetics  - encourage po intake  - nephrology following       # Sepsis   # Colitis ; infectious vs ischemic  - CT a/p w/ findings compatible with mild acute colitis in the left descending and sigmoid colon  - leukocytosis resolved, afebrile  - 6/15 bld cx: ngtd, prelim read  - 6/16 stool cx: ngtd, prelim read  - reporting 3 bloody BM this AM, vitals stable, f/u H+H  - f/u UA  - zosyn held  - ID following  - ctm CBC, temp        # AGMA likely 2/2 uremia/renal failure - resolved    # Hyperkalemia likely 2/2 ARF and ARB, - resolved  - holding ARB given renal fxn  - ctm on telemetry     # Transaminitis w/ hepatocellular distribution   Ischemic. Improving daily  CT a/p w/ normal liver, bile ducts nL caliber and s/p cholecystectomy  CMV, EBV to r/u infectious etiology - negative  - Trend LFTs and avoid hepatotoxic medications     # HTN / HLD  - /75, no need for antihypertensive at this time  - holding ARB due to renal fxn   - Holding statin for transaminitis     # Former obesity  - On mounjaro w/ 80lb weight loss and now BMI 23  - Hold mounjaro while inpt       VTE ppx:  Heparin sq  Diet: low fiber. Would like to speak to RD about diet  Dispositon: Acute. Pending renal recovery, HD

## 2025-06-19 NOTE — PROGRESS NOTE ADULT - SUBJECTIVE AND OBJECTIVE BOX
SUBJECTIVE  BRIEF HOSPITAL COURSE SUMMARY:  Pt is a 61 y/o F w/ PMH of HTN, HLD, prior obesity (currently on mounjaro for 1.5 yrs w/ 80lb wt loss) and anxiety/depression (on lexapro) s/p L-LICHA (6/9/25 at [facility]) presented to the ED c/o nausea, NBNB emesis and large vol watery nonbloody diarrhea w/ associated LLQ abd pain that started 3 days ago.  Pt also noticed she stopped making urine Saturday and since arrival at the hospital has not had a BM.  pt has been on naproxen 500mg BID since L-hip surgery 6/9 and is also on losartan.  VS notable for soft SBP in 90's, sinus tachy and RR 20 but not hypoxic or febrile.  On exam patient appeared dehydrated w/ mild LLQ tenderness to palpation.  Bernard in place w/ no urine.  Labs significant for WBC 23.12, K 6.5-->4.9, AGMA, BUN 64, Cr 5.22-->5.08 (baseline 0.85), Transaminitis , lipase nL, CT a/p w/ possible mild colitis. Pt given zosyn, albumin, 3L LR and 1L NS, 10g lokelma, 2g Ca gluconate, 4mg IV morphine and 4mg IV zofran in ED. MICU consulted and recommending SDU.  Pt admitted for acute renal failure w/ mild rhabdo 2/2 to n/v/d while on NSAIDs and ARB. Nephro consulted, started on plasmalyte. ID consulted for colitis, concern for viral gastroentereitis and zosyn held on 6/17 as unclear if beneficial plus worsening renal function. GI consulted for colitis, recommend patient f/u w/ her gastroenterologist Dr. Gypsy Jackson for outpt colonoscopy in 6-8wks. Renal US showed no hydronephrosis. Per nephro recs, losartan was held. Renal function worsening no response to IV lasix, patient started on HD, vascular surgery placed dialysis catheter.    LAST 24 HOURS:  Pt underwent dialysis catheter placement yesterday and HD.  TODAY:  No acute events overnight  Pt seen at bedside in AM, hemodynamically stable on room air in no acute distress, AAOx3. No issues with urination or BM. Endorses not the best appetite but has been trying to eat food provided to her, able to eat some breakfast this morning. Endorses adequate pain control.   No acute medical complaints at this time    OBJECTIVE    PHYSICAL EXAM:  General: Middle aged female sitting up in bed, comfortable  HEENT:  Anicteric sclera  NECK:  RIJ HD catheter  CVS: regular rate and rhythm S1 S2  RESP:  Lungs CTAB, no increased work of breathing  GI:  Soft nondistended nontender BS+  : No suprapubic tenderness  MSK:  No edema. Moves all extremities  CNS:  Awake, alert, oriented. Fluent speech and follows commands  INTEG:  Warm Skin  PSYCH:  AAOx3    Vital Signs Last 24 Hrs  T(C): 37.2 (19 Jun 2025 08:07), Max: 37.2 (19 Jun 2025 04:26)  T(F): 99 (19 Jun 2025 08:07), Max: 99 (19 Jun 2025 04:26)  HR: 89 (19 Jun 2025 08:07) (81 - 94)  BP: 119/75 (19 Jun 2025 08:07) (107/68 - 131/86)  BP(mean): 89 (18 Jun 2025 16:00) (80 - 90)  RR: 18 (19 Jun 2025 08:07) (17 - 18)  SpO2: 95% (19 Jun 2025 08:07) (91% - 100%)    Parameters below as of 19 Jun 2025 08:07  Patient On (Oxygen Delivery Method): room air            MEDICATIONS  (STANDING):  chlorhexidine 2% Cloths 1 Application(s) Topical <User Schedule>  chlorhexidine 2% Cloths 1 Application(s) Topical <User Schedule>  dextrose 5%. 1000 milliLiter(s) (100 mL/Hr) IV Continuous <Continuous>  dextrose 50% Injectable 25 Gram(s) IV Push once  dextrose 50% Injectable 12.5 Gram(s) IV Push once  dextrose 50% Injectable 25 Gram(s) IV Push once  dextrose Oral Gel 15 Gram(s) Oral once  escitalopram 20 milliGRAM(s) Oral daily  glucagon  Injectable 1 milliGRAM(s) IntraMuscular once  heparin   Injectable 5000 Unit(s) SubCutaneous every 8 hours  multiple electrolytes Injection Type 1 1000 milliLiter(s) (100 mL/Hr) IV Continuous <Continuous>    MEDICATIONS  (PRN):  acetaminophen     Tablet .. 650 milliGRAM(s) Oral every 6 hours PRN Temp greater or equal to 38C (100.4F), Mild Pain (1 - 3)  acetaminophen     Tablet .. 975 milliGRAM(s) Oral every 8 hours PRN Moderate Pain (4 - 6)  aluminum hydroxide/magnesium hydroxide/simethicone Suspension 30 milliLiter(s) Oral every 4 hours PRN Dyspepsia  HYDROmorphone   Tablet 2 milliGRAM(s) Oral every 4 hours PRN Severe Pain (7 - 10)  HYDROmorphone  Injectable 0.5 milliGRAM(s) IV Push every 4 hours PRN breakthrough pain  melatonin 3 milliGRAM(s) Oral at bedtime PRN Insomnia  ondansetron Injectable 4 milliGRAM(s) IV Push every 8 hours PRN Nausea and/or Vomiting  sodium chloride 0.9% lock flush 10 milliLiter(s) IV Push every 1 hour PRN Pre/post blood products, medications, blood draw, and to maintain line patency    Allergies    No Known Allergies    Intolerances        LABS:                        8.7    9.23  )-----------( 233      ( 19 Jun 2025 05:42 )             25.8     06-19    139  |  100  |  26.4[H]  ----------------------------<  94  3.9   |  25.0  |  5.20[H]    Ca    7.8[L]      19 Jun 2025 05:42  Phos  4.7     06-18  Mg     2.4     06-18    TPro  4.3[L]  /  Alb  2.3[L]  /  TBili  0.4  /  DBili  x   /  AST  60[H]  /  ALT  87[H]  /  AlkPhos  151[H]  06-19      Urinalysis Basic - ( 19 Jun 2025 05:42 )    Color: x / Appearance: x / SG: x / pH: x  Gluc: 94 mg/dL / Ketone: x  / Bili: x / Urobili: x   Blood: x / Protein: x / Nitrite: x   Leuk Esterase: x / RBC: x / WBC x   Sq Epi: x / Non Sq Epi: x / Bacteria: x      CAPILLARY BLOOD GLUCOSE          CULTURE DATA:    Culture - Blood (collected 06-15-25 @ 22:25)  Source: Blood Blood-Peripheral  Preliminary Report (06-19-25 @ 01:01):    No growth at 72 Hours    Culture - Stool (collected 06-16-25 @ 20:20)  Source: Stool Feces  Preliminary Report (06-18-25 @ 20:23):    No enteric pathogens to date: Final culture pending        RADIOLOGY & ADDITIONAL TESTS:  No new imaging to review SUBJECTIVE  BRIEF HOSPITAL COURSE SUMMARY:  Pt is a 59 y/o F w/ PMH of HTN, HLD, prior obesity (currently on mounjaro for 1.5 yrs w/ 80lb wt loss) and anxiety/depression (on lexapro) s/p L-LICHA (6/9/25 at [facility]) presented to the ED c/o nausea, NBNB emesis and large vol watery nonbloody diarrhea w/ associated LLQ abd pain that started 3 days ago.  Pt also noticed she stopped making urine Saturday and since arrival at the hospital has not had a BM.  pt has been on naproxen 500mg BID since L-hip surgery 6/9 and is also on losartan.  VS notable for soft SBP in 90's, sinus tachy and RR 20 but not hypoxic or febrile.  On exam patient appeared dehydrated w/ mild LLQ tenderness to palpation.  Bernard in place w/ no urine.  Labs significant for WBC 23.12, K 6.5-->4.9, AGMA, BUN 64, Cr 5.22-->5.08 (baseline 0.85), Transaminitis , lipase nL, CT a/p w/ possible mild colitis. Pt given zosyn, albumin, 3L LR and 1L NS, 10g lokelma, 2g Ca gluconate, 4mg IV morphine and 4mg IV zofran in ED. MICU consulted and recommending SDU.  Pt admitted for acute renal failure w/ mild rhabdo 2/2 to n/v/d while on NSAIDs and ARB. Nephro consulted, started on plasmalyte. ID consulted for colitis, concern for viral gastroentereitis and zosyn held on 6/17 as unclear if beneficial plus worsening renal function. GI consulted for colitis, recommend patient f/u w/ her gastroenterologist Dr. Gypsy Jackson for outpt colonoscopy in 6-8wks. Renal US showed no hydronephrosis. Per nephro recs, losartan was held. Renal function worsening no response to IV lasix, patient started on HD, vascular surgery placed dialysis catheter.    LAST 24 HOURS:  Pt underwent dialysis catheter placement yesterday and HD.  TODAY:  No acute events overnight  Pt seen at bedside in AM, hemodynamically stable on room air in no acute distress, AAOx3. States every time she eats she experiences loose stools. Endorses not the best appetite but has been trying to eat food provided to her, able to eat some breakfast this morning. Endorses adequate pain control.   No acute medical complaints at this time    Update @12:39 pm: per nurse report patient had a bloody  BM yesterday, team was made aware but not concerned. This AM patient has had 3 BM. Pt was reassessed at bedside, vitals snl. Pt endorses feeling dizzy when moves head abruptly, but symptoms have been present since she has been admitted. Pt reports she believes bloody BM due to acute colitis, but has not had them before. H/H ordered will ctm.    OBJECTIVE    PHYSICAL EXAM:  General: Middle aged female sitting up in bed, comfortable  HEENT:  Anicteric sclera  NECK:  RIJ HD catheter  CVS: regular rate and rhythm S1 S2  RESP:  Lungs CTAB, no increased work of breathing  GI:  Soft nondistended nontender BS+  : No suprapubic tenderness  MSK:  No edema. Moves all extremities  CNS:  Awake, alert, oriented. Fluent speech and follows commands  INTEG:  Warm Skin  PSYCH:  AAOx3    Vital Signs Last 24 Hrs  T(C): 37.2 (19 Jun 2025 08:07), Max: 37.2 (19 Jun 2025 04:26)  T(F): 99 (19 Jun 2025 08:07), Max: 99 (19 Jun 2025 04:26)  HR: 89 (19 Jun 2025 08:07) (81 - 94)  BP: 119/75 (19 Jun 2025 08:07) (107/68 - 131/86)  BP(mean): 89 (18 Jun 2025 16:00) (80 - 90)  RR: 18 (19 Jun 2025 08:07) (17 - 18)  SpO2: 95% (19 Jun 2025 08:07) (91% - 100%)    Parameters below as of 19 Jun 2025 08:07  Patient On (Oxygen Delivery Method): room air            MEDICATIONS  (STANDING):  chlorhexidine 2% Cloths 1 Application(s) Topical <User Schedule>  chlorhexidine 2% Cloths 1 Application(s) Topical <User Schedule>  dextrose 5%. 1000 milliLiter(s) (100 mL/Hr) IV Continuous <Continuous>  dextrose 50% Injectable 25 Gram(s) IV Push once  dextrose 50% Injectable 12.5 Gram(s) IV Push once  dextrose 50% Injectable 25 Gram(s) IV Push once  dextrose Oral Gel 15 Gram(s) Oral once  escitalopram 20 milliGRAM(s) Oral daily  glucagon  Injectable 1 milliGRAM(s) IntraMuscular once  heparin   Injectable 5000 Unit(s) SubCutaneous every 8 hours  multiple electrolytes Injection Type 1 1000 milliLiter(s) (100 mL/Hr) IV Continuous <Continuous>    MEDICATIONS  (PRN):  acetaminophen     Tablet .. 650 milliGRAM(s) Oral every 6 hours PRN Temp greater or equal to 38C (100.4F), Mild Pain (1 - 3)  acetaminophen     Tablet .. 975 milliGRAM(s) Oral every 8 hours PRN Moderate Pain (4 - 6)  aluminum hydroxide/magnesium hydroxide/simethicone Suspension 30 milliLiter(s) Oral every 4 hours PRN Dyspepsia  HYDROmorphone   Tablet 2 milliGRAM(s) Oral every 4 hours PRN Severe Pain (7 - 10)  HYDROmorphone  Injectable 0.5 milliGRAM(s) IV Push every 4 hours PRN breakthrough pain  melatonin 3 milliGRAM(s) Oral at bedtime PRN Insomnia  ondansetron Injectable 4 milliGRAM(s) IV Push every 8 hours PRN Nausea and/or Vomiting  sodium chloride 0.9% lock flush 10 milliLiter(s) IV Push every 1 hour PRN Pre/post blood products, medications, blood draw, and to maintain line patency    Allergies    No Known Allergies    Intolerances        LABS:                        8.7    9.23  )-----------( 233      ( 19 Jun 2025 05:42 )             25.8     06-19    139  |  100  |  26.4[H]  ----------------------------<  94  3.9   |  25.0  |  5.20[H]    Ca    7.8[L]      19 Jun 2025 05:42  Phos  4.7     06-18  Mg     2.4     06-18    TPro  4.3[L]  /  Alb  2.3[L]  /  TBili  0.4  /  DBili  x   /  AST  60[H]  /  ALT  87[H]  /  AlkPhos  151[H]  06-19      Urinalysis Basic - ( 19 Jun 2025 05:42 )    Color: x / Appearance: x / SG: x / pH: x  Gluc: 94 mg/dL / Ketone: x  / Bili: x / Urobili: x   Blood: x / Protein: x / Nitrite: x   Leuk Esterase: x / RBC: x / WBC x   Sq Epi: x / Non Sq Epi: x / Bacteria: x      CAPILLARY BLOOD GLUCOSE          CULTURE DATA:    Culture - Blood (collected 06-15-25 @ 22:25)  Source: Blood Blood-Peripheral  Preliminary Report (06-19-25 @ 01:01):    No growth at 72 Hours    Culture - Stool (collected 06-16-25 @ 20:20)  Source: Stool Feces  Preliminary Report (06-18-25 @ 20:23):    No enteric pathogens to date: Final culture pending        RADIOLOGY & ADDITIONAL TESTS:  No new imaging to review SUBJECTIVE  BRIEF HOSPITAL COURSE SUMMARY:  Pt is a 61 y/o F w/ PMH of HTN, HLD, prior obesity (currently on mounjaro for 1.5 yrs w/ 80lb wt loss) and anxiety/depression (on lexapro) s/p L-LICHA (6/9/25 at [facility]) presented to the ED c/o nausea, NBNB emesis and large vol watery nonbloody diarrhea w/ associated LLQ abd pain that started 3 days ago.  Pt also noticed she stopped making urine Saturday and since arrival at the hospital has not had a BM.  pt has been on naproxen 500mg BID since L-hip surgery 6/9 and is also on losartan.  VS notable for soft SBP in 90's, sinus tachy and RR 20 but not hypoxic or febrile.  On exam patient appeared dehydrated w/ mild LLQ tenderness to palpation.  Bernard in place w/ no urine.  Labs significant for WBC 23.12, K 6.5-->4.9, AGMA, BUN 64, Cr 5.22-->5.08 (baseline 0.85), Transaminitis , lipase nL, CT a/p w/ possible mild colitis. Pt given zosyn, albumin, 3L LR and 1L NS, 10g lokelma, 2g Ca gluconate, 4mg IV morphine and 4mg IV zofran in ED. MICU consulted and recommending SDU.  Pt admitted for acute renal failure w/ mild rhabdo 2/2 to n/v/d while on NSAIDs and ARB. Nephro consulted, started on plasmalyte. ID consulted for colitis, concern for viral gastroentereitis and zosyn held on 6/17 as unclear if beneficial plus worsening renal function. GI consulted for colitis, recommend patient f/u w/ her gastroenterologist Dr. Gypsy Jackson for outpt colonoscopy in 6-8wks. Renal US showed no hydronephrosis. Per nephro recs, losartan was held. Renal function worsening no response to IV lasix, patient started on HD, vascular surgery placed dialysis catheter.    LAST 24 HOURS:  Pt underwent dialysis catheter placement yesterday and HD.  TODAY:  No acute events overnight  Pt seen at bedside in AM, hemodynamically stable on room air in no acute distress, AAOx3. States every time she eats she experiences loose stools. Endorses not the best appetite but has been trying to eat food provided to her, able to eat some breakfast this morning. Endorses adequate pain control.   No acute medical complaints at this time    Update @12:39 pm: per nurse report patient had a bloody  BM yesterday, team was made aware but not concerned. This AM patient has had 3 BM. Pt was reassessed at bedside, vitals snl. Pt endorses feeling dizzy when moves head abruptly, but symptoms have been present since she has been admitted. Pt reports she believes bloody BM due to acute colitis, but has not had them before. H/H ordered will ctm. Will re-consult GI    OBJECTIVE    PHYSICAL EXAM:  General: Middle aged female sitting up in bed, comfortable  HEENT:  Anicteric sclera  NECK:  RIJ HD catheter  CVS: regular rate and rhythm S1 S2  RESP:  Lungs CTAB, no increased work of breathing  GI:  Soft nondistended nontender BS+  : No suprapubic tenderness  MSK:  No edema. Moves all extremities  CNS:  Awake, alert, oriented. Fluent speech and follows commands  INTEG:  Warm Skin  PSYCH:  AAOx3    Vital Signs Last 24 Hrs  T(C): 37.2 (19 Jun 2025 08:07), Max: 37.2 (19 Jun 2025 04:26)  T(F): 99 (19 Jun 2025 08:07), Max: 99 (19 Jun 2025 04:26)  HR: 89 (19 Jun 2025 08:07) (81 - 94)  BP: 119/75 (19 Jun 2025 08:07) (107/68 - 131/86)  BP(mean): 89 (18 Jun 2025 16:00) (80 - 90)  RR: 18 (19 Jun 2025 08:07) (17 - 18)  SpO2: 95% (19 Jun 2025 08:07) (91% - 100%)    Parameters below as of 19 Jun 2025 08:07  Patient On (Oxygen Delivery Method): room air            MEDICATIONS  (STANDING):  chlorhexidine 2% Cloths 1 Application(s) Topical <User Schedule>  chlorhexidine 2% Cloths 1 Application(s) Topical <User Schedule>  dextrose 5%. 1000 milliLiter(s) (100 mL/Hr) IV Continuous <Continuous>  dextrose 50% Injectable 25 Gram(s) IV Push once  dextrose 50% Injectable 12.5 Gram(s) IV Push once  dextrose 50% Injectable 25 Gram(s) IV Push once  dextrose Oral Gel 15 Gram(s) Oral once  escitalopram 20 milliGRAM(s) Oral daily  glucagon  Injectable 1 milliGRAM(s) IntraMuscular once  heparin   Injectable 5000 Unit(s) SubCutaneous every 8 hours  multiple electrolytes Injection Type 1 1000 milliLiter(s) (100 mL/Hr) IV Continuous <Continuous>    MEDICATIONS  (PRN):  acetaminophen     Tablet .. 650 milliGRAM(s) Oral every 6 hours PRN Temp greater or equal to 38C (100.4F), Mild Pain (1 - 3)  acetaminophen     Tablet .. 975 milliGRAM(s) Oral every 8 hours PRN Moderate Pain (4 - 6)  aluminum hydroxide/magnesium hydroxide/simethicone Suspension 30 milliLiter(s) Oral every 4 hours PRN Dyspepsia  HYDROmorphone   Tablet 2 milliGRAM(s) Oral every 4 hours PRN Severe Pain (7 - 10)  HYDROmorphone  Injectable 0.5 milliGRAM(s) IV Push every 4 hours PRN breakthrough pain  melatonin 3 milliGRAM(s) Oral at bedtime PRN Insomnia  ondansetron Injectable 4 milliGRAM(s) IV Push every 8 hours PRN Nausea and/or Vomiting  sodium chloride 0.9% lock flush 10 milliLiter(s) IV Push every 1 hour PRN Pre/post blood products, medications, blood draw, and to maintain line patency    Allergies    No Known Allergies    Intolerances        LABS:                        8.7    9.23  )-----------( 233      ( 19 Jun 2025 05:42 )             25.8     06-19    139  |  100  |  26.4[H]  ----------------------------<  94  3.9   |  25.0  |  5.20[H]    Ca    7.8[L]      19 Jun 2025 05:42  Phos  4.7     06-18  Mg     2.4     06-18    TPro  4.3[L]  /  Alb  2.3[L]  /  TBili  0.4  /  DBili  x   /  AST  60[H]  /  ALT  87[H]  /  AlkPhos  151[H]  06-19      Urinalysis Basic - ( 19 Jun 2025 05:42 )    Color: x / Appearance: x / SG: x / pH: x  Gluc: 94 mg/dL / Ketone: x  / Bili: x / Urobili: x   Blood: x / Protein: x / Nitrite: x   Leuk Esterase: x / RBC: x / WBC x   Sq Epi: x / Non Sq Epi: x / Bacteria: x      CAPILLARY BLOOD GLUCOSE          CULTURE DATA:    Culture - Blood (collected 06-15-25 @ 22:25)  Source: Blood Blood-Peripheral  Preliminary Report (06-19-25 @ 01:01):    No growth at 72 Hours    Culture - Stool (collected 06-16-25 @ 20:20)  Source: Stool Feces  Preliminary Report (06-18-25 @ 20:23):    No enteric pathogens to date: Final culture pending        RADIOLOGY & ADDITIONAL TESTS:  No new imaging to review SUBJECTIVE  BRIEF HOSPITAL COURSE SUMMARY:  Pt is a 59 y/o F w/ PMH of HTN, HLD, prior obesity (currently on mounjaro for 1.5 yrs w/ 80lb wt loss) and anxiety/depression (on lexapro) s/p L-LICHA (6/9/25 at [facility]) presented to the ED c/o nausea, NBNB emesis and large vol watery nonbloody diarrhea w/ associated LLQ abd pain that started 3 days ago.  Pt also noticed she stopped making urine Saturday and since arrival at the hospital has not had a BM.  pt has been on naproxen 500mg BID since L-hip surgery 6/9 and is also on losartan.  VS notable for soft SBP in 90's, sinus tachy and RR 20 but not hypoxic or febrile.  On exam patient appeared dehydrated w/ mild LLQ tenderness to palpation.  Bernard in place w/ no urine.  Labs significant for WBC 23.12, K 6.5-->4.9, AGMA, BUN 64, Cr 5.22-->5.08 (baseline 0.85), Transaminitis , lipase nL, CT a/p w/ possible mild colitis. Pt given zosyn, albumin, 3L LR and 1L NS, 10g lokelma, 2g Ca gluconate, 4mg IV morphine and 4mg IV zofran in ED. MICU consulted and recommending SDU.  Pt admitted for acute renal failure w/ mild rhabdo 2/2 to n/v/d while on NSAIDs and ARB. Nephro consulted, started on plasmalyte. ID consulted for colitis, concern for viral gastroentereitis and zosyn held on 6/17 as unclear if beneficial plus worsening renal function. GI consulted for colitis, recommend patient f/u w/ her gastroenterologist Dr. Gypsy Jackson for outpt colonoscopy in 6-8wks. Renal US showed no hydronephrosis. Per nephro recs, losartan was held. Renal function worsening no response to IV lasix, patient started on HD, vascular surgery placed dialysis catheter.    LAST 24 HOURS:  Pt underwent dialysis catheter placement yesterday and HD.  TODAY:  No acute events overnight  Pt seen at bedside in AM, hemodynamically stable on room air in no acute distress, AAOx3. States every time she eats she experiences loose stools. Endorses not the best appetite but has been trying to eat food provided to her, able to eat some breakfast this morning. Endorses adequate pain control.   No acute medical complaints at this time    Update @12:39 pm: per nurse report patient had a bloody  BM yesterday, team was made aware but not concerned. This AM patient has had 3 BM. Pt was reassessed at bedside, vitals snl. Pt endorses feeling dizzy when moves head abruptly, but symptoms have been present since she has been admitted. Pt reports she believes bloody BM due to acute colitis, but has not had them before. H/H ordered will ctm.     OBJECTIVE    PHYSICAL EXAM:  General: Middle aged female sitting up in bed, comfortable  HEENT:  Anicteric sclera  NECK:  RIJ HD catheter  CVS: regular rate and rhythm S1 S2  RESP:  Lungs CTAB, no increased work of breathing  GI:  Soft nondistended nontender BS+  : No suprapubic tenderness  MSK:  No edema. Moves all extremities  CNS:  Awake, alert, oriented. Fluent speech and follows commands  INTEG:  Warm Skin  PSYCH:  AAOx3    Vital Signs Last 24 Hrs  T(C): 37.2 (19 Jun 2025 08:07), Max: 37.2 (19 Jun 2025 04:26)  T(F): 99 (19 Jun 2025 08:07), Max: 99 (19 Jun 2025 04:26)  HR: 89 (19 Jun 2025 08:07) (81 - 94)  BP: 119/75 (19 Jun 2025 08:07) (107/68 - 131/86)  BP(mean): 89 (18 Jun 2025 16:00) (80 - 90)  RR: 18 (19 Jun 2025 08:07) (17 - 18)  SpO2: 95% (19 Jun 2025 08:07) (91% - 100%)    Parameters below as of 19 Jun 2025 08:07  Patient On (Oxygen Delivery Method): room air            MEDICATIONS  (STANDING):  chlorhexidine 2% Cloths 1 Application(s) Topical <User Schedule>  chlorhexidine 2% Cloths 1 Application(s) Topical <User Schedule>  dextrose 5%. 1000 milliLiter(s) (100 mL/Hr) IV Continuous <Continuous>  dextrose 50% Injectable 25 Gram(s) IV Push once  dextrose 50% Injectable 12.5 Gram(s) IV Push once  dextrose 50% Injectable 25 Gram(s) IV Push once  dextrose Oral Gel 15 Gram(s) Oral once  escitalopram 20 milliGRAM(s) Oral daily  glucagon  Injectable 1 milliGRAM(s) IntraMuscular once  heparin   Injectable 5000 Unit(s) SubCutaneous every 8 hours  multiple electrolytes Injection Type 1 1000 milliLiter(s) (100 mL/Hr) IV Continuous <Continuous>    MEDICATIONS  (PRN):  acetaminophen     Tablet .. 650 milliGRAM(s) Oral every 6 hours PRN Temp greater or equal to 38C (100.4F), Mild Pain (1 - 3)  acetaminophen     Tablet .. 975 milliGRAM(s) Oral every 8 hours PRN Moderate Pain (4 - 6)  aluminum hydroxide/magnesium hydroxide/simethicone Suspension 30 milliLiter(s) Oral every 4 hours PRN Dyspepsia  HYDROmorphone   Tablet 2 milliGRAM(s) Oral every 4 hours PRN Severe Pain (7 - 10)  HYDROmorphone  Injectable 0.5 milliGRAM(s) IV Push every 4 hours PRN breakthrough pain  melatonin 3 milliGRAM(s) Oral at bedtime PRN Insomnia  ondansetron Injectable 4 milliGRAM(s) IV Push every 8 hours PRN Nausea and/or Vomiting  sodium chloride 0.9% lock flush 10 milliLiter(s) IV Push every 1 hour PRN Pre/post blood products, medications, blood draw, and to maintain line patency    Allergies    No Known Allergies    Intolerances        LABS:                        8.7    9.23  )-----------( 233      ( 19 Jun 2025 05:42 )             25.8     06-19    139  |  100  |  26.4[H]  ----------------------------<  94  3.9   |  25.0  |  5.20[H]    Ca    7.8[L]      19 Jun 2025 05:42  Phos  4.7     06-18  Mg     2.4     06-18    TPro  4.3[L]  /  Alb  2.3[L]  /  TBili  0.4  /  DBili  x   /  AST  60[H]  /  ALT  87[H]  /  AlkPhos  151[H]  06-19      Urinalysis Basic - ( 19 Jun 2025 05:42 )    Color: x / Appearance: x / SG: x / pH: x  Gluc: 94 mg/dL / Ketone: x  / Bili: x / Urobili: x   Blood: x / Protein: x / Nitrite: x   Leuk Esterase: x / RBC: x / WBC x   Sq Epi: x / Non Sq Epi: x / Bacteria: x      CAPILLARY BLOOD GLUCOSE          CULTURE DATA:    Culture - Blood (collected 06-15-25 @ 22:25)  Source: Blood Blood-Peripheral  Preliminary Report (06-19-25 @ 01:01):    No growth at 72 Hours    Culture - Stool (collected 06-16-25 @ 20:20)  Source: Stool Feces  Preliminary Report (06-18-25 @ 20:23):    No enteric pathogens to date: Final culture pending        RADIOLOGY & ADDITIONAL TESTS:  No new imaging to review SUBJECTIVE  BRIEF HOSPITAL COURSE SUMMARY:  Pt is a 61 y/o F w/ PMH of HTN, HLD, prior obesity (currently on mounjaro for 1.5 yrs w/ 80lb wt loss) and anxiety/depression (on lexapro) s/p L-LICHA (6/9/25 at [facility]) presented to the ED c/o nausea, NBNB emesis and large vol watery nonbloody diarrhea w/ associated LLQ abd pain that started 3 days ago.  Pt also noticed she stopped making urine Saturday and since arrival at the hospital has not had a BM.  pt has been on naproxen 500mg BID since L-hip surgery 6/9 and is also on losartan.  VS notable for soft SBP in 90's, sinus tachy and RR 20 but not hypoxic or febrile.  On exam patient appeared dehydrated w/ mild LLQ tenderness to palpation.  Bernard in place w/ no urine.  Labs significant for WBC 23.12, K 6.5-->4.9, AGMA, BUN 64, Cr 5.22-->5.08 (baseline 0.85), Transaminitis , lipase nL, CT a/p w/ possible mild colitis. Pt given zosyn, albumin, 3L LR and 1L NS, 10g lokelma, 2g Ca gluconate, 4mg IV morphine and 4mg IV zofran in ED. MICU consulted and recommending SDU.  Pt admitted for acute renal failure w/ mild rhabdo 2/2 to n/v/d while on NSAIDs and ARB. Nephro consulted, started on plasmalyte. ID consulted for colitis, concern for viral gastroentereitis and zosyn held on 6/17 as unclear if beneficial plus worsening renal function. GI consulted for colitis, recommend patient f/u w/ her gastroenterologist Dr. Gypsy Jackson for outpt colonoscopy in 6-8wks. Renal US showed no hydronephrosis. Per nephro recs, losartan was held. Renal function worsening no response to IV lasix, patient started on HD, vascular surgery placed dialysis catheter.    LAST 24 HOURS:  Pt underwent dialysis catheter placement yesterday and HD.  TODAY:  No acute events overnight  Pt seen at bedside in AM, hemodynamically stable on room air in no acute distress, AAOx3. States every time she eats she experiences loose stools. Endorses not the best appetite but has been trying to eat food provided to her, able to eat some breakfast this morning. Endorses adequate pain control.   No acute medical complaints at this time    Update @12:39 pm: per nurse report patient had a bloody  BM yesterday, team was made aware but not concerned. This AM patient has had 3 bloody BM. Pt was reassessed at bedside, vitals wnl. Pt endorses feeling dizzy when moves head abruptly, but symptoms have been present since she has been admitted. Likely due to getting used to HD. Pt reports she believes bloody BM due to acute colitis, but has not had them before. H/H ordered will ctm.     OBJECTIVE    PHYSICAL EXAM:  General: Middle aged female sitting up in bed, comfortable  HEENT:  Anicteric sclera  NECK:  RIJ HD catheter  CVS: regular rate and rhythm S1 S2  RESP:  Lungs CTAB, no increased work of breathing  GI:  Soft nondistended nontender BS+  : No suprapubic tenderness  MSK:  No edema. Moves all extremities  CNS:  Awake, alert, oriented. Fluent speech and follows commands  INTEG:  Warm Skin  PSYCH:  AAOx3    Vital Signs Last 24 Hrs  T(C): 37.2 (19 Jun 2025 08:07), Max: 37.2 (19 Jun 2025 04:26)  T(F): 99 (19 Jun 2025 08:07), Max: 99 (19 Jun 2025 04:26)  HR: 89 (19 Jun 2025 08:07) (81 - 94)  BP: 119/75 (19 Jun 2025 08:07) (107/68 - 131/86)  BP(mean): 89 (18 Jun 2025 16:00) (80 - 90)  RR: 18 (19 Jun 2025 08:07) (17 - 18)  SpO2: 95% (19 Jun 2025 08:07) (91% - 100%)    Parameters below as of 19 Jun 2025 08:07  Patient On (Oxygen Delivery Method): room air            MEDICATIONS  (STANDING):  chlorhexidine 2% Cloths 1 Application(s) Topical <User Schedule>  chlorhexidine 2% Cloths 1 Application(s) Topical <User Schedule>  dextrose 5%. 1000 milliLiter(s) (100 mL/Hr) IV Continuous <Continuous>  dextrose 50% Injectable 25 Gram(s) IV Push once  dextrose 50% Injectable 12.5 Gram(s) IV Push once  dextrose 50% Injectable 25 Gram(s) IV Push once  dextrose Oral Gel 15 Gram(s) Oral once  escitalopram 20 milliGRAM(s) Oral daily  glucagon  Injectable 1 milliGRAM(s) IntraMuscular once  heparin   Injectable 5000 Unit(s) SubCutaneous every 8 hours  multiple electrolytes Injection Type 1 1000 milliLiter(s) (100 mL/Hr) IV Continuous <Continuous>    MEDICATIONS  (PRN):  acetaminophen     Tablet .. 650 milliGRAM(s) Oral every 6 hours PRN Temp greater or equal to 38C (100.4F), Mild Pain (1 - 3)  acetaminophen     Tablet .. 975 milliGRAM(s) Oral every 8 hours PRN Moderate Pain (4 - 6)  aluminum hydroxide/magnesium hydroxide/simethicone Suspension 30 milliLiter(s) Oral every 4 hours PRN Dyspepsia  HYDROmorphone   Tablet 2 milliGRAM(s) Oral every 4 hours PRN Severe Pain (7 - 10)  HYDROmorphone  Injectable 0.5 milliGRAM(s) IV Push every 4 hours PRN breakthrough pain  melatonin 3 milliGRAM(s) Oral at bedtime PRN Insomnia  ondansetron Injectable 4 milliGRAM(s) IV Push every 8 hours PRN Nausea and/or Vomiting  sodium chloride 0.9% lock flush 10 milliLiter(s) IV Push every 1 hour PRN Pre/post blood products, medications, blood draw, and to maintain line patency    Allergies    No Known Allergies    Intolerances        LABS:                        8.7    9.23  )-----------( 233      ( 19 Jun 2025 05:42 )             25.8     06-19    139  |  100  |  26.4[H]  ----------------------------<  94  3.9   |  25.0  |  5.20[H]    Ca    7.8[L]      19 Jun 2025 05:42  Phos  4.7     06-18  Mg     2.4     06-18    TPro  4.3[L]  /  Alb  2.3[L]  /  TBili  0.4  /  DBili  x   /  AST  60[H]  /  ALT  87[H]  /  AlkPhos  151[H]  06-19      Urinalysis Basic - ( 19 Jun 2025 05:42 )    Color: x / Appearance: x / SG: x / pH: x  Gluc: 94 mg/dL / Ketone: x  / Bili: x / Urobili: x   Blood: x / Protein: x / Nitrite: x   Leuk Esterase: x / RBC: x / WBC x   Sq Epi: x / Non Sq Epi: x / Bacteria: x      CAPILLARY BLOOD GLUCOSE          CULTURE DATA:    Culture - Blood (collected 06-15-25 @ 22:25)  Source: Blood Blood-Peripheral  Preliminary Report (06-19-25 @ 01:01):    No growth at 72 Hours    Culture - Stool (collected 06-16-25 @ 20:20)  Source: Stool Feces  Preliminary Report (06-18-25 @ 20:23):    No enteric pathogens to date: Final culture pending        RADIOLOGY & ADDITIONAL TESTS:  No new imaging to review

## 2025-06-19 NOTE — PROGRESS NOTE ADULT - SUBJECTIVE AND OBJECTIVE BOX
[facility] DIVISION OF KIDNEY DISEASES AND HYPERTENSION -- PROGRESS NOTE    SUBJECTIVE:   last HD yesterday. making urine. not much pO intake. still has lots of diarrhea    MEDICATIONS    Standing Inpatient Medications  ascorbic acid 500 milliGRAM(s) Oral daily  chlorhexidine 2% Cloths 1 Application(s) Topical <User Schedule>  chlorhexidine 2% Cloths 1 Application(s) Topical <User Schedule>  dextrose 5%. 1000 milliLiter(s) IV Continuous <Continuous>  dextrose 50% Injectable 25 Gram(s) IV Push once  dextrose 50% Injectable 12.5 Gram(s) IV Push once  dextrose 50% Injectable 25 Gram(s) IV Push once  dextrose Oral Gel 15 Gram(s) Oral once  escitalopram 20 milliGRAM(s) Oral daily  glucagon  Injectable 1 milliGRAM(s) IntraMuscular once  heparin   Injectable 5000 Unit(s) SubCutaneous every 8 hours  multiple electrolytes Injection Type 1 1000 milliLiter(s) IV Continuous <Continuous>  multivitamin 1 Tablet(s) Oral daily    PRN Inpatient Medications  acetaminophen     Tablet .. 650 milliGRAM(s) Oral every 6 hours PRN  acetaminophen     Tablet .. 975 milliGRAM(s) Oral every 8 hours PRN  aluminum hydroxide/magnesium hydroxide/simethicone Suspension 30 milliLiter(s) Oral every 4 hours PRN  HYDROmorphone   Tablet 2 milliGRAM(s) Oral every 4 hours PRN  HYDROmorphone  Injectable 0.5 milliGRAM(s) IV Push every 4 hours PRN  melatonin 3 milliGRAM(s) Oral at bedtime PRN  ondansetron Injectable 4 milliGRAM(s) IV Push every 8 hours PRN  sodium chloride 0.9% lock flush 10 milliLiter(s) IV Push every 1 hour PRN      VITALS/PHYSICAL EXAM  --------------------------------------------------------------------------------  T(C): 37.2 (06-19-25 @ 08:07), Max: 37.2 (06-19-25 @ 04:26)  HR: 89 (06-19-25 @ 08:07) (87 - 94)  BP: 119/75 (06-19-25 @ 08:07) (119/75 - 131/86)  RR: 18 (06-19-25 @ 08:07) (18 - 18)  SpO2: 95% (06-19-25 @ 08:07) (91% - 100%)  Wt(kg): --        06-18-25 @ 07:01  -  06-19-25 @ 07:00  --------------------------------------------------------  IN: 1230 mL / OUT: 445 mL / NET: 785 mL    06-19-25 @ 07:01  -  06-19-25 @ 18:11  --------------------------------------------------------  IN: 0 mL / OUT: 700 mL / NET: -700 mL      Physical Exam:  Alert and oriented x3   HEENT: normal  Pulm: CTA B/L  CV: normal S1S2; no murmur  Abd: soft, non-tender  Extremities- no edema  Marco Antonio+  RYLAND HD catheter    LABS/STUDIES  --------------------------------------------------------------------------------  139  |  100  |  26.4  ----------------------------<  94      [06-19-25 @ 05:42]  3.9   |  25.0  |  5.20        Ca     7.8     [06-19-25 @ 05:42]      Mg     2.4     [06-18-25 @ 09:06]      Phos  4.7     [06-18-25 @ 09:06]    TPro  4.3  /  Alb  2.3  /  TBili  0.4  /  DBili  x   /  AST  60  /  ALT  87  /  AlkPhos  151  [06-19-25 @ 05:42]          Creatinine Trend:  SCr 5.20 [06-19 @ 05:42]  SCr 6.29 [06-18 @ 09:06]  SCr 7.38 [06-17 @ 06:10]  SCr 5.16 [06-16 @ 04:30]  SCr 5.08 [06-16 @ 00:45]

## 2025-06-19 NOTE — PROGRESS NOTE ADULT - ATTENDING COMMENTS
Agree with above   Patient seen and examined together with medical residents. Reports feeling better. Able to tolerate small amount of food and water. Still with diarrhea after food intake. Had 2 sessions of HD.   Vital signs,  labs and imaging  reviewed.   Assessment and plan discussed with pt and residents   She is 61 yo female who had a recent hip replacement was having n/v, diarrhea for couple of days and on admission had acute kidney failure with elevated K.   HD was initiated and had 2 sessions so far. K improved. Serum Cr still around 5. Making urine, but not much   Plan of care as above

## 2025-06-20 ENCOUNTER — TRANSCRIPTION ENCOUNTER (OUTPATIENT)
Age: 60
End: 2025-06-20

## 2025-06-20 LAB
ALBUMIN SERPL ELPH-MCNC: 2.8 G/DL — LOW (ref 3.3–5.2)
ALP SERPL-CCNC: 159 U/L — HIGH (ref 40–120)
ALT FLD-CCNC: 109 U/L — HIGH
ANION GAP SERPL CALC-SCNC: 14 MMOL/L — SIGNIFICANT CHANGE UP (ref 5–17)
AST SERPL-CCNC: 80 U/L — HIGH
BILIRUB SERPL-MCNC: 0.3 MG/DL — LOW (ref 0.4–2)
BUN SERPL-MCNC: 31.1 MG/DL — HIGH (ref 8–20)
CALCIUM SERPL-MCNC: 8.4 MG/DL — SIGNIFICANT CHANGE UP (ref 8.4–10.5)
CHLORIDE SERPL-SCNC: 99 MMOL/L — SIGNIFICANT CHANGE UP (ref 96–108)
CO2 SERPL-SCNC: 27 MMOL/L — SIGNIFICANT CHANGE UP (ref 22–29)
CREAT SERPL-MCNC: 6.3 MG/DL — HIGH (ref 0.5–1.3)
EGFR: 7 ML/MIN/1.73M2 — LOW
EGFR: 7 ML/MIN/1.73M2 — LOW
GLUCOSE SERPL-MCNC: 105 MG/DL — HIGH (ref 70–99)
HCT VFR BLD CALC: 28.6 % — LOW (ref 34.5–45)
HGB BLD-MCNC: 9.3 G/DL — LOW (ref 11.5–15.5)
MAGNESIUM SERPL-MCNC: 2.4 MG/DL — SIGNIFICANT CHANGE UP (ref 1.6–2.6)
MCHC RBC-ENTMCNC: 28.5 PG — SIGNIFICANT CHANGE UP (ref 27–34)
MCHC RBC-ENTMCNC: 32.5 G/DL — SIGNIFICANT CHANGE UP (ref 32–36)
MCV RBC AUTO: 87.7 FL — SIGNIFICANT CHANGE UP (ref 80–100)
NRBC # BLD AUTO: 0 K/UL — SIGNIFICANT CHANGE UP (ref 0–0)
NRBC # FLD: 0 K/UL — SIGNIFICANT CHANGE UP (ref 0–0)
NRBC BLD AUTO-RTO: 0 /100 WBCS — SIGNIFICANT CHANGE UP (ref 0–0)
PHOSPHATE SERPL-MCNC: 3.5 MG/DL — SIGNIFICANT CHANGE UP (ref 2.4–4.7)
PLATELET # BLD AUTO: 276 K/UL — SIGNIFICANT CHANGE UP (ref 150–400)
PMV BLD: 9.9 FL — SIGNIFICANT CHANGE UP (ref 7–13)
POTASSIUM SERPL-MCNC: 4.1 MMOL/L — SIGNIFICANT CHANGE UP (ref 3.5–5.3)
POTASSIUM SERPL-SCNC: 4.1 MMOL/L — SIGNIFICANT CHANGE UP (ref 3.5–5.3)
PROT SERPL-MCNC: 5 G/DL — LOW (ref 6.6–8.7)
RBC # BLD: 3.26 M/UL — LOW (ref 3.8–5.2)
RBC # FLD: 12.7 % — SIGNIFICANT CHANGE UP (ref 10.3–14.5)
SODIUM SERPL-SCNC: 140 MMOL/L — SIGNIFICANT CHANGE UP (ref 135–145)
WBC # BLD: 11.11 K/UL — HIGH (ref 3.8–10.5)
WBC # FLD AUTO: 11.11 K/UL — HIGH (ref 3.8–10.5)

## 2025-06-20 PROCEDURE — 99233 SBSQ HOSP IP/OBS HIGH 50: CPT

## 2025-06-20 PROCEDURE — 99233 SBSQ HOSP IP/OBS HIGH 50: CPT | Mod: GC

## 2025-06-20 PROCEDURE — G0545: CPT

## 2025-06-20 PROCEDURE — 99232 SBSQ HOSP IP/OBS MODERATE 35: CPT

## 2025-06-20 RX ORDER — SIMETHICONE 80 MG
80 TABLET,CHEWABLE ORAL ONCE
Refills: 0 | Status: COMPLETED | OUTPATIENT
Start: 2025-06-20 | End: 2025-06-20

## 2025-06-20 RX ORDER — PIPERACILLIN-TAZO-DEXTROSE,ISO 3.375G/5
3.38 IV SOLUTION, PIGGYBACK PREMIX FROZEN(ML) INTRAVENOUS ONCE
Refills: 0 | Status: COMPLETED | OUTPATIENT
Start: 2025-06-20 | End: 2025-06-20

## 2025-06-20 RX ORDER — PIPERACILLIN-TAZO-DEXTROSE,ISO 3.375G/5
3.38 IV SOLUTION, PIGGYBACK PREMIX FROZEN(ML) INTRAVENOUS EVERY 12 HOURS
Refills: 0 | Status: DISCONTINUED | OUTPATIENT
Start: 2025-06-21 | End: 2025-06-27

## 2025-06-20 RX ORDER — LOPERAMIDE HCL 1 MG/7.5ML
2 SOLUTION ORAL ONCE
Refills: 0 | Status: COMPLETED | OUTPATIENT
Start: 2025-06-20 | End: 2025-06-21

## 2025-06-20 RX ADMIN — Medication 4 MILLIGRAM(S): at 22:40

## 2025-06-20 RX ADMIN — Medication 4 MILLIGRAM(S): at 14:30

## 2025-06-20 RX ADMIN — Medication 30 MILLILITER(S): at 06:02

## 2025-06-20 RX ADMIN — Medication 0.5 MILLIGRAM(S): at 10:20

## 2025-06-20 RX ADMIN — ESCITALOPRAM OXALATE 20 MILLIGRAM(S): 20 TABLET ORAL at 11:03

## 2025-06-20 RX ADMIN — Medication 0.5 MILLIGRAM(S): at 14:30

## 2025-06-20 RX ADMIN — Medication 30 MILLILITER(S): at 10:19

## 2025-06-20 RX ADMIN — Medication 4 MILLIGRAM(S): at 05:15

## 2025-06-20 RX ADMIN — Medication 80 MILLIGRAM(S): at 07:46

## 2025-06-20 RX ADMIN — Medication 200 GRAM(S): at 12:40

## 2025-06-20 RX ADMIN — Medication 0.5 MILLIGRAM(S): at 22:40

## 2025-06-20 RX ADMIN — Medication 0.5 MILLIGRAM(S): at 02:15

## 2025-06-20 RX ADMIN — Medication 30 MILLILITER(S): at 14:30

## 2025-06-20 RX ADMIN — Medication 0.5 MILLIGRAM(S): at 15:30

## 2025-06-20 RX ADMIN — Medication 25 GRAM(S): at 22:40

## 2025-06-20 RX ADMIN — Medication 1 APPLICATION(S): at 05:15

## 2025-06-20 RX ADMIN — Medication 1000 MILLILITER(S): at 11:33

## 2025-06-20 RX ADMIN — Medication 30 MILLILITER(S): at 01:52

## 2025-06-20 RX ADMIN — Medication 0.5 MILLIGRAM(S): at 06:02

## 2025-06-20 RX ADMIN — Medication 1 TABLET(S): at 11:03

## 2025-06-20 RX ADMIN — Medication 0.5 MILLIGRAM(S): at 01:52

## 2025-06-20 RX ADMIN — Medication 500 MILLIGRAM(S): at 11:03

## 2025-06-20 RX ADMIN — Medication 0.5 MILLIGRAM(S): at 06:25

## 2025-06-20 RX ADMIN — Medication 0.5 MILLIGRAM(S): at 11:20

## 2025-06-20 RX ADMIN — Medication 0.5 MILLIGRAM(S): at 23:00

## 2025-06-20 RX ADMIN — Medication 30 MILLILITER(S): at 22:40

## 2025-06-20 NOTE — PROGRESS NOTE ADULT - ASSESSMENT
1.  Acute kidney injury with hyperkalemia and acidosis: Multifactorial likely combination of prerenal/ATN/NSAID nephrotoxicity along with ARB use.  Ultrasound shows no evidence of hydronephrosis. HD started on 6/17. watch for recovery. lots of diarrhea. not much clearance. HD today. continue IV fluids.   2.  Hypertension hold losartan.  Continue IV fluids  3.  Status post hip replacement.  Hold NSAIDs per Ortho.  4.  Colitis noted on CT abdomen.  ongoing diarrhea- per GI        Otoniel Schmitz MD, IRA

## 2025-06-20 NOTE — PROGRESS NOTE ADULT - SUBJECTIVE AND OBJECTIVE BOX
[facility]  INFECTIOUS DISEASES at [place] and [place]  ===============================================================                  Bob Duncan MD               Diplomates American Board of Internal Medicine & Infectious Diseases                * [place] Office - Appt - Tel  [phone] Fax [phone]                * [place] Office - Appt - Tel [phone] Fax [phone]                                  Hospital Consult line:  [phone]  ==============================================================    CAROLEE AN [id number]    Follow up: diarrhea    Diarrhea was improving, now picking up again   no fever   No dialysis yesterday but will need one session today     I have personally reviewed the labs and data; pertinent labs and data are listed in this note; please see below.     _______________________________________________________________  REVIEW OF SYSTEMS  Having profuse diarrhea again with blood. No vomiting but any oral intake triggers a BM   ________________________________________________________________  Allergies:  No Known Allergies    ________________________________________________________________  PHYSICAL EXAM  GEN: ill appearing.   HEENT: Anicteric sclerae.  NECK: Shiley  LUNGS: eupneic.   ABDOMEN: Soft, NT, ND +BS.    : Bernard catheter  NEUROLOGIC: Grossly no motor focal deficits   PSYCHIATRIC: Appropriate affect and mood  SKIN: No rash,  LINES: PIV   ________________________________________________________________  Vitals:  T(F): 99.3 (20 Jun 2025 08:04), Max: 99.5 (20 Jun 2025 04:27)  HR: 93 (20 Jun 2025 08:04)  BP: 149/88 (20 Jun 2025 08:04)  RR: 18 (20 Jun 2025 08:04)  SpO2: 95% (20 Jun 2025 08:04) (94% - 100%)  temp max in last 48H T(F): , Max: 99.5 (06-20-25 @ 04:27)    Current Antibiotics:  piperacillin/tazobactam IVPB.- 3.375 Gram(s) IV Intermittent once    Other medications:  ascorbic acid 500 milliGRAM(s) Oral daily  chlorhexidine 2% Cloths 1 Application(s) Topical <User Schedule>  chlorhexidine 2% Cloths 1 Application(s) Topical <User Schedule>  dextrose 5%. 1000 milliLiter(s) IV Continuous <Continuous>  dextrose 50% Injectable 25 Gram(s) IV Push once  dextrose 50% Injectable 12.5 Gram(s) IV Push once  dextrose 50% Injectable 25 Gram(s) IV Push once  dextrose Oral Gel 15 Gram(s) Oral once  escitalopram 20 milliGRAM(s) Oral daily  glucagon  Injectable 1 milliGRAM(s) IntraMuscular once  heparin   Injectable 5000 Unit(s) SubCutaneous every 8 hours  loperamide 2 milliGRAM(s) Oral once  multiple electrolytes Injection Type 1 1000 milliLiter(s) IV Continuous <Continuous>  multivitamin 1 Tablet(s) Oral daily                            9.3    11.11 )-----------( 276      ( 20 Jun 2025 04:46 )             28.6     06-20    140  |  99  |  31.1[H]  ----------------------------<  105[H]  4.1   |  27.0  |  6.30[H]    Ca    8.4      20 Jun 2025 04:46  Phos  3.5     06-20  Mg     2.4     06-20    TPro  5.0[L]  /  Alb  2.8[L]  /  TBili  0.3[L]  /  DBili  x   /  AST  80[H]  /  ALT  109[H]  /  AlkPhos  159[H]  06-20    RECENT CULTURES:  06-16 @ 20:20 Stool Feces     No enteric pathogens isolated.  (Stool culture examined for Salmonella,  Shigella, Campylobacter, Aeromonas, Plesiomonas,  Vibrio, E.coli O157 and Yersinia)        06-15 @ 22:25 Blood Blood-Peripheral     No growth at 4 days      WBC Count: 11.11 K/uL (06-20-25 @ 04:46)  WBC Count: 9.23 K/uL (06-19-25 @ 05:42)  WBC Count: 8.55 K/uL (06-18-25 @ 09:06)  WBC Count: 9.90 K/uL (06-17-25 @ 06:10)  WBC Count: 13.70 K/uL (06-16-25 @ 09:45)  WBC Count: 23.12 K/uL (06-15-25 @ 20:24)    Creatinine: 6.30 mg/dL (06-20-25 @ 04:46)  Creatinine: 5.20 mg/dL (06-19-25 @ 05:42)  Creatinine: 6.29 mg/dL (06-18-25 @ 09:06)  Creatinine: 7.38 mg/dL (06-17-25 @ 06:10)  Creatinine: 5.16 mg/dL (06-16-25 @ 04:30)  Creatinine: 5.08 mg/dL (06-16-25 @ 00:45)  Creatinine: 5.00 mg/dL (06-15-25 @ 21:31)  Creatinine: 5.22 mg/dL (06-15-25 @ 20:24)      SARS-CoV-2 Result: NotDetec (06-15-25 @ 22:25)      ________________________________________________________________  CARDIOLOGY   ________________________________________________________________  RADIOLOGY  < from: CT Abdomen and Pelvis No Cont (06.15.25 @ 22:14) >  FINDINGS:  LOWER CHEST: Minimal linear dependent atelectatic changes. Normal-sized   heart.    LIVER: Within normal limits.  BILE DUCTS: Normal caliber.  GALLBLADDER: Cholecystectomy.  SPLEEN: Within normal limits. Normal size. Splenule.  PANCREAS: Within normal limits.  ADRENALS: Within normal limits.  KIDNEYS/URETERS: No hydronephrosis, hydroureter or nephroureterolithiasis.    BLADDER: Decompressed with a Bernard catheter in place.  REPRODUCTIVE ORGANS: Uterus and adnexa within normal limits.    BOWEL: Small hiatal hernia. Stomach and duodenum have grossly normal in   appearance. Small bowel is not dilated. No bowel obstruction. Appendix is   normal. No acute appendicitis. Thickening of the sigmoid colonic wall   with several no-inflamed diverticula, no peridiverticular fat stranding   or inflammation to suggest acute diverticulitis. There is decompressed   descending colon with of mild wall thickening and minimal fat stranding   in the mesocolon and mesosigmoid.    PERITONEUM/RETROPERITONEUM: No pneumoperitoneum, ascites or loculated   fluid collections. No abscess.  VESSELS: Within normal limits. Normal caliber of the abdominal aorta.  LYMPH NODES: No lymphadenopathy.  ABDOMINAL WALL: Within normal limits.  BONES: Sacral Tarlov cysts. Anterolisthesis L4 over L5 with spinal fusion   hardware in this segment. Posterior decompression L4. 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    No free perforation.    < end of copied text >

## 2025-06-20 NOTE — PROGRESS NOTE ADULT - ASSESSMENT
61 y/o F with PMHX HTN, HLD, obesity and anxiety/depression (on lexapro) s/p L-LICHA (6/9/25 at [facility]) presents complaining of abdominal pain, vomiting and diarrhea    #Colitis  #Elevated liver enzymes  #NAVI   CT Abdomen and Pelvis No Cont (06.15.25 @ 22:14) > thickening of sigmoid colonic wall with several no-inflamed diverticula, no peridiverticular fat stranding or inflammation to suggest acute diverticulitis; decompressed descending colon with mild wall thickening and minimal fat stranding in the mesocolon/sigmoid; findings compatible with mild acute colitis in the left descending and sigmoid colon which may be of inflammatory, infectious or ischemic etiology.   C Diff by PCR Result: NotDetec (06.16.25 @ 20:20)  GI PCR Panel: NotDetec (06.16.25 @ 20:20)  Ova and Parasites (06.16.25 @ 20:20) No Protozoa seen by trichrome stain, No Helminths or Protozoa seen   Culture - Stool (06.16.25 @ 20:20) No enteric pathogens isolated.    - Add bentyl for abdominal spasms   - Trend CBC  - Trend electrolytes, replete as needed   - Trend LFTs. Avoid hepatotoxins.   - Monitor for fever  - Continue supportive care  - Diet as tolerated (No lactose, low fiber, low fat)   - Stool count  - Repeat C. diff sample   - Check fecal calprotectin, fecal elastase, TSH, ESR/CRP, celiac cascade  - Defer abx to ID   - Outpatient colonoscopy in 6-8 weeks when acute inflammation resolves, she can follow up with her gastroenterologist Dr. Beena Jackson,  Further care per primary team / nephrology   _________________________________________________________________  Assessment and recommendations are final when note is signed by the attending physician.  61 y/o F with PMHX HTN, HLD, obesity and anxiety/depression (on lexapro) s/p L-LICHA (6/9/25 at [facility]) presents complaining of abdominal pain, vomiting and diarrhea    #Colitis  #Elevated liver enzymes  #NAVI   CT Abdomen and Pelvis No Cont (06.15.25 @ 22:14) > thickening of sigmoid colonic wall with several no-inflamed diverticula, no peridiverticular fat stranding or inflammation to suggest acute diverticulitis; decompressed descending colon with mild wall thickening and minimal fat stranding in the mesocolon/sigmoid; findings compatible with mild acute colitis in the left descending and sigmoid colon which may be of inflammatory, infectious or ischemic etiology.   C Diff by PCR Result: NotDetec (06.16.25 @ 20:20)  GI PCR Panel: NotDetec (06.16.25 @ 20:20)  Ova and Parasites (06.16.25 @ 20:20) No Protozoa seen by trichrome stain, No Helminths or Protozoa seen   Culture - Stool (06.16.25 @ 20:20) No enteric pathogens isolated.  Acute hepatitis panel negative, EBV/CMV negative     - Add bentyl for abdominal spasms   - Trend CBC  - Trend electrolytes, replete as needed   - Trend LFTs. Avoid hepatotoxins.   - Obtain abdominal ultrasound   - Monitor for fever  - Continue supportive care  - Diet as tolerated (No lactose, low fiber, low fat)   - Stool count  - Repeat C. diff sample   - Check fecal calprotectin, fecal elastase, TSH, ESR/CRP, celiac cascade  - Defer abx to ID   - Outpatient colonoscopy in 6-8 weeks when acute inflammation resolves, she can follow up with her gastroenterologist Dr. Beena Jackson,  Further care per primary team / nephrology   _________________________________________________________________  Assessment and recommendations are final when note is signed by the attending physician.

## 2025-06-20 NOTE — PROGRESS NOTE ADULT - ASSESSMENT
59 y/o F w/ PMH of HTN, HLD, prior obesity (currently on mounjaro for 1.5 yrs w/ 80lb wt loss) and anxiety/depression (on lexapro) s/p L-LICHA (6/9/25 at [facility]) presents c/o nausea, NBNB emesis and large vol watery nonbloody diarrhea w/ associated LLQ abd pain x 3 days ago associated w/ anuria, admitted for acute renal failure and sepsis 2/2 colitis      # Acute renal failure w/ mild rhabdo suspect multifactorial from N/V/D while on NSAIDs and ARB   # Hypovolemic mild hyponatremic hypochloremia likely 2/2 N/V/D  - monae catheter placed in ED  -  Holding outpt Naproxen, losartan   - BUN 31/Cr 6.3; last known Cr 0.85 5/2025   - s/p HD 6/18  - c/w IVF  - encourage po intake  - Strict I/O's  - avoid nephrotoxic agents or renally dose if needed  - c/w prn antiemetics  - nephrology following       # Sepsis   # Colitis ; infectious vs ischemic  - CT a/p w/ findings compatible with mild acute colitis in the left descending and sigmoid colon  - slight leukocytosis, afebrile  - 6/15 bld cx: ngtd, prelim read  - 6/16 stool cx: NGTD, final read  - reported 3 bloody BM yesterday, HgB 9.3, stable  - f/u UA  - zosyn held   - ID following  - ctm CBC, temp        # AGMA likely 2/2 uremia/renal failure - resolved    # Hyperkalemia likely 2/2 ARF and ARB, - resolved  - holding ARB given renal fxn  - ctm on telemetry     # Transaminitis w/ hepatocellular distribution   Ischemic.   CT a/p w/ normal liver, bile ducts nL caliber and s/p cholecystectomy  CMV, EBV to r/u infectious etiology - negative  - Trend LFTs and avoid hepatotoxic medications     # HTN / HLD  - /88  - holding ARB due to renal fxn   - Holding statin for transaminitis     # Former obesity  - On mounjaro w/ 80lb weight loss and now BMI 23  - Hold mounjaro while inpt       VTE ppx:  Heparin sq  Diet: low fiber. Would like to speak to RD about diet  Dispositon: Acute. Pending renal recovery, HD     59 y/o F w/ PMH of HTN, HLD, prior obesity (currently on mounjaro for 1.5 yrs w/ 80lb wt loss) and anxiety/depression (on lexapro) s/p L-LICHA (6/9/25 at [facility]) presents c/o nausea, NBNB emesis and large vol watery nonbloody diarrhea w/ associated LLQ abd pain x 3 days ago associated w/ anuria, admitted for acute renal failure and sepsis 2/2 colitis      # Acute renal failure w/ mild rhabdo suspect multifactorial from N/V/D while on NSAIDs and ARB   # Hypovolemic mild hyponatremic hypochloremia likely 2/2 N/V/D  - monae catheter placed in ED  -  Holding outpt Naproxen, losartan   - BUN 31/Cr 6.3; last known Cr 0.85 5/2025   - s/p HD 6/18, plan for HD today  - will give 1L NS bolus today  - encourage po intake  - Strict I/O's  - avoid nephrotoxic agents or renally dose if needed  - c/w prn antiemetics  - nephrology following       # Sepsis   # Colitis ; infectious vs ischemic  - CT a/p w/ findings compatible with mild acute colitis in the left descending and sigmoid colon  - slight leukocytosis, afebrile  - 6/15 bld cx: ngtd, prelim read  - 6/16 stool cx: NGTD, final read  - reported 3 bloody BM yesterday, HgB 9.3, stable  - reported 6 watery BM this AM  - bentyl for abd spasms  - low fiber diet (low fat and low lactulose)  - f/u repeat C. diff  - f/u feval calprotectin, fecal elastase, TSH, ESR/CRP, celiac cascade  - f/u UA  - started zosyn   - reached out to GI for re-eval per patient request  - ID following  - ctm CBC, temp        # AGMA likely 2/2 uremia/renal failure - resolved    # Hyperkalemia likely 2/2 ARF and ARB, - resolved  - holding ARB given renal fxn  - ctm on telemetry     # Transaminitis w/ hepatocellular distribution   Ischemic.   CT a/p w/ normal liver, bile ducts nL caliber and s/p cholecystectomy  CMV, EBV to r/u infectious etiology - negative  - Trend LFTs and avoid hepatotoxic medications     # HTN / HLD  - /88  - holding ARB due to renal fxn   - Holding statin for transaminitis     # Former obesity  - On mounjaro w/ 80lb weight loss and now BMI 23  - Hold mounjaro while inpt       VTE ppx:  Heparin sq  Diet: low fiber diet  Dispositon: Acute. Pending renal recovery, HD

## 2025-06-20 NOTE — PHYSICAL THERAPY INITIAL EVALUATION ADULT - PERTINENT HX OF CURRENT PROBLEM, REHAB EVAL
Acute renal failure w/ mild rhabdo suspect multifactorial from N/V/D while on NSAIDs and ARB, Hypovolemic mild hyponatremic hypochloremia likely 2/2 N/V/D,  sepsis 2/2 colitis, s/p L-LICHA (6/9/25 at [facility]) Daily Assessment

## 2025-06-20 NOTE — DISCHARGE NOTE NURSING/CASE MANAGEMENT/SOCIAL WORK - PATIENT PORTAL LINK FT
You can access the FollowMyHealth Patient Portal offered by [facility] by registering at the following website: [email or web address]. By joining [employer]’s FollowMyHealth portal, you will also be able to view your health information using other applications (apps) compatible with our system.

## 2025-06-20 NOTE — PROGRESS NOTE ADULT - SUBJECTIVE AND OBJECTIVE BOX
[facility] DIVISION OF KIDNEY DISEASES AND HYPERTENSION -- PROGRESS NOTE    SUBJECTIVE:   making urine. still has large volume diarrhea    MEDICATIONS    Standing Inpatient Medications  ascorbic acid 500 milliGRAM(s) Oral daily  chlorhexidine 2% Cloths 1 Application(s) Topical <User Schedule>  chlorhexidine 2% Cloths 1 Application(s) Topical <User Schedule>  dextrose 5%. 1000 milliLiter(s) IV Continuous <Continuous>  dextrose 50% Injectable 25 Gram(s) IV Push once  dextrose 50% Injectable 12.5 Gram(s) IV Push once  dextrose 50% Injectable 25 Gram(s) IV Push once  dextrose Oral Gel 15 Gram(s) Oral once  escitalopram 20 milliGRAM(s) Oral daily  glucagon  Injectable 1 milliGRAM(s) IntraMuscular once  heparin   Injectable 5000 Unit(s) SubCutaneous every 8 hours  loperamide 2 milliGRAM(s) Oral once  multiple electrolytes Injection Type 1 1000 milliLiter(s) IV Continuous <Continuous>  multivitamin 1 Tablet(s) Oral daily  piperacillin/tazobactam IVPB.- 3.375 Gram(s) IV Intermittent once    PRN Inpatient Medications  acetaminophen     Tablet .. 650 milliGRAM(s) Oral every 6 hours PRN  acetaminophen     Tablet .. 975 milliGRAM(s) Oral every 8 hours PRN  aluminum hydroxide/magnesium hydroxide/simethicone Suspension 30 milliLiter(s) Oral every 4 hours PRN  dicyclomine 20 milliGRAM(s) Oral four times a day before meals PRN  HYDROmorphone   Tablet 2 milliGRAM(s) Oral every 4 hours PRN  HYDROmorphone  Injectable 0.5 milliGRAM(s) IV Push every 4 hours PRN  melatonin 3 milliGRAM(s) Oral at bedtime PRN  ondansetron Injectable 4 milliGRAM(s) IV Push every 8 hours PRN  sodium chloride 0.9% lock flush 10 milliLiter(s) IV Push every 1 hour PRN      VITALS/PHYSICAL EXAM  --------------------------------------------------------------------------------  T(C): 37.6 (06-20-25 @ 14:35), Max: 37.6 (06-20-25 @ 14:35)  HR: 102 (06-20-25 @ 14:35) (93 - 102)  BP: 145/81 (06-20-25 @ 14:35) (125/78 - 149/88)  RR: 18 (06-20-25 @ 14:35) (18 - 18)  SpO2: 96% (06-20-25 @ 14:35) (94% - 96%)  Wt(kg): --        06-19-25 @ 07:01  -  06-20-25 @ 07:00  --------------------------------------------------------  IN: 0 mL / OUT: 950 mL / NET: -950 mL      Physical Exam:  Alert and oriented x3   HEENT: normal  Pulm: CTA B/L  CV: normal S1S2; no murmur  Abd: soft, non-tender  Extremities- no edema    LABS/STUDIES  --------------------------------------------------------------------------------  140  |  99  |  31.1  ----------------------------<  105      [06-20-25 @ 04:46]  4.1   |  27.0  |  6.30        Ca     8.4     [06-20-25 @ 04:46]      Mg     2.4     [06-20-25 @ 04:46]      Phos  3.5     [06-20-25 @ 04:46]    TPro  5.0  /  Alb  2.8  /  TBili  0.3  /  DBili  x   /  AST  80  /  ALT  109  /  AlkPhos  159  [06-20-25 @ 04:46]          Creatinine Trend:  SCr 6.30 [06-20 @ 04:46]  SCr 5.20 [06-19 @ 05:42]  SCr 6.29 [06-18 @ 09:06]  SCr 7.38 [06-17 @ 06:10]  SCr 5.16 [06-16 @ 04:30]

## 2025-06-20 NOTE — PROGRESS NOTE ADULT - SUBJECTIVE AND OBJECTIVE BOX
Chief Complaint:  Patient is a 60y old  Female who presents with a chief complaint of acute renal failure (20 Jun 2025 08:07)      HPI/ 24 hr events: GI re-consulted as patient is still having diarrhea. Patient seen and examined at bedside. She reports having more than 6 episodes of diarrhea within the past 8 hours. She notes that on Wednesday, 2 days ago, the diarrhea became blood tinged. She states it is now brown liquid with no further obvious blood. She is complaining of abdominal cramping and spasms after she eats/drinks and feels the urge to move her bowels.       REVIEW OF SYSTEMS:   General: Negative  HEENT: Negative  CV: Negative  Respiratory: Negative  GI: See HPI  : Negative  MSK: Negative  Hematologic: Negative  Skin: Negative    MEDICATIONS:   MEDICATIONS  (STANDING):  ascorbic acid 500 milliGRAM(s) Oral daily  chlorhexidine 2% Cloths 1 Application(s) Topical <User Schedule>  chlorhexidine 2% Cloths 1 Application(s) Topical <User Schedule>  dextrose 5%. 1000 milliLiter(s) (100 mL/Hr) IV Continuous <Continuous>  dextrose 50% Injectable 25 Gram(s) IV Push once  dextrose 50% Injectable 12.5 Gram(s) IV Push once  dextrose 50% Injectable 25 Gram(s) IV Push once  dextrose Oral Gel 15 Gram(s) Oral once  escitalopram 20 milliGRAM(s) Oral daily  glucagon  Injectable 1 milliGRAM(s) IntraMuscular once  heparin   Injectable 5000 Unit(s) SubCutaneous every 8 hours  loperamide 2 milliGRAM(s) Oral once  multiple electrolytes Injection Type 1 1000 milliLiter(s) (100 mL/Hr) IV Continuous <Continuous>  multivitamin 1 Tablet(s) Oral daily  piperacillin/tazobactam IVPB. 3.375 Gram(s) IV Intermittent once  piperacillin/tazobactam IVPB.- 3.375 Gram(s) IV Intermittent once    MEDICATIONS  (PRN):  acetaminophen     Tablet .. 650 milliGRAM(s) Oral every 6 hours PRN Temp greater or equal to 38C (100.4F), Mild Pain (1 - 3)  acetaminophen     Tablet .. 975 milliGRAM(s) Oral every 8 hours PRN Moderate Pain (4 - 6)  aluminum hydroxide/magnesium hydroxide/simethicone Suspension 30 milliLiter(s) Oral every 4 hours PRN Dyspepsia  HYDROmorphone   Tablet 2 milliGRAM(s) Oral every 4 hours PRN Severe Pain (7 - 10)  HYDROmorphone  Injectable 0.5 milliGRAM(s) IV Push every 4 hours PRN breakthrough pain  melatonin 3 milliGRAM(s) Oral at bedtime PRN Insomnia  ondansetron Injectable 4 milliGRAM(s) IV Push every 8 hours PRN Nausea and/or Vomiting  sodium chloride 0.9% lock flush 10 milliLiter(s) IV Push every 1 hour PRN Pre/post blood products, medications, blood draw, and to maintain line patency            DIET:  Diet, Regular (06-20-25 @ 11:07) [Active]          ALLERGIES:   Allergies    No Known Allergies    Intolerances        VITAL SIGNS:   Vital Signs Last 24 Hrs  T(C): 37.4 (20 Jun 2025 08:04), Max: 37.5 (20 Jun 2025 04:27)  T(F): 99.3 (20 Jun 2025 08:04), Max: 99.5 (20 Jun 2025 04:27)  HR: 93 (20 Jun 2025 08:04) (93 - 97)  BP: 149/88 (20 Jun 2025 08:04) (125/78 - 160/78)  BP(mean): --  RR: 18 (20 Jun 2025 08:04) (18 - 18)  SpO2: 95% (20 Jun 2025 08:04) (94% - 100%)    Parameters below as of 20 Jun 2025 08:04  Patient On (Oxygen Delivery Method): room air      I&O's Summary    19 Jun 2025 07:01  -  20 Jun 2025 07:00  --------------------------------------------------------  IN: 0 mL / OUT: 950 mL / NET: -950 mL        PHYSICAL EXAM:   GENERAL:  No acute distress  HEENT:  NC/AT, conjunctiva clear, sclera anicteric  CHEST:  No increased effort  HEART:  Regular rate  ABDOMEN:  Soft, tender, non-distended, no rebound or guarding  SKIN:  Warm, dry  NEURO:  Calm, cooperative    LABS:                        9.3    11.11 )-----------( 276      ( 20 Jun 2025 04:46 )             28.6     Hemoglobin: 9.3 g/dL (06-20-25 @ 04:46)  Hemoglobin: 8.8 g/dL (06-19-25 @ 14:30)  Hemoglobin: 8.7 g/dL (06-19-25 @ 05:42)  Hemoglobin: 9.9 g/dL (06-18-25 @ 09:06)    06-20    140  |  99  |  31.1[H]  ----------------------------<  105[H]  4.1   |  27.0  |  6.30[H]    Ca    8.4      20 Jun 2025 04:46  Phos  3.5     06-20  Mg     2.4     06-20    TPro  5.0[L]  /  Alb  2.8[L]  /  TBili  0.3[L]  /  DBili  x   /  AST  80[H]  /  ALT  109[H]  /  AlkPhos  159[H]  06-20    LIVER FUNCTIONS - ( 20 Jun 2025 04:46 )  Alb: 2.8 g/dL / Pro: 5.0 g/dL / ALK PHOS: 159 U/L / ALT: 109 U/L / AST: 80 U/L / GGT: x                         RADIOLOGY & ADDITIONAL STUDIES:

## 2025-06-20 NOTE — PROGRESS NOTE ADULT - ASSESSMENT
60F with HTN, HLD, s/p L-LICHA on 6/9 presented to the ED on 6/16 with constant vomiting, large volume diarrhea, unable to tolerate oral intake, abd pain/cramping that started on Sat 6/14.      ID consulted for colitis     Constipated post-op, was taking ASA, naproxen, Senna and Miralax   Then, patient with massive vomiting/diarrhea leading to NAVI/ATN starting on Sat 6/14  Attended a resort with buffet dining prior to her surgery (about one week prior to onset of symptoms). Family without any symptoms.   Has been on Mounjaro for 2 years - no GI side effects     C. diff PCR negative   GI PCR negative   Stool O&P - negative x 1   Stool culture - no enteropathogens   Mild leukocytosis today   CT AP with findings compatible with mild colitis in the left descending and sigmoid colon (infectious vs inflammatory vs ischemic)  LFTs improving (shock liver?)  Started on HD on 6/17 via [facility] - Renal following     No objections to restart piperacillin-tazobactam  consider repeating stool studies if diarrhea does not improve  Monitor stool output     NO indication for prophylactic IV antibiotics for hip replacement because the patient is receiving dialysis   Follow infection control strategies to prevent catheter-associated infections, and remove line when no longer indicated (provided renal recovery)    d/w medical team    60F with HTN, HLD, s/p L-LICHA on 6/9 presented to the ED on 6/16 with constant vomiting, large volume diarrhea, unable to tolerate oral intake, abd pain/cramping that started on Sat 6/14.      ID consulted for colitis     Constipated post-op, was taking ASA, naproxen, Senna and Miralax   Then, patient with massive vomiting/diarrhea leading to NAVI/ATN starting on Sat 6/14  Attended a resort with buffet dining prior to her surgery (about one week prior to onset of symptoms). Family without any symptoms.   Has been on Mounjaro for 2 years - no GI side effects     C. diff PCR negative   GI PCR negative   Stool O&P - negative x 1   Stool culture - no enteropathogens   Mild leukocytosis today   CT AP with findings compatible with mild colitis in the left descending and sigmoid colon (infectious vs inflammatory vs ischemic)  LFTs improving (shock liver?)  Started on HD on 6/17 via [facility] - Renal following     No objections to restart piperacillin-tazobactam  consider repeating stool studies if diarrhea, particularly C. diff   Monitor stool output     NO indication for prophylactic IV antibiotics for hip replacement because the patient is receiving dialysis   Follow infection control strategies to prevent catheter-associated infections, and remove line when no longer indicated (provided renal recovery)    d/w medical team

## 2025-06-20 NOTE — DISCHARGE NOTE NURSING/CASE MANAGEMENT/SOCIAL WORK - FINANCIAL ASSISTANCE
[facility] provides services at a reduced cost to those who are determined to be eligible through [facility]’s financial assistance program. Information regarding [facility]’s financial assistance program can be found by going to [email or web address] or by calling [phone].

## 2025-06-20 NOTE — PROGRESS NOTE ADULT - ATTENDING COMMENTS
Agree with above   Patient seen and examined together with medical residents. Still having diarrhea and feeling nauseated. Not able to hold any food  Vital signs,  labs and imaging  reviewed. Noted worsening kidney function.   Assessment and plan discussed with pt, Dr. David from ID and nephrologist Dr. Schmitz   Plan to continue with HD for now   GIiven that she continues to have diarrhea, will give one bolus of IV fluids.   Will send stool for cdiff   Also agree with Dr. David to start Zosyn for concerns of infectious vs ischemic colitis   The rest as above

## 2025-06-20 NOTE — CHART NOTE - NSCHARTNOTEFT_GEN_A_CORE
Nutrition Note:   Met with pt who is reporting some nausea and diarrhea after eating meals.  Po intake reported as fair.   Provided low fiber diet, colitis, renal diet education with instruction.     Continue Vit C, MVI  add renal diet.  RD to remain available

## 2025-06-20 NOTE — PROGRESS NOTE ADULT - SUBJECTIVE AND OBJECTIVE BOX
SUBJECTIVE  BRIEF HOSPITAL COURSE SUMMARY:      LAST 24 HOURS:  TODAY:  Pt seen at bedside in AM  No acute/overnight events  No acute medical complaints    OBJECTIVE    PHYSICAL EXAM:  GENERAL: No acute distress, comfortably in bed  HEENT: Atraumatic, normocephalic, non-icteric  NEURO: A&Ox3, no focal deficits, moving all extremities spontaneously, no dysarthria, CN II-XII grossly intact  PSYCH: Normal affect, calm, appropriate insight and judgment, fluent speech  LUNGS: CTAB, no wrr, non-labored breathing  HEART: RRR, no murmur appreciated  ABD: Soft, non-tender, non-distended, no organomegaly, no appreciable masses, +bs all 4 quadrants  EXTREMITIES: Nontender, no clubbing, cyanosis, or edema    Vital Signs Last 24 Hrs  T(C): 37.4 (20 Jun 2025 08:04), Max: 37.5 (20 Jun 2025 04:27)  T(F): 99.3 (20 Jun 2025 08:04), Max: 99.5 (20 Jun 2025 04:27)  HR: 93 (20 Jun 2025 08:04) (93 - 97)  BP: 149/88 (20 Jun 2025 08:04) (125/78 - 160/78)  BP(mean): --  RR: 18 (20 Jun 2025 08:04) (18 - 18)  SpO2: 95% (20 Jun 2025 08:04) (94% - 100%)    Parameters below as of 20 Jun 2025 08:04  Patient On (Oxygen Delivery Method): room air            MEDICATIONS  (STANDING):  ascorbic acid 500 milliGRAM(s) Oral daily  chlorhexidine 2% Cloths 1 Application(s) Topical <User Schedule>  chlorhexidine 2% Cloths 1 Application(s) Topical <User Schedule>  dextrose 5%. 1000 milliLiter(s) (100 mL/Hr) IV Continuous <Continuous>  dextrose 50% Injectable 25 Gram(s) IV Push once  dextrose 50% Injectable 12.5 Gram(s) IV Push once  dextrose 50% Injectable 25 Gram(s) IV Push once  dextrose Oral Gel 15 Gram(s) Oral once  escitalopram 20 milliGRAM(s) Oral daily  glucagon  Injectable 1 milliGRAM(s) IntraMuscular once  heparin   Injectable 5000 Unit(s) SubCutaneous every 8 hours  multiple electrolytes Injection Type 1 1000 milliLiter(s) (100 mL/Hr) IV Continuous <Continuous>  multivitamin 1 Tablet(s) Oral daily    MEDICATIONS  (PRN):  acetaminophen     Tablet .. 650 milliGRAM(s) Oral every 6 hours PRN Temp greater or equal to 38C (100.4F), Mild Pain (1 - 3)  acetaminophen     Tablet .. 975 milliGRAM(s) Oral every 8 hours PRN Moderate Pain (4 - 6)  aluminum hydroxide/magnesium hydroxide/simethicone Suspension 30 milliLiter(s) Oral every 4 hours PRN Dyspepsia  HYDROmorphone   Tablet 2 milliGRAM(s) Oral every 4 hours PRN Severe Pain (7 - 10)  HYDROmorphone  Injectable 0.5 milliGRAM(s) IV Push every 4 hours PRN breakthrough pain  melatonin 3 milliGRAM(s) Oral at bedtime PRN Insomnia  ondansetron Injectable 4 milliGRAM(s) IV Push every 8 hours PRN Nausea and/or Vomiting  sodium chloride 0.9% lock flush 10 milliLiter(s) IV Push every 1 hour PRN Pre/post blood products, medications, blood draw, and to maintain line patency    Allergies    No Known Allergies    Intolerances        LABS:                        9.3    11.11 )-----------( 276      ( 20 Jun 2025 04:46 )             28.6     06-20    140  |  99  |  31.1[H]  ----------------------------<  105[H]  4.1   |  27.0  |  6.30[H]    Ca    8.4      20 Jun 2025 04:46  Phos  3.5     06-20  Mg     2.4     06-20    TPro  5.0[L]  /  Alb  2.8[L]  /  TBili  0.3[L]  /  DBili  x   /  AST  80[H]  /  ALT  109[H]  /  AlkPhos  159[H]  06-20      Urinalysis Basic - ( 20 Jun 2025 04:46 )    Color: x / Appearance: x / SG: x / pH: x  Gluc: 105 mg/dL / Ketone: x  / Bili: x / Urobili: x   Blood: x / Protein: x / Nitrite: x   Leuk Esterase: x / RBC: x / WBC x   Sq Epi: x / Non Sq Epi: x / Bacteria: x      CAPILLARY BLOOD GLUCOSE          CULTURE DATA:    Culture - Blood (collected 06-15-25 @ 22:25)  Source: Blood Blood-Peripheral  Preliminary Report (06-20-25 @ 01:01):    No growth at 4 days    Culture - Stool (collected 06-16-25 @ 20:20)  Source: Stool Feces  Final Report (06-19-25 @ 17:37):    No enteric pathogens isolated.    (Stool culture examined for Salmonella,    Shigella, Campylobacter, Aeromonas, Plesiomonas,    Vibrio, E.coli O157 and Yersinia)        RADIOLOGY & ADDITIONAL TESTS:  No new imaging to review SUBJECTIVE  BRIEF HOSPITAL COURSE SUMMARY:  Pt is a 61 y/o F w/ PMH of HTN, HLD, prior obesity (currently on mounjaro for 1.5 yrs w/ 80lb wt loss) and anxiety/depression (on lexapro) s/p L-LICHA (6/9/25 at [facility]) presented to the ED c/o nausea, NBNB emesis and large vol watery nonbloody diarrhea w/ associated LLQ abd pain that started 3 days ago.  Pt also noticed she stopped making urine Saturday and since arrival at the hospital has not had a BM.  pt has been on naproxen 500mg BID since L-hip surgery 6/9 and is also on losartan.  VS notable for soft SBP in 90's, sinus tachy and RR 20 but not hypoxic or febrile.  On exam patient appeared dehydrated w/ mild LLQ tenderness to palpation.  Bernard in place w/ no urine.  Labs significant for WBC 23.12, K 6.5-->4.9, AGMA, BUN 64, Cr 5.22-->5.08 (baseline 0.85), Transaminitis , lipase nL, CT a/p w/ possible mild colitis. Pt given zosyn, albumin, 3L LR and 1L NS, 10g lokelma, 2g Ca gluconate, 4mg IV morphine and 4mg IV zofran in ED. MICU consulted and recommending SDU.  Pt admitted for acute renal failure w/ mild rhabdo 2/2 to n/v/d while on NSAIDs and ARB. Nephro consulted, started on plasmalyte. ID consulted for colitis, concern for viral gastroentereitis and zosyn held on 6/17 as unclear if beneficial plus worsening renal function. GI consulted for colitis, recommend patient f/u w/ her gastroenterologist Dr. Gypsy Jackson for outpt colonoscopy in 6-8wks. Renal US showed no hydronephrosis. Per nephro recs, losartan was held. Renal function worsening no response to IV lasix, vascular surgery placed dialysis catheter, patient started on HD on 6/18,     LAST 24 HOURS:  Yesterday patient reported 3 bloody BM, vitals were stable, H/H was stable (HgB 8.7 --> 8.8). Today's AM labs show HgB 9.3      TODAY:  Pt seen at bedside in AM, hemodynamically stable on room air, AAOx3. Pt endorses  loose bowel movement soon after eating or drinking anything. Reports has been taking in approx 60cc of po liquid intake per hour. Has been trying to increase her po intake, which is encouraged. No issues with urination. Adequate pain control.  Pt declined heparin sc this AM  Pt requesting to be seen by GI, is concerned about her acute colitis.      OBJECTIVE    PHYSICAL EXAM:  GENERAL: No acute distress, comfortably in bed  HEENT: Atraumatic, normocephalic, non-icteric  NEURO: A&Ox3, no focal deficits, moving all extremities spontaneously, no dysarthria, CN II-XII grossly intact  PSYCH: Normal affect, calm, appropriate insight and judgment, fluent speech  LUNGS: CTAB, no wrr, non-labored breathing  HEART: RRR, no murmur appreciated  ABD: Soft, non-tender, non-distended, no organomegaly, no appreciable masses, +bs all 4 quadrants  EXTREMITIES: Nontender, no clubbing, cyanosis, or edema    Vital Signs Last 24 Hrs  T(C): 37.4 (20 Jun 2025 08:04), Max: 37.5 (20 Jun 2025 04:27)  T(F): 99.3 (20 Jun 2025 08:04), Max: 99.5 (20 Jun 2025 04:27)  HR: 93 (20 Jun 2025 08:04) (93 - 97)  BP: 149/88 (20 Jun 2025 08:04) (125/78 - 160/78)  RR: 18 (20 Jun 2025 08:04) (18 - 18)  SpO2: 95% (20 Jun 2025 08:04) (94% - 100%)    Parameters below as of 20 Jun 2025 08:04  Patient On (Oxygen Delivery Method): room air            MEDICATIONS  (STANDING):  ascorbic acid 500 milliGRAM(s) Oral daily  chlorhexidine 2% Cloths 1 Application(s) Topical <User Schedule>  chlorhexidine 2% Cloths 1 Application(s) Topical <User Schedule>  dextrose 5%. 1000 milliLiter(s) (100 mL/Hr) IV Continuous <Continuous>  dextrose 50% Injectable 25 Gram(s) IV Push once  dextrose 50% Injectable 12.5 Gram(s) IV Push once  dextrose 50% Injectable 25 Gram(s) IV Push once  dextrose Oral Gel 15 Gram(s) Oral once  escitalopram 20 milliGRAM(s) Oral daily  glucagon  Injectable 1 milliGRAM(s) IntraMuscular once  heparin   Injectable 5000 Unit(s) SubCutaneous every 8 hours  multiple electrolytes Injection Type 1 1000 milliLiter(s) (100 mL/Hr) IV Continuous <Continuous>  multivitamin 1 Tablet(s) Oral daily    MEDICATIONS  (PRN):  acetaminophen     Tablet .. 650 milliGRAM(s) Oral every 6 hours PRN Temp greater or equal to 38C (100.4F), Mild Pain (1 - 3)  acetaminophen     Tablet .. 975 milliGRAM(s) Oral every 8 hours PRN Moderate Pain (4 - 6)  aluminum hydroxide/magnesium hydroxide/simethicone Suspension 30 milliLiter(s) Oral every 4 hours PRN Dyspepsia  HYDROmorphone   Tablet 2 milliGRAM(s) Oral every 4 hours PRN Severe Pain (7 - 10)  HYDROmorphone  Injectable 0.5 milliGRAM(s) IV Push every 4 hours PRN breakthrough pain  melatonin 3 milliGRAM(s) Oral at bedtime PRN Insomnia  ondansetron Injectable 4 milliGRAM(s) IV Push every 8 hours PRN Nausea and/or Vomiting  sodium chloride 0.9% lock flush 10 milliLiter(s) IV Push every 1 hour PRN Pre/post blood products, medications, blood draw, and to maintain line patency    Allergies    No Known Allergies    Intolerances        LABS:                        9.3    11.11 )-----------( 276      ( 20 Jun 2025 04:46 )             28.6     06-20    140  |  99  |  31.1[H]  ----------------------------<  105[H]  4.1   |  27.0  |  6.30[H]    Ca    8.4      20 Jun 2025 04:46  Phos  3.5     06-20  Mg     2.4     06-20    TPro  5.0[L]  /  Alb  2.8[L]  /  TBili  0.3[L]  /  DBili  x   /  AST  80[H]  /  ALT  109[H]  /  AlkPhos  159[H]  06-20      Urinalysis Basic - ( 20 Jun 2025 04:46 )    Color: x / Appearance: x / SG: x / pH: x  Gluc: 105 mg/dL / Ketone: x  / Bili: x / Urobili: x   Blood: x / Protein: x / Nitrite: x   Leuk Esterase: x / RBC: x / WBC x   Sq Epi: x / Non Sq Epi: x / Bacteria: x      CAPILLARY BLOOD GLUCOSE          CULTURE DATA:    Culture - Blood (collected 06-15-25 @ 22:25)  Source: Blood Blood-Peripheral  Preliminary Report (06-20-25 @ 01:01):    No growth at 4 days    Culture - Stool (collected 06-16-25 @ 20:20)  Source: Stool Feces  Final Report (06-19-25 @ 17:37):    No enteric pathogens isolated.    (Stool culture examined for Salmonella,    Shigella, Campylobacter, Aeromonas, Plesiomonas,    Vibrio, E.coli O157 and Yersinia)        RADIOLOGY & ADDITIONAL TESTS:  No new imaging to review

## 2025-06-21 LAB
ALBUMIN SERPL ELPH-MCNC: 2.6 G/DL — LOW (ref 3.3–5.2)
ALP SERPL-CCNC: 145 U/L — HIGH (ref 40–120)
ALT FLD-CCNC: 105 U/L — HIGH
ANION GAP SERPL CALC-SCNC: 11 MMOL/L — SIGNIFICANT CHANGE UP (ref 5–17)
AST SERPL-CCNC: 70 U/L — HIGH
BILIRUB SERPL-MCNC: 0.3 MG/DL — LOW (ref 0.4–2)
BUN SERPL-MCNC: 16 MG/DL — SIGNIFICANT CHANGE UP (ref 8–20)
C DIFF BY PCR RESULT: SIGNIFICANT CHANGE UP
CALCIUM SERPL-MCNC: 7.9 MG/DL — LOW (ref 8.4–10.5)
CHLORIDE SERPL-SCNC: 99 MMOL/L — SIGNIFICANT CHANGE UP (ref 96–108)
CO2 SERPL-SCNC: 29 MMOL/L — SIGNIFICANT CHANGE UP (ref 22–29)
CREAT SERPL-MCNC: 3.93 MG/DL — HIGH (ref 0.5–1.3)
CRP SERPL-MCNC: 24 MG/L — HIGH
CULTURE RESULTS: SIGNIFICANT CHANGE UP
EGFR: 12 ML/MIN/1.73M2 — LOW
EGFR: 12 ML/MIN/1.73M2 — LOW
ERYTHROCYTE [SEDIMENTATION RATE] IN BLOOD: 49 MM/HR — HIGH (ref 0–20)
GLUCOSE SERPL-MCNC: 106 MG/DL — HIGH (ref 70–99)
HCT VFR BLD CALC: 27.4 % — LOW (ref 34.5–45)
HGB BLD-MCNC: 8.8 G/DL — LOW (ref 11.5–15.5)
MAGNESIUM SERPL-MCNC: 2.1 MG/DL — SIGNIFICANT CHANGE UP (ref 1.6–2.6)
MCHC RBC-ENTMCNC: 28.6 PG — SIGNIFICANT CHANGE UP (ref 27–34)
MCHC RBC-ENTMCNC: 32.1 G/DL — SIGNIFICANT CHANGE UP (ref 32–36)
MCV RBC AUTO: 89 FL — SIGNIFICANT CHANGE UP (ref 80–100)
NRBC # BLD AUTO: 0 K/UL — SIGNIFICANT CHANGE UP (ref 0–0)
NRBC # FLD: 0 K/UL — SIGNIFICANT CHANGE UP (ref 0–0)
NRBC BLD AUTO-RTO: 0 /100 WBCS — SIGNIFICANT CHANGE UP (ref 0–0)
PHOSPHATE SERPL-MCNC: 1.8 MG/DL — LOW (ref 2.4–4.7)
PLATELET # BLD AUTO: 300 K/UL — SIGNIFICANT CHANGE UP (ref 150–400)
PMV BLD: 9.5 FL — SIGNIFICANT CHANGE UP (ref 7–13)
POTASSIUM SERPL-MCNC: 4.7 MMOL/L — SIGNIFICANT CHANGE UP (ref 3.5–5.3)
POTASSIUM SERPL-SCNC: 4.7 MMOL/L — SIGNIFICANT CHANGE UP (ref 3.5–5.3)
PROT SERPL-MCNC: 5.2 G/DL — LOW (ref 6.6–8.7)
RBC # BLD: 3.08 M/UL — LOW (ref 3.8–5.2)
RBC # FLD: 12.8 % — SIGNIFICANT CHANGE UP (ref 10.3–14.5)
SODIUM SERPL-SCNC: 139 MMOL/L — SIGNIFICANT CHANGE UP (ref 135–145)
SPECIMEN SOURCE: SIGNIFICANT CHANGE UP
TSH SERPL-MCNC: 2.4 UIU/ML — SIGNIFICANT CHANGE UP (ref 0.27–4.2)
WBC # BLD: 9.61 K/UL — SIGNIFICANT CHANGE UP (ref 3.8–10.5)
WBC # FLD AUTO: 9.61 K/UL — SIGNIFICANT CHANGE UP (ref 3.8–10.5)

## 2025-06-21 PROCEDURE — 99233 SBSQ HOSP IP/OBS HIGH 50: CPT

## 2025-06-21 PROCEDURE — 99232 SBSQ HOSP IP/OBS MODERATE 35: CPT

## 2025-06-21 PROCEDURE — 76700 US EXAM ABDOM COMPLETE: CPT | Mod: 26

## 2025-06-21 PROCEDURE — G0545: CPT

## 2025-06-21 RX ORDER — LOPERAMIDE HCL 1 MG/7.5ML
2 SOLUTION ORAL ONCE
Refills: 0 | Status: COMPLETED | OUTPATIENT
Start: 2025-06-21 | End: 2025-06-21

## 2025-06-21 RX ORDER — SODIUM CHLORIDE 9 G/1000ML
1000 INJECTION, SOLUTION INTRAVENOUS ONCE
Refills: 0 | Status: COMPLETED | OUTPATIENT
Start: 2025-06-21 | End: 2025-06-21

## 2025-06-21 RX ORDER — ACETAMINOPHEN 500 MG/5ML
1000 LIQUID (ML) ORAL ONCE
Refills: 0 | Status: COMPLETED | OUTPATIENT
Start: 2025-06-21 | End: 2025-06-21

## 2025-06-21 RX ADMIN — ESCITALOPRAM OXALATE 20 MILLIGRAM(S): 20 TABLET ORAL at 11:13

## 2025-06-21 RX ADMIN — LOPERAMIDE HCL 2 MILLIGRAM(S): 1 SOLUTION ORAL at 09:38

## 2025-06-21 RX ADMIN — SODIUM CHLORIDE 1000 MILLILITER(S): 9 INJECTION, SOLUTION INTRAVENOUS at 11:43

## 2025-06-21 RX ADMIN — Medication 30 MILLILITER(S): at 07:40

## 2025-06-21 RX ADMIN — Medication 25 GRAM(S): at 06:09

## 2025-06-21 RX ADMIN — Medication 2 MILLIGRAM(S): at 22:52

## 2025-06-21 RX ADMIN — Medication 2 MILLIGRAM(S): at 13:29

## 2025-06-21 RX ADMIN — Medication 30 MILLILITER(S): at 13:29

## 2025-06-21 RX ADMIN — Medication 30 MILLILITER(S): at 21:52

## 2025-06-21 RX ADMIN — Medication 1000 MILLIGRAM(S): at 12:12

## 2025-06-21 RX ADMIN — Medication 30 MILLILITER(S): at 03:31

## 2025-06-21 RX ADMIN — Medication 2 MILLIGRAM(S): at 14:29

## 2025-06-21 RX ADMIN — Medication 0.5 MILLIGRAM(S): at 03:55

## 2025-06-21 RX ADMIN — Medication 4 GRAM(S): at 17:42

## 2025-06-21 RX ADMIN — Medication 0.5 MILLIGRAM(S): at 03:31

## 2025-06-21 RX ADMIN — Medication 0.5 MILLIGRAM(S): at 07:40

## 2025-06-21 RX ADMIN — Medication 2 MILLIGRAM(S): at 21:52

## 2025-06-21 RX ADMIN — Medication 1 APPLICATION(S): at 06:09

## 2025-06-21 RX ADMIN — Medication 25 GRAM(S): at 17:42

## 2025-06-21 RX ADMIN — Medication 0.5 MILLIGRAM(S): at 08:40

## 2025-06-21 RX ADMIN — LOPERAMIDE HCL 2 MILLIGRAM(S): 1 SOLUTION ORAL at 17:41

## 2025-06-21 RX ADMIN — Medication 4 MILLIGRAM(S): at 18:32

## 2025-06-21 RX ADMIN — Medication 4 MILLIGRAM(S): at 07:40

## 2025-06-21 RX ADMIN — Medication 400 MILLIGRAM(S): at 11:12

## 2025-06-21 RX ADMIN — Medication 500 MILLIGRAM(S): at 11:13

## 2025-06-21 RX ADMIN — Medication 1 TABLET(S): at 11:13

## 2025-06-21 NOTE — PROGRESS NOTE ADULT - ASSESSMENT
61 y/o F with PMHX HTN, HLD, obesity and anxiety/depression (on lexapro) s/p L-LICHA (6/9/25 at [facility]) presents complaining of abdominal pain, vomiting and diarrhea    #Colitis  #Elevated liver enzymes  #NAVI   CT Abdomen and Pelvis No Cont (06.15.25 @ 22:14) > thickening of sigmoid colonic wall with several no-inflamed diverticula, no peridiverticular fat stranding or inflammation to suggest acute diverticulitis; decompressed descending colon with mild wall thickening and minimal fat stranding in the mesocolon/sigmoid; findings compatible with mild acute colitis in the left descending and sigmoid colon which may be of inflammatory, infectious or ischemic etiology.   C Diff by PCR Result: NotDetec (06.16.25 @ 20:20)  GI PCR Panel: NotDetec (06.16.25 @ 20:20)  Ova and Parasites (06.16.25 @ 20:20) No Protozoa seen by trichrome stain, No Helminths or Protozoa seen   Culture - Stool (06.16.25 @ 20:20) No enteric pathogens isolated.  Acute hepatitis panel negative, EBV/CMV negative     - Add bentyl for abdominal spasms   - Trend CBC  - Trend electrolytes, replete as needed   - Trend LFTs. Avoid hepatotoxins.   - Ordered abdominal ultrasound   - Monitor for fever  - Continue supportive care  - Diet as tolerated (No lactose, low fiber, low fat)   - Stool count  - Repeat C. diff sample neg   - Pending  fecal calprotectin, fecal elastase, TSH, ESR/CRP, celiac cascade  - Defer abx to ID   - Outpatient colonoscopy in 6-8 weeks when acute inflammation resolves, she can follow up with her gastroenterologist Dr. Beena Jackson,  Further care per primary team / nephrology   _________________________________________________________________  Assessment and recommendations are final when note is signed by the attending physician.

## 2025-06-21 NOTE — PROGRESS NOTE ADULT - SUBJECTIVE AND OBJECTIVE BOX
[facility] DIVISION OF KIDNEY DISEASES AND HYPERTENSION -- FOLLOW UP NOTE  --------------------------------------------------------------------------------  Chief Complaint: sandy    24 hour events/subjective:  sp HD yesterday  continues to have diarrhea      PAST HISTORY  --------------------------------------------------------------------------------  No significant changes to PMH, PSH, FHx, SHx, unless otherwise noted    ALLERGIES & MEDICATIONS  --------------------------------------------------------------------------------  Allergies    No Known Allergies      Standing Inpatient Medications  ascorbic acid 500 milliGRAM(s) Oral daily  chlorhexidine 2% Cloths 1 Application(s) Topical <User Schedule>  chlorhexidine 2% Cloths 1 Application(s) Topical <User Schedule>  cholestyramine Powder (Sugar-Free) 4 Gram(s) Oral daily  dextrose 5%. 1000 milliLiter(s) IV Continuous <Continuous>  dextrose 50% Injectable 25 Gram(s) IV Push once  dextrose 50% Injectable 12.5 Gram(s) IV Push once  dextrose 50% Injectable 25 Gram(s) IV Push once  dextrose Oral Gel 15 Gram(s) Oral once  escitalopram 20 milliGRAM(s) Oral daily  glucagon  Injectable 1 milliGRAM(s) IntraMuscular once  heparin   Injectable 5000 Unit(s) SubCutaneous every 8 hours  loperamide 2 milliGRAM(s) Oral once  multiple electrolytes Injection Type 1 1000 milliLiter(s) IV Continuous <Continuous>  multivitamin 1 Tablet(s) Oral daily  piperacillin/tazobactam IVPB.. 3.375 Gram(s) IV Intermittent every 12 hours    PRN Inpatient Medications  acetaminophen     Tablet .. 650 milliGRAM(s) Oral every 6 hours PRN  acetaminophen     Tablet .. 975 milliGRAM(s) Oral every 8 hours PRN  aluminum hydroxide/magnesium hydroxide/simethicone Suspension 30 milliLiter(s) Oral every 4 hours PRN  dicyclomine 20 milliGRAM(s) Oral four times a day before meals PRN  HYDROmorphone   Tablet 2 milliGRAM(s) Oral every 4 hours PRN  HYDROmorphone  Injectable 0.5 milliGRAM(s) IV Push every 4 hours PRN  melatonin 3 milliGRAM(s) Oral at bedtime PRN  ondansetron Injectable 4 milliGRAM(s) IV Push every 8 hours PRN  sodium chloride 0.9% lock flush 10 milliLiter(s) IV Push every 1 hour PRN      REVIEW OF SYSTEMS  --------------------------------------------------------------------------------  Gen: No weight changes, fatigue, fevers/chills, weakness  Skin: No rashes  Head/Eyes/Ears/Mouth: No headache; Normal hearing; Normal vision w/o blurriness; No sinus pain/discomfort, sore throat  Respiratory: No dyspnea, cough, wheezing, hemoptysis  CV: No chest pain, PND, orthopnea  GI: diarrhea  : No increased frequency, dysuria, hematuria, nocturia  MSK: No joint pain/swelling; no back pain; no edema  Neuro: No dizziness/lightheadedness, weakness, seizures, numbness, tingling  Heme: No easy bruising or bleeding  Endo: No heat/cold intolerance  Psych: No significant nervousness, anxiety, stress, depression    All other systems were reviewed and are negative, except as noted.    VITALS/PHYSICAL EXAM  --------------------------------------------------------------------------------  T(C): 37.3 (06-21-25 @ 09:04), Max: 37.3 (06-20-25 @ 22:40)  HR: 98 (06-21-25 @ 09:04) (83 - 109)  BP: 122/79 (06-21-25 @ 09:04) (116/75 - 143/86)  RR: 18 (06-21-25 @ 09:04) (16 - 18)  SpO2: 94% (06-21-25 @ 09:04) (93% - 98%)  Wt(kg): --        06-20-25 @ 07:01  -  06-21-25 @ 07:00  --------------------------------------------------------  IN: 800 mL / OUT: 1500 mL / NET: -700 mL      Physical Exam:  	Gen: NAD, well-appearing  	HEENT: PERRL, supple neck, clear oropharynx  	Pulm: CTA B/L  	CV: RRR, S1S2; no rub  	Abd: +BS, soft, nontender/nondistended  	: No suprapubic tenderness  	UE: Warm,  no edema  	LE: Warm, ; no edema  	Neuro: No focal deficit  	Psych: Normal affect and mood  	Skin: Warm  	Vascular access: ij cvc    LABS/STUDIES  --------------------------------------------------------------------------------              8.8    9.61  >-----------<  300      [06-21-25 @ 06:45]              27.4     139  |  99  |  16.0  ----------------------------<  106      [06-21-25 @ 06:45]  4.7   |  29.0  |  3.93        Ca     7.9     [06-21-25 @ 06:45]      Mg     2.1     [06-21-25 @ 06:45]      Phos  1.8     [06-21-25 @ 06:45]    TPro  5.2  /  Alb  2.6  /  TBili  0.3  /  DBili  x   /  AST  70  /  ALT  105  /  AlkPhos  145  [06-21-25 @ 06:45]          Creatinine Trend:  SCr 3.93 [06-21 @ 06:45]  SCr 6.30 [06-20 @ 04:46]  SCr 5.20 [06-19 @ 05:42]  SCr 6.29 [06-18 @ 09:06]  SCr 7.38 [06-17 @ 06:10]    Urinalysis - [06-21-25 @ 06:45]      Color  / Appearance  / SG  / pH       Gluc 106 / Ketone   / Bili  / Urobili        Blood  / Protein  / Leuk Est  / Nitrite       RBC  / WBC  / Hyaline  / Gran  / Sq Epi  / Non Sq Epi  / Bacteria       TSH 2.40      [06-21-25 @ 06:45]    HBsAb 5.2      [06-17-25 @ 20:30]  HBsAg Nonreact      [06-16-25 @ 09:45]  HCV 0.06, Nonreact      [06-16-25 @ 09:45]

## 2025-06-21 NOTE — PROGRESS NOTE ADULT - ASSESSMENT
60F with HTN, HLD, s/p L-LICHA on 6/9 presented to the ED on 6/16 with constant vomiting, large volume diarrhea, unable to tolerate oral intake, abd pain/cramping that started on Sat 6/14.      ID consulted for colitis     Constipated post-op, was taking ASA, naproxen, Senna and Miralax   Then, patient with massive vomiting/diarrhea leading to NAVI/ATN starting on Sat 6/14  Attended a resort with buffet dining prior to her surgery (about one week prior to onset of symptoms). Family without any symptoms.   Has been on Mounjaro for 2 years - no GI side effects     C. diff PCR negative   GI PCR negative   Stool O&P - negative x 1   Stool culture - no enteropathogens   No leukocytosis  CT AP with findings compatible with mild colitis in the left descending and sigmoid colon (infectious vs inflammatory vs ischemic)  LFTs improving (shock liver?)  Started on HD on 6/17 via [facility] - Renal following   repeat C diff negative  c/w piperacillin-tazobactam    Monitor stool output     NO indication for prophylactic IV antibiotics for hip replacement because the patient is receiving dialysis   Follow infection control strategies to prevent catheter-associated infections, and remove line when no longer indicated (provided renal recovery)    d/w medical team Dr Mackey    will f/u

## 2025-06-21 NOTE — PROGRESS NOTE ADULT - ASSESSMENT
1.  Acute kidney injury with hyperkalemia and acidosis: Multifactorial likely combination of prerenal/ATN/NSAID nephrotoxicity along with ARB use.  Ultrasound shows no evidence of hydronephrosis. HD started on 6/17. last HD was yesterday. Monitor for renal  recovery, not evident at this time.  continues to have diarrhea- continue IV fluids    2.  Hypertension; bp stable. continue to hold losartan.  Continue IV fluids  3.  Status post hip replacement.  Hold NSAIDs per Ortho.  4.  Colitis noted on CT abdomen.  ongoing diarrhea- mgt as per GI

## 2025-06-21 NOTE — PROGRESS NOTE ADULT - ASSESSMENT
61 y/o F w/ PMH of HTN, HLD, prior obesity (currently on mounjaro for 1.5 yrs w/ 80lb wt loss) and anxiety/depression (on lexapro) s/p L-LICHA (6/9/25 at [facility]) presents c/o nausea, NBNB emesis and large vol watery nonbloody diarrhea w/ associated LLQ abd pain x 3 days ago associated w/ anuria, admitted for acute renal failure and sepsis 2/2 colitis      # Acute renal failure w/ mild rhabdo suspect multifactorial from N/V/D while on NSAIDs and ARB   # Hypovolemic mild hyponatremic hypochloremia likely 2/2 N/V/D  - HD initiated 6/17  - monae catheter placed in ED  -  Holding outpt Naproxen, losartan   - BUN 31/Cr 6.3; last known Cr 0.85 5/2025   - s/p HD 6/20, HD as per nephrology  - encourage po intake  - Strict I/O's  - avoid nephrotoxic agents or renally dose if needed  - c/w prn antiemetics  - nephrology following       # Sepsis (improving)  # Colitis ; infectious vs ischemic  - CT a/p w/ findings compatible with mild acute colitis in the left descending and sigmoid colon  - 6/15 bld cx: ngtd, prelim read  - 6/16 stool cx: NGTD, final read  - reported 3 bloody BM yesterday, HgB 9.3, stable  - reported 6 watery BM this AM  - bentyl for abd spasms  - low fiber diet (low fat and low lactulose)  - f/u repeat C. diff  - f/u feval calprotectin, fecal elastase, TSH, ESR/CRP, celiac cascade  - f/u UA  - c/w zosyn   - GI - Repeat C Diff PCR (negative), Bentyl for spasms  - ID - Zosyn, repeat C diff (negative)  - ctm CBC, temp        # Transaminitis w/ hepatocellular distribution   Ischemic.   CT a/p w/ normal liver, bile ducts nL caliber and s/p cholecystectomy  CMV, EBV to r/u infectious etiology - negative  - Trend LFTs and avoid hepatotoxic medications     # HTN / HLD  - bp stable today  - holding ARB due to renal fxn   - Holding statin for transaminitis     # Former obesity  - On mounjaro w/ 80lb weight loss and now BMI 23  - Hold mounjaro while inpt       VTE ppx:  Heparin sq  Diet: low fiber diet  Dispositon: Acute. Pending renal recovery, HD

## 2025-06-21 NOTE — PROGRESS NOTE ADULT - SUBJECTIVE AND OBJECTIVE BOX
Hospitalist Progress Note    Chief Complaint:  Acute renal failure    SUBJECTIVE / OVERNIGHT EVENTS:  No events overnight, patient seen at bedside, in NAD, no new complaints today. Patient denies chest pain, SOB, abd pain, N/V, fever, chills, dysuria or any other complaints. All remainder ROS negative.     MEDICATIONS  (STANDING):  ascorbic acid 500 milliGRAM(s) Oral daily  chlorhexidine 2% Cloths 1 Application(s) Topical <User Schedule>  chlorhexidine 2% Cloths 1 Application(s) Topical <User Schedule>  dextrose 5%. 1000 milliLiter(s) (100 mL/Hr) IV Continuous <Continuous>  dextrose 50% Injectable 25 Gram(s) IV Push once  dextrose 50% Injectable 12.5 Gram(s) IV Push once  dextrose 50% Injectable 25 Gram(s) IV Push once  dextrose Oral Gel 15 Gram(s) Oral once  escitalopram 20 milliGRAM(s) Oral daily  glucagon  Injectable 1 milliGRAM(s) IntraMuscular once  heparin   Injectable 5000 Unit(s) SubCutaneous every 8 hours  loperamide 2 milliGRAM(s) Oral once  multiple electrolytes Injection Type 1 1000 milliLiter(s) (100 mL/Hr) IV Continuous <Continuous>  multivitamin 1 Tablet(s) Oral daily  piperacillin/tazobactam IVPB.. 3.375 Gram(s) IV Intermittent every 12 hours    MEDICATIONS  (PRN):  acetaminophen     Tablet .. 650 milliGRAM(s) Oral every 6 hours PRN Temp greater or equal to 38C (100.4F), Mild Pain (1 - 3)  acetaminophen     Tablet .. 975 milliGRAM(s) Oral every 8 hours PRN Moderate Pain (4 - 6)  aluminum hydroxide/magnesium hydroxide/simethicone Suspension 30 milliLiter(s) Oral every 4 hours PRN Dyspepsia  dicyclomine 20 milliGRAM(s) Oral four times a day before meals PRN abd spasms  HYDROmorphone   Tablet 2 milliGRAM(s) Oral every 4 hours PRN Severe Pain (7 - 10)  HYDROmorphone  Injectable 0.5 milliGRAM(s) IV Push every 4 hours PRN breakthrough pain  melatonin 3 milliGRAM(s) Oral at bedtime PRN Insomnia  ondansetron Injectable 4 milliGRAM(s) IV Push every 8 hours PRN Nausea and/or Vomiting  sodium chloride 0.9% lock flush 10 milliLiter(s) IV Push every 1 hour PRN Pre/post blood products, medications, blood draw, and to maintain line patency        I&O's Summary    20 Jun 2025 07:01  -  21 Jun 2025 07:00  --------------------------------------------------------  IN: 800 mL / OUT: 1500 mL / NET: -700 mL        PHYSICAL EXAM:  Vital Signs Last 24 Hrs  T(C): 37.3 (21 Jun 2025 04:24), Max: 37.6 (20 Jun 2025 14:35)  T(F): 99.1 (21 Jun 2025 04:24), Max: 99.6 (20 Jun 2025 14:35)  HR: 83 (21 Jun 2025 04:24) (83 - 109)  BP: 116/75 (21 Jun 2025 04:24) (116/75 - 149/88)  BP(mean): --  RR: 18 (21 Jun 2025 04:24) (16 - 18)  SpO2: 93% (21 Jun 2025 04:24) (93% - 98%)    Parameters below as of 21 Jun 2025 04:24  Patient On (Oxygen Delivery Method): room air          GENERAL: No acute distress, comfortably in bed  HEENT: Atraumatic, normocephalic, non-icteric  NEURO: A&Ox3, no focal deficits, moving all extremities spontaneously, no dysarthria, CN II-XII grossly intact  PSYCH: Normal affect, calm, appropriate insight and judgment, fluent speech  LUNGS: CTAB, no wrr, non-labored breathing  HEART: RRR, no murmur appreciated  ABD: Soft, non-tender, non-distended, no organomegaly, no appreciable masses, +bs all 4 quadrants  EXTREMITIES: Nontender, no clubbing, cyanosis, or edema      LABS:                        9.3    11.11 )-----------( 276      ( 20 Jun 2025 04:46 )             28.6     06-20    140  |  99  |  31.1[H]  ----------------------------<  105[H]  4.1   |  27.0  |  6.30[H]    Ca    8.4      20 Jun 2025 04:46  Phos  3.5     06-20  Mg     2.4     06-20    TPro  5.0[L]  /  Alb  2.8[L]  /  TBili  0.3[L]  /  DBili  x   /  AST  80[H]  /  ALT  109[H]  /  AlkPhos  159[H]  06-20          Urinalysis Basic - ( 20 Jun 2025 04:46 )    Color: x / Appearance: x / SG: x / pH: x  Gluc: 105 mg/dL / Ketone: x  / Bili: x / Urobili: x   Blood: x / Protein: x / Nitrite: x   Leuk Esterase: x / RBC: x / WBC x   Sq Epi: x / Non Sq Epi: x / Bacteria: x        CAPILLARY BLOOD GLUCOSE            RADIOLOGY & ADDITIONAL TESTS:  Results Reviewed: Y  Imaging Personally Reviewed: N  Electrocardiogram Personally Reviewed: ARLIN

## 2025-06-21 NOTE — PROGRESS NOTE ADULT - SUBJECTIVE AND OBJECTIVE BOX
Patient is a 60y old  Female who presents with a chief complaint of acute renal failure (2025 07:29)      HPI:  61 y/o F w/ PMH of HTN, HLD, prior obesity (currently on mounjaro for 1.5 yrs w/ 80lb wt loss) and anxiety/depression (on lexapro) s/p L-LICHA (25 at [facility]) presents c/o nausea, NBNB emesis and large vol watery nonbloody diarrhea w/ associated LLQ abd pain that started 3 days ago.  Pt also noticed she stopped making urine Saturday and since arrival at the hospital has not had a BM.  Pt denies sick contacts or recent travel.  She was in the hospital  for elective L-hip replacement and pt has been on naproxen   GI re-consulted for  diarrhea.    24 hour events  Patient seen and examined at bedside. She reports having more than 4 episodes of diarrhea within the past 8 hours > now brown liquid with no further obvious blood.  Reports sl improvement in  abdominal cramping and spasms after she eats/drinks      REVIEW OF SYSTEMS:    General: Negative  HEENT: Negative  CV: Negative  Respiratory: Negative  GI: See HPI  : Negative  MSK: Negative  Hematologic: Negative  Skin: Negative    PAST MEDICAL & SURGICAL HISTORY:  Spinal stenosis      Kidney stones  x 1 pt denies surgical intervention " in my 20 s '      Colitis, nonspecific      Nephrolithiasis      HTN (hypertension)      HLD (hyperlipidemia)      Anxiety and depression      Menieres disease      Vasovagal syncope      Unilateral primary osteoarthritis, left hip      Enlarged thyroid      Diverticulosis       delivery NOS        S/P cholecystectomy      S/P laminectomy      S/P cystoscopy with ureteral stent placement      History of hand surgery              MEDICATIONS:  MEDICATIONS  (STANDING):  ascorbic acid 500 milliGRAM(s) Oral daily  chlorhexidine 2% Cloths 1 Application(s) Topical <User Schedule>  chlorhexidine 2% Cloths 1 Application(s) Topical <User Schedule>  dextrose 5%. 1000 milliLiter(s) (100 mL/Hr) IV Continuous <Continuous>  dextrose 50% Injectable 25 Gram(s) IV Push once  dextrose 50% Injectable 12.5 Gram(s) IV Push once  dextrose 50% Injectable 25 Gram(s) IV Push once  dextrose Oral Gel 15 Gram(s) Oral once  escitalopram 20 milliGRAM(s) Oral daily  glucagon  Injectable 1 milliGRAM(s) IntraMuscular once  heparin   Injectable 5000 Unit(s) SubCutaneous every 8 hours  multiple electrolytes Injection Type 1 1000 milliLiter(s) (100 mL/Hr) IV Continuous <Continuous>  multivitamin 1 Tablet(s) Oral daily  piperacillin/tazobactam IVPB.. 3.375 Gram(s) IV Intermittent every 12 hours    MEDICATIONS  (PRN):  acetaminophen     Tablet .. 650 milliGRAM(s) Oral every 6 hours PRN Temp greater or equal to 38C (100.4F), Mild Pain (1 - 3)  acetaminophen     Tablet .. 975 milliGRAM(s) Oral every 8 hours PRN Moderate Pain (4 - 6)  aluminum hydroxide/magnesium hydroxide/simethicone Suspension 30 milliLiter(s) Oral every 4 hours PRN Dyspepsia  dicyclomine 20 milliGRAM(s) Oral four times a day before meals PRN abd spasms  HYDROmorphone   Tablet 2 milliGRAM(s) Oral every 4 hours PRN Severe Pain (7 - 10)  HYDROmorphone  Injectable 0.5 milliGRAM(s) IV Push every 4 hours PRN breakthrough pain  melatonin 3 milliGRAM(s) Oral at bedtime PRN Insomnia  ondansetron Injectable 4 milliGRAM(s) IV Push every 8 hours PRN Nausea and/or Vomiting  sodium chloride 0.9% lock flush 10 milliLiter(s) IV Push every 1 hour PRN Pre/post blood products, medications, blood draw, and to maintain line patency      Allergies    No Known Allergies    Intolerances        Vital Signs Last 24 Hrs  T(C): 37.3 (2025 09:04), Max: 37.6 (2025 14:35)  T(F): 99.1 (2025 09:04), Max: 99.6 (2025 14:35)  HR: 98 (2025 09:04) (83 - 109)  BP: 122/79 (2025 09:04) (116/75 - 145/81)  BP(mean): --  RR: 18 (2025 09:04) (16 - 18)  SpO2: 94% (2025 09:04) (93% - 98%)    Parameters below as of 2025 09:04  Patient On (Oxygen Delivery Method): room air        06-20 @ 07:01  -  06-21 @ 07:00  --------------------------------------------------------  IN: 800 mL / OUT: 1500 mL / NET: -700 mL          PHYSICAL EXAM:    GENERAL:  No acute distress  HEENT:  NC/AT, conjunctiva clear, sclera anicteric  CHEST:  No increased effort  HEART:  Regular rate  ABDOMEN:  Soft, tender, non-distended, no rebound or guarding  SKIN:  Warm, dry  NEURO:  Calm, cooperative            LABS:                        8.8    9.61  )-----------( 300      ( 2025 06:45 )             27.4     2025 06:45    139    |  99     |  16.0   ----------------------------<  106    4.7     |  29.0   |  3.93     Ca    7.9        2025 06:45  Phos  1.8       2025 06:45  Mg     2.1       2025 06:45    TPro  5.2    /  Alb  2.6    /  TBili  0.3    /  DBili  x      /  AST  70     /  ALT  105    /  AlkPhos  145    / Amylase x      /Lipase x      2025 06:45          Sedimentation Rate, Erythrocyte: 49 mm/hr (25 @ 06:45)  C-Reactive Protein: 24 mg/L (25 @ 06:45)      RADIOLOGY & ADDITIONAL STUDIES:

## 2025-06-21 NOTE — PROGRESS NOTE ADULT - SUBJECTIVE AND OBJECTIVE BOX
[facility]  INFECTIOUS DISEASES at [place] [facility] [place]  ===============================================================                  Bob Duncan MD               Diplomates American Board of Internal Medicine & Infectious Diseases                * [place] Office - Appt - Tel  [phone] Fax [phone]                * [place] Office - Appt - Tel [phone] Fax [phone]                                  Hospital Consult line:  [phone]  ==============================================================    CAROLEE AN [id number]    Follow up: diarrhea    still having diarrhea   no abdominal pain, has cramping prior to diarrhea      I have personally reviewed the labs and data; pertinent labs and data are listed in this note; please see below.     _______________________________________________________________  REVIEW OF SYSTEMS  + diarrhea    ________________________________________________________________  Allergies:  No Known Allergies    ________________________________________________________________  PHYSICAL EXAM  GEN: ill appearing.   HEENT: Anicteric sclerae.  NECK: Shiley  LUNGS: eupneic.   ABDOMEN: Soft, NT, ND +BS.    : Bernard catheter  NEUROLOGIC: Grossly no motor focal deficits   PSYCHIATRIC: Appropriate affect and mood  SKIN: No rash,  LINES: PIV   ________________________________________________________________  Vitals:  Vital Signs Last 24 Hrs  T(C): 37.3 (21 Jun 2025 09:04), Max: 37.6 (20 Jun 2025 14:35)  T(F): 99.1 (21 Jun 2025 09:04), Max: 99.6 (20 Jun 2025 14:35)  HR: 98 (21 Jun 2025 09:04) (83 - 109)  BP: 122/79 (21 Jun 2025 09:04) (116/75 - 145/81)  BP(mean): --  RR: 18 (21 Jun 2025 09:04) (16 - 18)  SpO2: 94% (21 Jun 2025 09:04) (93% - 98%)    Parameters below as of 21 Jun 2025 09:04  Patient On (Oxygen Delivery Method): room air        Current Antibiotics:  piperacillin/tazobactam IVPB.- 3.375 Gram(s) IV Intermittent once    Other medications:  ascorbic acid 500 milliGRAM(s) Oral daily  chlorhexidine 2% Cloths 1 Application(s) Topical <User Schedule>  chlorhexidine 2% Cloths 1 Application(s) Topical <User Schedule>  dextrose 5%. 1000 milliLiter(s) IV Continuous <Continuous>  dextrose 50% Injectable 25 Gram(s) IV Push once  dextrose 50% Injectable 12.5 Gram(s) IV Push once  dextrose 50% Injectable 25 Gram(s) IV Push once  dextrose Oral Gel 15 Gram(s) Oral once  escitalopram 20 milliGRAM(s) Oral daily  glucagon  Injectable 1 milliGRAM(s) IntraMuscular once  heparin   Injectable 5000 Unit(s) SubCutaneous every 8 hours  loperamide 2 milliGRAM(s) Oral once  multiple electrolytes Injection Type 1 1000 milliLiter(s) IV Continuous <Continuous>  multivitamin 1 Tablet(s) Oral daily                                8.8    9.61  )-----------( 300      ( 21 Jun 2025 06:45 )             27.4       06-21    139  |  99  |  16.0  ----------------------------<  106[H]  4.7   |  29.0  |  3.93[H]    Ca    7.9[L]      21 Jun 2025 06:45  Phos  1.8     06-21  Mg     2.1     06-21    TPro  5.2[L]  /  Alb  2.6[L]  /  TBili  0.3[L]  /  DBili  x   /  AST  70[H]  /  ALT  105[H]  /  AlkPhos  145[H]  06-21              Urinalysis Basic - ( 21 Jun 2025 06:45 )    Color: x / Appearance: x / SG: x / pH: x  Gluc: 106 mg/dL / Ketone: x  / Bili: x / Urobili: x   Blood: x / Protein: x / Nitrite: x   Leuk Esterase: x / RBC: x / WBC x   Sq Epi: x / Non Sq Epi: x / Bacteria: x                  CAPILLARY BLOOD GLUCOSE                RECENT CULTURES:  06-16 @ 20:20 Stool Feces     No enteric pathogens isolated.  (Stool culture examined for Salmonella,  Shigella, Campylobacter, Aeromonas, Plesiomonas,  Vibrio, E.coli O157 and Yersinia)        06-15 @ 22:25 Blood Blood-Peripheral     No growth at 4 days      WBC Count: 11.11 K/uL (06-20-25 @ 04:46)  WBC Count: 9.23 K/uL (06-19-25 @ 05:42)  WBC Count: 8.55 K/uL (06-18-25 @ 09:06)  WBC Count: 9.90 K/uL (06-17-25 @ 06:10)  WBC Count: 13.70 K/uL (06-16-25 @ 09:45)  WBC Count: 23.12 K/uL (06-15-25 @ 20:24)    Creatinine: 6.30 mg/dL (06-20-25 @ 04:46)  Creatinine: 5.20 mg/dL (06-19-25 @ 05:42)  Creatinine: 6.29 mg/dL (06-18-25 @ 09:06)  Creatinine: 7.38 mg/dL (06-17-25 @ 06:10)  Creatinine: 5.16 mg/dL (06-16-25 @ 04:30)  Creatinine: 5.08 mg/dL (06-16-25 @ 00:45)  Creatinine: 5.00 mg/dL (06-15-25 @ 21:31)  Creatinine: 5.22 mg/dL (06-15-25 @ 20:24)      SARS-CoV-2 Result: NotDetec (06-15-25 @ 22:25)      ________________________________________________________________  CARDIOLOGY   ________________________________________________________________  RADIOLOGY  < from: CT Abdomen and Pelvis No Cont (06.15.25 @ 22:14) >  FINDINGS:  LOWER CHEST: Minimal linear dependent atelectatic changes. Normal-sized   heart.    LIVER: Within normal limits.  BILE DUCTS: Normal caliber.  GALLBLADDER: Cholecystectomy.  SPLEEN: Within normal limits. Normal size. Splenule.  PANCREAS: Within normal limits.  ADRENALS: Within normal limits.  KIDNEYS/URETERS: No hydronephrosis, hydroureter or nephroureterolithiasis.    BLADDER: Decompressed with a Bernard catheter in place.  REPRODUCTIVE ORGANS: Uterus and adnexa within normal limits.    BOWEL: Small hiatal hernia. Stomach and duodenum have grossly normal in   appearance. Small bowel is not dilated. No bowel obstruction. Appendix is   normal. No acute appendicitis. Thickening of the sigmoid colonic wall   with several no-inflamed diverticula, no peridiverticular fat stranding   or inflammation to suggest acute diverticulitis. There is decompressed   descending colon with of mild wall thickening and minimal fat stranding   in the mesocolon and mesosigmoid.    PERITONEUM/RETROPERITONEUM: No pneumoperitoneum, ascites or loculated   fluid collections. No abscess.  VESSELS: Within normal limits. Normal caliber of the abdominal aorta.  LYMPH NODES: No lymphadenopathy.  ABDOMINAL WALL: Within normal limits.  BONES: Sacral Tarlov cysts. Anterolisthesis L4 over L5 with spinal fusion   hardware in this segment. Posterior decompression L4. 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    No free perforation.    < end of copied text >

## 2025-06-22 LAB
ALBUMIN SERPL ELPH-MCNC: 2.6 G/DL — LOW (ref 3.3–5.2)
ALP SERPL-CCNC: 114 U/L — SIGNIFICANT CHANGE UP (ref 40–120)
ALT FLD-CCNC: 70 U/L — HIGH
ANION GAP SERPL CALC-SCNC: 11 MMOL/L — SIGNIFICANT CHANGE UP (ref 5–17)
AST SERPL-CCNC: 37 U/L — HIGH
BASOPHILS # BLD AUTO: 0.04 K/UL — SIGNIFICANT CHANGE UP (ref 0–0.2)
BASOPHILS NFR BLD AUTO: 0.4 % — SIGNIFICANT CHANGE UP (ref 0–2)
BILIRUB DIRECT SERPL-MCNC: 0.1 MG/DL — SIGNIFICANT CHANGE UP (ref 0–0.3)
BILIRUB INDIRECT FLD-MCNC: 0.2 MG/DL — SIGNIFICANT CHANGE UP (ref 0.2–1)
BILIRUB SERPL-MCNC: 0.3 MG/DL — LOW (ref 0.4–2)
BUN SERPL-MCNC: 22.1 MG/DL — HIGH (ref 8–20)
CALCIUM SERPL-MCNC: 8.1 MG/DL — LOW (ref 8.4–10.5)
CHLORIDE SERPL-SCNC: 101 MMOL/L — SIGNIFICANT CHANGE UP (ref 96–108)
CO2 SERPL-SCNC: 26 MMOL/L — SIGNIFICANT CHANGE UP (ref 22–29)
CREAT SERPL-MCNC: 4.83 MG/DL — HIGH (ref 0.5–1.3)
EGFR: 10 ML/MIN/1.73M2 — LOW
EGFR: 10 ML/MIN/1.73M2 — LOW
EOSINOPHIL # BLD AUTO: 0.34 K/UL — SIGNIFICANT CHANGE UP (ref 0–0.5)
EOSINOPHIL NFR BLD AUTO: 3.8 % — SIGNIFICANT CHANGE UP (ref 0–6)
GLUCOSE SERPL-MCNC: 99 MG/DL — SIGNIFICANT CHANGE UP (ref 70–99)
HCT VFR BLD CALC: 26.6 % — LOW (ref 34.5–45)
HGB BLD-MCNC: 8.3 G/DL — LOW (ref 11.5–15.5)
IMM GRANULOCYTES # BLD AUTO: 0.07 K/UL — SIGNIFICANT CHANGE UP (ref 0–0.07)
IMM GRANULOCYTES NFR BLD AUTO: 0.8 % — SIGNIFICANT CHANGE UP (ref 0–0.9)
LYMPHOCYTES # BLD AUTO: 1.8 K/UL — SIGNIFICANT CHANGE UP (ref 1–3.3)
LYMPHOCYTES NFR BLD AUTO: 20.2 % — SIGNIFICANT CHANGE UP (ref 13–44)
MAGNESIUM SERPL-MCNC: 2.4 MG/DL — SIGNIFICANT CHANGE UP (ref 1.6–2.6)
MCHC RBC-ENTMCNC: 28 PG — SIGNIFICANT CHANGE UP (ref 27–34)
MCHC RBC-ENTMCNC: 31.2 G/DL — LOW (ref 32–36)
MCV RBC AUTO: 89.9 FL — SIGNIFICANT CHANGE UP (ref 80–100)
MONOCYTES # BLD AUTO: 0.96 K/UL — HIGH (ref 0–0.9)
MONOCYTES NFR BLD AUTO: 10.8 % — SIGNIFICANT CHANGE UP (ref 2–14)
NEUTROPHILS # BLD AUTO: 5.69 K/UL — SIGNIFICANT CHANGE UP (ref 1.8–7.4)
NEUTROPHILS NFR BLD AUTO: 64 % — SIGNIFICANT CHANGE UP (ref 43–77)
NRBC # BLD AUTO: 0 K/UL — SIGNIFICANT CHANGE UP (ref 0–0)
NRBC # FLD: 0 K/UL — SIGNIFICANT CHANGE UP (ref 0–0)
NRBC BLD AUTO-RTO: 0 /100 WBCS — SIGNIFICANT CHANGE UP (ref 0–0)
PHOSPHATE SERPL-MCNC: 2.6 MG/DL — SIGNIFICANT CHANGE UP (ref 2.4–4.7)
PLATELET # BLD AUTO: 328 K/UL — SIGNIFICANT CHANGE UP (ref 150–400)
PMV BLD: 9.3 FL — SIGNIFICANT CHANGE UP (ref 7–13)
POTASSIUM SERPL-MCNC: 4.5 MMOL/L — SIGNIFICANT CHANGE UP (ref 3.5–5.3)
POTASSIUM SERPL-SCNC: 4.5 MMOL/L — SIGNIFICANT CHANGE UP (ref 3.5–5.3)
PROT SERPL-MCNC: 5.1 G/DL — LOW (ref 6.6–8.7)
RBC # BLD: 2.96 M/UL — LOW (ref 3.8–5.2)
RBC # FLD: 13.2 % — SIGNIFICANT CHANGE UP (ref 10.3–14.5)
SODIUM SERPL-SCNC: 138 MMOL/L — SIGNIFICANT CHANGE UP (ref 135–145)
WBC # BLD: 8.9 K/UL — SIGNIFICANT CHANGE UP (ref 3.8–10.5)
WBC # FLD AUTO: 8.9 K/UL — SIGNIFICANT CHANGE UP (ref 3.8–10.5)

## 2025-06-22 PROCEDURE — 99233 SBSQ HOSP IP/OBS HIGH 50: CPT

## 2025-06-22 PROCEDURE — 99232 SBSQ HOSP IP/OBS MODERATE 35: CPT

## 2025-06-22 RX ORDER — ACETAMINOPHEN 500 MG/5ML
1000 LIQUID (ML) ORAL ONCE
Refills: 0 | Status: COMPLETED | OUTPATIENT
Start: 2025-06-22 | End: 2025-06-22

## 2025-06-22 RX ORDER — ONDANSETRON HCL/PF 4 MG/2 ML
4 VIAL (ML) INJECTION ONCE
Refills: 0 | Status: COMPLETED | OUTPATIENT
Start: 2025-06-22 | End: 2025-06-22

## 2025-06-22 RX ORDER — LOPERAMIDE HCL 1 MG/7.5ML
2 SOLUTION ORAL EVERY 4 HOURS
Refills: 0 | Status: DISCONTINUED | OUTPATIENT
Start: 2025-06-22 | End: 2025-06-25

## 2025-06-22 RX ORDER — SODIUM CHLORIDE 9 G/1000ML
1000 INJECTION, SOLUTION INTRAVENOUS
Refills: 0 | Status: COMPLETED | OUTPATIENT
Start: 2025-06-22 | End: 2025-06-22

## 2025-06-22 RX ADMIN — LOPERAMIDE HCL 2 MILLIGRAM(S): 1 SOLUTION ORAL at 11:15

## 2025-06-22 RX ADMIN — Medication 975 MILLIGRAM(S): at 01:21

## 2025-06-22 RX ADMIN — Medication 975 MILLIGRAM(S): at 21:01

## 2025-06-22 RX ADMIN — Medication 2 MILLIGRAM(S): at 05:22

## 2025-06-22 RX ADMIN — ESCITALOPRAM OXALATE 20 MILLIGRAM(S): 20 TABLET ORAL at 13:45

## 2025-06-22 RX ADMIN — Medication 25 GRAM(S): at 05:21

## 2025-06-22 RX ADMIN — Medication 30 MILLILITER(S): at 11:15

## 2025-06-22 RX ADMIN — Medication 20 MILLIGRAM(S): at 13:47

## 2025-06-22 RX ADMIN — SODIUM CHLORIDE 1000 MILLILITER(S): 9 INJECTION, SOLUTION INTRAVENOUS at 21:08

## 2025-06-22 RX ADMIN — Medication 1 TABLET(S): at 13:46

## 2025-06-22 RX ADMIN — Medication 30 MILLILITER(S): at 05:22

## 2025-06-22 RX ADMIN — Medication 4 MILLIGRAM(S): at 01:21

## 2025-06-22 RX ADMIN — Medication 25 GRAM(S): at 16:50

## 2025-06-22 RX ADMIN — Medication 500 MILLIGRAM(S): at 13:46

## 2025-06-22 RX ADMIN — LOPERAMIDE HCL 2 MILLIGRAM(S): 1 SOLUTION ORAL at 21:01

## 2025-06-22 RX ADMIN — Medication 3 MILLIGRAM(S): at 21:01

## 2025-06-22 RX ADMIN — Medication 4 MILLIGRAM(S): at 13:47

## 2025-06-22 RX ADMIN — Medication 1 APPLICATION(S): at 05:23

## 2025-06-22 RX ADMIN — Medication 2 MILLIGRAM(S): at 17:18

## 2025-06-22 RX ADMIN — Medication 30 MILLILITER(S): at 21:00

## 2025-06-22 RX ADMIN — SODIUM CHLORIDE 1000 MILLILITER(S): 9 INJECTION, SOLUTION INTRAVENOUS at 21:04

## 2025-06-22 RX ADMIN — Medication 2 MILLIGRAM(S): at 16:50

## 2025-06-22 RX ADMIN — Medication 400 MILLIGRAM(S): at 11:15

## 2025-06-22 RX ADMIN — Medication 1000 MILLIGRAM(S): at 13:08

## 2025-06-22 RX ADMIN — Medication 4 GRAM(S): at 21:01

## 2025-06-22 RX ADMIN — Medication 975 MILLIGRAM(S): at 21:08

## 2025-06-22 NOTE — PROGRESS NOTE ADULT - SUBJECTIVE AND OBJECTIVE BOX
[facility] DIVISION OF KIDNEY DISEASES AND HYPERTENSION -- FOLLOW UP NOTE  --------------------------------------------------------------------------------  Chief Complaint:  Luis    24 hour events/subjective:  continues to have diarrhea      PAST HISTORY  --------------------------------------------------------------------------------  No significant changes to PMH, PSH, FHx, SHx, unless otherwise noted    ALLERGIES & MEDICATIONS  --------------------------------------------------------------------------------  Allergies    No Known Allergies        Standing Inpatient Medications  ascorbic acid 500 milliGRAM(s) Oral daily  chlorhexidine 2% Cloths 1 Application(s) Topical <User Schedule>  chlorhexidine 2% Cloths 1 Application(s) Topical <User Schedule>  cholestyramine Powder (Sugar-Free) 4 Gram(s) Oral two times a day  dextrose 5%. 1000 milliLiter(s) IV Continuous <Continuous>  dextrose 50% Injectable 25 Gram(s) IV Push once  dextrose 50% Injectable 12.5 Gram(s) IV Push once  dextrose 50% Injectable 25 Gram(s) IV Push once  dextrose Oral Gel 15 Gram(s) Oral once  escitalopram 20 milliGRAM(s) Oral daily  glucagon  Injectable 1 milliGRAM(s) IntraMuscular once  heparin   Injectable 5000 Unit(s) SubCutaneous every 8 hours  lactated ringers. 1000 milliLiter(s) IV Continuous <Continuous>  multiple electrolytes Injection Type 1 1000 milliLiter(s) IV Continuous <Continuous>  multivitamin 1 Tablet(s) Oral daily  piperacillin/tazobactam IVPB.. 3.375 Gram(s) IV Intermittent every 12 hours    PRN Inpatient Medications  acetaminophen     Tablet .. 650 milliGRAM(s) Oral every 6 hours PRN  acetaminophen     Tablet .. 975 milliGRAM(s) Oral every 8 hours PRN  aluminum hydroxide/magnesium hydroxide/simethicone Suspension 30 milliLiter(s) Oral every 4 hours PRN  dicyclomine 20 milliGRAM(s) Oral four times a day before meals PRN  HYDROmorphone   Tablet 2 milliGRAM(s) Oral every 4 hours PRN  HYDROmorphone  Injectable 0.5 milliGRAM(s) IV Push every 4 hours PRN  loperamide 2 milliGRAM(s) Oral every 4 hours PRN  melatonin 3 milliGRAM(s) Oral at bedtime PRN  ondansetron Injectable 4 milliGRAM(s) IV Push every 8 hours PRN  sodium chloride 0.9% lock flush 10 milliLiter(s) IV Push every 1 hour PRN      REVIEW OF SYSTEMS  --------------------------------------------------------------------------------  Gen: No weight changes, fatigue, fevers/chills, weakness  Skin: No rashes  Head/Eyes/Ears/Mouth: No headache; Normal hearing; Normal vision w/o blurriness; No sinus pain/discomfort, sore throat  Respiratory: No dyspnea, cough, wheezing, hemoptysis  CV: No chest pain, PND, orthopnea  GI: No abdominal pain, diarrhea+  : No increased frequency, dysuria, hematuria, nocturia  MSK: No joint pain/swelling; no back pain; no edema  Neuro: No dizziness/lightheadedness, weakness, seizures, numbness, tingling  Heme: No easy bruising or bleeding  Endo: No heat/cold intolerance  Psych: No significant nervousness, anxiety, stress, depression    All other systems were reviewed and are negative, except as noted.    VITALS/PHYSICAL EXAM  --------------------------------------------------------------------------------  T(C): 37 (06-22-25 @ 08:17), Max: 37.8 (06-21-25 @ 21:34)  HR: 96 (06-22-25 @ 08:17) (93 - 99)  BP: 117/74 (06-22-25 @ 08:17) (110/67 - 128/82)  RR: 18 (06-22-25 @ 08:17) (16 - 18)  SpO2: 95% (06-22-25 @ 08:17) (93% - 96%)  Wt(kg): --        06-21-25 @ 07:01  -  06-22-25 @ 07:00  --------------------------------------------------------  IN: 0 mL / OUT: 575 mL / NET: -575 mL      Physical Exam:  	Gen: NAD, well-appearing  	HEENT: supple neck, clear oropharynx  	Pulm: CTA B/L  	CV: RRR, S1S2; no rub  	Back: No spinal or CVA tenderness; no sacral edema  	Abd: +BS, soft, nontender/nondistended  	: No suprapubic tenderness  	UE: Warm,; no edema  	LE: Warm,no edema  	Neuro: No focal deficit  	Psych: Normal affect and mood  	Skin: Warm  	Vascular access: [facility]j non [facility]    LABS/STUDIES  --------------------------------------------------------------------------------              8.3    8.90  >-----------<  328      [06-22-25 @ 07:47]              26.6     138  |  101  |  22.1  ----------------------------<  99      [06-22-25 @ 07:47]  4.5   |  26.0  |  4.83        Ca     8.1     [06-22-25 @ 07:47]      Mg     2.4     [06-22-25 @ 07:47]      Phos  2.6     [06-22-25 @ 07:47]    TPro  5.1  /  Alb  2.6  /  TBili  0.3  /  DBili  0.1  /  AST  37  /  ALT  70  /  AlkPhos  114  [06-22-25 @ 07:47]          Creatinine Trend:  SCr 4.83 [06-22 @ 07:47]  SCr 3.93 [06-21 @ 06:45]  SCr 6.30 [06-20 @ 04:46]  SCr 5.20 [06-19 @ 05:42]  SCr 6.29 [06-18 @ 09:06]    Urinalysis - [06-22-25 @ 07:47]      Color  / Appearance  / SG  / pH       Gluc 99 / Ketone   / Bili  / Urobili        Blood  / Protein  / Leuk Est  / Nitrite       RBC  / WBC  / Hyaline  / Gran  / Sq Epi  / Non Sq Epi  / Bacteria       TSH 2.40      [06-21-25 @ 06:45]    HBsAb 5.2      [06-17-25 @ 20:30]  HBsAg Nonreact      [06-16-25 @ 09:45]  HCV 0.06, Nonreact      [06-16-25 @ 09:45]

## 2025-06-22 NOTE — PROGRESS NOTE ADULT - ATTENDING COMMENTS
61 y/o F here for acute renal failure 2/ mild rhabdo w/ gastroenteritis    #ARF  #Hypovolemia 2/2 NVD  #sepsis 2/2 Gastroenteritis  #HTN  - watch for renal recovery  - f/u nephro  - ID on board  - abx  - cholestyramine  - awaiting fecal procal  - remaining plan as above 59 y/o F here for acute renal failure 2/ mild rhabdo w/ gastroenteritis    #ARF  #Hypovolemia 2/2 NVD  #sepsis 2/2 Gastroenteritis  #HTN  - watch for renal recovery  - f/u nephro  - ID on board  - abx  - cholestyramine  - awaiting fecal procal  - remaining plan as above.

## 2025-06-22 NOTE — PROGRESS NOTE ADULT - ASSESSMENT
61 y/o F w/ PMH of HTN, HLD, prior obesity (currently on mounjaro for 1.5 yrs w/ 80lb wt loss) and anxiety/depression (on lexapro) s/p L-LICHA (6/9/25 at [facility]) presents c/o nausea, NBNB emesis and large vol watery nonbloody diarrhea w/ associated LLQ abd pain x 3 days ago associated w/ anuria, admitted for acute renal failure and sepsis 2/2 colitis      # Acute renal failure w/ mild rhabdo suspect multifactorial from N/V/D while on NSAIDs and ARB   # Hypovolemic mild hyponatremic hypochloremia likely 2/2 N/V/D  - HD initiated 6/17  - monae catheter placed in ED  -  Holding outpt Naproxen, losartan   - BUN 16/Cr 3.83; last known Cr 0.85 5/2025   - s/p HD 6/20  - encourage po intake  - Strict I/O's  - avoid nephrotoxic agents or renally dose if needed  - c/w prn antiemetics  - nephrology following       # Sepsis (improving)  # Colitis ; infectious vs ischemic  - CT a/p w/ findings compatible with mild acute colitis in the left descending and sigmoid colon  - 6/15 bld cx: ngtd, prelim read  - 6/16 stool cx: NGTD, final read  - reported 3 bloody BM yesterday, HgB 9.3, stable  - reported 6 watery BM this AM  - bentyl for abd spasms  - low fiber diet (low fat and low lactulose)  - f/u repeat C. diff  - f/u fecal calprotectin, fecal elastase, TSH, ESR/CRP, celiac cascade  - f/u UA  - c/w zosyn   - GI - Repeat C Diff PCR (negative), Bentyl for spasms  - ID - Zosyn, repeat C diff (negative)  - ctm CBC, temp        # Transaminitis w/ hepatocellular distribution   Ischemic.   CT a/p w/ normal liver, bile ducts nL caliber and s/p cholecystectomy  CMV, EBV to r/u infectious etiology - negative  - Trend LFTs and avoid hepatotoxic medications     # HTN / HLD  - bp stable today  - holding ARB due to renal fxn   - Holding statin for transaminitis     # Former obesity  - On mounjaro w/ 80lb weight loss and now BMI 23  - Hold mounjaro while inpt       VTE ppx:  Heparin sq  Diet: low fiber diet  Dispositon: Acute. Pending renal recovery, HD     59 y/o F w/ PMH of HTN, HLD, prior obesity (currently on mounjaro for 1.5 yrs w/ 80lb wt loss) and anxiety/depression (on lexapro) s/p L-LICHA (6/9/25 at [facility]) presents c/o nausea, NBNB emesis and large vol watery nonbloody diarrhea w/ associated LLQ abd pain x 3 days ago associated w/ anuria, admitted for acute renal failure and sepsis 2/2 colitis      # Acute renal failure w/ mild rhabdo suspect multifactorial from N/V/D while on NSAIDs and ARB   # Hypovolemic mild hyponatremic hypochloremia likely 2/2 N/V/D  - HD initiated 6/17  - monae catheter placed in ED  -  Holding outpt Naproxen, losartan   - BUN 22/Cr 4.83; last known Cr 0.85 5/2025   - s/p HD 6/18, 6/20  - 2L LR bolus today  - encourage po intake  - Strict I/O's  - avoid nephrotoxic agents or renally dose if needed  - c/w prn antiemetics  - nephrology following       # Sepsis (improving)  # Colitis ; infectious vs ischemic  - CT a/p w/ findings compatible with mild acute colitis in the left descending and sigmoid colon  -6/21 repeat C. diff PCR negative   - 6/21 TSH 2.4  - 6/21 ESR 49  - No leukocytosis, reported multiple watery BM this AM  - bentyl for abd spasms  - cholestyramine powder 4g po BID  - increased imodium to 2mg, up to six times per day  - tylenol 650mg q6h prn for fever  - clear liquid diet   - c/w zosyn   - f/u fecal calprotectin, fecal elastase, celiac cascade  - ctm CBC, temp    - ID following  - GI following      # Transaminitis w/ hepatocellular distribution - improving  Ischemic.   CT a/p w/ normal liver, bile ducts nL caliber and s/p cholecystectomy  CMV, EBV to r/u infectious etiology - negative  - Trend LFTs and avoid hepatotoxic medications     #generalized headache  - likely secondary to dehydration in the setting of multiple watery BM  - ofirmev x 1      # HTN / HLD  - bp stable today  - holding ARB due to renal fxn   - Holding statin for transaminitis     # Former obesity  - On mounjaro w/ 80lb weight loss and now BMI 23  - Hold mounjaro while inpt     #Anxiety/depression  - lexapro 20mg po QD     s/p L-LICHA (6/9/25 at [facility])  - PT/OT, OOB  - ICS  - acetaminophen 975mg po q8h for moderate pain  - dilaudid 2mg po q4h prn for severe pain  - dilaudid 0.5mg IVP q4h prn for breakthrough pain      VTE ppx:  Heparin sq  Diet: clear liquid diet  Dispositon: Acute. Pending renal recovery, HD

## 2025-06-22 NOTE — PROGRESS NOTE ADULT - SUBJECTIVE AND OBJECTIVE BOX
SUBJECTIVE  BRIEF HOSPITAL COURSE SUMMARY:      LAST 24 HOURS:  TODAY:  Pt seen at bedside in AM  No acute/overnight events  No acute medical complaints    OBJECTIVE    PHYSICAL EXAM:  GENERAL: No acute distress, comfortably in bed  HEENT: Atraumatic, normocephalic, non-icteric  NEURO: A&Ox3, no focal deficits, moving all extremities spontaneously, no dysarthria, CN II-XII grossly intact  PSYCH: Normal affect, calm, appropriate insight and judgment, fluent speech  LUNGS: CTAB, no wrr, non-labored breathing  HEART: RRR, no murmur appreciated  ABD: Soft, non-tender, non-distended, no organomegaly, no appreciable masses, +bs all 4 quadrants  EXTREMITIES: Nontender, no clubbing, cyanosis, or edema    Vital Signs Last 24 Hrs  T(C): 36.9 (22 Jun 2025 06:18), Max: 37.8 (21 Jun 2025 21:34)  T(F): 98.5 (22 Jun 2025 06:18), Max: 100 (21 Jun 2025 21:34)  HR: 93 (22 Jun 2025 06:18) (93 - 99)  BP: 110/67 (22 Jun 2025 06:18) (110/67 - 164/69)  BP(mean): --  RR: 18 (22 Jun 2025 06:18) (16 - 18)  SpO2: 93% (22 Jun 2025 06:18) (93% - 96%)    Parameters below as of 22 Jun 2025 06:18  Patient On (Oxygen Delivery Method): room air            MEDICATIONS  (STANDING):  ascorbic acid 500 milliGRAM(s) Oral daily  chlorhexidine 2% Cloths 1 Application(s) Topical <User Schedule>  chlorhexidine 2% Cloths 1 Application(s) Topical <User Schedule>  cholestyramine Powder (Sugar-Free) 4 Gram(s) Oral daily  dextrose 5%. 1000 milliLiter(s) (100 mL/Hr) IV Continuous <Continuous>  dextrose 50% Injectable 25 Gram(s) IV Push once  dextrose 50% Injectable 12.5 Gram(s) IV Push once  dextrose 50% Injectable 25 Gram(s) IV Push once  dextrose Oral Gel 15 Gram(s) Oral once  escitalopram 20 milliGRAM(s) Oral daily  glucagon  Injectable 1 milliGRAM(s) IntraMuscular once  heparin   Injectable 5000 Unit(s) SubCutaneous every 8 hours  multiple electrolytes Injection Type 1 1000 milliLiter(s) (100 mL/Hr) IV Continuous <Continuous>  multivitamin 1 Tablet(s) Oral daily  piperacillin/tazobactam IVPB.. 3.375 Gram(s) IV Intermittent every 12 hours    MEDICATIONS  (PRN):  acetaminophen     Tablet .. 650 milliGRAM(s) Oral every 6 hours PRN Temp greater or equal to 38C (100.4F), Mild Pain (1 - 3)  acetaminophen     Tablet .. 975 milliGRAM(s) Oral every 8 hours PRN Moderate Pain (4 - 6)  aluminum hydroxide/magnesium hydroxide/simethicone Suspension 30 milliLiter(s) Oral every 4 hours PRN Dyspepsia  dicyclomine 20 milliGRAM(s) Oral four times a day before meals PRN abd spasms  HYDROmorphone   Tablet 2 milliGRAM(s) Oral every 4 hours PRN Severe Pain (7 - 10)  HYDROmorphone  Injectable 0.5 milliGRAM(s) IV Push every 4 hours PRN breakthrough pain  melatonin 3 milliGRAM(s) Oral at bedtime PRN Insomnia  ondansetron Injectable 4 milliGRAM(s) IV Push every 8 hours PRN Nausea and/or Vomiting  sodium chloride 0.9% lock flush 10 milliLiter(s) IV Push every 1 hour PRN Pre/post blood products, medications, blood draw, and to maintain line patency    Allergies    No Known Allergies    Intolerances        LABS:                        8.3    8.90  )-----------( 328      ( 22 Jun 2025 07:47 )             26.6     06-21    139  |  99  |  16.0  ----------------------------<  106[H]  4.7   |  29.0  |  3.93[H]    Ca    7.9[L]      21 Jun 2025 06:45  Phos  1.8     06-21  Mg     2.1     06-21    TPro  5.2[L]  /  Alb  2.6[L]  /  TBili  0.3[L]  /  DBili  x   /  AST  70[H]  /  ALT  105[H]  /  AlkPhos  145[H]  06-21      Urinalysis Basic - ( 21 Jun 2025 06:45 )    Color: x / Appearance: x / SG: x / pH: x  Gluc: 106 mg/dL / Ketone: x  / Bili: x / Urobili: x   Blood: x / Protein: x / Nitrite: x   Leuk Esterase: x / RBC: x / WBC x   Sq Epi: x / Non Sq Epi: x / Bacteria: x      CAPILLARY BLOOD GLUCOSE          CULTURE DATA:    Culture - Blood (collected 06-15-25 @ 22:25)  Source: Blood Blood-Peripheral  Final Report (06-21-25 @ 01:00):    No growth at 5 days    Culture - Stool (collected 06-16-25 @ 20:20)  Source: Stool Feces  Final Report (06-19-25 @ 17:37):    No enteric pathogens isolated.    (Stool culture examined for Salmonella,    Shigella, Campylobacter, Aeromonas, Plesiomonas,    Vibrio, E.coli O157 and Yersinia)        RADIOLOGY & ADDITIONAL TESTS:  No new imaging to review SUBJECTIVE  BRIEF HOSPITAL COURSE SUMMARY:  Pt is a 61 y/o F w/ PMH of HTN, HLD, prior obesity (currently on mounjaro for 1.5 yrs w/ 80lb wt loss) and anxiety/depression (on lexapro) s/p L-LICHA (6/9/25 at [facility]) presented to the ED c/o nausea, NBNB emesis and large vol watery nonbloody diarrhea w/ associated LLQ abd pain that started 3 days ago.  Pt also noticed she stopped making urine Saturday and since arrival at the hospital has not had a BM.  pt has been on naproxen 500mg BID since L-hip surgery 6/9 and is also on losartan.  VS notable for soft SBP in 90's, sinus tachy and RR 20 but not hypoxic or febrile.  On exam patient appeared dehydrated w/ mild LLQ tenderness to palpation.  Bernard in place w/ no urine.  Labs significant for WBC 23.12, K 6.5-->4.9, AGMA, BUN 64, Cr 5.22-->5.08 (baseline 0.85), Transaminitis , lipase nL, CT a/p w/ possible mild colitis. Pt given zosyn, albumin, 3L LR and 1L NS, 10g lokelma, 2g Ca gluconate, 4mg IV morphine and 4mg IV zofran in ED. MICU consulted and recommending SDU.  Pt admitted for acute renal failure w/ mild rhabdo 2/2 to n/v/d while on NSAIDs and ARB. Nephro consulted, started on plasmalyte. ID consulted for colitis, concern for viral gastroentereitis and zosyn held on 6/17 as unclear if beneficial plus worsening renal function. GI consulted for colitis, recommend patient f/u w/ her gastroenterologist Dr. Gypsy Jackson for outpt colonoscopy in 6-8wks. Renal US showed no hydronephrosis. Per nephro recs, losartan was held. Renal function worsening no response to IV lasix, vascular surgery placed dialysis catheter, patient started on HD on 6/18. HD on 6/20    LAST 24 HOURS:  No acute events overnight    TODAY:  Pt seen at bedside in AM, hemodynamically stable on room air. Pt endorsing generalized headache likely due to dehydration, will give ofirmev x 1. Also reporting abd cramps and multiple watery bowel movements. Informed patient some medications are prn as so has to ask for them. No issues with urination. Endorses adequate po intake. No other acute medical complaints    Pt denies fevers, chills, sob, cp, palpitations, n/v.    OBJECTIVE    PHYSICAL EXAM:  GENERAL: No acute distress, comfortably in bed  HEENT: Atraumatic, normocephalic, non-icteric  NEURO: A&Ox3, no focal deficits, moving all extremities spontaneously, no dysarthria, CN II-XII grossly intact  PSYCH: Normal affect, calm, appropriate insight and judgment, fluent speech  LUNGS: CTAB, no wrr, non-labored breathing  HEART: RRR, no murmur appreciated  ABD: Soft, non-tender, non-distended, no organomegaly, no appreciable masses, +bs all 4 quadrants  EXTREMITIES: Nontender, no clubbing, cyanosis, or edema    Vital Signs Last 24 Hrs  T(C): 36.9 (22 Jun 2025 06:18), Max: 37.8 (21 Jun 2025 21:34)  T(F): 98.5 (22 Jun 2025 06:18), Max: 100 (21 Jun 2025 21:34)  HR: 93 (22 Jun 2025 06:18) (93 - 99)  BP: 110/67 (22 Jun 2025 06:18) (110/67 - 164/69)  BP(mean): --  RR: 18 (22 Jun 2025 06:18) (16 - 18)  SpO2: 93% (22 Jun 2025 06:18) (93% - 96%)    Parameters below as of 22 Jun 2025 06:18  Patient On (Oxygen Delivery Method): room air            MEDICATIONS  (STANDING):  ascorbic acid 500 milliGRAM(s) Oral daily  chlorhexidine 2% Cloths 1 Application(s) Topical <User Schedule>  chlorhexidine 2% Cloths 1 Application(s) Topical <User Schedule>  cholestyramine Powder (Sugar-Free) 4 Gram(s) Oral daily  dextrose 5%. 1000 milliLiter(s) (100 mL/Hr) IV Continuous <Continuous>  dextrose 50% Injectable 25 Gram(s) IV Push once  dextrose 50% Injectable 12.5 Gram(s) IV Push once  dextrose 50% Injectable 25 Gram(s) IV Push once  dextrose Oral Gel 15 Gram(s) Oral once  escitalopram 20 milliGRAM(s) Oral daily  glucagon  Injectable 1 milliGRAM(s) IntraMuscular once  heparin   Injectable 5000 Unit(s) SubCutaneous every 8 hours  multiple electrolytes Injection Type 1 1000 milliLiter(s) (100 mL/Hr) IV Continuous <Continuous>  multivitamin 1 Tablet(s) Oral daily  piperacillin/tazobactam IVPB.. 3.375 Gram(s) IV Intermittent every 12 hours    MEDICATIONS  (PRN):  acetaminophen     Tablet .. 650 milliGRAM(s) Oral every 6 hours PRN Temp greater or equal to 38C (100.4F), Mild Pain (1 - 3)  acetaminophen     Tablet .. 975 milliGRAM(s) Oral every 8 hours PRN Moderate Pain (4 - 6)  aluminum hydroxide/magnesium hydroxide/simethicone Suspension 30 milliLiter(s) Oral every 4 hours PRN Dyspepsia  dicyclomine 20 milliGRAM(s) Oral four times a day before meals PRN abd spasms  HYDROmorphone   Tablet 2 milliGRAM(s) Oral every 4 hours PRN Severe Pain (7 - 10)  HYDROmorphone  Injectable 0.5 milliGRAM(s) IV Push every 4 hours PRN breakthrough pain  melatonin 3 milliGRAM(s) Oral at bedtime PRN Insomnia  ondansetron Injectable 4 milliGRAM(s) IV Push every 8 hours PRN Nausea and/or Vomiting  sodium chloride 0.9% lock flush 10 milliLiter(s) IV Push every 1 hour PRN Pre/post blood products, medications, blood draw, and to maintain line patency    Allergies    No Known Allergies    Intolerances        LABS:                        8.3    8.90  )-----------( 328      ( 22 Jun 2025 07:47 )             26.6     06-21    139  |  99  |  16.0  ----------------------------<  106[H]  4.7   |  29.0  |  3.93[H]    Ca    7.9[L]      21 Jun 2025 06:45  Phos  1.8     06-21  Mg     2.1     06-21    TPro  5.2[L]  /  Alb  2.6[L]  /  TBili  0.3[L]  /  DBili  x   /  AST  70[H]  /  ALT  105[H]  /  AlkPhos  145[H]  06-21      Urinalysis Basic - ( 21 Jun 2025 06:45 )    Color: x / Appearance: x / SG: x / pH: x  Gluc: 106 mg/dL / Ketone: x  / Bili: x / Urobili: x   Blood: x / Protein: x / Nitrite: x   Leuk Esterase: x / RBC: x / WBC x   Sq Epi: x / Non Sq Epi: x / Bacteria: x      CAPILLARY BLOOD GLUCOSE          CULTURE DATA:    Culture - Blood (collected 06-15-25 @ 22:25)  Source: Blood Blood-Peripheral  Final Report (06-21-25 @ 01:00):    No growth at 5 days    Culture - Stool (collected 06-16-25 @ 20:20)  Source: Stool Feces  Final Report (06-19-25 @ 17:37):    No enteric pathogens isolated.    (Stool culture examined for Salmonella,    Shigella, Campylobacter, Aeromonas, Plesiomonas,    Vibrio, E.coli O157 and Yersinia)        RADIOLOGY & ADDITIONAL TESTS:  No new imaging to review

## 2025-06-22 NOTE — PROGRESS NOTE ADULT - ASSESSMENT
1.  Acute kidney injury with hyperkalemia and acidosis: Multifactorial likely combination of prerenal/ATN/NSAID nephrotoxicity along with ARB use.  Ultrasound shows no evidence of hydronephrosis. HD started on 6/17. last HD was on Friday.  No renal  recovery evident at this time.  HD tomorrow  continues to have diarrhea- IV fluids    2.  Hypertension; bp stable. continue to hold losartan.  Continue IV fluids  3.  Status post hip replacement.  Hold NSAIDs per Ortho.  4.  Colitis noted on CT abdomen.  ongoing diarrhea- mgt as per GI

## 2025-06-22 NOTE — PROGRESS NOTE ADULT - ASSESSMENT
ASSESSMENT / PLAN    NAVI (Cr 4.83) – likely pre-renal from diarrheal losses  • Continue  mL/h; strict I/O, daily BMP, weights.  • Hold nephrotoxins.    Persistent diarrhea  • Cholestyramine 4 g PO BID (dose increased).  • Stool C-diff PCR, culture, O&P sent.  • Loperamide 2 mg PO q4h PRN (max 8 mg/day).  • Maintain clear-liquid diet; reassess tomorrow.    Anemia (Hgb 8.3) – monitor CBC q24 h; transfuse if <7 or symptomatic.    Electrolyte / nutrition  • Re-check Ca tomorrow; give CaCO3 500 mg PO TID if Ca < 8.2.  • Consider nutrition consult once stool output improves.    Mild transaminitis – trend LFTs q48 h; likely reactive.    Antibiotics / prophylaxis  • Continue piperacillin-tazobactam (day 3/7).  • Continue heparin 5 000 U SC q8h.    Disposition: remain inpatient; anticipate improvement over next 24–48 h.   ASSESSMENT / PLAN    NAVI (Cr 4.83) – likely pre-renal from diarrheal losses  • Continue  mL/h; strict I/O, daily BMP, weights.  • Hold nephrotoxins.    Persistent diarrhea  • Cholestyramine 4 g PO BID (dose increased).  • Stool C-diff PCR, culture, O&P sent.  • Loperamide 2 mg PO q4h PRN (max 8 mg/day).  • Maintain clear-liquid diet; reassess tomorrow.    Anemia (Hgb 8.3) – monitor CBC q24 h; transfuse if <7 or symptomatic.    Electrolyte / nutrition  • Re-check Ca tomorrow; give CaCO3 500 mg PO TID if Ca < 8.2.  • Consider nutrition consult once stool output improves.    Mild transaminitis – trend LFTs q48 h; likely reactive.    Antibiotics / prophylaxis  • Continue piperacillin-tazobactam (day 3/7).  • Continue heparin 5 000 U SC q8h.

## 2025-06-22 NOTE — PROGRESS NOTE ADULT - SUBJECTIVE AND OBJECTIVE BOX
DATE: 22-Jun-2025  TIME: 1015    PROGRESS NOTE – GI SERVICE    IDENTIFICATION / CHIEF COMPLAINT  60-year-old female admitted for NAVI; persistent diarrhea under GI care.    SUBJECTIVE (24-h events)  • Still having watery diarrhea (~4 overnight). No blood.  • Denies N/V, abdominal pain, hematemesis, hematochezia, melena.  • Otherwise feels well; tolerating clear liquids.    OBJECTIVE  Vital signs 0817 T 36.9 °C (98.6 °F) HR 96 /74 RR 18 SpO2 95 % RA  I&O 06/21?06/22 IN 0 mL /  mL (NET –575 mL)    Exam: Gen NAD; HEENT anicteric; Chest clear; CV RRR; Abd soft NT/ND; Ext no edema; Neuro alert; Skin warm/dry.    Labs 06/22 0747: Hgb 8.3?, Plt 328 K, Na 138, K 4.5, Cl 101, CO2 22 ?, BUN 26 ?, Cr 4.83 ?, Ca 8.1 ? (connie 8.7), Mg 2.4, Alb 2.6 ?, AST/ALT 37/70 ?.    US Abd 06/21: Small pleural effusions; otherwise unremarkable.    MEDICATION LIST  Standing  • Ascorbic acid 500 mg PO daily  • Chlorhexidine 2 % cloths topical (×2 per user schedule)  • Cholestyramine (sugar-free) 4 g PO BID ? (dose increased today)  • Dextrose 5 % 1000 mL IV @ 100 mL/h continuous  • Dextrose 50 % 25 g IV push once (standing order)  • Dextrose 50 % 12.5 g IV push once (standing order)  • Escitalopram 20 mg PO daily  • Glucagon 1 mg IM once  • Heparin 5 000 U SC q8h for VTE prophylaxis  • Lactated Ringers 1000 mL IV @ 1000 mL/h continuous  • Multiple electrolytes injection (Type 1) 1000 mL IV @ 100 mL/h continuous  • Multivitamin PO daily  • Piperacillin/Tazobactam 3.375 g IV q12h (day 3/7)    PRN  • Acetaminophen 650 mg PO q6h PRN T = 38 °C / mild pain  • Acetaminophen 975 mg PO q8h PRN moderate pain  • Aluminum hydroxide/Mg hydroxide/Simethicone 30 mL PO q4h PRN dyspepsia  • Dicyclomine 20 mg PO QID before meals PRN abdominal spasms  • Hydromorphone 2 mg PO q4h PRN severe pain  • Hydromorphone 0.5 mg IV q4h PRN breakthrough pain  • Loperamide 2 mg PO q4h PRN diarrhea (max 8 mg/24 h)  • Melatonin 3 mg PO HS PRN insomnia  • Ondansetron 4 mg IV q8h PRN N/V  • Dextrose oral gel 15 g PO PRN BG < 70 mg/dL  • Sodium chloride 0.9 % lock flush 10 mL IV push q1h PRN line patency    DIET  Clear liquids only (initiated 06/22 0937).

## 2025-06-23 DIAGNOSIS — R19.7 DIARRHEA, UNSPECIFIED: ICD-10-CM

## 2025-06-23 LAB
ALBUMIN SERPL ELPH-MCNC: 2.3 G/DL — LOW (ref 3.3–5.2)
ALP SERPL-CCNC: 98 U/L — SIGNIFICANT CHANGE UP (ref 40–120)
ALT FLD-CCNC: 48 U/L — HIGH
ANION GAP SERPL CALC-SCNC: 12 MMOL/L — SIGNIFICANT CHANGE UP (ref 5–17)
AST SERPL-CCNC: 25 U/L — SIGNIFICANT CHANGE UP
BASOPHILS # BLD AUTO: 0.06 K/UL — SIGNIFICANT CHANGE UP (ref 0–0.2)
BASOPHILS NFR BLD AUTO: 0.6 % — SIGNIFICANT CHANGE UP (ref 0–2)
BILIRUB SERPL-MCNC: 0.3 MG/DL — LOW (ref 0.4–2)
BUN SERPL-MCNC: 21.7 MG/DL — HIGH (ref 8–20)
CALCIUM SERPL-MCNC: 8 MG/DL — LOW (ref 8.4–10.5)
CHLORIDE SERPL-SCNC: 103 MMOL/L — SIGNIFICANT CHANGE UP (ref 96–108)
CO2 SERPL-SCNC: 24 MMOL/L — SIGNIFICANT CHANGE UP (ref 22–29)
CREAT SERPL-MCNC: 4.86 MG/DL — HIGH (ref 0.5–1.3)
EGFR: 10 ML/MIN/1.73M2 — LOW
EGFR: 10 ML/MIN/1.73M2 — LOW
EOSINOPHIL # BLD AUTO: 0.35 K/UL — SIGNIFICANT CHANGE UP (ref 0–0.5)
EOSINOPHIL NFR BLD AUTO: 3.6 % — SIGNIFICANT CHANGE UP (ref 0–6)
GLUCOSE SERPL-MCNC: 90 MG/DL — SIGNIFICANT CHANGE UP (ref 70–99)
HCT VFR BLD CALC: 24.5 % — LOW (ref 34.5–45)
HGB BLD-MCNC: 7.8 G/DL — LOW (ref 11.5–15.5)
IMM GRANULOCYTES # BLD AUTO: 0.06 K/UL — SIGNIFICANT CHANGE UP (ref 0–0.07)
IMM GRANULOCYTES NFR BLD AUTO: 0.6 % — SIGNIFICANT CHANGE UP (ref 0–0.9)
LYMPHOCYTES # BLD AUTO: 1.86 K/UL — SIGNIFICANT CHANGE UP (ref 1–3.3)
LYMPHOCYTES NFR BLD AUTO: 19.1 % — SIGNIFICANT CHANGE UP (ref 13–44)
MAGNESIUM SERPL-MCNC: 2.2 MG/DL — SIGNIFICANT CHANGE UP (ref 1.6–2.6)
MCHC RBC-ENTMCNC: 28.6 PG — SIGNIFICANT CHANGE UP (ref 27–34)
MCHC RBC-ENTMCNC: 31.8 G/DL — LOW (ref 32–36)
MCV RBC AUTO: 89.7 FL — SIGNIFICANT CHANGE UP (ref 80–100)
MONOCYTES # BLD AUTO: 0.94 K/UL — HIGH (ref 0–0.9)
MONOCYTES NFR BLD AUTO: 9.6 % — SIGNIFICANT CHANGE UP (ref 2–14)
NEUTROPHILS # BLD AUTO: 6.48 K/UL — SIGNIFICANT CHANGE UP (ref 1.8–7.4)
NEUTROPHILS NFR BLD AUTO: 66.5 % — SIGNIFICANT CHANGE UP (ref 43–77)
NRBC # BLD AUTO: 0 K/UL — SIGNIFICANT CHANGE UP (ref 0–0)
NRBC # FLD: 0 K/UL — SIGNIFICANT CHANGE UP (ref 0–0)
NRBC BLD AUTO-RTO: 0 /100 WBCS — SIGNIFICANT CHANGE UP (ref 0–0)
PHOSPHATE SERPL-MCNC: 2.9 MG/DL — SIGNIFICANT CHANGE UP (ref 2.4–4.7)
PLATELET # BLD AUTO: 374 K/UL — SIGNIFICANT CHANGE UP (ref 150–400)
PMV BLD: 9.7 FL — SIGNIFICANT CHANGE UP (ref 7–13)
POTASSIUM SERPL-MCNC: 4.2 MMOL/L — SIGNIFICANT CHANGE UP (ref 3.5–5.3)
POTASSIUM SERPL-SCNC: 4.2 MMOL/L — SIGNIFICANT CHANGE UP (ref 3.5–5.3)
PROT SERPL-MCNC: 4.6 G/DL — LOW (ref 6.6–8.7)
RBC # BLD: 2.73 M/UL — LOW (ref 3.8–5.2)
RBC # FLD: 13.3 % — SIGNIFICANT CHANGE UP (ref 10.3–14.5)
SODIUM SERPL-SCNC: 139 MMOL/L — SIGNIFICANT CHANGE UP (ref 135–145)
WBC # BLD: 9.75 K/UL — SIGNIFICANT CHANGE UP (ref 3.8–10.5)
WBC # FLD AUTO: 9.75 K/UL — SIGNIFICANT CHANGE UP (ref 3.8–10.5)

## 2025-06-23 PROCEDURE — 99233 SBSQ HOSP IP/OBS HIGH 50: CPT

## 2025-06-23 PROCEDURE — 99232 SBSQ HOSP IP/OBS MODERATE 35: CPT

## 2025-06-23 PROCEDURE — 90935 HEMODIALYSIS ONE EVALUATION: CPT

## 2025-06-23 RX ADMIN — Medication 1 APPLICATION(S): at 05:21

## 2025-06-23 RX ADMIN — Medication 25 GRAM(S): at 05:22

## 2025-06-23 RX ADMIN — Medication 4 MILLIGRAM(S): at 18:23

## 2025-06-23 RX ADMIN — ESCITALOPRAM OXALATE 20 MILLIGRAM(S): 20 TABLET ORAL at 21:20

## 2025-06-23 RX ADMIN — Medication 25 GRAM(S): at 18:20

## 2025-06-23 RX ADMIN — Medication 2 MILLIGRAM(S): at 05:29

## 2025-06-23 RX ADMIN — Medication 975 MILLIGRAM(S): at 18:24

## 2025-06-23 RX ADMIN — Medication 650 MILLIGRAM(S): at 02:49

## 2025-06-23 RX ADMIN — Medication 4 GRAM(S): at 21:20

## 2025-06-23 RX ADMIN — Medication 650 MILLIGRAM(S): at 02:52

## 2025-06-23 RX ADMIN — Medication 4 GRAM(S): at 08:09

## 2025-06-23 RX ADMIN — Medication 30 MILLILITER(S): at 18:31

## 2025-06-23 RX ADMIN — LOPERAMIDE HCL 2 MILLIGRAM(S): 1 SOLUTION ORAL at 02:49

## 2025-06-23 RX ADMIN — LOPERAMIDE HCL 2 MILLIGRAM(S): 1 SOLUTION ORAL at 18:20

## 2025-06-23 RX ADMIN — Medication 650 MILLIGRAM(S): at 12:08

## 2025-06-23 RX ADMIN — Medication 650 MILLIGRAM(S): at 11:38

## 2025-06-23 RX ADMIN — Medication 2 MILLIGRAM(S): at 06:29

## 2025-06-23 RX ADMIN — Medication 3 MILLIGRAM(S): at 21:20

## 2025-06-23 RX ADMIN — Medication 20 MILLIGRAM(S): at 18:20

## 2025-06-23 RX ADMIN — Medication 4 MILLIGRAM(S): at 00:03

## 2025-06-23 NOTE — PROGRESS NOTE ADULT - PROBLEM SELECTOR PLAN 1
Patient continues with diarrhea -15 small bowel movements  CT mild thickened left colon my interpretation  C. difficile GI PCR negative  Please order CBC CMP for tomorrow  If diarrhea continues, we can consider flexible sigmoidoscopy.  We have ordered n.p.o. after midnight for possible flex sig tomorrow.  If we decide to do a flexible sigmoidoscopy tomorrow, patient will need tapwater enema to be ordered by GI team in the morning.  Will need to find out from orthopedics whether patient can lay on her left lateral decubitus position.  I discussed the risks and benefits of flexible sigmoidoscopy including but not limited to bleeding infection perforation.  Biopsies would be done to rule out microscopic colitis and to look for pseudomembranes.

## 2025-06-23 NOTE — PROGRESS NOTE ADULT - ASSESSMENT
59 y/o F with PMHX HTN, HLD, obesity and anxiety/depression (on lexapro) s/p L-LICHA (6/9/25 at [facility]) presents complaining of abdominal pain, vomiting and diarrhea    #Colitis  #Elevated liver enzymes  #NAVI   CT Abdomen and Pelvis No Cont (06.15.25 @ 22:14) thickening of sigmoid colonic wall with several no-inflamed diverticula, no peridiverticular fat stranding or inflammation to suggest acute diverticulitis; decompressed descending colon with mild wall thickening and minimal fat stranding in the mesocolon/sigmoid; findings compatible with mild acute colitis in the left descending and sigmoid colon which may be of inflammatory, infectious or ischemic etiology.   C Diff by PCR Result: NotDetec (06.16.25 @ 20:20)  GI PCR Panel: NotDetec (06.16.25 @ 20:20)  Ova and Parasites (06.16.25 @ 20:20) No Protozoa seen by trichrome stain, No Helminths or Protozoa seen   Culture - Stool (06.16.25 @ 20:20) No enteric pathogens isolated.  Acute hepatitis panel negative, EBV/CMV negative   - Trend CBC and BMP daily   - Trend electrolytes, replete as needed   - Continue bentyl PRN abdominal spasms  - Continue with cholestyramine BID   - Monitor stool count   - Follow up fecal calprotectin   - Defer abx to ID   - If no improvement in diarrhea will consider flex sig tomorrow, 6/24/25. Pt will need ortho clearance to lay on left hip for procedure.  - NPO after midnight   D/w primary team   _________________________________________________________________  Assessment and recommendations are final when note is signed by the attending physician.

## 2025-06-23 NOTE — PROGRESS NOTE ADULT - ASSESSMENT
59 y/o F w/ PMH of HTN, HLD, prior obesity (currently on mounjaro for 1.5 yrs w/ 80lb wt loss) and anxiety/depression (on lexapro) s/p L-LICHA (6/9/25 at [facility]) presents c/o nausea, NBNB emesis and large vol watery nonbloody diarrhea w/ associated LLQ abd pain x 3 days ago associated w/ anuria, admitted for acute renal failure and sepsis 2/2 colitis. Initially had some blood bowel movements, which transformed into multiple watery BM. Given IVF, started on zosyn. C diff was negative.Plan for possible flagyl procedure tomorrow with GI      # Acute renal failure w/ mild rhabdo suspect multifactorial from N/V/D while on NSAIDs and ARB   # Hypovolemic mild hyponatremic hypochloremia likely 2/2 N/V/D  - HD initiated 6/17  - monae catheter placed in ED  -  Holding outpt Naproxen, losartan   - BUN 21/Cr 4.86; last known Cr 0.85 5/2025   - s/p HD 6/18, 6/20  - HD today  - encourage po intake  - Strict I/O's  - avoid nephrotoxic agents or renally dose if needed  - c/w prn antiemetics  - nephrology following       # Sepsis (improving)  # Colitis ; infectious vs ischemic  - CT a/p w/ findings compatible with mild acute colitis in the left descending and sigmoid colon  -6/21 repeat C. diff PCR negative; TSH wnl; ESR 49  - No leukocytosis, reported 15 watery BM in last 24 hrs  - c/w zosyn   - bentyl for abd spasms  - cholestyramine powder 4g po BID  - increased imodium to 2mg, up to six times per day  - tylenol 650mg q6h prn for fever  - clear liquid diet. NPO after midnight  - f/u fecal calprotectin, fecal elastase, celiac cascade  - ctm CBC, temp    - ID following  - GI following      # Transaminitis w/ hepatocellular distribution - improving  Ischemic.   CT a/p w/ normal liver, bile ducts nL caliber and s/p cholecystectomy  CMV, EBV to r/u infectious etiology - negative  - Trend LFTs and avoid hepatotoxic medications     #generalized headache  - likely secondary to dehydration in the setting of multiple watery BM  - ofirmev x 1      # HTN / HLD  - bp stable today  - holding ARB due to renal fxn   - Holding statin for transaminitis     # Former obesity  - On mounjaro w/ 80lb weight loss and now BMI 23  - Hold mounjaro while inpt     #Anxiety/depression  - lexapro 20mg po QD     s/p L-LICHA (6/9/25 at [facility])  - PT/OT, OOB  - ICS  - acetaminophen 975mg po q8h for moderate pain  - dilaudid 2mg po q4h prn for severe pain  - dilaudid 0.5mg IVP q4h prn for breakthrough pain      VTE ppx:  Heparin sq  Diet: clear liquid diet. NPO after midnight for possible flagyl procedure  Dispositon: Acute. Pending renal recovery, HD

## 2025-06-23 NOTE — PROGRESS NOTE ADULT - SUBJECTIVE AND OBJECTIVE BOX
Chief Complaint:  Patient is a 60y old  Female who presents with a chief complaint of acute renal failure (23 Jun 2025 07:33)      HPI/ 24 hr events: Patient seen and examined at bedside. Pt feeling well this morning. States she feels her diarrhea is a little improved, however still have 15 small BMs over past 24H. She states her BM this am was slightly more formed. Denies nausea, vomiting, abdominal pain. Vitals are overall stable, no leukocytosis.      REVIEW OF SYSTEMS:   General: Negative  HEENT: Negative  CV: Negative  Respiratory: Negative  GI: See HPI  : Negative  MSK: Negative  Hematologic: Negative  Skin: Negative    MEDICATIONS:   MEDICATIONS  (STANDING):  ascorbic acid 500 milliGRAM(s) Oral daily  chlorhexidine 2% Cloths 1 Application(s) Topical <User Schedule>  chlorhexidine 2% Cloths 1 Application(s) Topical <User Schedule>  cholestyramine Powder (Sugar-Free) 4 Gram(s) Oral two times a day  dextrose 5%. 1000 milliLiter(s) (100 mL/Hr) IV Continuous <Continuous>  dextrose 50% Injectable 25 Gram(s) IV Push once  dextrose 50% Injectable 12.5 Gram(s) IV Push once  dextrose 50% Injectable 25 Gram(s) IV Push once  dextrose Oral Gel 15 Gram(s) Oral once  escitalopram 20 milliGRAM(s) Oral daily  glucagon  Injectable 1 milliGRAM(s) IntraMuscular once  heparin   Injectable 5000 Unit(s) SubCutaneous every 8 hours  multivitamin 1 Tablet(s) Oral daily  piperacillin/tazobactam IVPB.. 3.375 Gram(s) IV Intermittent every 12 hours    MEDICATIONS  (PRN):  acetaminophen     Tablet .. 650 milliGRAM(s) Oral every 6 hours PRN Temp greater or equal to 38C (100.4F), Mild Pain (1 - 3)  acetaminophen     Tablet .. 975 milliGRAM(s) Oral every 8 hours PRN Moderate Pain (4 - 6)  aluminum hydroxide/magnesium hydroxide/simethicone Suspension 30 milliLiter(s) Oral every 4 hours PRN Dyspepsia  dicyclomine 20 milliGRAM(s) Oral four times a day before meals PRN abd spasms  HYDROmorphone   Tablet 2 milliGRAM(s) Oral every 4 hours PRN Severe Pain (7 - 10)  HYDROmorphone  Injectable 0.5 milliGRAM(s) IV Push every 4 hours PRN breakthrough pain  loperamide 2 milliGRAM(s) Oral every 4 hours PRN Diarrhea  melatonin 3 milliGRAM(s) Oral at bedtime PRN Insomnia  ondansetron Injectable 4 milliGRAM(s) IV Push every 8 hours PRN Nausea and/or Vomiting  sodium chloride 0.9% lock flush 10 milliLiter(s) IV Push every 1 hour PRN Pre/post blood products, medications, blood draw, and to maintain line patency            DIET:  Diet, NPO after Midnight:      NPO Start Date: 23-Jun-2025,   NPO Start Time: 23:59 (06-23-25 @ 09:59) [Active]  Diet, Clear Liquid (06-22-25 @ 09:37) [Active]          ALLERGIES:   Allergies    No Known Allergies    Intolerances        VITAL SIGNS:   Vital Signs Last 24 Hrs  T(C): 37.2 (23 Jun 2025 07:15), Max: 37.2 (23 Jun 2025 07:15)  T(F): 98.9 (23 Jun 2025 07:15), Max: 98.9 (23 Jun 2025 07:15)  HR: 87 (23 Jun 2025 07:15) (87 - 99)  BP: 114/75 (23 Jun 2025 07:15) (111/72 - 147/77)  BP(mean): --  RR: 18 (23 Jun 2025 07:15) (18 - 18)  SpO2: 93% (23 Jun 2025 07:15) (93% - 99%)    Parameters below as of 23 Jun 2025 07:15  Patient On (Oxygen Delivery Method): room air      I&O's Summary    22 Jun 2025 07:01  -  23 Jun 2025 07:00  --------------------------------------------------------  IN: 0 mL / OUT: 2000 mL / NET: -2000 mL        PHYSICAL EXAM:   GENERAL:  No acute distress  HEENT:  NC/AT, conjunctiva clear, sclera anicteric  CHEST:  No increased effort  HEART:  Regular rate  ABDOMEN:  Soft, non-tender, non-distended, no rebound or guarding  EXTREMITIES: No edema  SKIN:  Warm, dry  NEURO:  Calm, cooperative    LABS:                        7.8    9.75  )-----------( 374      ( 23 Jun 2025 04:00 )             24.5     Hemoglobin: 7.8 g/dL (06-23-25 @ 04:00)  Hemoglobin: 8.3 g/dL (06-22-25 @ 07:47)  Hemoglobin: 8.8 g/dL (06-21-25 @ 06:45)    06-23    139  |  103  |  21.7[H]  ----------------------------<  90  4.2   |  24.0  |  4.86[H]    Ca    8.0[L]      23 Jun 2025 04:00  Phos  2.9     06-23  Mg     2.2     06-23    TPro  4.6[L]  /  Alb  2.3[L]  /  TBili  0.3[L]  /  DBili  x   /  AST  25  /  ALT  48[H]  /  AlkPhos  98  06-23    LIVER FUNCTIONS - ( 23 Jun 2025 04:00 )  Alb: 2.3 g/dL / Pro: 4.6 g/dL / ALK PHOS: 98 U/L / ALT: 48 U/L / AST: 25 U/L / GGT: x         RADIOLOGY & ADDITIONAL STUDIES:        ACC: [id number] EXAM:  US ABDOMEN COMPLETE   ORDERED BY: RADHA ALFARO     PROCEDURE DATE:  06/21/2025          INTERPRETATION:  CLINICAL INFORMATION: Colitis.    COMPARISON: Abdominal CT dated 06/15/2025.    TECHNIQUE: Sonography of the abdomen.    FINDINGS:  Liver: Normal in size and echogenicity. No focal lesions are identified.  Bile ducts: Normal caliber. Common bile duct measures 7 mm.  Gallbladder: Cholecystectomy.  Pancreas: Visualized portions are within normal limits.  Spleen: 10.5 cm. Within normal limits.  Right kidney: 10.4 cm. No hydronephrosis.  Left kidney: 12.0 cm. No hydronephrosis.  Ascites: None.  Aorta and IVC: Visualized portions are within normal limits.    Small bilateral pleural effusions.    IMPRESSION:  Small bilateral pleural effusions, otherwise unremarkable abdominal   ultrasound.        --- End of Report ---            ROSIE BARRIOS MD; Attending Radiologist  This document has been electronically signed. Jun 21 2025  3:30PM -- -- --             Chief Complaint:  Patient is a 60y old  Female who presents with a chief complaint of acute renal failure (23 Jun 2025 07:33)      HPI/ 24 hr events: Patient seen and examined at bedside. Pt feeling better this morning. States she feels her diarrhea is a little improved, however still have 15 small BMs over past 24H. No BM this am .  She states her BM this am was slightly more formed. Started  Questran.  Denies nausea, vomiting, abdominal pain. Vitals are overall stable, no leukocytosis.      REVIEW OF SYSTEMS:   General: Negative  HEENT: Negative  CV: Negative  Respiratory: Negative  GI: See HPI  : Negative  MSK: Negative  Hematologic: Negative  Skin: Negative    MEDICATIONS:   MEDICATIONS  (STANDING):  ascorbic acid 500 milliGRAM(s) Oral daily  chlorhexidine 2% Cloths 1 Application(s) Topical <User Schedule>  chlorhexidine 2% Cloths 1 Application(s) Topical <User Schedule>  cholestyramine Powder (Sugar-Free) 4 Gram(s) Oral two times a day  dextrose 5%. 1000 milliLiter(s) (100 mL/Hr) IV Continuous <Continuous>  dextrose 50% Injectable 25 Gram(s) IV Push once  dextrose 50% Injectable 12.5 Gram(s) IV Push once  dextrose 50% Injectable 25 Gram(s) IV Push once  dextrose Oral Gel 15 Gram(s) Oral once  escitalopram 20 milliGRAM(s) Oral daily  glucagon  Injectable 1 milliGRAM(s) IntraMuscular once  heparin   Injectable 5000 Unit(s) SubCutaneous every 8 hours  multivitamin 1 Tablet(s) Oral daily  piperacillin/tazobactam IVPB.. 3.375 Gram(s) IV Intermittent every 12 hours    MEDICATIONS  (PRN):  acetaminophen     Tablet .. 650 milliGRAM(s) Oral every 6 hours PRN Temp greater or equal to 38C (100.4F), Mild Pain (1 - 3)  acetaminophen     Tablet .. 975 milliGRAM(s) Oral every 8 hours PRN Moderate Pain (4 - 6)  aluminum hydroxide/magnesium hydroxide/simethicone Suspension 30 milliLiter(s) Oral every 4 hours PRN Dyspepsia  dicyclomine 20 milliGRAM(s) Oral four times a day before meals PRN abd spasms  HYDROmorphone   Tablet 2 milliGRAM(s) Oral every 4 hours PRN Severe Pain (7 - 10)  HYDROmorphone  Injectable 0.5 milliGRAM(s) IV Push every 4 hours PRN breakthrough pain  loperamide 2 milliGRAM(s) Oral every 4 hours PRN Diarrhea  melatonin 3 milliGRAM(s) Oral at bedtime PRN Insomnia  ondansetron Injectable 4 milliGRAM(s) IV Push every 8 hours PRN Nausea and/or Vomiting  sodium chloride 0.9% lock flush 10 milliLiter(s) IV Push every 1 hour PRN Pre/post blood products, medications, blood draw, and to maintain line patency            DIET:  Diet, NPO after Midnight:      NPO Start Date: 23-Jun-2025,   NPO Start Time: 23:59 (06-23-25 @ 09:59) [Active]  Diet, Clear Liquid (06-22-25 @ 09:37) [Active]          ALLERGIES:   Allergies    No Known Allergies    Intolerances        VITAL SIGNS:   Vital Signs Last 24 Hrs  T(C): 37.2 (23 Jun 2025 07:15), Max: 37.2 (23 Jun 2025 07:15)  T(F): 98.9 (23 Jun 2025 07:15), Max: 98.9 (23 Jun 2025 07:15)  HR: 87 (23 Jun 2025 07:15) (87 - 99)  BP: 114/75 (23 Jun 2025 07:15) (111/72 - 147/77)  BP(mean): --  RR: 18 (23 Jun 2025 07:15) (18 - 18)  SpO2: 93% (23 Jun 2025 07:15) (93% - 99%)    Parameters below as of 23 Jun 2025 07:15  Patient On (Oxygen Delivery Method): room air      I&O's Summary    22 Jun 2025 07:01  -  23 Jun 2025 07:00  --------------------------------------------------------  IN: 0 mL / OUT: 2000 mL / NET: -2000 mL        PHYSICAL EXAM:   GENERAL:  No acute distress  HEENT:  NC/AT, conjunctiva clear, sclera anicteric  CHEST:  No increased effort  HEART:  Regular rate  ABDOMEN:  Soft, non-tender, non-distended, no rebound or guarding  EXTREMITIES: No edema  SKIN:  Warm, dry  NEURO:  Calm, cooperative    LABS:                        7.8    9.75  )-----------( 374      ( 23 Jun 2025 04:00 )             24.5     Hemoglobin: 7.8 g/dL (06-23-25 @ 04:00)  Hemoglobin: 8.3 g/dL (06-22-25 @ 07:47)  Hemoglobin: 8.8 g/dL (06-21-25 @ 06:45)    06-23    139  |  103  |  21.7[H]  ----------------------------<  90  4.2   |  24.0  |  4.86[H]    Ca    8.0[L]      23 Jun 2025 04:00  Phos  2.9     06-23  Mg     2.2     06-23    TPro  4.6[L]  /  Alb  2.3[L]  /  TBili  0.3[L]  /  DBili  x   /  AST  25  /  ALT  48[H]  /  AlkPhos  98  06-23    LIVER FUNCTIONS - ( 23 Jun 2025 04:00 )  Alb: 2.3 g/dL / Pro: 4.6 g/dL / ALK PHOS: 98 U/L / ALT: 48 U/L / AST: 25 U/L / GGT: x         RADIOLOGY & ADDITIONAL STUDIES:        ACC: [id number] EXAM:  US ABDOMEN COMPLETE   ORDERED BY: RADHA ALFARO     PROCEDURE DATE:  06/21/2025          INTERPRETATION:  CLINICAL INFORMATION: Colitis.    COMPARISON: Abdominal CT dated 06/15/2025.    TECHNIQUE: Sonography of the abdomen.    FINDINGS:  Liver: Normal in size and echogenicity. No focal lesions are identified.  Bile ducts: Normal caliber. Common bile duct measures 7 mm.  Gallbladder: Cholecystectomy.  Pancreas: Visualized portions are within normal limits.  Spleen: 10.5 cm. Within normal limits.  Right kidney: 10.4 cm. No hydronephrosis.  Left kidney: 12.0 cm. No hydronephrosis.  Ascites: None.  Aorta and IVC: Visualized portions are within normal limits.    Small bilateral pleural effusions.    IMPRESSION:  Small bilateral pleural effusions, otherwise unremarkable abdominal   ultrasound.        --- End of Report ---            ROSIE BARRIOS MD; Attending Radiologist  This document has been electronically signed. Jun 21 2025  3:30PM -- -- --

## 2025-06-23 NOTE — PROGRESS NOTE ADULT - SUBJECTIVE AND OBJECTIVE BOX
Patient was seen and re-evaluated on dialysis.   Patient is tolerating the procedure well.   T(C): 37.1 (06-23-25 @ 14:40), Max: 37.2 (06-23-25 @ 07:15)  HR: 92 (06-23-25 @ 14:40) (87 - 99)  BP: 134/80 (06-23-25 @ 14:40) (111/72 - 147/77)  Continue dialysis:   Dialyzer: revaclear 300 QB:  400 ml QD: 500ml  Goal UF 0 kg over 3 Hours

## 2025-06-23 NOTE — PROGRESS NOTE ADULT - ATTENDING COMMENTS
61 y/o F here for acute renal failure 2/ mild rhabdo w/ gastroenteritis    #ARF  #Hypovolemia 2/2 NVD  #sepsis 2/2 Gastroenteritis  #HTN  - no significant renal recovery, c/w HD as per nephro  - f/u nephro  - ID on board  - abx  - cholestyramine  - awaiting fecal procal  - remaining plan as above 59 y/o F here for acute renal failure 2/ mild rhabdo w/ gastroenteritis    #ARF  #Hypovolemia 2/2 NVD  #sepsis 2/2 Gastroenteritis  #HTN  - no significant renal recovery, c/w HD as per nephro  - f/u nephro  - ID on board  - abx  - cholestyramine  - awaiting fecal procal  - remaining plan as above.

## 2025-06-23 NOTE — PROGRESS NOTE ADULT - SUBJECTIVE AND OBJECTIVE BOX
SUBJECTIVE  BRIEF HOSPITAL COURSE SUMMARY:      LAST 24 HOURS:  TODAY:  Pt seen at bedside in AM  No acute/overnight events  No acute medical complaints    OBJECTIVE    PHYSICAL EXAM:  GENERAL: No acute distress, comfortably in bed  HEENT: Atraumatic, normocephalic, non-icteric  NEURO: A&Ox3, no focal deficits, moving all extremities spontaneously, no dysarthria, CN II-XII grossly intact  PSYCH: Normal affect, calm, appropriate insight and judgment, fluent speech  LUNGS: CTAB, no wrr, non-labored breathing  HEART: RRR, no murmur appreciated  ABD: Soft, non-tender, non-distended, no organomegaly, no appreciable masses, +bs all 4 quadrants  EXTREMITIES: Nontender, no clubbing, cyanosis, or edema    Vital Signs Last 24 Hrs  T(C): 37 (23 Jun 2025 05:03), Max: 37.1 (23 Jun 2025 00:41)  T(F): 98.6 (23 Jun 2025 05:03), Max: 98.8 (23 Jun 2025 00:41)  HR: 93 (23 Jun 2025 05:03) (93 - 99)  BP: 115/76 (23 Jun 2025 05:03) (111/72 - 147/77)  BP(mean): --  RR: 18 (23 Jun 2025 05:03) (18 - 18)  SpO2: 95% (23 Jun 2025 05:03) (95% - 99%)    Parameters below as of 23 Jun 2025 05:03  Patient On (Oxygen Delivery Method): room air            MEDICATIONS  (STANDING):  ascorbic acid 500 milliGRAM(s) Oral daily  chlorhexidine 2% Cloths 1 Application(s) Topical <User Schedule>  chlorhexidine 2% Cloths 1 Application(s) Topical <User Schedule>  cholestyramine Powder (Sugar-Free) 4 Gram(s) Oral two times a day  dextrose 5%. 1000 milliLiter(s) (100 mL/Hr) IV Continuous <Continuous>  dextrose 50% Injectable 25 Gram(s) IV Push once  dextrose 50% Injectable 12.5 Gram(s) IV Push once  dextrose 50% Injectable 25 Gram(s) IV Push once  dextrose Oral Gel 15 Gram(s) Oral once  escitalopram 20 milliGRAM(s) Oral daily  glucagon  Injectable 1 milliGRAM(s) IntraMuscular once  heparin   Injectable 5000 Unit(s) SubCutaneous every 8 hours  multivitamin 1 Tablet(s) Oral daily  piperacillin/tazobactam IVPB.. 3.375 Gram(s) IV Intermittent every 12 hours    MEDICATIONS  (PRN):  acetaminophen     Tablet .. 650 milliGRAM(s) Oral every 6 hours PRN Temp greater or equal to 38C (100.4F), Mild Pain (1 - 3)  acetaminophen     Tablet .. 975 milliGRAM(s) Oral every 8 hours PRN Moderate Pain (4 - 6)  aluminum hydroxide/magnesium hydroxide/simethicone Suspension 30 milliLiter(s) Oral every 4 hours PRN Dyspepsia  dicyclomine 20 milliGRAM(s) Oral four times a day before meals PRN abd spasms  HYDROmorphone   Tablet 2 milliGRAM(s) Oral every 4 hours PRN Severe Pain (7 - 10)  HYDROmorphone  Injectable 0.5 milliGRAM(s) IV Push every 4 hours PRN breakthrough pain  loperamide 2 milliGRAM(s) Oral every 4 hours PRN Diarrhea  melatonin 3 milliGRAM(s) Oral at bedtime PRN Insomnia  ondansetron Injectable 4 milliGRAM(s) IV Push every 8 hours PRN Nausea and/or Vomiting  sodium chloride 0.9% lock flush 10 milliLiter(s) IV Push every 1 hour PRN Pre/post blood products, medications, blood draw, and to maintain line patency    Allergies    No Known Allergies    Intolerances        LABS:                        7.8    9.75  )-----------( 374      ( 23 Jun 2025 04:00 )             24.5     06-23    139  |  103  |  21.7[H]  ----------------------------<  90  4.2   |  24.0  |  4.86[H]    Ca    8.0[L]      23 Jun 2025 04:00  Phos  2.9     06-23  Mg     2.2     06-23    TPro  4.6[L]  /  Alb  2.3[L]  /  TBili  0.3[L]  /  DBili  x   /  AST  25  /  ALT  48[H]  /  AlkPhos  98  06-23      Urinalysis Basic - ( 23 Jun 2025 04:00 )    Color: x / Appearance: x / SG: x / pH: x  Gluc: 90 mg/dL / Ketone: x  / Bili: x / Urobili: x   Blood: x / Protein: x / Nitrite: x   Leuk Esterase: x / RBC: x / WBC x   Sq Epi: x / Non Sq Epi: x / Bacteria: x      CAPILLARY BLOOD GLUCOSE          CULTURE DATA:    Culture - Blood (collected 06-15-25 @ 22:25)  Source: Blood Blood-Peripheral  Final Report (06-21-25 @ 01:00):    No growth at 5 days    Culture - Stool (collected 06-16-25 @ 20:20)  Source: Stool Feces  Final Report (06-19-25 @ 17:37):    No enteric pathogens isolated.    (Stool culture examined for Salmonella,    Shigella, Campylobacter, Aeromonas, Plesiomonas,    Vibrio, E.coli O157 and Yersinia)        RADIOLOGY & ADDITIONAL TESTS:  No new imaging to review SUBJECTIVE  BRIEF HOSPITAL COURSE SUMMARY:      LAST 24 HOURS:\  No acute events overnight    TODAY:  Pt seen at bedside in AM, hemodynamically stable on room air, in no acute distress, AAOx3. Pt reports that she continues to have multiple watery bowel movements, although slightly better than yesterday. Reports being afraid of po intake, as it makes her have to have a BM soon after, despite this patient is attempting to eat/drink as much as she can. Overall, patient is feeling slightly better than yesterday. No issues with urination. Adequate pain control  Per GI plan for flagyl procedure tomorrow. Reached out to ortho who report it is ok for patient to lay on left side as tolerated. NPO after midnight    OBJECTIVE    PHYSICAL EXAM:  GENERAL: No acute distress, comfortably in bed  HEENT: Atraumatic, normocephalic, non-icteric  NEURO: A&Ox3, no focal deficits, moving all extremities spontaneously, no dysarthria, CN II-XII grossly intact  PSYCH: Normal affect, calm, appropriate insight and judgment, fluent speech  LUNGS: CTAB, no wrr, non-labored breathing  HEART: RRR, no murmur appreciated  ABD: Soft, non-tender, non-distended, no organomegaly, no appreciable masses, +bs all 4 quadrants  EXTREMITIES: Nontender, no clubbing, cyanosis, or edema    Vital Signs Last 24 Hrs  T(C): 37 (23 Jun 2025 05:03), Max: 37.1 (23 Jun 2025 00:41)  T(F): 98.6 (23 Jun 2025 05:03), Max: 98.8 (23 Jun 2025 00:41)  HR: 93 (23 Jun 2025 05:03) (93 - 99)  BP: 115/76 (23 Jun 2025 05:03) (111/72 - 147/77)  BP(mean): --  RR: 18 (23 Jun 2025 05:03) (18 - 18)  SpO2: 95% (23 Jun 2025 05:03) (95% - 99%)    Parameters below as of 23 Jun 2025 05:03  Patient On (Oxygen Delivery Method): room air            MEDICATIONS  (STANDING):  ascorbic acid 500 milliGRAM(s) Oral daily  chlorhexidine 2% Cloths 1 Application(s) Topical <User Schedule>  chlorhexidine 2% Cloths 1 Application(s) Topical <User Schedule>  cholestyramine Powder (Sugar-Free) 4 Gram(s) Oral two times a day  dextrose 5%. 1000 milliLiter(s) (100 mL/Hr) IV Continuous <Continuous>  dextrose 50% Injectable 25 Gram(s) IV Push once  dextrose 50% Injectable 12.5 Gram(s) IV Push once  dextrose 50% Injectable 25 Gram(s) IV Push once  dextrose Oral Gel 15 Gram(s) Oral once  escitalopram 20 milliGRAM(s) Oral daily  glucagon  Injectable 1 milliGRAM(s) IntraMuscular once  heparin   Injectable 5000 Unit(s) SubCutaneous every 8 hours  multivitamin 1 Tablet(s) Oral daily  piperacillin/tazobactam IVPB.. 3.375 Gram(s) IV Intermittent every 12 hours    MEDICATIONS  (PRN):  acetaminophen     Tablet .. 650 milliGRAM(s) Oral every 6 hours PRN Temp greater or equal to 38C (100.4F), Mild Pain (1 - 3)  acetaminophen     Tablet .. 975 milliGRAM(s) Oral every 8 hours PRN Moderate Pain (4 - 6)  aluminum hydroxide/magnesium hydroxide/simethicone Suspension 30 milliLiter(s) Oral every 4 hours PRN Dyspepsia  dicyclomine 20 milliGRAM(s) Oral four times a day before meals PRN abd spasms  HYDROmorphone   Tablet 2 milliGRAM(s) Oral every 4 hours PRN Severe Pain (7 - 10)  HYDROmorphone  Injectable 0.5 milliGRAM(s) IV Push every 4 hours PRN breakthrough pain  loperamide 2 milliGRAM(s) Oral every 4 hours PRN Diarrhea  melatonin 3 milliGRAM(s) Oral at bedtime PRN Insomnia  ondansetron Injectable 4 milliGRAM(s) IV Push every 8 hours PRN Nausea and/or Vomiting  sodium chloride 0.9% lock flush 10 milliLiter(s) IV Push every 1 hour PRN Pre/post blood products, medications, blood draw, and to maintain line patency    Allergies    No Known Allergies    Intolerances        LABS:                        7.8    9.75  )-----------( 374      ( 23 Jun 2025 04:00 )             24.5     06-23    139  |  103  |  21.7[H]  ----------------------------<  90  4.2   |  24.0  |  4.86[H]    Ca    8.0[L]      23 Jun 2025 04:00  Phos  2.9     06-23  Mg     2.2     06-23    TPro  4.6[L]  /  Alb  2.3[L]  /  TBili  0.3[L]  /  DBili  x   /  AST  25  /  ALT  48[H]  /  AlkPhos  98  06-23      Urinalysis Basic - ( 23 Jun 2025 04:00 )    Color: x / Appearance: x / SG: x / pH: x  Gluc: 90 mg/dL / Ketone: x  / Bili: x / Urobili: x   Blood: x / Protein: x / Nitrite: x   Leuk Esterase: x / RBC: x / WBC x   Sq Epi: x / Non Sq Epi: x / Bacteria: x      CAPILLARY BLOOD GLUCOSE          CULTURE DATA:    Culture - Blood (collected 06-15-25 @ 22:25)  Source: Blood Blood-Peripheral  Final Report (06-21-25 @ 01:00):    No growth at 5 days    Culture - Stool (collected 06-16-25 @ 20:20)  Source: Stool Feces  Final Report (06-19-25 @ 17:37):    No enteric pathogens isolated.    (Stool culture examined for Salmonella,    Shigella, Campylobacter, Aeromonas, Plesiomonas,    Vibrio, E.coli O157 and Yersinia)        RADIOLOGY & ADDITIONAL TESTS:  No new imaging to review

## 2025-06-24 ENCOUNTER — APPOINTMENT (OUTPATIENT)
Dept: ORTHOPEDIC SURGERY | Facility: CLINIC | Age: 60
End: 2025-06-24

## 2025-06-24 ENCOUNTER — TRANSCRIPTION ENCOUNTER (OUTPATIENT)
Age: 60
End: 2025-06-24

## 2025-06-24 LAB
ALBUMIN SERPL ELPH-MCNC: 2.3 G/DL — LOW (ref 3.3–5.2)
ALP SERPL-CCNC: 105 U/L — SIGNIFICANT CHANGE UP (ref 40–120)
ALT FLD-CCNC: 41 U/L — HIGH
ANION GAP SERPL CALC-SCNC: 10 MMOL/L — SIGNIFICANT CHANGE UP (ref 5–17)
APTT BLD: 26.7 SEC — SIGNIFICANT CHANGE UP (ref 26.1–36.8)
AST SERPL-CCNC: 22 U/L — SIGNIFICANT CHANGE UP
BILIRUB SERPL-MCNC: 0.4 MG/DL — SIGNIFICANT CHANGE UP (ref 0.4–2)
BLD GP AB SCN SERPL QL: SIGNIFICANT CHANGE UP
BUN SERPL-MCNC: 12.6 MG/DL — SIGNIFICANT CHANGE UP (ref 8–20)
CALCIUM SERPL-MCNC: 8 MG/DL — LOW (ref 8.4–10.5)
CALPROTECTIN STL-MCNT: 1940 UG/G — HIGH (ref 0–120)
CHLORIDE SERPL-SCNC: 103 MMOL/L — SIGNIFICANT CHANGE UP (ref 96–108)
CO2 SERPL-SCNC: 25 MMOL/L — SIGNIFICANT CHANGE UP (ref 22–29)
CREAT SERPL-MCNC: 2.92 MG/DL — HIGH (ref 0.5–1.3)
EGFR: 18 ML/MIN/1.73M2 — LOW
EGFR: 18 ML/MIN/1.73M2 — LOW
ELASTASE PANC STL-MCNT: 163 CD:794062645 — LOW
GLUCOSE SERPL-MCNC: 89 MG/DL — SIGNIFICANT CHANGE UP (ref 70–99)
HCT VFR BLD CALC: 24.8 % — LOW (ref 34.5–45)
HGB BLD-MCNC: 8 G/DL — LOW (ref 11.5–15.5)
INR BLD: 1.01 RATIO — SIGNIFICANT CHANGE UP (ref 0.85–1.16)
MAGNESIUM SERPL-MCNC: 2.1 MG/DL — SIGNIFICANT CHANGE UP (ref 1.6–2.6)
MCHC RBC-ENTMCNC: 28.7 PG — SIGNIFICANT CHANGE UP (ref 27–34)
MCHC RBC-ENTMCNC: 32.3 G/DL — SIGNIFICANT CHANGE UP (ref 32–36)
MCV RBC AUTO: 88.9 FL — SIGNIFICANT CHANGE UP (ref 80–100)
NRBC # BLD AUTO: 0 K/UL — SIGNIFICANT CHANGE UP (ref 0–0)
NRBC # FLD: 0 K/UL — SIGNIFICANT CHANGE UP (ref 0–0)
NRBC BLD AUTO-RTO: 0 /100 WBCS — SIGNIFICANT CHANGE UP (ref 0–0)
PHOSPHATE SERPL-MCNC: 2.6 MG/DL — SIGNIFICANT CHANGE UP (ref 2.4–4.7)
PLATELET # BLD AUTO: 388 K/UL — SIGNIFICANT CHANGE UP (ref 150–400)
PMV BLD: 9.5 FL — SIGNIFICANT CHANGE UP (ref 7–13)
POTASSIUM SERPL-MCNC: 4.2 MMOL/L — SIGNIFICANT CHANGE UP (ref 3.5–5.3)
POTASSIUM SERPL-SCNC: 4.2 MMOL/L — SIGNIFICANT CHANGE UP (ref 3.5–5.3)
PROT SERPL-MCNC: 4.9 G/DL — LOW (ref 6.6–8.7)
PROTHROM AB SERPL-ACNC: 11.4 SEC — SIGNIFICANT CHANGE UP (ref 9.9–13.4)
RBC # BLD: 2.79 M/UL — LOW (ref 3.8–5.2)
RBC # FLD: 13.1 % — SIGNIFICANT CHANGE UP (ref 10.3–14.5)
SODIUM SERPL-SCNC: 138 MMOL/L — SIGNIFICANT CHANGE UP (ref 135–145)
WBC # BLD: 10.21 K/UL — SIGNIFICANT CHANGE UP (ref 3.8–10.5)
WBC # FLD AUTO: 10.21 K/UL — SIGNIFICANT CHANGE UP (ref 3.8–10.5)

## 2025-06-24 PROCEDURE — 87040 BLOOD CULTURE FOR BACTERIA: CPT

## 2025-06-24 PROCEDURE — 84295 ASSAY OF SERUM SODIUM: CPT

## 2025-06-24 PROCEDURE — 80053 COMPREHEN METABOLIC PANEL: CPT

## 2025-06-24 PROCEDURE — 84550 ASSAY OF BLOOD/URIC ACID: CPT

## 2025-06-24 PROCEDURE — 87799 DETECT AGENT NOS DNA QUANT: CPT

## 2025-06-24 PROCEDURE — 87640 STAPH A DNA AMP PROBE: CPT

## 2025-06-24 PROCEDURE — 99233 SBSQ HOSP IP/OBS HIGH 50: CPT

## 2025-06-24 PROCEDURE — 82947 ASSAY GLUCOSE BLOOD QUANT: CPT

## 2025-06-24 PROCEDURE — 82330 ASSAY OF CALCIUM: CPT

## 2025-06-24 PROCEDURE — 85610 PROTHROMBIN TIME: CPT

## 2025-06-24 PROCEDURE — 82010 KETONE BODYS QUAN: CPT

## 2025-06-24 PROCEDURE — 71045 X-RAY EXAM CHEST 1 VIEW: CPT

## 2025-06-24 PROCEDURE — 87177 OVA AND PARASITES SMEARS: CPT

## 2025-06-24 PROCEDURE — 85025 COMPLETE CBC W/AUTO DIFF WBC: CPT

## 2025-06-24 PROCEDURE — 76700 US EXAM ABDOM COMPLETE: CPT

## 2025-06-24 PROCEDURE — 84132 ASSAY OF SERUM POTASSIUM: CPT

## 2025-06-24 PROCEDURE — 87045 FECES CULTURE AEROBIC BACT: CPT

## 2025-06-24 PROCEDURE — 88305 TISSUE EXAM BY PATHOLOGIST: CPT | Mod: 26

## 2025-06-24 PROCEDURE — 86900 BLOOD TYPING SEROLOGIC ABO: CPT

## 2025-06-24 PROCEDURE — 83690 ASSAY OF LIPASE: CPT

## 2025-06-24 PROCEDURE — 99232 SBSQ HOSP IP/OBS MODERATE 35: CPT

## 2025-06-24 PROCEDURE — 86901 BLOOD TYPING SEROLOGIC RH(D): CPT

## 2025-06-24 PROCEDURE — 87077 CULTURE AEROBIC IDENTIFY: CPT

## 2025-06-24 PROCEDURE — 85027 COMPLETE CBC AUTOMATED: CPT

## 2025-06-24 PROCEDURE — 87046 STOOL CULTR AEROBIC BACT EA: CPT

## 2025-06-24 PROCEDURE — 83930 ASSAY OF BLOOD OSMOLALITY: CPT

## 2025-06-24 PROCEDURE — 82435 ASSAY OF BLOOD CHLORIDE: CPT

## 2025-06-24 PROCEDURE — 82803 BLOOD GASES ANY COMBINATION: CPT

## 2025-06-24 PROCEDURE — 80074 ACUTE HEPATITIS PANEL: CPT

## 2025-06-24 PROCEDURE — 83993 ASSAY FOR CALPROTECTIN FECAL: CPT

## 2025-06-24 PROCEDURE — 36415 COLL VENOUS BLD VENIPUNCTURE: CPT

## 2025-06-24 PROCEDURE — 82653 EL-1 FECAL QUANTITATIVE: CPT

## 2025-06-24 PROCEDURE — 0241U: CPT

## 2025-06-24 PROCEDURE — 83735 ASSAY OF MAGNESIUM: CPT

## 2025-06-24 PROCEDURE — P9047: CPT

## 2025-06-24 PROCEDURE — 82962 GLUCOSE BLOOD TEST: CPT

## 2025-06-24 PROCEDURE — 86706 HEP B SURFACE ANTIBODY: CPT

## 2025-06-24 PROCEDURE — 93005 ELECTROCARDIOGRAM TRACING: CPT

## 2025-06-24 PROCEDURE — 87507 IADNA-DNA/RNA PROBE TQ 12-25: CPT

## 2025-06-24 PROCEDURE — 85652 RBC SED RATE AUTOMATED: CPT

## 2025-06-24 PROCEDURE — 87641 MR-STAPH DNA AMP PROBE: CPT

## 2025-06-24 PROCEDURE — 45331 SIGMOIDOSCOPY AND BIOPSY: CPT

## 2025-06-24 PROCEDURE — 82550 ASSAY OF CK (CPK): CPT

## 2025-06-24 PROCEDURE — 82009 KETONE BODYS QUAL: CPT

## 2025-06-24 PROCEDURE — 74176 CT ABD & PELVIS W/O CONTRAST: CPT

## 2025-06-24 PROCEDURE — 85018 HEMOGLOBIN: CPT

## 2025-06-24 PROCEDURE — 86140 C-REACTIVE PROTEIN: CPT

## 2025-06-24 PROCEDURE — 85014 HEMATOCRIT: CPT

## 2025-06-24 PROCEDURE — 87493 C DIFF AMPLIFIED PROBE: CPT

## 2025-06-24 PROCEDURE — 85730 THROMBOPLASTIN TIME PARTIAL: CPT

## 2025-06-24 PROCEDURE — 83605 ASSAY OF LACTIC ACID: CPT

## 2025-06-24 PROCEDURE — 80076 HEPATIC FUNCTION PANEL: CPT

## 2025-06-24 PROCEDURE — 84443 ASSAY THYROID STIM HORMONE: CPT

## 2025-06-24 PROCEDURE — G0545: CPT

## 2025-06-24 PROCEDURE — 80048 BASIC METABOLIC PNL TOTAL CA: CPT

## 2025-06-24 PROCEDURE — 86850 RBC ANTIBODY SCREEN: CPT

## 2025-06-24 PROCEDURE — 84100 ASSAY OF PHOSPHORUS: CPT

## 2025-06-24 PROCEDURE — 82553 CREATINE MB FRACTION: CPT

## 2025-06-24 DEVICE — NAIL OSTEO 1.5X16MM STRL
Type: IMPLANTABLE DEVICE | Status: NON-FUNCTIONAL
Removed: 2025-06-24

## 2025-06-24 RX ORDER — ACETAMINOPHEN 500 MG/5ML
1000 LIQUID (ML) ORAL ONCE
Refills: 0 | Status: COMPLETED | OUTPATIENT
Start: 2025-06-24 | End: 2025-06-24

## 2025-06-24 RX ORDER — VANCOMYCIN HCL IN 5 % DEXTROSE 1.5G/250ML
125 PLASTIC BAG, INJECTION (ML) INTRAVENOUS EVERY 6 HOURS
Refills: 0 | Status: DISCONTINUED | OUTPATIENT
Start: 2025-06-24 | End: 2025-06-30

## 2025-06-24 RX ORDER — SODIUM CHLORIDE 9 G/1000ML
1000 INJECTION, SOLUTION INTRAVENOUS
Refills: 0 | Status: DISCONTINUED | OUTPATIENT
Start: 2025-06-24 | End: 2025-06-30

## 2025-06-24 RX ADMIN — Medication 125 MILLIGRAM(S): at 19:52

## 2025-06-24 RX ADMIN — Medication 125 MILLIGRAM(S): at 23:13

## 2025-06-24 RX ADMIN — Medication 1 APPLICATION(S): at 04:24

## 2025-06-24 RX ADMIN — Medication 25 GRAM(S): at 17:59

## 2025-06-24 RX ADMIN — Medication 975 MILLIGRAM(S): at 04:23

## 2025-06-24 RX ADMIN — Medication 4 GRAM(S): at 09:15

## 2025-06-24 RX ADMIN — Medication 400 MILLIGRAM(S): at 09:20

## 2025-06-24 RX ADMIN — Medication 4 MILLIGRAM(S): at 09:15

## 2025-06-24 RX ADMIN — Medication 4 GRAM(S): at 21:26

## 2025-06-24 RX ADMIN — Medication 975 MILLIGRAM(S): at 04:27

## 2025-06-24 RX ADMIN — Medication 25 GRAM(S): at 04:23

## 2025-06-24 RX ADMIN — Medication 3 MILLIGRAM(S): at 21:26

## 2025-06-24 NOTE — PROGRESS NOTE ADULT - ATTENDING COMMENTS
59 y/o F here for acute renal failure 2/ mild rhabdo w/ gastroenteritis    #ARF  #Hypovolemia 2/2 NVD  #sepsis 2/2 Gastroenteritis  #HTN  - HD as per nephro  - ID on board  - abx  - cholestyramine  - NPO for sigmoidoscopy today w/ GI  - Dispo: Medically acute, pending improvement in diarrhea and plan for HD  - remaining plan as above. 59 y/o F here for acute renal failure 2/ mild rhabdo w/ gastroenteritis    #ARF  #Hypovolemia 2/2 NVD  #sepsis 2/2 Gastroenteritis  #HTN  - HD as per nephro  - ID on board  - abx  - cholestyramine  - NPO for sigmoidoscopy today w/ GI  - Dispo: Medically acute, pending improvement in diarrhea and plan for HD  - remaining plan as above.    Update @1600 - Called by GI, patient found to have pseudomembrane on sigmoidoscopy. GI to start Vanco PO, to be started on C Diff precautions. Will discuss w/ ID, patient's bed to be moved to private room.

## 2025-06-24 NOTE — PROGRESS NOTE ADULT - SUBJECTIVE AND OBJECTIVE BOX
Pt Name: CAROLEE AN    MRN: [id number]    Patient is a 60y Female admitted with renal failure and colitis. Patient underwent Left total hip arthroplasty by Dr. Brooks on 6/10/2025. Patient denies numbness or tingling to LLE. No acute orthopedic complaints are expressed at this time.                         8.0    10.21 )-----------( 388      ( 24 Jun 2025 04:00 )             24.8     ICU Vital Signs Last 24 Hrs  T(C): 36.7 (24 Jun 2025 08:16), Max: 37.1 (23 Jun 2025 14:40)  T(F): 98.1 (24 Jun 2025 08:16), Max: 98.8 (23 Jun 2025 14:40)  HR: 92 (24 Jun 2025 08:16) (82 - 98)  BP: 122/76 (24 Jun 2025 08:16) (121/75 - 134/84)  BP(mean): --  ABP: --  ABP(mean): --  RR: 18 (24 Jun 2025 08:16) (17 - 18)  SpO2: 93% (24 Jun 2025 08:16) (93% - 96%)    O2 Parameters below as of 24 Jun 2025 08:16  Patient On (Oxygen Delivery Method): room air    PHYSICAL EXAM:    Appearance: NAD     Musculoskeletal:         Left Lower Extremity: Leg lengths grossly equal. Dressing clean and dry- dressing removed. incision c/d/i-no erythema, drainage or discharge-new dressing applied. Hip non tender. Calf supple. No pain w/ log roll. SILT distally. +TA/GSC/EHL/FHL. +DP pulse.     A/P: Pt is a 60y Female s/p left total hip arthroplasty    PLAN:   - Case discussed with Dr. Brooks and Dr. Foley  - Pain control as needed  - WBAT  - PT/OT, out of bed  - continue rest of care per primary team

## 2025-06-24 NOTE — PROGRESS NOTE ADULT - SUBJECTIVE AND OBJECTIVE BOX
[facility]  INFECTIOUS DISEASES at [place] and [place]  ===============================================================                  Bob Duncan MD               Diplomates American Board of Internal Medicine & Infectious Diseases                * [place] Office - Appt - Tel  [phone] Fax [phone]                * [place] Office - Appt - Tel [phone] Fax [phone]                                  Hospital Consult line:  [phone]  ==============================================================    CAROLEE AN [id number]    Follow up: diarrhea     For sigmoidoscopy today   continues to require dialysis   No acute events overnight   afebrile     I have personally reviewed the labs and data; pertinent labs and data are listed in this note; please see below.     _______________________________________________________________  REVIEW OF SYSTEMS  Reports diarrhea is better - now mostly gas. No vomiting, some nausea. Was tolerating CLD. Cold liquids worsen abd cramps   No fever or chills.   ________________________________________________________________  Allergies:  No Known Allergies    ________________________________________________________________  PHYSICAL EXAM  GEN: in NAD, lying in bed.   HEENT: Anicteric sclerae. Moist mucous membranes. No mucosal lesions.   NECK: CVC  LUNGS: eupneic. CTA B/L.  HEART: RRR, +murmur   ABDOMEN: Soft, NT, ND  +BS.    :  Bernard catheter  NEUROLOGIC: Grossly no motor focal deficits   PSYCHIATRIC: Appropriate affect and mood  SKIN: No rash  LINES: PIV, RIJ Shiley   ________________________________________________________________  Vitals:  T(F): 98.1 (24 Jun 2025 08:16), Max: 98.8 (23 Jun 2025 14:40)  HR: 92 (24 Jun 2025 08:16)  BP: 122/76 (24 Jun 2025 08:16)  RR: 18 (24 Jun 2025 08:16)  SpO2: 93% (24 Jun 2025 08:16) (93% - 96%)  temp max in last 48H T(F): , Max: 98.9 (06-23-25 @ 07:15)    Current Antibiotics:  piperacillin/tazobactam IVPB.. 3.375 Gram(s) IV Intermittent every 12 hours    Other medications:  ascorbic acid 500 milliGRAM(s) Oral daily  chlorhexidine 2% Cloths 1 Application(s) Topical <User Schedule>  chlorhexidine 2% Cloths 1 Application(s) Topical <User Schedule>  cholestyramine Powder (Sugar-Free) 4 Gram(s) Oral two times a day  dextrose 5%. 1000 milliLiter(s) IV Continuous <Continuous>  dextrose 50% Injectable 25 Gram(s) IV Push once  dextrose 50% Injectable 12.5 Gram(s) IV Push once  dextrose 50% Injectable 25 Gram(s) IV Push once  dextrose Oral Gel 15 Gram(s) Oral once  escitalopram 20 milliGRAM(s) Oral daily  glucagon  Injectable 1 milliGRAM(s) IntraMuscular once  heparin   Injectable 5000 Unit(s) SubCutaneous every 8 hours  lactated ringers. 1000 milliLiter(s) IV Continuous <Continuous>  multivitamin 1 Tablet(s) Oral daily                            8.0    10.21 )-----------( 388      ( 24 Jun 2025 04:00 )             24.8     06-24    138  |  103  |  12.6  ----------------------------<  89  4.2   |  25.0  |  2.92[H]    Ca    8.0[L]      24 Jun 2025 04:00  Phos  2.6     06-24  Mg     2.1     06-24    TPro  4.9[L]  /  Alb  2.3[L]  /  TBili  0.4  /  DBili  x   /  AST  22  /  ALT  41[H]  /  AlkPhos  105  06-24    RECENT CULTURES:  06-16 @ 20:20 Stool Feces     No enteric pathogens isolated.  (Stool culture examined for Salmonella,  Shigella, Campylobacter, Aeromonas, Plesiomonas,  Vibrio, E.coli O157 and Yersinia)      06-15 @ 22:25 Blood Blood-Peripheral     No growth at 5 days      WBC Count: 10.21 K/uL (06-24-25 @ 04:00)  WBC Count: 9.75 K/uL (06-23-25 @ 04:00)  WBC Count: 8.90 K/uL (06-22-25 @ 07:47)  WBC Count: 9.61 K/uL (06-21-25 @ 06:45)  WBC Count: 11.11 K/uL (06-20-25 @ 04:46)    Creatinine: 2.92 mg/dL (06-24-25 @ 04:00)  Creatinine: 4.86 mg/dL (06-23-25 @ 04:00)  Creatinine: 4.83 mg/dL (06-22-25 @ 07:47)  Creatinine: 3.93 mg/dL (06-21-25 @ 06:45)  Creatinine: 6.30 mg/dL (06-20-25 @ 04:46)    C-Reactive Protein: 24 mg/L (06-21-25 @ 06:45)    Sedimentation Rate, Erythrocyte: 49 mm/hr (06-21-25 @ 06:45)     SARS-CoV-2 Result: NotDetec (06-15-25 @ 22:25)    ________________________________________________________________  CARDIOLOGY   ________________________________________________________________  RADIOLOGY  < from: US Abdomen Complete (US Abdomen Complete .) (06.21.25 @ 15:14) >  IMPRESSION:  Small bilateral pleural effusions, otherwise unremarkable abdominal   ultrasound    < end of copied text >    < from: CT Abdomen and Pelvis No Cont (06.15.25 @ 22:14) >  FINDINGS:  LOWER CHEST: Minimal linear dependent atelectatic changes. Normal-sized   heart.    LIVER: Within normal limits.  BILE DUCTS: Normal caliber.  GALLBLADDER: Cholecystectomy.  SPLEEN: Within normal limits. Normal size. Splenule.  PANCREAS: Within normal limits.  ADRENALS: Within normal limits.  KIDNEYS/URETERS: No hydronephrosis, hydroureter or nephroureterolithiasis.    BLADDER: Decompressed with a Bernard catheter in place.  REPRODUCTIVE ORGANS: Uterus and adnexa within normal limits.    BOWEL: Small hiatal hernia. Stomach and duodenum have grossly normal in   appearance. Small bowel is not dilated. No bowel obstruction. Appendix is   normal. No acute appendicitis. Thickening of the sigmoid colonic wall   with several no-inflamed diverticula, no peridiverticular fat stranding   or inflammation to suggest acute diverticulitis. There is decompressed   descending colon with of mild wall thickening and minimal fat stranding   in the mesocolon and mesosigmoid.    PERITONEUM/RETROPERITONEUM: No pneumoperitoneum, ascites or loculated   fluid collections. No abscess.  VESSELS: Within normal limits. Normal caliber of the abdominal aorta.  LYMPH NODES: No lymphadenopathy.  ABDOMINAL WALL: Within normal limits.  BONES: Sacral Tarlov cysts. Anterolisthesis L4 over L5 with spinal fusion   hardware in this segment. Posterior decompression L4. 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    No free perforation.    < end of copied text >

## 2025-06-24 NOTE — PROGRESS NOTE ADULT - ASSESSMENT
60F with HTN, HLD, s/p L-LICHA on 6/9 presented to the ED on 6/16 with constant vomiting, large volume diarrhea, unable to tolerate oral intake, abd pain/cramping that started on Sat 6/14.      ID consulted for colitis     Constipated post-op, was taking ASA, naproxen, Senna and Miralax   Then, patient with massive vomiting/diarrhea leading to NAVI/ATN starting on Sat 6/14  Attended a resort with buffet dining prior to her surgery (about one week prior to onset of symptoms). Family without any symptoms.   Has been on Mounjaro for 2 years - no GI side effects     C. diff PCR negative x 2  GI PCR negative   Stool O&P - negative x 1   Stool culture - no enteropathogens   No leukocytosis  CT AP with findings compatible with mild colitis in the left descending and sigmoid colon (infectious vs inflammatory vs ischemic)  LFTs improving (shock liver?)  Started on HD on 6/17 via [facility] Shiley - Renal following   No clear evidence of infectious colitis. Probably ischemic.   For sigmoidoscopy today     complete 7 day course with piperacillin-tazobactam (thru 6/26)    No indication for prophylactic intravenous antibiotics for hip replacement because the patient is receiving dialysis   No objections at this point to place Permacath.     d/w hospitalist     Will not actively follow. Please call with questions.

## 2025-06-24 NOTE — PROGRESS NOTE ADULT - ASSESSMENT
61 y/o F w/ PMH of HTN, HLD, prior obesity (currently on mounjaro for 1.5 yrs w/ 80lb wt loss) and anxiety/depression (on lexapro) s/p L-LICHA (6/9/25 at [facility]) presents c/o nausea, NBNB emesis and large vol watery nonbloody diarrhea w/ associated LLQ abd pain x 3 days ago associated w/ anuria, admitted for acute renal failure and sepsis 2/2 colitis. Initially had some blood bowel movements, which transformed into multiple watery BM. Given IVF, started on zosyn. C diff was negative.Plan for possible flagyl procedure tomorrow with GI      # Acute renal failure w/ mild rhabdo suspect multifactorial from N/V/D while on NSAIDs and ARB   # Hypovolemic mild hyponatremic hypochloremia likely 2/2 N/V/D  - monae catheter placed in ED  - s/p HD 6/17, 6/20, 6/22  - BUN 21/Cr 4.86; last known Cr 0.85 5/2025   -  Holding outpt Naproxen, losartan   - encourage po intake  - Strict I/O's  - avoid nephrotoxic agents or renally dose if needed  - c/w prn antiemetics  - nephrology following       # Sepsis (improving)  # Colitis ; infectious vs ischemic  - CT a/p w/ findings compatible with mild acute colitis in the left descending and sigmoid colon  -6/21 repeat C. diff PCR negative; TSH wnl; ESR 49  - No leukocytosis, reported 15 watery BM in last 24 hrs  - c/w zosyn   - bentyl for abd spasms  - cholestyramine powder 4g po BID  - increased imodium to 2mg, up to six times per day  - tylenol 650mg q6h prn for fever  - clear liquid diet. NPO after midnight  - f/u fecal calprotectin, fecal elastase, celiac cascade  - ctm CBC, temp    - ID following  - GI following      # Transaminitis w/ hepatocellular distribution - improving  Ischemic.   CT a/p w/ normal liver, bile ducts nL caliber and s/p cholecystectomy  CMV, EBV to r/u infectious etiology - negative  - Trend LFTs and avoid hepatotoxic medications     #generalized headache  - likely secondary to dehydration in the setting of multiple watery BM  - ofirmev x 1      # HTN / HLD  - bp stable today  - holding ARB due to renal fxn   - Holding statin for transaminitis     # Former obesity  - On mounjaro w/ 80lb weight loss and now BMI 23  - Hold mounjaro while inpt     #Anxiety/depression  - lexapro 20mg po QD     s/p L-LICHA (6/9/25 at [facility])  - PT/OT, OOB  - ICS  - acetaminophen 975mg po q8h for moderate pain  - dilaudid 2mg po q4h prn for severe pain  - dilaudid 0.5mg IVP q4h prn for breakthrough pain      VTE ppx:  Heparin sq  Diet: NPO for possible flagyl procedure  Dispositon: Acute. Pending renal recovery, HD     59 y/o F w/ PMH of HTN, HLD, prior obesity (currently on mounjaro for 1.5 yrs w/ 80lb wt loss) and anxiety/depression (on lexapro) s/p L-LICHA (6/9/25 at [facility]) presents c/o nausea, NBNB emesis and large vol watery nonbloody diarrhea w/ associated LLQ abd pain x 3 days ago associated w/ anuria, admitted for acute renal failure and sepsis 2/2 colitis. Initially had some blood bowel movements, which transformed into multiple watery BM. Given IVF, started on zosyn. C diff was negative.Plan for possible flagyl procedure tomorrow with GI      # Acute renal failure w/ mild rhabdo suspect multifactorial from N/V/D while on NSAIDs and ARB   # Hypovolemic mild hyponatremic hypochloremia likely 2/2 N/V/D  - monae catheter placed in ED  - s/p HD 6/17, 6/20, 6/22  - BUN 12/Cr 2.92; last known Cr 0.85 5/2025   -  Holding outpt Naproxen, losartan   - encourage po intake  - Strict I/O's  - avoid nephrotoxic agents or renally dose if needed  - c/w prn antiemetics  - nephrology following       # Sepsis (improving)  # Colitis ; infectious vs ischemic  - CT a/p w/ findings compatible with mild acute colitis in the left descending and sigmoid colon  -6/21 repeat C. diff PCR negative; TSH wnl; ESR 49  - No leukocytosis  - fecal elastase 163  - zosyn (thru 6/26)  - bentyl for abd spasms  - cholestyramine powder 4g po BID  - imodium to 2mg, up to six times per day  - tylenol 650mg q6h prn for fever  - NPO for flexible sigmoidoscopy today  - f/u fecal calprotectin, celiac cascade  - ctm CBC, temp    - ID following  - GI following      # Transaminitis w/ hepatocellular distribution - improving  Ischemic.   CT a/p w/ normal liver, bile ducts nL caliber and s/p cholecystectomy  CMV, EBV to r/u infectious etiology - negative  - Trend LFTs and avoid hepatotoxic medications     #generalized headache - resolved  - likely secondary to dehydration in the setting of multiple watery BM  - ofirmev x 1      # HTN / HLD  - bp stable today  - holding ARB due to renal fxn   - Holding statin for transaminitis     # Former obesity  - On mounjaro w/ 80lb weight loss and now BMI 23  - Hold mounjaro while inpt     #Anxiety/depression  - lexapro 20mg po QD     s/p L-LICHA (6/9/25 at [facility])  - PT/OT, OOB  - ICS  - acetaminophen 975mg po q8h for moderate pain  - dilaudid 2mg po q4h prn for severe pain  - dilaudid 0.5mg IVP q4h prn for breakthrough pain      VTE ppx:  Heparin sq  Diet: NPO for possible flagyl procedure  Dispositon: Acute. Pending renal recovery, HD     59 y/o F w/ PMH of HTN, HLD, prior obesity (currently on mounjaro for 1.5 yrs w/ 80lb wt loss) and anxiety/depression (on lexapro) s/p L-LICHA (6/9/25 at [facility]) presents c/o nausea, NBNB emesis and large vol watery nonbloody diarrhea w/ associated LLQ abd pain x 3 days ago associated w/ anuria, admitted for acute renal failure and sepsis 2/2 colitis. Initially had some blood bowel movements, which transformed into multiple watery BM. Given IVF, started on zosyn. C diff was negative.Plan for possible flagyl procedure today with GI      # Acute renal failure w/ mild rhabdo suspect multifactorial from N/V/D while on NSAIDs and ARB   # Hypovolemic mild hyponatremic hypochloremia likely 2/2 N/V/D  - monae catheter placed in ED  - s/p HD 6/17, 6/20, 6/22  - BUN 12/Cr 2.92; last known Cr 0.85 5/2025   -  Holding outpt Naproxen, losartan   - encourage po intake  - Strict I/O's  - avoid nephrotoxic agents or renally dose if needed  - c/w prn antiemetics  - nephrology following       # Sepsis (improving)  # Colitis ; infectious vs ischemic  - CT a/p w/ findings compatible with mild acute colitis in the left descending and sigmoid colon  -6/21 repeat C. diff PCR negative; TSH wnl; ESR 49  - No leukocytosis  - fecal elastase 163  - zosyn (thru 6/26)  - bentyl for abd spasms  - cholestyramine powder 4g po BID  - imodium to 2mg, up to six times per day  - tylenol 650mg q6h prn for fever  - NPO for flexible sigmoidoscopy today  - f/u fecal calprotectin, celiac cascade  - ctm CBC, temp    - ID following  - GI following      # Transaminitis w/ hepatocellular distribution - improving  Ischemic.   CT a/p w/ normal liver, bile ducts nL caliber and s/p cholecystectomy  CMV, EBV to r/u infectious etiology - negative  - Trend LFTs and avoid hepatotoxic medications     #generalized headache - resolved  - likely secondary to dehydration in the setting of multiple watery BM  - ofirmev x 1      # HTN / HLD  - bp stable today  - holding ARB due to renal fxn   - Holding statin for transaminitis     # Former obesity  - On mounjaro w/ 80lb weight loss and now BMI 23  - Hold mounjaro while inpt     #Anxiety/depression  - lexapro 20mg po QD     s/p L-LICHA (6/9/25 at [facility])  - PT/OT, OOB  - ICS  - acetaminophen 975mg po q8h for moderate pain  - dilaudid 2mg po q4h prn for severe pain  - dilaudid 0.5mg IVP q4h prn for breakthrough pain      VTE ppx:  Heparin sq  Diet: NPO for possible flagyl procedure  Dispositon: Acute. Pending renal recovery, HD

## 2025-06-24 NOTE — PROGRESS NOTE ADULT - SUBJECTIVE AND OBJECTIVE BOX
SUBJECTIVE  BRIEF HOSPITAL COURSE SUMMARY:      LAST 24 HOURS:  HD yesterday  No acute events overnight    TODAY:  Pt seen at bedside in AM  No acute/overnight events  No acute medical complaints    OBJECTIVE    PHYSICAL EXAM:  GENERAL: No acute distress, comfortably in bed  HEENT: Atraumatic, normocephalic, non-icteric  NEURO: A&Ox3, no focal deficits, moving all extremities spontaneously, no dysarthria, CN II-XII grossly intact  PSYCH: Normal affect, calm, appropriate insight and judgment, fluent speech  LUNGS: CTAB, no wrr, non-labored breathing  HEART: RRR, no murmur appreciated  ABD: Soft, non-tender, non-distended, no organomegaly, no appreciable masses, +bs all 4 quadrants  EXTREMITIES: Nontender, no clubbing, cyanosis, or edema    Vital Signs Last 24 Hrs  T(C): 37.1 (24 Jun 2025 04:43), Max: 37.1 (23 Jun 2025 14:40)  T(F): 98.7 (24 Jun 2025 04:43), Max: 98.8 (23 Jun 2025 14:40)  HR: 88 (24 Jun 2025 04:43) (82 - 98)  BP: 121/75 (24 Jun 2025 04:43) (121/75 - 134/84)  BP(mean): --  RR: 18 (24 Jun 2025 04:43) (17 - 18)  SpO2: 94% (24 Jun 2025 04:43) (93% - 96%)    Parameters below as of 24 Jun 2025 04:43  Patient On (Oxygen Delivery Method): room air            MEDICATIONS  (STANDING):  ascorbic acid 500 milliGRAM(s) Oral daily  chlorhexidine 2% Cloths 1 Application(s) Topical <User Schedule>  chlorhexidine 2% Cloths 1 Application(s) Topical <User Schedule>  cholestyramine Powder (Sugar-Free) 4 Gram(s) Oral two times a day  dextrose 5%. 1000 milliLiter(s) (100 mL/Hr) IV Continuous <Continuous>  dextrose 50% Injectable 25 Gram(s) IV Push once  dextrose 50% Injectable 12.5 Gram(s) IV Push once  dextrose 50% Injectable 25 Gram(s) IV Push once  dextrose Oral Gel 15 Gram(s) Oral once  escitalopram 20 milliGRAM(s) Oral daily  glucagon  Injectable 1 milliGRAM(s) IntraMuscular once  heparin   Injectable 5000 Unit(s) SubCutaneous every 8 hours  multivitamin 1 Tablet(s) Oral daily  piperacillin/tazobactam IVPB.. 3.375 Gram(s) IV Intermittent every 12 hours    MEDICATIONS  (PRN):  acetaminophen     Tablet .. 650 milliGRAM(s) Oral every 6 hours PRN Temp greater or equal to 38C (100.4F), Mild Pain (1 - 3)  acetaminophen     Tablet .. 975 milliGRAM(s) Oral every 8 hours PRN Moderate Pain (4 - 6)  aluminum hydroxide/magnesium hydroxide/simethicone Suspension 30 milliLiter(s) Oral every 4 hours PRN Dyspepsia  dicyclomine 20 milliGRAM(s) Oral four times a day before meals PRN abd spasms  HYDROmorphone   Tablet 2 milliGRAM(s) Oral every 4 hours PRN Severe Pain (7 - 10)  HYDROmorphone  Injectable 0.5 milliGRAM(s) IV Push every 4 hours PRN breakthrough pain  loperamide 2 milliGRAM(s) Oral every 4 hours PRN Diarrhea  melatonin 3 milliGRAM(s) Oral at bedtime PRN Insomnia  ondansetron Injectable 4 milliGRAM(s) IV Push every 8 hours PRN Nausea and/or Vomiting  sodium chloride 0.9% lock flush 10 milliLiter(s) IV Push every 1 hour PRN Pre/post blood products, medications, blood draw, and to maintain line patency    Allergies    No Known Allergies    Intolerances        LABS:                        8.0    10.21 )-----------( 388      ( 24 Jun 2025 04:00 )             24.8     06-24    138  |  103  |  12.6  ----------------------------<  89  4.2   |  25.0  |  2.92[H]    Ca    8.0[L]      24 Jun 2025 04:00  Phos  2.6     06-24  Mg     2.1     06-24    TPro  4.9[L]  /  Alb  2.3[L]  /  TBili  0.4  /  DBili  x   /  AST  22  /  ALT  41[H]  /  AlkPhos  105  06-24    PT/INR - ( 24 Jun 2025 04:00 )   PT: 11.4 sec;   INR: 1.01 ratio         PTT - ( 24 Jun 2025 04:00 )  PTT:26.7 sec  Urinalysis Basic - ( 24 Jun 2025 04:00 )    Color: x / Appearance: x / SG: x / pH: x  Gluc: 89 mg/dL / Ketone: x  / Bili: x / Urobili: x   Blood: x / Protein: x / Nitrite: x   Leuk Esterase: x / RBC: x / WBC x   Sq Epi: x / Non Sq Epi: x / Bacteria: x      CAPILLARY BLOOD GLUCOSE          CULTURE DATA:    Culture - Blood (collected 06-15-25 @ 22:25)  Source: Blood Blood-Peripheral  Final Report (06-21-25 @ 01:00):    No growth at 5 days    Culture - Stool (collected 06-16-25 @ 20:20)  Source: Stool Feces  Final Report (06-19-25 @ 17:37):    No enteric pathogens isolated.    (Stool culture examined for Salmonella,    Shigella, Campylobacter, Aeromonas, Plesiomonas,    Vibrio, E.coli O157 and Yersinia)        RADIOLOGY & ADDITIONAL TESTS:  No new imaging to review SUBJECTIVE  BRIEF HOSPITAL COURSE SUMMARY:      LAST 24 HOURS:  HD yesterday  Ortho cleared for flexible sigmoidoscopy today  No acute events overnight    TODAY:  Pt seen at bedside this AM, hemodynamically stable. Reports feeling better this AM, but continues to have multiple episodes of watery diarrhea  Plan for flexible sigmoidoscopy today        OBJECTIVE    PHYSICAL EXAM:  GENERAL: No acute distress, comfortably in bed  HEENT: Atraumatic, normocephalic, non-icteric  NEURO: A&Ox3, no focal deficits, moving all extremities spontaneously, no dysarthria, CN II-XII grossly intact  PSYCH: Normal affect, calm, appropriate insight and judgment, fluent speech  LUNGS: CTAB, no wrr, non-labored breathing  HEART: RRR, no murmur appreciated  ABD: Soft, non-tender, non-distended, no organomegaly, no appreciable masses, +bs all 4 quadrants  EXTREMITIES: Nontender, no clubbing, cyanosis, or edema    Vital Signs Last 24 Hrs  T(C): 37.1 (24 Jun 2025 04:43), Max: 37.1 (23 Jun 2025 14:40)  T(F): 98.7 (24 Jun 2025 04:43), Max: 98.8 (23 Jun 2025 14:40)  HR: 88 (24 Jun 2025 04:43) (82 - 98)  BP: 121/75 (24 Jun 2025 04:43) (121/75 - 134/84)  BP(mean): --  RR: 18 (24 Jun 2025 04:43) (17 - 18)  SpO2: 94% (24 Jun 2025 04:43) (93% - 96%)    Parameters below as of 24 Jun 2025 04:43  Patient On (Oxygen Delivery Method): room air            MEDICATIONS  (STANDING):  ascorbic acid 500 milliGRAM(s) Oral daily  chlorhexidine 2% Cloths 1 Application(s) Topical <User Schedule>  chlorhexidine 2% Cloths 1 Application(s) Topical <User Schedule>  cholestyramine Powder (Sugar-Free) 4 Gram(s) Oral two times a day  dextrose 5%. 1000 milliLiter(s) (100 mL/Hr) IV Continuous <Continuous>  dextrose 50% Injectable 25 Gram(s) IV Push once  dextrose 50% Injectable 12.5 Gram(s) IV Push once  dextrose 50% Injectable 25 Gram(s) IV Push once  dextrose Oral Gel 15 Gram(s) Oral once  escitalopram 20 milliGRAM(s) Oral daily  glucagon  Injectable 1 milliGRAM(s) IntraMuscular once  heparin   Injectable 5000 Unit(s) SubCutaneous every 8 hours  multivitamin 1 Tablet(s) Oral daily  piperacillin/tazobactam IVPB.. 3.375 Gram(s) IV Intermittent every 12 hours    MEDICATIONS  (PRN):  acetaminophen     Tablet .. 650 milliGRAM(s) Oral every 6 hours PRN Temp greater or equal to 38C (100.4F), Mild Pain (1 - 3)  acetaminophen     Tablet .. 975 milliGRAM(s) Oral every 8 hours PRN Moderate Pain (4 - 6)  aluminum hydroxide/magnesium hydroxide/simethicone Suspension 30 milliLiter(s) Oral every 4 hours PRN Dyspepsia  dicyclomine 20 milliGRAM(s) Oral four times a day before meals PRN abd spasms  HYDROmorphone   Tablet 2 milliGRAM(s) Oral every 4 hours PRN Severe Pain (7 - 10)  HYDROmorphone  Injectable 0.5 milliGRAM(s) IV Push every 4 hours PRN breakthrough pain  loperamide 2 milliGRAM(s) Oral every 4 hours PRN Diarrhea  melatonin 3 milliGRAM(s) Oral at bedtime PRN Insomnia  ondansetron Injectable 4 milliGRAM(s) IV Push every 8 hours PRN Nausea and/or Vomiting  sodium chloride 0.9% lock flush 10 milliLiter(s) IV Push every 1 hour PRN Pre/post blood products, medications, blood draw, and to maintain line patency    Allergies    No Known Allergies    Intolerances        LABS:                        8.0    10.21 )-----------( 388      ( 24 Jun 2025 04:00 )             24.8     06-24    138  |  103  |  12.6  ----------------------------<  89  4.2   |  25.0  |  2.92[H]    Ca    8.0[L]      24 Jun 2025 04:00  Phos  2.6     06-24  Mg     2.1     06-24    TPro  4.9[L]  /  Alb  2.3[L]  /  TBili  0.4  /  DBili  x   /  AST  22  /  ALT  41[H]  /  AlkPhos  105  06-24    PT/INR - ( 24 Jun 2025 04:00 )   PT: 11.4 sec;   INR: 1.01 ratio         PTT - ( 24 Jun 2025 04:00 )  PTT:26.7 sec  Urinalysis Basic - ( 24 Jun 2025 04:00 )    Color: x / Appearance: x / SG: x / pH: x  Gluc: 89 mg/dL / Ketone: x  / Bili: x / Urobili: x   Blood: x / Protein: x / Nitrite: x   Leuk Esterase: x / RBC: x / WBC x   Sq Epi: x / Non Sq Epi: x / Bacteria: x      CAPILLARY BLOOD GLUCOSE          CULTURE DATA:    Culture - Blood (collected 06-15-25 @ 22:25)  Source: Blood Blood-Peripheral  Final Report (06-21-25 @ 01:00):    No growth at 5 days    Culture - Stool (collected 06-16-25 @ 20:20)  Source: Stool Feces  Final Report (06-19-25 @ 17:37):    No enteric pathogens isolated.    (Stool culture examined for Salmonella,    Shigella, Campylobacter, Aeromonas, Plesiomonas,    Vibrio, E.coli O157 and Yersinia)        RADIOLOGY & ADDITIONAL TESTS:  No new imaging to review

## 2025-06-25 LAB
ALBUMIN SERPL ELPH-MCNC: 2.4 G/DL — LOW (ref 3.3–5.2)
ALP SERPL-CCNC: 100 U/L — SIGNIFICANT CHANGE UP (ref 40–120)
ALT FLD-CCNC: 31 U/L — SIGNIFICANT CHANGE UP
ANION GAP SERPL CALC-SCNC: 12 MMOL/L — SIGNIFICANT CHANGE UP (ref 5–17)
AST SERPL-CCNC: 17 U/L — SIGNIFICANT CHANGE UP
BILIRUB SERPL-MCNC: 0.4 MG/DL — SIGNIFICANT CHANGE UP (ref 0.4–2)
BUN SERPL-MCNC: 18 MG/DL — SIGNIFICANT CHANGE UP (ref 8–20)
CALCIUM SERPL-MCNC: 8.1 MG/DL — LOW (ref 8.4–10.5)
CELIAC DISEASE INTERPRETATION: SIGNIFICANT CHANGE UP
CELIAC GENE PAIRS PRESENT: NO — SIGNIFICANT CHANGE UP
CHLORIDE SERPL-SCNC: 104 MMOL/L — SIGNIFICANT CHANGE UP (ref 96–108)
CO2 SERPL-SCNC: 23 MMOL/L — SIGNIFICANT CHANGE UP (ref 22–29)
CREAT SERPL-MCNC: 3.15 MG/DL — HIGH (ref 0.5–1.3)
DQ ALPHA 1: SIGNIFICANT CHANGE UP
DQ BETA 1: SIGNIFICANT CHANGE UP
EGFR: 16 ML/MIN/1.73M2 — LOW
EGFR: 16 ML/MIN/1.73M2 — LOW
GLUCOSE SERPL-MCNC: 101 MG/DL — HIGH (ref 70–99)
HCT VFR BLD CALC: 25.6 % — LOW (ref 34.5–45)
HGB BLD-MCNC: 8.1 G/DL — LOW (ref 11.5–15.5)
IMMUNOGLOBULIN A (IGA), S: 134 MG/DL — SIGNIFICANT CHANGE UP (ref 61–356)
MAGNESIUM SERPL-MCNC: 2 MG/DL — SIGNIFICANT CHANGE UP (ref 1.6–2.6)
MCHC RBC-ENTMCNC: 28.5 PG — SIGNIFICANT CHANGE UP (ref 27–34)
MCHC RBC-ENTMCNC: 31.6 G/DL — LOW (ref 32–36)
MCV RBC AUTO: 90.1 FL — SIGNIFICANT CHANGE UP (ref 80–100)
NRBC # BLD AUTO: 0 K/UL — SIGNIFICANT CHANGE UP (ref 0–0)
NRBC # FLD: 0 K/UL — SIGNIFICANT CHANGE UP (ref 0–0)
NRBC BLD AUTO-RTO: 0 /100 WBCS — SIGNIFICANT CHANGE UP (ref 0–0)
PHOSPHATE SERPL-MCNC: 4.1 MG/DL — SIGNIFICANT CHANGE UP (ref 2.4–4.7)
PLATELET # BLD AUTO: 442 K/UL — HIGH (ref 150–400)
PMV BLD: 9.2 FL — SIGNIFICANT CHANGE UP (ref 7–13)
POTASSIUM SERPL-MCNC: 3.9 MMOL/L — SIGNIFICANT CHANGE UP (ref 3.5–5.3)
POTASSIUM SERPL-SCNC: 3.9 MMOL/L — SIGNIFICANT CHANGE UP (ref 3.5–5.3)
PROT SERPL-MCNC: 5 G/DL — LOW (ref 6.6–8.7)
RBC # BLD: 2.84 M/UL — LOW (ref 3.8–5.2)
RBC # FLD: 13.2 % — SIGNIFICANT CHANGE UP (ref 10.3–14.5)
SODIUM SERPL-SCNC: 139 MMOL/L — SIGNIFICANT CHANGE UP (ref 135–145)
WBC # BLD: 9.85 K/UL — SIGNIFICANT CHANGE UP (ref 3.8–10.5)
WBC # FLD AUTO: 9.85 K/UL — SIGNIFICANT CHANGE UP (ref 3.8–10.5)

## 2025-06-25 PROCEDURE — 99231 SBSQ HOSP IP/OBS SF/LOW 25: CPT

## 2025-06-25 PROCEDURE — 90937 HEMODIALYSIS REPEATED EVAL: CPT

## 2025-06-25 PROCEDURE — 99233 SBSQ HOSP IP/OBS HIGH 50: CPT

## 2025-06-25 RX ORDER — HYDROMORPHONE/SOD CHLOR,ISO/PF 2 MG/10 ML
2 SYRINGE (ML) INJECTION EVERY 4 HOURS
Refills: 0 | Status: DISCONTINUED | OUTPATIENT
Start: 2025-06-26 | End: 2025-06-30

## 2025-06-25 RX ORDER — HYDROMORPHONE/SOD CHLOR,ISO/PF 2 MG/10 ML
0.5 SYRINGE (ML) INJECTION EVERY 4 HOURS
Refills: 0 | Status: DISCONTINUED | OUTPATIENT
Start: 2025-06-25 | End: 2025-06-30

## 2025-06-25 RX ADMIN — Medication 1 APPLICATION(S): at 05:02

## 2025-06-25 RX ADMIN — Medication 975 MILLIGRAM(S): at 05:07

## 2025-06-25 RX ADMIN — Medication 975 MILLIGRAM(S): at 20:00

## 2025-06-25 RX ADMIN — Medication 25 GRAM(S): at 05:01

## 2025-06-25 RX ADMIN — Medication 4 GRAM(S): at 22:46

## 2025-06-25 RX ADMIN — Medication 4 MILLIGRAM(S): at 05:02

## 2025-06-25 RX ADMIN — Medication 25 GRAM(S): at 22:53

## 2025-06-25 RX ADMIN — Medication 125 MILLIGRAM(S): at 22:46

## 2025-06-25 RX ADMIN — Medication 30 MILLILITER(S): at 16:05

## 2025-06-25 RX ADMIN — Medication 1 TABLET(S): at 11:14

## 2025-06-25 RX ADMIN — Medication 975 MILLIGRAM(S): at 13:29

## 2025-06-25 RX ADMIN — Medication 125 MILLIGRAM(S): at 11:14

## 2025-06-25 RX ADMIN — Medication 975 MILLIGRAM(S): at 12:49

## 2025-06-25 RX ADMIN — Medication 2 MILLIGRAM(S): at 23:45

## 2025-06-25 RX ADMIN — Medication 4 MILLIGRAM(S): at 22:45

## 2025-06-25 RX ADMIN — ESCITALOPRAM OXALATE 20 MILLIGRAM(S): 20 TABLET ORAL at 11:14

## 2025-06-25 RX ADMIN — Medication 975 MILLIGRAM(S): at 19:00

## 2025-06-25 RX ADMIN — Medication 975 MILLIGRAM(S): at 05:02

## 2025-06-25 RX ADMIN — Medication 500 MILLIGRAM(S): at 11:14

## 2025-06-25 RX ADMIN — Medication 125 MILLIGRAM(S): at 05:02

## 2025-06-25 RX ADMIN — Medication 4 GRAM(S): at 09:27

## 2025-06-25 RX ADMIN — Medication 2 MILLIGRAM(S): at 22:45

## 2025-06-25 NOTE — PROGRESS NOTE ADULT - ASSESSMENT
1.  Acute kidney injury with hyperkalemia and acidosis: Multifactorial likely combination of prerenal/ATN/NSAID nephrotoxicity along with ARB use.  Ultrasound shows no evidence of hydronephrosis. HD started on 6/17. last HD was on monday  No renal  recovery evident at this time.  HD today  2.  Hypertension; bp stable. continue to hold losartan.   3.  Status post hip replacement.  Hold NSAIDs per Ortho.  4.  Colitis noted on CT abdomen.  mgt as per GI

## 2025-06-25 NOTE — PROGRESS NOTE ADULT - SUBJECTIVE AND OBJECTIVE BOX
[facility] DIVISION OF KIDNEY DISEASES AND HYPERTENSION -- HEMODIALYSIS NOTE  --------------------------------------------------------------------------------  Chief Complaint: sandy/Ongoing hemodialysis requirement    24 hour events/subjective:  diarrhea has improved  pt is c/o headache at the time of my evaluation        PAST HISTORY  --------------------------------------------------------------------------------  No significant changes to PMH, PSH, FHx, SHx, unless otherwise noted    ALLERGIES & MEDICATIONS  --------------------------------------------------------------------------------  Allergies    No Known Allergies        Standing Inpatient Medications  ascorbic acid 500 milliGRAM(s) Oral daily  chlorhexidine 2% Cloths 1 Application(s) Topical <User Schedule>  chlorhexidine 2% Cloths 1 Application(s) Topical <User Schedule>  cholestyramine Powder (Sugar-Free) 4 Gram(s) Oral two times a day  dextrose 5%. 1000 milliLiter(s) IV Continuous <Continuous>  dextrose 50% Injectable 25 Gram(s) IV Push once  dextrose 50% Injectable 12.5 Gram(s) IV Push once  dextrose 50% Injectable 25 Gram(s) IV Push once  dextrose Oral Gel 15 Gram(s) Oral once  escitalopram 20 milliGRAM(s) Oral daily  glucagon  Injectable 1 milliGRAM(s) IntraMuscular once  heparin   Injectable 5000 Unit(s) SubCutaneous every 8 hours  lactated ringers. 1000 milliLiter(s) IV Continuous <Continuous>  multivitamin 1 Tablet(s) Oral daily  piperacillin/tazobactam IVPB.. 3.375 Gram(s) IV Intermittent every 12 hours  vancomycin    Solution 125 milliGRAM(s) Oral every 6 hours    PRN Inpatient Medications  acetaminophen     Tablet .. 975 milliGRAM(s) Oral every 8 hours PRN  acetaminophen     Tablet .. 650 milliGRAM(s) Oral every 6 hours PRN  aluminum hydroxide/magnesium hydroxide/simethicone Suspension 30 milliLiter(s) Oral every 4 hours PRN  dicyclomine 20 milliGRAM(s) Oral four times a day before meals PRN  HYDROmorphone   Tablet 2 milliGRAM(s) Oral every 4 hours PRN  HYDROmorphone  Injectable 0.5 milliGRAM(s) IV Push every 4 hours PRN  melatonin 3 milliGRAM(s) Oral at bedtime PRN  ondansetron Injectable 4 milliGRAM(s) IV Push every 8 hours PRN  sodium chloride 0.9% lock flush 10 milliLiter(s) IV Push every 1 hour PRN      REVIEW OF SYSTEMS  --------------------------------------------------------------------------------  Gen: No weight changes, fatigue, fevers/chills, weakness  Skin: No rashes  Head/Eyes/Ears/Mouth: No headache  Respiratory: No dyspnea, cough,  CV: No chest pain, orthopnea  GI: No abdominal pain, diarrhea, constipation, nausea, vomiting,  MSK: No joint pain  Neuro: No dizziness/lightheadedness, weakness  Heme: No bleeding  Psych: No significant nervousness, anxiety, stress, depression    All other systems were reviewed and are negative, except as noted.    VITALS/PHYSICAL EXAM  --------------------------------------------------------------------------------  T(C): 37 (06-25-25 @ 16:38), Max: 37.1 (06-25-25 @ 07:18)  HR: 91 (06-25-25 @ 16:38) (84 - 97)  BP: 130/78 (06-25-25 @ 16:38) (130/78 - 139/84)  RR: 18 (06-25-25 @ 16:38) (17 - 19)  SpO2: 95% (06-25-25 @ 16:38) (93% - 95%)  Wt(kg): --  Height (cm): 162.6 (06-24-25 @ 14:28)  Weight (kg): 62.1 (06-24-25 @ 14:28)  BMI (kg/m2): 23.5 (06-24-25 @ 14:28)  BSA (m2): 1.67 (06-24-25 @ 14:28)      06-25-25 @ 07:01  -  06-25-25 @ 16:56  --------------------------------------------------------  IN: 0 mL / OUT: 700 mL / NET: -700 mL      Physical Exam:  	Gen: NAD, well-appearing  	HEENTsupple neck,  	Pulm: CTA B/L  	CV: RRR, S1S2; no rub  	Abd: +BS, soft, nontender  	UE: Warm,  	LE: Warm, no edema  	Neuro: No focal deficits  	Psych: Normal affect and mood  	Skin: Warm  	Vascular access: rij non [facility]    LABS/STUDIES  --------------------------------------------------------------------------------              8.1    9.85  >-----------<  442      [06-25-25 @ 05:40]              25.6     139  |  104  |  18.0  ----------------------------<  101      [06-25-25 @ 05:40]  3.9   |  23.0  |  3.15        Ca     8.1     [06-25-25 @ 05:40]      Mg     2.0     [06-25-25 @ 05:40]      Phos  4.1     [06-25-25 @ 05:40]    TPro  5.0  /  Alb  2.4  /  TBili  0.4  /  DBili  x   /  AST  17  /  ALT  31  /  AlkPhos  100  [06-25-25 @ 05:40]    PT/INR: PT 11.4 , INR 1.01       [06-24-25 @ 04:00]  PTT: 26.7       [06-24-25 @ 04:00]      TSH 2.40      [06-21-25 @ 06:45]    HBsAb 5.2      [06-17-25 @ 20:30]  HBsAg Nonreact      [06-16-25 @ 09:45]  HCV 0.06, Nonreact      [06-16-25 @ 09:45]

## 2025-06-25 NOTE — PROGRESS NOTE ADULT - ASSESSMENT
59 y/o F with PMHX HTN, HLD, obesity and anxiety/depression (on lexapro) s/p L-LICHA (6/9/25 at [facility]) presents complaining of abdominal pain, vomiting and diarrhea    #Colitis  #Elevated liver enzymes, resolved   #NAVI, improving, on dialysis   CT Abdomen and Pelvis No Cont (06.15.25 @ 22:14) thickening of sigmoid colonic wall with several no-inflamed diverticula, no peridiverticular fat stranding or inflammation to suggest acute diverticulitis; decompressed descending colon with mild wall thickening and minimal fat stranding in the mesocolon/sigmoid; findings compatible with mild acute colitis in the left descending and sigmoid colon which may be of inflammatory, infectious or ischemic etiology.   C Diff by PCR Result: NotDetec (06.16.25 @ 20:20)  GI PCR Panel: NotDetec (06.16.25 @ 20:20)  Ova and Parasites (06.16.25 @ 20:20) No Protozoa seen by trichrome stain, No Helminths or Protozoa seen   Culture - Stool (06.16.25 @ 20:20) No enteric pathogens isolated.  Acute hepatitis panel negative, EBV/CMV negative   US Abdomen Complete (US Abdomen Complete) (06.21.25) > Liver: Normal in size and echogenicity. No focal lesions are identified. Bile ducts: Normal caliber. Common bile duct measures 7 mm. Gallbladder: Cholecystectomy.  C Diff by PCR Result: NotDetec (06.21.25 @ 00:40)  Calprotectin, Stool (06.21.25 @ 00:40): 1940 ug/g      Colonoscopy (06.24.25) >moderate diverticulosis of left side of colon, rectum showed erythema of mucosa and possible pseudomembranous colitis starting 15cm from anal verge until 40cm    - Await pathology results  - Continue vancomycin for pseudomembranous colitis  - Trend CBC and BMP daily   - Trend electrolytes, replete as needed   - Continue bentyl PRN abdominal spasms  - Continue with cholestyramine BID   - Monitor stool count   _________________________________________________________________  Assessment and recommendations are final when note is signed by the attending physician.

## 2025-06-25 NOTE — PROGRESS NOTE ADULT - SUBJECTIVE AND OBJECTIVE BOX
Chief Complaint:  Patient is a 60y old  Female who presents with a chief complaint of acute renal failure (25 Jun 2025 07:32)      HPI/ 24 hr events: Patient seen and examined at bedside. Patient reports 7 semi-formed BMs overnight. She is tolerating diet. She underwent flexible sigmoidoscopy yesterday that showed moderate diverticulosis of left side of colon, rectum showed erythema of mucosa and possible pseudomembranous colitis starting 15cm from anal verge until 40cm. She was started on Vancomycin due to pseudomembranous colitis identified.       REVIEW OF SYSTEMS:   General: Negative  HEENT: Negative  CV: Negative  Respiratory: Negative  GI: See HPI  : Negative  MSK: Negative  Hematologic: Negative  Skin: Negative    MEDICATIONS:   MEDICATIONS  (STANDING):  ascorbic acid 500 milliGRAM(s) Oral daily  chlorhexidine 2% Cloths 1 Application(s) Topical <User Schedule>  chlorhexidine 2% Cloths 1 Application(s) Topical <User Schedule>  cholestyramine Powder (Sugar-Free) 4 Gram(s) Oral two times a day  dextrose 5%. 1000 milliLiter(s) (100 mL/Hr) IV Continuous <Continuous>  dextrose 50% Injectable 25 Gram(s) IV Push once  dextrose 50% Injectable 12.5 Gram(s) IV Push once  dextrose 50% Injectable 25 Gram(s) IV Push once  dextrose Oral Gel 15 Gram(s) Oral once  escitalopram 20 milliGRAM(s) Oral daily  glucagon  Injectable 1 milliGRAM(s) IntraMuscular once  heparin   Injectable 5000 Unit(s) SubCutaneous every 8 hours  lactated ringers. 1000 milliLiter(s) (75 mL/Hr) IV Continuous <Continuous>  multivitamin 1 Tablet(s) Oral daily  piperacillin/tazobactam IVPB.. 3.375 Gram(s) IV Intermittent every 12 hours  vancomycin    Solution 125 milliGRAM(s) Oral every 6 hours    MEDICATIONS  (PRN):  acetaminophen     Tablet .. 975 milliGRAM(s) Oral every 8 hours PRN Moderate Pain (4 - 6)  acetaminophen     Tablet .. 650 milliGRAM(s) Oral every 6 hours PRN Temp greater or equal to 38C (100.4F), Mild Pain (1 - 3)  aluminum hydroxide/magnesium hydroxide/simethicone Suspension 30 milliLiter(s) Oral every 4 hours PRN Dyspepsia  dicyclomine 20 milliGRAM(s) Oral four times a day before meals PRN abd spasms  HYDROmorphone   Tablet 2 milliGRAM(s) Oral every 4 hours PRN Severe Pain (7 - 10)  HYDROmorphone  Injectable 0.5 milliGRAM(s) IV Push every 4 hours PRN breakthrough pain  loperamide 2 milliGRAM(s) Oral every 4 hours PRN Diarrhea  melatonin 3 milliGRAM(s) Oral at bedtime PRN Insomnia  ondansetron Injectable 4 milliGRAM(s) IV Push every 8 hours PRN Nausea and/or Vomiting  sodium chloride 0.9% lock flush 10 milliLiter(s) IV Push every 1 hour PRN Pre/post blood products, medications, blood draw, and to maintain line patency            DIET:  Diet, Low Fiber:   Low Fat (LOWFAT)  No Dairy (06-25-25 @ 01:45) [Active]          ALLERGIES:   Allergies    No Known Allergies    Intolerances        VITAL SIGNS:   Vital Signs Last 24 Hrs  T(C): 37.1 (25 Jun 2025 07:18), Max: 37.1 (25 Jun 2025 07:18)  T(F): 98.7 (25 Jun 2025 07:18), Max: 98.7 (25 Jun 2025 07:18)  HR: 84 (25 Jun 2025 07:18) (82 - 97)  BP: 130/81 (25 Jun 2025 07:18) (112/65 - 139/84)  BP(mean): --  RR: 17 (25 Jun 2025 07:18) (12 - 19)  SpO2: 94% (25 Jun 2025 07:18) (93% - 99%)    Parameters below as of 25 Jun 2025 07:18  Patient On (Oxygen Delivery Method): room air      I&O's Summary    25 Jun 2025 07:01  -  25 Jun 2025 08:53  --------------------------------------------------------  IN: 0 mL / OUT: 700 mL / NET: -700 mL        PHYSICAL EXAM:   GENERAL:  No acute distress  HEENT:  NC/AT, conjunctiva clear, sclera anicteric  CHEST:  No increased effort  HEART:  Regular rate  ABDOMEN:  Soft, non-tender, non-distended, no rebound or guarding  SKIN:  Warm, dry  NEURO:  Calm, cooperative    LABS:                        8.1    9.85  )-----------( 442      ( 25 Jun 2025 05:40 )             25.6     Hemoglobin: 8.1 g/dL (06-25-25 @ 05:40)  Hemoglobin: 8.0 g/dL (06-24-25 @ 04:00)  Hemoglobin: 7.8 g/dL (06-23-25 @ 04:00)    06-25    139  |  104  |  18.0  ----------------------------<  101[H]  3.9   |  23.0  |  3.15[H]    Ca    8.1[L]      25 Jun 2025 05:40  Phos  4.1     06-25  Mg     2.0     06-25    TPro  5.0[L]  /  Alb  2.4[L]  /  TBili  0.4  /  DBili  x   /  AST  17  /  ALT  31  /  AlkPhos  100  06-25    LIVER FUNCTIONS - ( 25 Jun 2025 05:40 )  Alb: 2.4 g/dL / Pro: 5.0 g/dL / ALK PHOS: 100 U/L / ALT: 31 U/L / AST: 17 U/L / GGT: x             PT/INR - ( 24 Jun 2025 04:00 )   PT: 11.4 sec;   INR: 1.01 ratio         PTT - ( 24 Jun 2025 04:00 )  PTT:26.7 sec                                        RADIOLOGY & ADDITIONAL STUDIES:          Colonoscopy Report    Date: 6/24/2025          Using the [place] Bowel Preparation Score, each segment of the colon was graded    as follows:    Left Colon: Good, 2 |  |            Findings:    Excavated lesions Multiple diverticula were seen in the the left side of the    colon. Diverticulosis appeared to be of moderate severity.    Additional findings - Rectum normalunit 15 cm from the anal verge. There is    erythema of the mucosa and what appears to be pseudomembranous colitis starting    15 cm from the anal verge until 40 cm ( scope was passed to 40 cm ).        Impressions:    Moderate diverticulosis of the the left side of the colon.    - Rectum normal unit 15 cm from the anal verge. There is erythema of the    mucosa and what appears to be pseudomembranous colitis starting 15 cm from the    anal verge until 40 cm ( scope was passed to 40 cm ).        Plan:    Await pathology results.        Version 1, Electronically signed on 6/24/2025 3:39:43 PM by Alexa Huang MD, DO

## 2025-06-25 NOTE — PROGRESS NOTE ADULT - SUBJECTIVE AND OBJECTIVE BOX
Patient was seen and re-evaluated on dialysis.   pt is c/o headache-  otherwise tolerating the procedure well.   T(C): 36.6 (06-25-25 @ 17:35), Max: 37.1 (06-25-25 @ 07:18)  HR: 89 (06-25-25 @ 17:40) (84 - 97)  BP: 143/87 (06-25-25 @ 17:40) (130/78 - 143/87)  Continue dialysis:   Dialyzer:  revaclear 300   QB: 400 ml   QD: 500ml.,  Goal UF  0 kg over 3 Hours   tylenol given for headache

## 2025-06-25 NOTE — PROGRESS NOTE ADULT - ASSESSMENT
61 y/o F w/ PMH of HTN, HLD, prior obesity (currently on mounjaro for 1.5 yrs w/ 80lb wt loss) and anxiety/depression (on lexapro) s/p L-LICHA (6/9/25 at [facility]) presents c/o nausea, NBNB emesis and large vol watery nonbloody diarrhea w/ associated LLQ abd pain x 3 days ago associated w/ anuria, admitted for acute renal failure and sepsis 2/2 colitis. Initially had some blood bowel movements, which transformed into multiple watery BM. Given IVF, started on zosyn. C diff was negative.       # Acute renal failure w/ mild rhabdo suspect multifactorial from N/V/D while on NSAIDs and ARB   # Hypovolemic mild hyponatremic hypochloremia likely 2/2 N/V/D  - monae catheter placed in ED  - s/p HD 6/17, 6/20, 6/22  - BUN 12/Cr 2.92; last known Cr 0.85 5/2025   -  Holding outpt Naproxen, losartan   - encourage po intake  - Strict I/O's  - avoid nephrotoxic agents or renally dose if needed  - c/w prn antiemetics  - nephrology following       # Sepsis (improving)  # pseudomembranous colitis  - CT a/p w/ findings compatible with mild acute colitis in the left descending and sigmoid colon  -6/21 repeat C. diff PCR negative  - 6/24 flexible sigmoidoscopy found pseudomembranous colitis  - No leukocytosis  - zosyn (thru 6/26)  - started on vancomycin  - bentyl, cholestyramine powder, imodium for abd spasms and diarrhea  - tylenol 650mg q6h prn for fever  - NPO for flexible sigmoidoscopy today  - f/u fecal calprotectin, celiac cascade  - ctm CBC, temp    - ID following  - GI following      # Transaminitis w/ hepatocellular distribution - resolved  Ischemic.   - Trend LFTs and avoid hepatotoxic medications     #generalized headache - resolved      # HTN / HLD  - bp stable today  - holding ARB due to renal fxn   - Holding statin for transaminitis     # Former obesity  - On mounjaro w/ 80lb weight loss and now BMI 23  - Hold mounjaro while inpt     #Anxiety/depression  - lexapro 20mg po QD     s/p L-LICHA (6/9/25 at [facility])  - PT/OT, OOB  - ICS  - acetaminophen 975mg po q8h for moderate pain  - dilaudid 2mg po q4h prn for severe pain  - dilaudid 0.5mg IVP q4h prn for breakthrough pain      VTE ppx:  Heparin sq  Diet: NPO for possible flagyl procedure  Dispositon: Acute. Pending renal recovery, HD     61 y/o F w/ PMH of HTN, HLD, prior obesity (currently on mounjaro for 1.5 yrs w/ 80lb wt loss) and anxiety/depression (on lexapro) s/p L-LICHA (6/9/25 at [facility]) presents c/o nausea, NBNB emesis and large vol watery nonbloody diarrhea w/ associated LLQ abd pain x 3 days ago associated w/ anuria, admitted for acute renal failure and sepsis 2/2 colitis. Initially had some blood bowel movements, which transformed into multiple watery BM. Given IVF, started on zosyn. C diff was negative.     # Acute renal failure w/ mild rhabdo suspect multifactorial from N/V/D while on NSAIDs and ARB   # Hypovolemic mild hyponatremic hypochloremia likely 2/2 N/V/D  - monae catheter placed in ED  - s/p HD 6/17, 6/20, 6/22  - BUN 18/Cr 3.15; last known Cr 0.85 5/2025   -  Holding outpt Naproxen, losartan   - encourage po intake  - Strict I/O's  - avoid nephrotoxic agents or renally dose if needed  - c/w prn antiemetics  - nephrology following       # Sepsis (improving)  # pseudomembranous colitis  - CT a/p w/ findings compatible with mild acute colitis in the left descending and sigmoid colon  - 6/21 repeat C. diff PCR negative  - 6/24 flexible sigmoidoscopy found pseudomembranous colitis  - No leukocytosis, afebrile  - zosyn (thru 6/26)  - started on vancomycin   - bentyl, cholestyramine powder, imodium for abd spasms and diarrhea  - tylenol 650mg q6h prn for fever  - f/u fecal calprotectin, celiac cascade  - ctm CBC, temp    - ID following  - GI following      # Transaminitis w/ hepatocellular distribution - resolved  Ischemic.   - Trend LFTs and avoid hepatotoxic medications     #generalized headache - resolved      # HTN / HLD  - bp stable today  - holding ARB due to renal fxn   - Holding statin for transaminitis     # Former obesity  - On mounjaro w/ 80lb weight loss and now BMI 23  - Hold mounjaro while inpt     #Anxiety/depression  - lexapro 20mg po QD     s/p L-LICHA (6/9/25 at [facility])  - PT/OT, OOB  - ICS  - acetaminophen 975mg po q8h for moderate pain  - dilaudid 2mg po q4h prn for severe pain  - dilaudid 0.5mg IVP q4h prn for breakthrough pain      VTE ppx:  Heparin sq  Diet: low fiber  Dispositon: Acute. Pending renal recovery   59 y/o F w/ PMH of HTN, HLD, prior obesity (currently on mounjaro for 1.5 yrs w/ 80lb wt loss) and anxiety/depression (on lexapro) s/p L-LICHA (6/9/25 at [facility]) presents c/o nausea, NBNB emesis and large vol watery nonbloody diarrhea w/ associated LLQ abd pain x 3 days ago associated w/ anuria, admitted for acute renal failure and sepsis 2/2 colitis. Initially had some blood bowel movements, which transformed into multiple watery BM. Given IVF, started on zosyn. C diff was negative.     # Acute renal failure w/ mild rhabdo suspect multifactorial from N/V/D while on NSAIDs and ARB   # Hypovolemic mild hyponatremic hypochloremia likely 2/2 N/V/D  - monae catheter placed in ED  - s/p HD 6/17, 6/20, 6/22  - BUN 18/Cr 3.15; last known Cr 0.85 5/2025   -  Holding outpt Naproxen, losartan   - encourage po intake  - Strict I/O's  - avoid nephrotoxic agents or renally dose if needed  - c/w prn antiemetics  - nephrology following       # Sepsis (improving)  # pseudomembranous colitis  - CT a/p w/ findings compatible with mild acute colitis in the left descending and sigmoid colon  - 6/21 repeat C. diff PCR negative  - 6/24 flexible sigmoidoscopy found pseudomembranous colitis  - started on vancomycin   - zosyn (thru 6/26)  - placed on contact precaution  - d/c imodium   - bentyl, cholestyramine powder, for abd spasms and diarrhea  - tylenol 650mg q6h prn for fever  - f/u fecal calprotectin, celiac cascade  - ctm CBC, temp    - ID following  - GI following      # Transaminitis w/ hepatocellular distribution - resolved  Ischemic.   - Trend LFTs and avoid hepatotoxic medications     #generalized headache - resolved      # HTN / HLD  - bp stable today  - holding ARB due to renal fxn   - Holding statin for transaminitis     # Former obesity  - On mounjaro w/ 80lb weight loss and now BMI 23  - Hold mounjaro while inpt     #Anxiety/depression  - lexapro 20mg po QD     s/p L-LICHA (6/9/25 at [facility])  - PT/OT, OOB  - ICS  - acetaminophen 975mg po q8h for moderate pain  - dilaudid 2mg po q4h prn for severe pain  - dilaudid 0.5mg IVP q4h prn for breakthrough pain      VTE ppx:  Heparin sq  Diet: low fiber  Dispositon: Acute. Pending renal recovery   59 y/o F w/ PMH of HTN, HLD, prior obesity (currently on mounjaro for 1.5 yrs w/ 80lb wt loss) and anxiety/depression (on lexapro) s/p L-LICHA (6/9/25 at [facility]) presents c/o nausea, NBNB emesis and large vol watery nonbloody diarrhea w/ associated LLQ abd pain x 3 days ago associated w/ anuria, admitted for acute renal failure and sepsis 2/2 colitis. Initially had some blood bowel movements, which transformed into multiple watery BM. Given IVF, started on zosyn. C diff was negative.     # Acute renal failure w/ mild rhabdo suspect multifactorial from N/V/D while on NSAIDs and ARB   # Hypovolemic mild hyponatremic hypochloremia likely 2/2 N/V/D  - monae catheter placed in ED  - s/p HD 6/17, 6/20, 6/22  - HD today  - BUN 18/Cr 3.15; last known Cr 0.85 5/2025   -  Holding outpt Naproxen, losartan   - encourage po intake  - Strict I/O's  - avoid nephrotoxic agents or renally dose if needed  - c/w prn antiemetics  - nephrology following       # Sepsis (improving)  # pseudomembranous colitis  - CT a/p w/ findings compatible with mild acute colitis in the left descending and sigmoid colon  - 6/21 repeat C. diff PCR negative  - 6/24 flexible sigmoidoscopy found pseudomembranous colitis  - started on vancomycin   - zosyn (thru 6/26)  - placed on contact precaution  - d/c imodium   - bentyl, cholestyramine powder, for abd spasms and diarrhea  - tylenol 650mg q6h prn for fever  - f/u fecal calprotectin, celiac cascade  - ctm CBC, temp    - ID following  - GI following      # Transaminitis w/ hepatocellular distribution - resolved  Ischemic.   - Trend LFTs and avoid hepatotoxic medications     #generalized headache - resolved      # HTN / HLD  - bp stable today  - holding ARB due to renal fxn   - Holding statin for transaminitis     # Former obesity  - On mounjaro w/ 80lb weight loss and now BMI 23  - Hold mounjaro while inpt     #Anxiety/depression  - lexapro 20mg po QD     s/p L-LICHA (6/9/25 at [facility])  - PT/OT, OOB  - ICS  - acetaminophen 975mg po q8h for moderate pain  - dilaudid 2mg po q4h prn for severe pain  - dilaudid 0.5mg IVP q4h prn for breakthrough pain      VTE ppx:  Heparin sq  Diet: low fiber  Dispositon: Acute. Pending renal recovery

## 2025-06-25 NOTE — PROGRESS NOTE ADULT - ATTENDING COMMENTS
59 y/o F here for acute renal failure 2/ mild rhabdo w/ gastroenteritis    #ARF  #Hypovolemia 2/2 NVD  #sepsis 2/2 Pseudomembranous Colitis  #HTN  - HD as per nephro  - ID on board  - abx  - cholestyramine  - Sigmoid found to have Pseudomembranous colitis, started on PO vanco, noted loose stools today  - Dispo: Medically acute, pending improvement in diarrhea and plan for HD  - remaining plan as above. 61 y/o F here for acute renal failure 2/ mild rhabdo w/ gastroenteritis    #ARF  #Hypovolemia 2/2 NVD  #sepsis 2/2 Pseudomembranous Colitis  #HTN  - HD as per nephro  - ID on board  - abx  - cholestyramine  - Sigmoid found to have Pseudomembranous colitis, started on PO vanco, noted loose stools today  - Dispo: Medically acute, pending improvement in diarrhea and plan for HD  - remaining plan as above

## 2025-06-25 NOTE — PROGRESS NOTE ADULT - SUBJECTIVE AND OBJECTIVE BOX
SUBJECTIVE  BRIEF HOSPITAL COURSE SUMMARY:  Pt is a 61 y/o F w/ PMH of HTN, HLD, prior obesity (currently on mounjaro for 1.5 yrs w/ 80lb wt loss) and anxiety/depression (on lexapro) s/p L-LICHA (6/9/25 at [facility]) presented to the ED c/o nausea, NBNB emesis and large vol watery nonbloody diarrhea w/ associated LLQ abd pain that started 3 days ago.  Pt also noticed she stopped making urine Saturday and since arrival at the hospital has not had a BM.  pt has been on naproxen 500mg BID since L-hip surgery 6/9 and is also on losartan.  VS notable for soft SBP in 90's, sinus tachy and RR 20 but not hypoxic or febrile.  On exam patient appeared dehydrated w/ mild LLQ tenderness to palpation.  Bernard in place w/ no urine.  Labs significant for WBC 23.12, K 6.5-->4.9, AGMA, BUN 64, Cr 5.22-->5.08 (baseline 0.85), Transaminitis , lipase nL, CT a/p w/ possible mild colitis. Pt given zosyn, albumin, 3L LR and 1L NS, 10g lokelma, 2g Ca gluconate, 4mg IV morphine and 4mg IV zofran in ED. MICU consulted and recommending SDU.  Pt admitted for acute renal failure w/ mild rhabdo 2/2 to n/v/d while on NSAIDs and ARB. Nephro consulted, started on plasmalyte. ID consulted for colitis, concern for viral gastroentereitis and zosyn held on 6/17 as unclear if beneficial plus worsening renal function. GI consulted for colitis, recommend patient f/u w/ her gastroenterologist Dr. Gypsy Jackson for outpt colonoscopy in 6-8wks. Renal US showed no hydronephrosis. Per nephro recs, losartan was held. Renal function worsening no response to IV lasix, vascular surgery placed dialysis catheter, patient started on HD on 6/17. HD on 6/20, 6/22. Flexible sigmoidoscopy on 6/24 significant for pseudomembranous colitis, patient started  on vanc    LAST 24 HOURS:  flexible sigmoidoscopy with GI yesterday. Found pseudomembranous colitis, started on vanc  no acute events ovn    TODAY:  Pt seen at bedside in AM, hemodynamically stable, AAOx3, in no acute distress  No acute medical complaints    OBJECTIVE    PHYSICAL EXAM:  GENERAL: No acute distress, comfortably in bed  HEENT: Atraumatic, normocephalic, non-icteric  NEURO: A&Ox3, no focal deficits, moving all extremities spontaneously, no dysarthria, CN II-XII grossly intact  PSYCH: Normal affect, calm, appropriate insight and judgment, fluent speech  LUNGS: CTAB, no wrr, non-labored breathing  HEART: RRR, no murmur appreciated  ABD: Soft, non-tender, non-distended, no organomegaly, no appreciable masses, +bs all 4 quadrants  EXTREMITIES: Nontender, no clubbing, cyanosis, or edema    Vital Signs Last 24 Hrs  T(C): 36.7 (25 Jun 2025 05:05), Max: 36.7 (24 Jun 2025 08:16)  T(F): 98 (25 Jun 2025 05:05), Max: 98.1 (24 Jun 2025 08:16)  HR: 97 (25 Jun 2025 05:05) (82 - 97)  BP: 139/84 (25 Jun 2025 05:05) (112/65 - 139/84)  BP(mean): --  RR: 19 (25 Jun 2025 05:05) (12 - 19)  SpO2: 93% (25 Jun 2025 05:05) (93% - 99%)    Parameters below as of 25 Jun 2025 05:05  Patient On (Oxygen Delivery Method): room air            MEDICATIONS  (STANDING):  ascorbic acid 500 milliGRAM(s) Oral daily  chlorhexidine 2% Cloths 1 Application(s) Topical <User Schedule>  chlorhexidine 2% Cloths 1 Application(s) Topical <User Schedule>  cholestyramine Powder (Sugar-Free) 4 Gram(s) Oral two times a day  dextrose 5%. 1000 milliLiter(s) (100 mL/Hr) IV Continuous <Continuous>  dextrose 50% Injectable 25 Gram(s) IV Push once  dextrose 50% Injectable 12.5 Gram(s) IV Push once  dextrose 50% Injectable 25 Gram(s) IV Push once  dextrose Oral Gel 15 Gram(s) Oral once  escitalopram 20 milliGRAM(s) Oral daily  glucagon  Injectable 1 milliGRAM(s) IntraMuscular once  heparin   Injectable 5000 Unit(s) SubCutaneous every 8 hours  lactated ringers. 1000 milliLiter(s) (75 mL/Hr) IV Continuous <Continuous>  multivitamin 1 Tablet(s) Oral daily  piperacillin/tazobactam IVPB.. 3.375 Gram(s) IV Intermittent every 12 hours  vancomycin    Solution 125 milliGRAM(s) Oral every 6 hours    MEDICATIONS  (PRN):  acetaminophen     Tablet .. 975 milliGRAM(s) Oral every 8 hours PRN Moderate Pain (4 - 6)  acetaminophen     Tablet .. 650 milliGRAM(s) Oral every 6 hours PRN Temp greater or equal to 38C (100.4F), Mild Pain (1 - 3)  aluminum hydroxide/magnesium hydroxide/simethicone Suspension 30 milliLiter(s) Oral every 4 hours PRN Dyspepsia  dicyclomine 20 milliGRAM(s) Oral four times a day before meals PRN abd spasms  HYDROmorphone   Tablet 2 milliGRAM(s) Oral every 4 hours PRN Severe Pain (7 - 10)  HYDROmorphone  Injectable 0.5 milliGRAM(s) IV Push every 4 hours PRN breakthrough pain  loperamide 2 milliGRAM(s) Oral every 4 hours PRN Diarrhea  melatonin 3 milliGRAM(s) Oral at bedtime PRN Insomnia  ondansetron Injectable 4 milliGRAM(s) IV Push every 8 hours PRN Nausea and/or Vomiting  sodium chloride 0.9% lock flush 10 milliLiter(s) IV Push every 1 hour PRN Pre/post blood products, medications, blood draw, and to maintain line patency    Allergies    No Known Allergies    Intolerances        LABS:                        8.1    9.85  )-----------( 442      ( 25 Jun 2025 05:40 )             25.6     06-25    139  |  104  |  18.0  ----------------------------<  101[H]  3.9   |  23.0  |  3.15[H]    Ca    8.1[L]      25 Jun 2025 05:40  Phos  4.1     06-25  Mg     2.0     06-25    TPro  5.0[L]  /  Alb  2.4[L]  /  TBili  0.4  /  DBili  x   /  AST  17  /  ALT  31  /  AlkPhos  100  06-25    PT/INR - ( 24 Jun 2025 04:00 )   PT: 11.4 sec;   INR: 1.01 ratio         PTT - ( 24 Jun 2025 04:00 )  PTT:26.7 sec  Urinalysis Basic - ( 25 Jun 2025 05:40 )    Color: x / Appearance: x / SG: x / pH: x  Gluc: 101 mg/dL / Ketone: x  / Bili: x / Urobili: x   Blood: x / Protein: x / Nitrite: x   Leuk Esterase: x / RBC: x / WBC x   Sq Epi: x / Non Sq Epi: x / Bacteria: x      CAPILLARY BLOOD GLUCOSE          CULTURE DATA:    Culture - Blood (collected 06-15-25 @ 22:25)  Source: Blood Blood-Peripheral  Final Report (06-21-25 @ 01:00):    No growth at 5 days    Culture - Stool (collected 06-16-25 @ 20:20)  Source: Stool Feces  Final Report (06-19-25 @ 17:37):    No enteric pathogens isolated.    (Stool culture examined for Salmonella,    Shigella, Campylobacter, Aeromonas, Plesiomonas,    Vibrio, E.coli O157 and Yersinia)        RADIOLOGY & ADDITIONAL TESTS:  No new imaging to review SUBJECTIVE  BRIEF HOSPITAL COURSE SUMMARY:  Pt is a 59 y/o F w/ PMH of HTN, HLD, prior obesity (currently on mounjaro for 1.5 yrs w/ 80lb wt loss) and anxiety/depression (on lexapro) s/p L-LICHA (6/9/25 at [facility]) presented to the ED c/o nausea, NBNB emesis and large vol watery nonbloody diarrhea w/ associated LLQ abd pain that started 3 days ago.  Pt also noticed she stopped making urine Saturday and since arrival at the hospital has not had a BM.  pt has been on naproxen 500mg BID since L-hip surgery 6/9 and is also on losartan.  VS notable for soft SBP in 90's, sinus tachy and RR 20 but not hypoxic or febrile.  On exam patient appeared dehydrated w/ mild LLQ tenderness to palpation.  Bernard in place w/ no urine.  Labs significant for WBC 23.12, K 6.5-->4.9, AGMA, BUN 64, Cr 5.22-->5.08 (baseline 0.85), Transaminitis , lipase nL, CT a/p w/ possible mild colitis. Pt given zosyn, albumin, 3L LR and 1L NS, 10g lokelma, 2g Ca gluconate, 4mg IV morphine and 4mg IV zofran in ED. MICU consulted and recommending SDU.  Pt admitted for acute renal failure w/ mild rhabdo 2/2 to n/v/d while on NSAIDs and ARB. Nephro consulted, started on plasmalyte. ID consulted for colitis, concern for viral gastroentereitis and zosyn held on 6/17 as unclear if beneficial plus worsening renal function. GI consulted for colitis, recommend patient f/u w/ her gastroenterologist Dr. Gypsy Jackson for outpt colonoscopy in 6-8wks. Renal US showed no hydronephrosis. Per nephro recs, losartan was held. Renal function worsening no response to IV lasix, vascular surgery placed dialysis catheter, patient started on HD on 6/17. HD on 6/20, 6/22. Flexible sigmoidoscopy on 6/24 significant for pseudomembranous colitis, patient started  on vanc    LAST 24 HOURS:  flexible sigmoidoscopy with GI yesterday. Found pseudomembranous colitis, started on vanc  no acute events ovn    TODAY:  Pt seen at bedside in AM, hemodynamically stable, AAOx3, in no acute distress  reports 7BM, like 'pudding', feeling better overall. No issues with urination. Adequate po intake. Adequate pain control  No acute medical complaints    OBJECTIVE    PHYSICAL EXAM:  GENERAL: No acute distress, comfortably in bed  HEENT: Atraumatic, normocephalic, non-icteric  NEURO: A&Ox3, no focal deficits, moving all extremities spontaneously, no dysarthria, CN II-XII grossly intact  PSYCH: Normal affect, calm, appropriate insight and judgment, fluent speech  LUNGS: CTAB, no wrr, non-labored breathing  HEART: RRR, no murmur appreciated  ABD: Soft, non-tender, non-distended, no organomegaly, no appreciable masses, +bs all 4 quadrants  EXTREMITIES: Nontender, no clubbing, cyanosis, or edema    Vital Signs Last 24 Hrs  T(C): 36.7 (25 Jun 2025 05:05), Max: 36.7 (24 Jun 2025 08:16)  T(F): 98 (25 Jun 2025 05:05), Max: 98.1 (24 Jun 2025 08:16)  HR: 97 (25 Jun 2025 05:05) (82 - 97)  BP: 139/84 (25 Jun 2025 05:05) (112/65 - 139/84)  BP(mean): --  RR: 19 (25 Jun 2025 05:05) (12 - 19)  SpO2: 93% (25 Jun 2025 05:05) (93% - 99%)    Parameters below as of 25 Jun 2025 05:05  Patient On (Oxygen Delivery Method): room air            MEDICATIONS  (STANDING):  ascorbic acid 500 milliGRAM(s) Oral daily  chlorhexidine 2% Cloths 1 Application(s) Topical <User Schedule>  chlorhexidine 2% Cloths 1 Application(s) Topical <User Schedule>  cholestyramine Powder (Sugar-Free) 4 Gram(s) Oral two times a day  dextrose 5%. 1000 milliLiter(s) (100 mL/Hr) IV Continuous <Continuous>  dextrose 50% Injectable 25 Gram(s) IV Push once  dextrose 50% Injectable 12.5 Gram(s) IV Push once  dextrose 50% Injectable 25 Gram(s) IV Push once  dextrose Oral Gel 15 Gram(s) Oral once  escitalopram 20 milliGRAM(s) Oral daily  glucagon  Injectable 1 milliGRAM(s) IntraMuscular once  heparin   Injectable 5000 Unit(s) SubCutaneous every 8 hours  lactated ringers. 1000 milliLiter(s) (75 mL/Hr) IV Continuous <Continuous>  multivitamin 1 Tablet(s) Oral daily  piperacillin/tazobactam IVPB.. 3.375 Gram(s) IV Intermittent every 12 hours  vancomycin    Solution 125 milliGRAM(s) Oral every 6 hours    MEDICATIONS  (PRN):  acetaminophen     Tablet .. 975 milliGRAM(s) Oral every 8 hours PRN Moderate Pain (4 - 6)  acetaminophen     Tablet .. 650 milliGRAM(s) Oral every 6 hours PRN Temp greater or equal to 38C (100.4F), Mild Pain (1 - 3)  aluminum hydroxide/magnesium hydroxide/simethicone Suspension 30 milliLiter(s) Oral every 4 hours PRN Dyspepsia  dicyclomine 20 milliGRAM(s) Oral four times a day before meals PRN abd spasms  HYDROmorphone   Tablet 2 milliGRAM(s) Oral every 4 hours PRN Severe Pain (7 - 10)  HYDROmorphone  Injectable 0.5 milliGRAM(s) IV Push every 4 hours PRN breakthrough pain  loperamide 2 milliGRAM(s) Oral every 4 hours PRN Diarrhea  melatonin 3 milliGRAM(s) Oral at bedtime PRN Insomnia  ondansetron Injectable 4 milliGRAM(s) IV Push every 8 hours PRN Nausea and/or Vomiting  sodium chloride 0.9% lock flush 10 milliLiter(s) IV Push every 1 hour PRN Pre/post blood products, medications, blood draw, and to maintain line patency    Allergies    No Known Allergies    Intolerances        LABS:                        8.1    9.85  )-----------( 442      ( 25 Jun 2025 05:40 )             25.6     06-25    139  |  104  |  18.0  ----------------------------<  101[H]  3.9   |  23.0  |  3.15[H]    Ca    8.1[L]      25 Jun 2025 05:40  Phos  4.1     06-25  Mg     2.0     06-25    TPro  5.0[L]  /  Alb  2.4[L]  /  TBili  0.4  /  DBili  x   /  AST  17  /  ALT  31  /  AlkPhos  100  06-25    PT/INR - ( 24 Jun 2025 04:00 )   PT: 11.4 sec;   INR: 1.01 ratio         PTT - ( 24 Jun 2025 04:00 )  PTT:26.7 sec  Urinalysis Basic - ( 25 Jun 2025 05:40 )    Color: x / Appearance: x / SG: x / pH: x  Gluc: 101 mg/dL / Ketone: x  / Bili: x / Urobili: x   Blood: x / Protein: x / Nitrite: x   Leuk Esterase: x / RBC: x / WBC x   Sq Epi: x / Non Sq Epi: x / Bacteria: x      CAPILLARY BLOOD GLUCOSE          CULTURE DATA:    Culture - Blood (collected 06-15-25 @ 22:25)  Source: Blood Blood-Peripheral  Final Report (06-21-25 @ 01:00):    No growth at 5 days    Culture - Stool (collected 06-16-25 @ 20:20)  Source: Stool Feces  Final Report (06-19-25 @ 17:37):    No enteric pathogens isolated.    (Stool culture examined for Salmonella,    Shigella, Campylobacter, Aeromonas, Plesiomonas,    Vibrio, E.coli O157 and Yersinia)        RADIOLOGY & ADDITIONAL TESTS:  No new imaging to review

## 2025-06-25 NOTE — PROGRESS NOTE ADULT - PROBLEM SELECTOR PLAN 1
Patient continues with diarrhea -15 small bowel movements  CT mild thickened left colon my interpretation  C. difficile GI PCR negative  Please order CBC CMP for tomorrow  If diarrhea continues, we can consider flexible sigmoidoscopy.  We have ordered n.p.o. after midnight for possible flex sig tomorrow.  If we decide to do a flexible sigmoidoscopy tomorrow, patient will need tapwater enema to be ordered by GI team in the morning.  Will need to find out from orthopedics whether patient can lay on her left lateral decubitus position.  I discussed the risks and benefits of flexible sigmoidoscopy including but not limited to bleeding infection perforation.  Biopsies would be done to rule out microscopic colitis and to look for pseudomembranes. Patient continues with diarrhea -  Now with soft semiformed stools–.  Had 7 bowel movements overnight  CT mild thickened left colon my interpretation  C. difficile GI PCR negative  Please order CBC CMP for tomorrow  Flexible sigmoidoscopy" showed pseudomembranes  Continue vancomycin 125 mg 4 times daily  Low fiber diet

## 2025-06-26 LAB
ALBUMIN SERPL ELPH-MCNC: 2.8 G/DL — LOW (ref 3.3–5.2)
ALP SERPL-CCNC: 95 U/L — SIGNIFICANT CHANGE UP (ref 40–120)
ALT FLD-CCNC: 24 U/L — SIGNIFICANT CHANGE UP
ANION GAP SERPL CALC-SCNC: 10 MMOL/L — SIGNIFICANT CHANGE UP (ref 5–17)
AST SERPL-CCNC: 17 U/L — SIGNIFICANT CHANGE UP
BILIRUB SERPL-MCNC: 0.4 MG/DL — SIGNIFICANT CHANGE UP (ref 0.4–2)
BUN SERPL-MCNC: 7 MG/DL — LOW (ref 8–20)
CALCIUM SERPL-MCNC: 8.1 MG/DL — LOW (ref 8.4–10.5)
CHLORIDE SERPL-SCNC: 104 MMOL/L — SIGNIFICANT CHANGE UP (ref 96–108)
CO2 SERPL-SCNC: 26 MMOL/L — SIGNIFICANT CHANGE UP (ref 22–29)
CREAT SERPL-MCNC: 1.91 MG/DL — HIGH (ref 0.5–1.3)
EGFR: 29 ML/MIN/1.73M2 — LOW
EGFR: 29 ML/MIN/1.73M2 — LOW
GLUCOSE SERPL-MCNC: 105 MG/DL — HIGH (ref 70–99)
HCT VFR BLD CALC: 25.9 % — LOW (ref 34.5–45)
HGB BLD-MCNC: 8.3 G/DL — LOW (ref 11.5–15.5)
MAGNESIUM SERPL-MCNC: 1.7 MG/DL — SIGNIFICANT CHANGE UP (ref 1.6–2.6)
MCHC RBC-ENTMCNC: 28.7 PG — SIGNIFICANT CHANGE UP (ref 27–34)
MCHC RBC-ENTMCNC: 32 G/DL — SIGNIFICANT CHANGE UP (ref 32–36)
MCV RBC AUTO: 89.6 FL — SIGNIFICANT CHANGE UP (ref 80–100)
NRBC # BLD AUTO: 0 K/UL — SIGNIFICANT CHANGE UP (ref 0–0)
NRBC # FLD: 0 K/UL — SIGNIFICANT CHANGE UP (ref 0–0)
NRBC BLD AUTO-RTO: 0 /100 WBCS — SIGNIFICANT CHANGE UP (ref 0–0)
PHOSPHATE SERPL-MCNC: 3.1 MG/DL — SIGNIFICANT CHANGE UP (ref 2.4–4.7)
PLATELET # BLD AUTO: 442 K/UL — HIGH (ref 150–400)
PMV BLD: 9.2 FL — SIGNIFICANT CHANGE UP (ref 7–13)
POTASSIUM SERPL-MCNC: 3.5 MMOL/L — SIGNIFICANT CHANGE UP (ref 3.5–5.3)
POTASSIUM SERPL-SCNC: 3.5 MMOL/L — SIGNIFICANT CHANGE UP (ref 3.5–5.3)
PROT SERPL-MCNC: 5.2 G/DL — LOW (ref 6.6–8.7)
RBC # BLD: 2.89 M/UL — LOW (ref 3.8–5.2)
RBC # FLD: 13 % — SIGNIFICANT CHANGE UP (ref 10.3–14.5)
SODIUM SERPL-SCNC: 140 MMOL/L — SIGNIFICANT CHANGE UP (ref 135–145)
WBC # BLD: 8.8 K/UL — SIGNIFICANT CHANGE UP (ref 3.8–10.5)
WBC # FLD AUTO: 8.8 K/UL — SIGNIFICANT CHANGE UP (ref 3.8–10.5)

## 2025-06-26 PROCEDURE — 99231 SBSQ HOSP IP/OBS SF/LOW 25: CPT

## 2025-06-26 PROCEDURE — 72125 CT NECK SPINE W/O DYE: CPT | Mod: 26

## 2025-06-26 PROCEDURE — 70450 CT HEAD/BRAIN W/O DYE: CPT | Mod: 26

## 2025-06-26 PROCEDURE — 99233 SBSQ HOSP IP/OBS HIGH 50: CPT

## 2025-06-26 RX ORDER — SUMATRIPTAN 100 MG/1
25 TABLET, FILM COATED ORAL ONCE
Refills: 0 | Status: COMPLETED | OUTPATIENT
Start: 2025-06-26 | End: 2025-06-26

## 2025-06-26 RX ORDER — BUTALBITAL, ACETAMINOPHEN AND CAFFEINE 50; 325; 40 MG/1; MG/1; MG/1
1 TABLET ORAL ONCE
Refills: 0 | Status: COMPLETED | OUTPATIENT
Start: 2025-06-26 | End: 2025-06-26

## 2025-06-26 RX ORDER — BUTALBITAL, ACETAMINOPHEN AND CAFFEINE 50; 325; 40 MG/1; MG/1; MG/1
1 TABLET ORAL EVERY 6 HOURS
Refills: 0 | Status: COMPLETED | OUTPATIENT
Start: 2025-06-26 | End: 2025-06-29

## 2025-06-26 RX ORDER — MAGNESIUM SULFATE 500 MG/ML
2 SYRINGE (ML) INJECTION
Refills: 0 | Status: COMPLETED | OUTPATIENT
Start: 2025-06-26 | End: 2025-06-26

## 2025-06-26 RX ADMIN — Medication 25 GRAM(S): at 13:51

## 2025-06-26 RX ADMIN — SUMATRIPTAN 25 MILLIGRAM(S): 100 TABLET, FILM COATED ORAL at 22:03

## 2025-06-26 RX ADMIN — Medication 4 GRAM(S): at 21:28

## 2025-06-26 RX ADMIN — Medication 500 MILLIGRAM(S): at 13:47

## 2025-06-26 RX ADMIN — Medication 2 MILLIGRAM(S): at 04:28

## 2025-06-26 RX ADMIN — Medication 125 MILLIGRAM(S): at 13:46

## 2025-06-26 RX ADMIN — Medication 2 MILLIGRAM(S): at 23:46

## 2025-06-26 RX ADMIN — Medication 4 GRAM(S): at 11:04

## 2025-06-26 RX ADMIN — BUTALBITAL, ACETAMINOPHEN AND CAFFEINE 1 TABLET(S): 50; 325; 40 TABLET ORAL at 06:23

## 2025-06-26 RX ADMIN — Medication 1 TABLET(S): at 13:47

## 2025-06-26 RX ADMIN — SUMATRIPTAN 25 MILLIGRAM(S): 100 TABLET, FILM COATED ORAL at 23:03

## 2025-06-26 RX ADMIN — Medication 4 MILLIGRAM(S): at 23:47

## 2025-06-26 RX ADMIN — Medication 0.5 MILLIGRAM(S): at 02:30

## 2025-06-26 RX ADMIN — Medication 25 GRAM(S): at 09:51

## 2025-06-26 RX ADMIN — Medication 125 MILLIGRAM(S): at 23:03

## 2025-06-26 RX ADMIN — Medication 2 MILLIGRAM(S): at 05:28

## 2025-06-26 RX ADMIN — Medication 0.5 MILLIGRAM(S): at 02:15

## 2025-06-26 RX ADMIN — Medication 25 GRAM(S): at 05:21

## 2025-06-26 RX ADMIN — Medication 1 APPLICATION(S): at 05:22

## 2025-06-26 RX ADMIN — Medication 1 APPLICATION(S): at 05:21

## 2025-06-26 RX ADMIN — Medication 25 GRAM(S): at 17:48

## 2025-06-26 RX ADMIN — Medication 125 MILLIGRAM(S): at 17:49

## 2025-06-26 RX ADMIN — ESCITALOPRAM OXALATE 20 MILLIGRAM(S): 20 TABLET ORAL at 13:47

## 2025-06-26 NOTE — PROGRESS NOTE ADULT - ASSESSMENT
61 y/o F w/ PMH of HTN, HLD, prior obesity (currently on mounjaro for 1.5 yrs w/ 80lb wt loss) and anxiety/depression (on lexapro) s/p L-LICHA (6/9/25 at [facility] [place]) presents c/o nausea, NBNB emesis and large vol watery nonbloody diarrhea w/ associated LLQ abd pain x 3 days ago associated w/ anuria, admitted for acute renal failure and sepsis 2/2 colitis. Initially had some blood bowel movements, which transformed into multiple watery BM. Given IVF, started on zosyn. C diff was negative.     # Acute renal failure w/ mild rhabdo suspect multifactorial from N/V/D while on NSAIDs and ARB   # Hypovolemic mild hyponatremic hypochloremia likely 2/2 N/V/D  - monae catheter placed in ED  - s/p HD 6/17, 6/20, 6/22, 6/25  -  BUN 7/Cr 1.91; last known Cr 0.85 5/2025   -  Holding outpt Naproxen, losartan   - encourage po intake  - Strict I/O's  - avoid nephrotoxic agents or renally dose if needed  - c/w prn antiemetics  - nephrology following     #Headache  - likely 2/2 dehydration in the setting of C.diff, HD  - Dilaudid 0.5mg prn  - f/u CTH      # Sepsis (improving)  # pseudomembranous colitis  - CT a/p w/ findings compatible with mild acute colitis in the left descending and sigmoid colon  - 6/21 repeat C. diff PCR negative  - 6/24 flexible sigmoidoscopy found pseudomembranous colitis  - started on vancomycin   - zosyn (thru 6/26)  - placed on contact precaution  - d/c imodium   - bentyl, cholestyramine powder, for abd spasms and diarrhea  - tylenol 650mg q6h prn for fever  - low fiber diet  - f/u fecal calprotectin, celiac cascade  - ctm CBC, temp    - ID following  - GI following      # Transaminitis w/ hepatocellular distribution - resolved  Ischemic.   - Trend LFTs and avoid hepatotoxic medications     #generalized headache - resolved      # HTN / HLD  - bp stable today  - holding ARB due to renal fxn   - Holding statin for transaminitis     # Former obesity  - On mounjaro w/ 80lb weight loss and now BMI 23  - Hold mounjaro while inpt     #Anxiety/depression  - lexapro 20mg po QD     s/p L-LICHA (6/9/25 at [facility] [place])  - PT/OT, OOB  - ICS  - acetaminophen 975mg po q8h for moderate pain  - dilaudid 2mg po q4h prn for severe pain  - dilaudid 0.5mg IVP q4h prn for breakthrough pain      VTE ppx:  Heparin sq  Diet: low fiber  Dispositon: Acute. Pending renal recovery   59 y/o F w/ PMH of HTN, HLD, prior obesity (currently on mounjaro for 1.5 yrs w/ 80lb wt loss) and anxiety/depression (on lexapro) s/p L-LICHA (6/9/25 at [facility]) presents c/o nausea, NBNB emesis and large vol watery nonbloody diarrhea w/ associated LLQ abd pain x 3 days ago associated w/ anuria, admitted for acute renal failure and sepsis 2/2 colitis. Initially had some blood bowel movements, which transformed into multiple watery BM. Given IVF, started on zosyn. C diff was negative.     # Acute renal failure w/ mild rhabdo suspect multifactorial from N/V/D while on NSAIDs and ARB   # Hypovolemic mild hyponatremic hypochloremia likely 2/2 N/V/D  - monae catheter placed in ED  - s/p HD 6/17, 6/20, 6/22, 6/25  -  BUN 7/Cr 1.91; last known Cr 0.85 5/2025   -  Holding outpt Naproxen, losartan   - encourage po intake  - Strict I/O's  - avoid nephrotoxic agents or renally dose if needed  - c/w prn antiemetics  - nephrology following     #Headache  - likely 2/2 dehydration in the setting of C.diff, HD  - Dilaudid 0.5mg prn  - CTH: Cervical spondylosis, more notably at C4-5 and C5-6 where there is likely moderate to marked spinal canal narrowing with probable compression on the cord.  - MR c-spine ordered  -ortho consulted    # Sepsis (improving)  # pseudomembranous colitis  - CT a/p w/ findings compatible with mild acute colitis in the left descending and sigmoid colon  - 6/21 repeat C. diff PCR negative  - 6/24 flexible sigmoidoscopy found pseudomembranous colitis  - started on vancomycin   - zosyn (thru 6/26)  - placed on contact precaution  - d/c imodium   - bentyl, cholestyramine powder, for abd spasms and diarrhea  - tylenol 650mg q6h prn for fever  - low fiber diet  - f/u fecal calprotectin, celiac cascade  - ctm CBC, temp    - ID following  - GI following      # Transaminitis w/ hepatocellular distribution - resolved  Ischemic.   - Trend LFTs and avoid hepatotoxic medications       # HTN / HLD  - bp stable today  - losartan 50mg qd   - crestor 10mg qhs    # Former obesity  - On mounjaro w/ 80lb weight loss and now BMI 23  - Hold mounjaro while inpt     #Anxiety/depression  - lexapro 20mg po QD     s/p L-LICHA (6/9/25 at [facility])  - PT/OT, OOB  - ICS  - acetaminophen 975mg po q8h for moderate pain  - dilaudid 2mg po q4h prn for severe pain  - dilaudid 0.5mg IVP q4h prn for breakthrough pain      VTE ppx:  Heparin sq  Diet: low fiber  Dispositon: Acute. Pending renal recovery   59 y/o F w/ PMH of HTN, HLD, prior obesity (currently on mounjaro for 1.5 yrs w/ 80lb wt loss) and anxiety/depression (on lexapro) s/p L-LICHA (6/9/25 at [facility]) presents c/o nausea, NBNB emesis and large vol watery nonbloody diarrhea w/ associated LLQ abd pain x 3 days ago associated w/ anuria, admitted for acute renal failure and sepsis 2/2 colitis. Initially had some blood bowel movements, which transformed into multiple watery BM. Given IVF, started on zosyn. C diff was negative.     # Acute renal failure w/ mild rhabdo suspect multifactorial from N/V/D while on NSAIDs and ARB   # Hypovolemic mild hyponatremic hypochloremia likely 2/2 N/V/D  - monae catheter placed in ED  - s/p HD 6/17, 6/20, 6/22, 6/25  -  BUN 7/Cr 1.91; last known Cr 0.85 5/2025   -  Holding outpt Naproxen, losartan   - encourage po intake  - Strict I/O's  - avoid nephrotoxic agents or renally dose if needed  - c/w prn antiemetics  - nephrology following     #Headache  - likely 2/2 dehydration in the setting of C.diff, HD  - Dilaudid 0.5mg prn  - CTH: Cervical spondylosis, more notably at C4-5 and C5-6 where there is likely moderate to marked spinal canal narrowing with probable compression on the cord.  - MR c-spine ordered  -ortho consulted    # Sepsis (improving)  # pseudomembranous colitis  - CT a/p w/ findings compatible with mild acute colitis in the left descending and sigmoid colon  - 6/21 repeat C. diff PCR negative  - 6/24 flexible sigmoidoscopy found pseudomembranous colitis  - started on vancomycin   - zosyn (thru 6/26)  - placed on contact precaution  - d/c imodium   - bentyl, cholestyramine powder, for abd spasms and diarrhea  - tylenol 650mg q6h prn for fever  - low fiber diet  - f/u fecal calprotectin, celiac cascade  - ctm CBC, temp    - ID following  - GI following      # Transaminitis w/ hepatocellular distribution - resolved  Ischemic.   - Trend LFTs and avoid hepatotoxic medications       # HTN / HLD  - bp stable today  - hold losartan 50mg qd for sandy  - crestor 10mg qhs    # Former obesity  - On mounjaro w/ 80lb weight loss and now BMI 23  - Hold mounjaro while inpt     #Anxiety/depression  - lexapro 20mg po QD     s/p L-LICHA (6/9/25 at [facility])  - PT/OT, OOB  - ICS  - acetaminophen 975mg po q8h for moderate pain  - dilaudid 2mg po q4h prn for severe pain  - dilaudid 0.5mg IVP q4h prn for breakthrough pain      VTE ppx:  Heparin sq  Diet: low fiber  Dispositon: Acute. Pending renal recovery   Mucosal Advancement Flap Text: Given the location of the defect, shape of the defect and the proximity to free margins a mucosal advancement flap was deemed most appropriate. Incisions were made with a 15 blade scalpel in the appropriate fashion along the cutaneous vermilion border and the mucosal lip. The remaining actinically damaged mucosal tissue was excised.  The mucosal advancement flap was then elevated to the gingival sulcus with care taken to preserve the neurovascular structures and advanced into the primary defect. Care was taken to ensure that precise realignment of the vermilion border was achieved.

## 2025-06-26 NOTE — PROGRESS NOTE ADULT - SUBJECTIVE AND OBJECTIVE BOX
SUBJECTIVE  BRIEF HOSPITAL COURSE SUMMARY:      LAST 24 HOURS:  TODAY:  Pt seen at bedside in AM  No acute/overnight events  No acute medical complaints    OBJECTIVE    PHYSICAL EXAM:  GENERAL: No acute distress, comfortably in bed  HEENT: Atraumatic, normocephalic, non-icteric  NEURO: A&Ox3, no focal deficits, moving all extremities spontaneously, no dysarthria, CN II-XII grossly intact  PSYCH: Normal affect, calm, appropriate insight and judgment, fluent speech  LUNGS: CTAB, no wrr, non-labored breathing  HEART: RRR, no murmur appreciated  ABD: Soft, non-tender, non-distended, no organomegaly, no appreciable masses, +bs all 4 quadrants  EXTREMITIES: Nontender, no clubbing, cyanosis, or edema    Vital Signs Last 24 Hrs  T(C): 36.6 (26 Jun 2025 03:44), Max: 37.2 (25 Jun 2025 22:15)  T(F): 97.9 (26 Jun 2025 03:44), Max: 98.9 (25 Jun 2025 22:15)  HR: 91 (26 Jun 2025 03:44) (80 - 95)  BP: 124/76 (26 Jun 2025 03:44) (124/76 - 143/87)  BP(mean): --  RR: 18 (26 Jun 2025 03:44) (17 - 18)  SpO2: 94% (26 Jun 2025 03:44) (94% - 96%)    Parameters below as of 26 Jun 2025 03:44  Patient On (Oxygen Delivery Method): room air            MEDICATIONS  (STANDING):  ascorbic acid 500 milliGRAM(s) Oral daily  chlorhexidine 2% Cloths 1 Application(s) Topical <User Schedule>  chlorhexidine 2% Cloths 1 Application(s) Topical <User Schedule>  cholestyramine Powder (Sugar-Free) 4 Gram(s) Oral two times a day  dextrose 5%. 1000 milliLiter(s) (100 mL/Hr) IV Continuous <Continuous>  dextrose 50% Injectable 25 Gram(s) IV Push once  dextrose 50% Injectable 12.5 Gram(s) IV Push once  dextrose 50% Injectable 25 Gram(s) IV Push once  dextrose Oral Gel 15 Gram(s) Oral once  escitalopram 20 milliGRAM(s) Oral daily  glucagon  Injectable 1 milliGRAM(s) IntraMuscular once  heparin   Injectable 5000 Unit(s) SubCutaneous every 8 hours  lactated ringers. 1000 milliLiter(s) (75 mL/Hr) IV Continuous <Continuous>  multivitamin 1 Tablet(s) Oral daily  piperacillin/tazobactam IVPB.. 3.375 Gram(s) IV Intermittent every 12 hours  vancomycin    Solution 125 milliGRAM(s) Oral every 6 hours    MEDICATIONS  (PRN):  acetaminophen     Tablet .. 975 milliGRAM(s) Oral every 8 hours PRN Moderate Pain (4 - 6)  acetaminophen     Tablet .. 650 milliGRAM(s) Oral every 6 hours PRN Temp greater or equal to 38C (100.4F), Mild Pain (1 - 3)  aluminum hydroxide/magnesium hydroxide/simethicone Suspension 30 milliLiter(s) Oral every 4 hours PRN Dyspepsia  dicyclomine 20 milliGRAM(s) Oral four times a day before meals PRN abd spasms  HYDROmorphone   Tablet 2 milliGRAM(s) Oral every 4 hours PRN Severe Pain (7 - 10)  HYDROmorphone  Injectable 0.5 milliGRAM(s) IV Push every 4 hours PRN breakthrough pain  melatonin 3 milliGRAM(s) Oral at bedtime PRN Insomnia  ondansetron Injectable 4 milliGRAM(s) IV Push every 8 hours PRN Nausea and/or Vomiting  sodium chloride 0.9% lock flush 10 milliLiter(s) IV Push every 1 hour PRN Pre/post blood products, medications, blood draw, and to maintain line patency    Allergies    No Known Allergies    Intolerances        LABS:                        8.3    8.80  )-----------( 442      ( 26 Jun 2025 05:18 )             25.9     06-26    140  |  104  |  7.0[L]  ----------------------------<  105[H]  3.5   |  26.0  |  1.91[H]    Ca    8.1[L]      26 Jun 2025 05:18  Phos  3.1     06-26  Mg     1.7     06-26    TPro  5.2[L]  /  Alb  2.8[L]  /  TBili  0.4  /  DBili  x   /  AST  17  /  ALT  24  /  AlkPhos  95  06-26      Urinalysis Basic - ( 26 Jun 2025 05:18 )    Color: x / Appearance: x / SG: x / pH: x  Gluc: 105 mg/dL / Ketone: x  / Bili: x / Urobili: x   Blood: x / Protein: x / Nitrite: x   Leuk Esterase: x / RBC: x / WBC x   Sq Epi: x / Non Sq Epi: x / Bacteria: x      CAPILLARY BLOOD GLUCOSE          CULTURE DATA:    Culture - Blood (collected 06-15-25 @ 22:25)  Source: Blood Blood-Peripheral  Final Report (06-21-25 @ 01:00):    No growth at 5 days    Culture - Stool (collected 06-16-25 @ 20:20)  Source: Stool Feces  Final Report (06-19-25 @ 17:37):    No enteric pathogens isolated.    (Stool culture examined for Salmonella,    Shigella, Campylobacter, Aeromonas, Plesiomonas,    Vibrio, E.coli O157 and Yersinia)        RADIOLOGY & ADDITIONAL TESTS:  No new imaging to review SUBJECTIVE  BRIEF HOSPITAL COURSE SUMMARY:      LAST 24 HOURS:  overnight patient complained of 'worse headache of her life', given ESGIC with improvement in symptoms  CTH ordered    TODAY:  Pt seen at bedside in AM, hemodynamically stable on room air, in no acute distress. Pt reports headache is much better. no issues with po intake, urination or BM   ROS: denies headaches, dizziness, fevers, cp, palpitations, sob, upper extremities numbness/tingling/weakness. Endorses worsening blurry vision, last saw opthalmologic 2yrs ago    OBJECTIVE    PHYSICAL EXAM:  GENERAL: No acute distress, comfortably in bed  HEENT: Atraumatic, normocephalic, non-icteric  NEURO: A&Ox3, no focal deficits, moving all extremities spontaneously, no dysarthria, CN II-XII grossly intact  PSYCH: Normal affect, calm, appropriate insight and judgment, fluent speech  LUNGS: CTAB, no wrr, non-labored breathing  HEART: RRR, no murmur appreciated  ABD: Soft, non-tender, non-distended, no organomegaly, no appreciable masses, +bs all 4 quadrants  EXTREMITIES: Nontender, no clubbing, cyanosis, or edema    Vital Signs Last 24 Hrs  T(C): 36.6 (26 Jun 2025 03:44), Max: 37.2 (25 Jun 2025 22:15)  T(F): 97.9 (26 Jun 2025 03:44), Max: 98.9 (25 Jun 2025 22:15)  HR: 91 (26 Jun 2025 03:44) (80 - 95)  BP: 124/76 (26 Jun 2025 03:44) (124/76 - 143/87)  BP(mean): --  RR: 18 (26 Jun 2025 03:44) (17 - 18)  SpO2: 94% (26 Jun 2025 03:44) (94% - 96%)    Parameters below as of 26 Jun 2025 03:44  Patient On (Oxygen Delivery Method): room air      MEDICATIONS  (STANDING):  ascorbic acid 500 milliGRAM(s) Oral daily  chlorhexidine 2% Cloths 1 Application(s) Topical <User Schedule>  chlorhexidine 2% Cloths 1 Application(s) Topical <User Schedule>  cholestyramine Powder (Sugar-Free) 4 Gram(s) Oral two times a day  dextrose 5%. 1000 milliLiter(s) (100 mL/Hr) IV Continuous <Continuous>  dextrose 50% Injectable 25 Gram(s) IV Push once  dextrose 50% Injectable 12.5 Gram(s) IV Push once  dextrose 50% Injectable 25 Gram(s) IV Push once  dextrose Oral Gel 15 Gram(s) Oral once  escitalopram 20 milliGRAM(s) Oral daily  glucagon  Injectable 1 milliGRAM(s) IntraMuscular once  heparin   Injectable 5000 Unit(s) SubCutaneous every 8 hours  lactated ringers. 1000 milliLiter(s) (75 mL/Hr) IV Continuous <Continuous>  multivitamin 1 Tablet(s) Oral daily  piperacillin/tazobactam IVPB.. 3.375 Gram(s) IV Intermittent every 12 hours  vancomycin    Solution 125 milliGRAM(s) Oral every 6 hours    MEDICATIONS  (PRN):  acetaminophen     Tablet .. 975 milliGRAM(s) Oral every 8 hours PRN Moderate Pain (4 - 6)  acetaminophen     Tablet .. 650 milliGRAM(s) Oral every 6 hours PRN Temp greater or equal to 38C (100.4F), Mild Pain (1 - 3)  aluminum hydroxide/magnesium hydroxide/simethicone Suspension 30 milliLiter(s) Oral every 4 hours PRN Dyspepsia  dicyclomine 20 milliGRAM(s) Oral four times a day before meals PRN abd spasms  HYDROmorphone   Tablet 2 milliGRAM(s) Oral every 4 hours PRN Severe Pain (7 - 10)  HYDROmorphone  Injectable 0.5 milliGRAM(s) IV Push every 4 hours PRN breakthrough pain  melatonin 3 milliGRAM(s) Oral at bedtime PRN Insomnia  ondansetron Injectable 4 milliGRAM(s) IV Push every 8 hours PRN Nausea and/or Vomiting  sodium chloride 0.9% lock flush 10 milliLiter(s) IV Push every 1 hour PRN Pre/post blood products, medications, blood draw, and to maintain line patency    Allergies    No Known Allergies    Intolerances        LABS:                        8.3    8.80  )-----------( 442      ( 26 Jun 2025 05:18 )             25.9     06-26    140  |  104  |  7.0[L]  ----------------------------<  105[H]  3.5   |  26.0  |  1.91[H]    Ca    8.1[L]      26 Jun 2025 05:18  Phos  3.1     06-26  Mg     1.7     06-26    TPro  5.2[L]  /  Alb  2.8[L]  /  TBili  0.4  /  DBili  x   /  AST  17  /  ALT  24  /  AlkPhos  95  06-26      Urinalysis Basic - ( 26 Jun 2025 05:18 )    Color: x / Appearance: x / SG: x / pH: x  Gluc: 105 mg/dL / Ketone: x  / Bili: x / Urobili: x   Blood: x / Protein: x / Nitrite: x   Leuk Esterase: x / RBC: x / WBC x   Sq Epi: x / Non Sq Epi: x / Bacteria: x      CAPILLARY BLOOD GLUCOSE          CULTURE DATA:    Culture - Blood (collected 06-15-25 @ 22:25)  Source: Blood Blood-Peripheral  Final Report (06-21-25 @ 01:00):    No growth at 5 days    Culture - Stool (collected 06-16-25 @ 20:20)  Source: Stool Feces  Final Report (06-19-25 @ 17:37):    No enteric pathogens isolated.    (Stool culture examined for Salmonella,    Shigella, Campylobacter, Aeromonas, Plesiomonas,    Vibrio, E.coli O157 and Yersinia)        RADIOLOGY & ADDITIONAL TESTS:  < from: CT Head No Cont (06.26.25 @ 08:10) >  MPRESSION:    No CT evidence of acute traumatic injury to the head and cervical spine.    MRI may be obtained, if further information regarding ligamentous injury,   hematoma or spinal cord pathology is required.    Cervical spondylosis, more notably at C4-5 and C5-6 where there is likely   moderate to marked spinal canal narrowing with probable compression on   the cord. This can be better delineated with cervical spine MRI as   clinically warranted.      < end of copied text >  < from: CT Cervical Spine No Cont (06.26.25 @ 08:11) >  IMPRESSION:    No CT evidence of acute traumatic injury to the head and cervical spine.    MRI may be obtained, if further information regarding ligamentous injury,   hematoma or spinal cord pathology is required.    Cervical spondylosis, more notably at C4-5 and C5-6 where there is likely   moderate to marked spinal canal narrowing with probable compression on   the cord. This can be better delineated with cervical spine MRI as   clinically warranted.    --- End of Report ---    < end of copied text >

## 2025-06-26 NOTE — PROGRESS NOTE ADULT - SUBJECTIVE AND OBJECTIVE BOX
Chief Complaint:  Patient is a 60y old  Female who presents with a chief complaint of acute renal failure (2025 07:17)      HPI/ 24 hr events: Patient seen and examined at bedside. Pt feeling well this morning. Tolerating regular diet. States her BMs are much improved, reports 5 BMs in the past 24 hours. Denies nausea, vomiting, abdominal pain. Vitals are overall stable, remains afebrile, no leukocytosis.      REVIEW OF SYSTEMS:   General: Negative  HEENT: Negative  CV: Negative  Respiratory: Negative  GI: See HPI  : Negative  MSK: Negative  Hematologic: Negative  Skin: Negative    MEDICATIONS:   MEDICATIONS  (STANDING):  ascorbic acid 500 milliGRAM(s) Oral daily  chlorhexidine 2% Cloths 1 Application(s) Topical <User Schedule>  chlorhexidine 2% Cloths 1 Application(s) Topical <User Schedule>  cholestyramine Powder (Sugar-Free) 4 Gram(s) Oral two times a day  dextrose 5%. 1000 milliLiter(s) (100 mL/Hr) IV Continuous <Continuous>  dextrose 50% Injectable 25 Gram(s) IV Push once  dextrose 50% Injectable 12.5 Gram(s) IV Push once  dextrose 50% Injectable 25 Gram(s) IV Push once  dextrose Oral Gel 15 Gram(s) Oral once  escitalopram 20 milliGRAM(s) Oral daily  glucagon  Injectable 1 milliGRAM(s) IntraMuscular once  heparin   Injectable 5000 Unit(s) SubCutaneous every 8 hours  lactated ringers. 1000 milliLiter(s) (75 mL/Hr) IV Continuous <Continuous>  magnesium sulfate  IVPB 2 Gram(s) IV Intermittent every 2 hours  multivitamin 1 Tablet(s) Oral daily  piperacillin/tazobactam IVPB.. 3.375 Gram(s) IV Intermittent every 12 hours  vancomycin    Solution 125 milliGRAM(s) Oral every 6 hours    MEDICATIONS  (PRN):  acetaminophen     Tablet .. 975 milliGRAM(s) Oral every 8 hours PRN Moderate Pain (4 - 6)  acetaminophen     Tablet .. 650 milliGRAM(s) Oral every 6 hours PRN Temp greater or equal to 38C (100.4F), Mild Pain (1 - 3)  aluminum hydroxide/magnesium hydroxide/simethicone Suspension 30 milliLiter(s) Oral every 4 hours PRN Dyspepsia  dicyclomine 20 milliGRAM(s) Oral four times a day before meals PRN abd spasms  HYDROmorphone   Tablet 2 milliGRAM(s) Oral every 4 hours PRN Severe Pain (7 - 10)  HYDROmorphone  Injectable 0.5 milliGRAM(s) IV Push every 4 hours PRN breakthrough pain  melatonin 3 milliGRAM(s) Oral at bedtime PRN Insomnia  ondansetron Injectable 4 milliGRAM(s) IV Push every 8 hours PRN Nausea and/or Vomiting  sodium chloride 0.9% lock flush 10 milliLiter(s) IV Push every 1 hour PRN Pre/post blood products, medications, blood draw, and to maintain line patency            DIET:  Diet, Low Fiber:   Low Fat (LOWFAT)  No Dairy (25 @ 01:45) [Active]          ALLERGIES:   Allergies    No Known Allergies    Intolerances        VITAL SIGNS:   Vital Signs Last 24 Hrs  T(C): 36.7 (2025 07:20), Max: 37.2 (2025 22:15)  T(F): 98 (2025 07:20), Max: 98.9 (2025 22:15)  HR: 83 (2025 07:20) (80 - 95)  BP: 128/80 (2025 07:20) (124/76 - 143/87)  BP(mean): --  RR: 19 (2025 07:20) (17 - 19)  SpO2: 93% (2025 07:20) (93% - 96%)    Parameters below as of 2025 07:20  Patient On (Oxygen Delivery Method): room air      I&O's Summary    2025 07:01  -  2025 07:00  --------------------------------------------------------  IN: 800 mL / OUT: 1900 mL / NET: -1100 mL        PHYSICAL EXAM:   GENERAL:  No acute distress  HEENT:  NC/AT, conjunctiva clear, sclera anicteric  CHEST:  No increased effort  HEART:  Regular rate  ABDOMEN:  Soft, non-tender, non-distended, no rebound or guarding  EXTREMITIES: No edema  SKIN:  Warm, dry  NEURO:  Calm, cooperative    LABS:                        8.3    8.80  )-----------( 442      ( 2025 05:18 )             25.9     Hemoglobin: 8.3 g/dL (25 @ 05:18)  Hemoglobin: 8.1 g/dL (25 @ 05:40)  Hemoglobin: 8.0 g/dL (25 @ 04:00)        140  |  104  |  7.0[L]  ----------------------------<  105[H]  3.5   |  26.0  |  1.91[H]    Ca    8.1[L]      2025 05:18  Phos  3.1       Mg     1.7         TPro  5.2[L]  /  Alb  2.8[L]  /  TBili  0.4  /  DBili  x   /  AST  17  /  ALT  24  /  AlkPhos  95      LIVER FUNCTIONS - ( 2025 05:18 )  Alb: 2.8 g/dL / Pro: 5.2 g/dL / ALK PHOS: 95 U/L / ALT: 24 U/L / AST: 17 U/L / GGT: x             RADIOLOGY & ADDITIONAL STUDIES:          Colonoscopy Report        Date: 2025        Patient Name: CAROLEE AN        MRN: [id number]        Account Number:        [id number]        Gender: Female         (age): 1965 (60)        Instrument(s):        [facility] [id number]        Attending/Fellow:        Alexa Huang MD, DO                Procedure Room #:        PROCEDURE ROOM 2                        ASA Class:        P3 - 2025 3:29 PM Alexa Huang MD, DO        Administered Medications:        As per Anesthesiology Record        Indications:        Diarrhea: 787.91 - R19.7        Procedure:        This is a average risk patient  undergoing a diagnostic colonoscopy.        The procedure, indications, preparation and potential complications were    explained to the patient, who indicated understanding and signed the    corresponding consent forms. MAC was administered by the anesthesiologist.    Continuous pulse oximetry and blood pressure monitoring were used throughout the    procedure. Supplemental oxygen wasused. The quality of preparation was 6-9 on    the BBP scale/adequate. Patient was placed in left lateral decubitus position.    Digital exam was normal. The colonoscope was introduced through the rectum and    advanced under direct visualization until cecum was reached. The appendiceal    orifice and the ileocecal valve were identified. The terminal ileum was    identified. Careful visualization was performed as the instrument was withdrawn.    Patient tolerance to procedure was excellent. The procedure was not difficult.            Philo Bowel Preparation Score:        Using the Philo Bowel Preparation Score, each segment of the colon was graded    as follows:        Left Colon: Good, 2 |  |        Limitations/Complications:        There were no apparent limitations or complications        Findings:        Excavated lesions Multiple diverticula were seen in the the left side of the    colon. Diverticulosis appeared to be of moderate severity.        Additional findings - Rectum normalunit 15 cm from the anal verge. There is    erythema of the mucosa and what appears to be pseudomembranous colitis starting    15 cm from the anal verge until 40 cm ( scope was passed to 40 cm ).        Impressions:        Moderate diverticulosis of the the left side of the colon.        - Rectum normal unit 15 cm from the anal verge. There is erythema of the    mucosa and what appears to be pseudomembranous colitis starting 15 cm from the    anal verge until 40 cm ( scope was passed to 40 cm ).        Plan:        Await pathology results.        Specimens:        Jar # 1 :        in the sigmoid colon        Test(s) requested: Histology        Comments: R/O pseudomembrane        Additional Notes:        Start Vancomycin        Pathology:        Pathology was sent to lab, waiting for results        Alexa Huang MD, DO        Version 1, Electronically signed on 2025 3:39:43 PM by Alexa Huang MD, DO

## 2025-06-26 NOTE — PROGRESS NOTE ADULT - ATTENDING COMMENTS
61 y/o F here for acute renal failure 2/ mild rhabdo w/ gastroenteritis    #ARF  #Hypovolemia 2/2 NVD  #sepsis 2/2 Pseudomembranous Colitis  #HTN  - HD as per nephro, improving kidney function  - zosyn to complete a week, PO Vanco as per GI  - cholestyramine  - Dispo: Medically acute, pending improvement in diarrhea and plan for HD  - remaining plan as above 61 y/o F here for acute renal failure 2/ mild rhabdo w/ gastroenteritis    #ARF  #Hypovolemia 2/2 NVD  #sepsis 2/2 Pseudomembranous Colitis  #HTN  #C Spine Spondylosis  - HD as per nephro, improving kidney function  - zosyn to complete a week, PO Vanco as per GI  - cholestyramine  - mri c spine and nsgy eval  - Dispo: Medically acute, pending improvement in diarrhea and plan for HD  - remaining plan as above 61 y/o F here for acute renal failure 2/ mild rhabdo w/ gastroenteritis    #ARF  #Hypovolemia 2/2 NVD  #sepsis 2/2 Pseudomembranous Colitis  #HTN  #C Spine Spondylosis  - HD as per nephro, improving kidney function  - zosyn to complete a week, PO Vanco as per GI  - cholestyramine  - mri c spine and nsgy eval  - Dispo: Medically acute, pending improvement in diarrhea and plan for HD  - remaining plan as above.

## 2025-06-26 NOTE — CHART NOTE - NSCHARTNOTEFT_GEN_A_CORE
Nutrition Note:     Pt s/p flexible sigmoidoscopy yesterday that showed moderate diverticulosis of left side of colon, rectum showed erythema of mucosa and possible pseudomembranous colitis starting 15cm from anal verge until 40cm.   She was started on Vancomycin due to pseudomembranous colitis identified.     Met with pt this am who is reporting improved appetite yesterday, less BM's with some nausea.   BUN/ Creat now improving. Menu provided for meal choices as requested.   No weight loss noted.     Continue Low fiber, low fat, no dairy  Ongoing diet education as needed  Continue to monitor po intake, labs, weights  RD to remain available.

## 2025-06-26 NOTE — CONSULT NOTE ADULT - REASON FOR ADMISSION
acute renal failure
acute renal failure.

## 2025-06-26 NOTE — PROGRESS NOTE ADULT - NS ATTEST RISK PROBLEM GEN_ALL_CORE FT
Patient continues with diarrhea -15 small bowel movements  CT mild thickened left colon my interpretation  C. difficile GI PCR negative  Please order CBC CMP for tomorrow  If diarrhea continues, we can consider flexible sigmoidoscopy.  We have ordered n.p.o. after midnight for possible flex sig tomorrow.  If we decide to do a flexible sigmoidoscopy tomorrow, patient will need tapwater enema to be ordered by GI team in the morning.  Will need to find out from orthopedics whether patient can lay on her left lateral decubitus position.  I discussed the risks and benefits of flexible sigmoidoscopy including but not limited to bleeding infection perforation.  Biopsies would be done to rule out microscopic colitis and to look for pseudomembranes.
Pseudomembranous colitis– C. difficile negative x 2, however flex sig showed pseudomembranes.  Biopsies pending  Low fiber low-fat diet till diarrhea resolves  Continue vancomycin 125 mg p.o. 4 times daily
Patient continues with diarrhea -Now with soft semiformed stools–.  Had 7 bowel movements overnight  Flexible sigmoidoscopy showed pseudomembranes  Continue vancomycin 125 mg 4 times daily  Low fiber diet  CT mild thickened left colon my interpretation  C. difficile GI PCR negative  Please order CBC CMP for tomorrow

## 2025-06-26 NOTE — PROGRESS NOTE ADULT - PROBLEM SELECTOR PLAN 1
Pseudomembranous colitis– C. difficile negative x 2, however flex sig showed pseudomembranes.  Biopsies pending  Low fiber low-fat diet till diarrhea resolves  Continue vancomycin 125 mg p.o. 4 times daily    Follow-up in the office in 4 weeks

## 2025-06-26 NOTE — CONSULT NOTE ADULT - NS ATTEND AMEND GEN_ALL_CORE FT
Pt is 59 yo F with PMH of recent L hip replacement at [facility] 6/9/25, HTN, HLD, presenting with nausea, vomiting, diarrhea. Labs showed NAVI/ATN, elevated LAEs. CT showed e/o left sided colitis. The overall picture is consistent with likely ischemic colitis, ATN, ischemic hepatopathy in the setting of NSAID use and dehydration. Cdiff is negative. GI PCR is pending. Antibiotics on hold as per ID. She will need interval colonoscopy in 6-8 wks. She should follow up with her regular GI outpatient after discharge. Gi signing off, please call back as needed.
Orthopaedic Trauma Surgeon Addendum:    I have reviewed the physician assistant note and agree with the history, exam, and plan of care, except as noted.    Patient admitted for non-orthopedic concerns. Arthroplasty incision appears to be healing well.  Will discuss with Dr. Brooks but expect routine post-op follow up in his office.    Please call with questions or concerns.     Oracio Foley MD  Orthopaedic Trauma Surgeon  [facility] [place]
Orthopaedic Spine/Trauma Addendum:    I have personally reviewed the patients chart, imaging and lab results. I have reviewed the physician assistant note and agree with the history, exam, and plan of care, except as noted.    Patient with cervical spondylosis, exam otherwise benign  MRI is pending and will determine if there is any severe cord compression  Will give further recs pending MRI  No restrictions, WBAT, can participate with PT/OT as needed.     Thony Riggins DO  Orthopaedic Spine/Trauma Surgeon  [facility] [place]

## 2025-06-26 NOTE — CONSULT NOTE ADULT - SUBJECTIVE AND OBJECTIVE BOX
Pt Name: CAROLEE AN    MRN: [id number]      Patient is a 60y Female admitted for colitis and ARF. Patient had a headache last night that prompted a CT scan of the head and neck to be performed as part of the workup. The study revealed cervical spondylosis which prompted and orthopedic consultation. Patient was seen and evaluated at bedside. Patient offers no acute orthopedic complaints at this time. Patient denies any neck pain currently. She denies pain in either upper extremity. Patient denies upper extremity numbness/weakness, incontinence of stool/urine, difficulty walking, balance issues, or difficulty w/ fine motor tasks.       HEALTH ISSUES - PROBLEM Dx:  Diarrhea      REVIEW OF SYSTEMS      General: Alert, responsive, in NAD    Gastrointestinal:	 No stool incontinence.      Genitourinary: No urinary incontinence.     Musculoskeletal: SEE HPI.    Neurological: No sensory or motor changes.     ROS is otherwise negative.      PAST MEDICAL & SURGICAL HISTORY:  Spinal stenosis      Kidney stones  x 1 pt denies surgical intervention " in my 20 s '      Colitis, nonspecific      Nephrolithiasis      HTN (hypertension)      HLD (hyperlipidemia)      Anxiety and depression      Menieres disease      Vasovagal syncope      Unilateral primary osteoarthritis, left hip      Enlarged thyroid      Diverticulosis       delivery NOS      S/P L total hip arthroplasty .       S/P cholecystectomy      S/P laminectomy      S/P cystoscopy with ureteral stent placement      History of hand surgery          Allergies: No Known Allergies      Medications: acetaminophen     Tablet .. 975 milliGRAM(s) Oral every 8 hours PRN  acetaminophen     Tablet .. 650 milliGRAM(s) Oral every 6 hours PRN  acetaminophen 325 mG/butalbital 50 mG/caffeine 40 mG 1 Tablet(s) Oral every 6 hours PRN  aluminum hydroxide/magnesium hydroxide/simethicone Suspension 30 milliLiter(s) Oral every 4 hours PRN  ascorbic acid 500 milliGRAM(s) Oral daily  chlorhexidine 2% Cloths 1 Application(s) Topical <User Schedule>  chlorhexidine 2% Cloths 1 Application(s) Topical <User Schedule>  cholestyramine Powder (Sugar-Free) 4 Gram(s) Oral two times a day  dextrose 5%. 1000 milliLiter(s) IV Continuous <Continuous>  dextrose 50% Injectable 25 Gram(s) IV Push once  dextrose 50% Injectable 12.5 Gram(s) IV Push once  dextrose 50% Injectable 25 Gram(s) IV Push once  dextrose Oral Gel 15 Gram(s) Oral once  dicyclomine 20 milliGRAM(s) Oral four times a day before meals PRN  escitalopram 20 milliGRAM(s) Oral daily  glucagon  Injectable 1 milliGRAM(s) IntraMuscular once  heparin   Injectable 5000 Unit(s) SubCutaneous every 8 hours  HYDROmorphone   Tablet 2 milliGRAM(s) Oral every 4 hours PRN  HYDROmorphone  Injectable 0.5 milliGRAM(s) IV Push every 4 hours PRN  lactated ringers. 1000 milliLiter(s) IV Continuous <Continuous>  melatonin 3 milliGRAM(s) Oral at bedtime PRN  multivitamin 1 Tablet(s) Oral daily  ondansetron Injectable 4 milliGRAM(s) IV Push every 8 hours PRN  piperacillin/tazobactam IVPB.. 3.375 Gram(s) IV Intermittent every 12 hours  sodium chloride 0.9% lock flush 10 milliLiter(s) IV Push every 1 hour PRN  vancomycin    Solution 125 milliGRAM(s) Oral every 6 hours      FAMILY HISTORY:  Family history of renal failure (Father)    Family history of hypertension (Father, Mother)    Family history of cerebrovascular accident (CVA) in father (Father)    Family history of breast cancer in mother (Mother)    Family history of diabetes mellitus (Sibling)    : non-contributory    Social History:     Ambulation: Patient is a community ambulator at baseline.                           8.3    8.80  )-----------( 442      ( 2025 05:18 )             25.9     06-26    140  |  104  |  7.0[L]  ----------------------------<  105[H]  3.5   |  26.0  |  1.91[H]    Ca    8.1[L]      2025 05:18  Phos  3.1       Mg     1.7         TPro  5.2[L]  /  Alb  2.8[L]  /  TBili  0.4  /  DBili  x   /  AST  17  /  ALT  24  /  AlkPhos  95        PHYSICAL EXAM:    Vital Signs Last 24 Hrs  T(C): 36.7 (2025 07:20), Max: 37.2 (2025 22:15)  T(F): 98 (2025 07:20), Max: 98.9 (2025 22:15)  HR: 83 (2025 07:20) (80 - 95)  BP: 128/80 (2025 07:20) (124/76 - 143/87)  BP(mean): --  RR: 19 (2025 07:20) (17 - 19)  SpO2: 93% (2025 07:20) (93% - 96%)    Parameters below as of 2025 07:20  Patient On (Oxygen Delivery Method): room air      Daily     Daily Weight in k.1 (2025 20:45)    Appearance: Alert, responsive, in no acute distress.    Musculoskeletal:      Neurological:     Sensation is grossly intact to light touch C5-T1 and L2-S1 bilaterally. No focal deficits or weaknesses found in the upper or lower extremities.     Pathologic reflexes: No Sales, No Clonus.            Motor exam:          Upper extremities - Deltoids/wrist extension/triceps//intrinsics strength: 5/5 bilaterally.               Lower extremities  - Hip flexion/knee extension/TA/EHL/GSC strength: 5/5 bilaterally.       Imaging Studies:    IMPRESSION:    No CT evidence of acute traumatic injury to the head and cervical spine.    MRI may be obtained, if further information regarding ligamentous injury, hematoma or spinal cord pathology is required.    Cervical spondylosis, more notably at C4-5 and C5-6 where there is likely moderate to marked spinal canal narrowing with probable compression on the cord. This can be better delineated with cervical spine MRI as clinically warranted.    A/P: Pt is a 60y Female with cervical spondylosis.     PLAN:  * Pain control PRN.  * F/U MRI results.  * WBAT.  * DVT ppx per primary team,  * PT/OT.   * No acute orthopedic intervention planned at this time.

## 2025-06-26 NOTE — PROGRESS NOTE ADULT - NS ATTEND BILL GEN_ALL_CORE
Penned to dr. gillespie
Pt grandmother asking for refill for   2 different methylphenidate       [facility]    Best number to reach her is [phone]
Attending to bill

## 2025-06-26 NOTE — CONSULT NOTE ADULT - CONSULT REASON
Post-op
Cervical spondylosis.
Colitis
Dialysis central catheter placement
NAVI
Acute renal failure/hyperkalemia
colitis

## 2025-06-26 NOTE — PROGRESS NOTE ADULT - NS ATTEND AMEND GEN_ALL_CORE FT
I evaluated this pt with my ACP and agree with the above assessment and management plan. Pt. continues to have persistent diarrhea with multiple negative stool studies. She states that she has a diarrheal BM every two hours and is presently on a low fat low fiber diet. Cholestyramine was ordered yesterday but she never received it for unknown reasons. Will increase dosing from once to twice daily and decrease diet back down to clear liquids. Repeat labs ordered for the AM.
Diarrhea continues.  No abdominal pain nausea vomit    Abdominal exam positive bowel sounds soft nontender    Patient continues with diarrhea -15 small bowel movements  CT mild thickened left colon my interpretation  C. difficile GI PCR negative  Please order CBC CMP for tomorrow  If diarrhea continues, we can consider flexible sigmoidoscopy.  We have ordered n.p.o. after midnight for possible flex sig tomorrow.  If we decide to do a flexible sigmoidoscopy tomorrow, patient will need tapwater enema to be ordered by GI team in the morning.  Will need to find out from orthopedics whether patient can lay on her left lateral decubitus position.  I discussed the risks and benefits of flexible sigmoidoscopy including but not limited to bleeding infection perforation.  Biopsies would be done to rule out microscopic colitis and to look for pseudomembranes.
Orthopaedic Trauma Surgeon Addendum:    I have reviewed the physician assistant note and agree with the history, exam, and plan of care, except as noted.    Continue standard post-op care.  No intervention needed at this time. Outpatient follow up with Primary surgeon when discharged.    Oracio Foley MD  Orthopaedic Trauma Surgeon  [facility] [place]
Patient continues with diarrhea -Now with soft semiformed stools–.  Had 7 bowel movements overnight  Flexible sigmoidoscopy showed pseudomembranes  Continue vancomycin 125 mg 4 times daily  Low fiber diet  CT mild thickened left colon my interpretation  C. difficile GI PCR negative  Please order CBC CMP for tomorrow    A- +BS, soft, non tender
I evaluated this pt. with my ACP and agree with the above assessment and management plan. Pt with persistent diarrhea. Repeat stool for C. Dificle PCR also negative. Diarrhea unchanged. On low fiber diet. Will try Cholestyramine 4 grams daily for possible bile salt induced diarrhea. Ok for Loperamide PRN at this point also. Repeat labs ordered for the AM. I reviewed her CT images here and agree with the radiologist's interpretation. Repeat labs ordered for the AM.
I evaluated this pt. with my ACP and agree with the above assessment and management plan. Pt. will likely need OPT colonoscopy when she has fully recovered from her recent hip replacement surgery. Fecal calprotectin and repeat stool for C. Dificile PCR ordered. Dicyclomine ordered for spasms. Diet changed to low fiber. Repeat labs ordered for the AM. I reviewed her CT images and agree with the radiologist's interpretation.
Patient with 5 soft to semiformed bowel movements.  No bowel movement overnight, no leukocytosis no fever    Abdominal exam positive bowel sound soft nontender      Pseudomembranous colitis– C. difficile negative x 2, however flex sig showed pseudomembranes.  Biopsies pending  Low fiber low-fat diet till diarrhea resolves  Continue vancomycin 125 mg p.o. 4 times daily    Follow-up in the office in 4 weeks with Dr Coreas

## 2025-06-26 NOTE — PROGRESS NOTE ADULT - ASSESSMENT
59 y/o F with PMHX HTN, HLD, obesity and anxiety/depression (on lexapro) s/p L-LICHA (6/9/25 at [facility]) presents complaining of abdominal pain, vomiting and diarrhea    #Colitis  #Elevated liver enzymes, resolved   #NAVI, improving, on dialysis   CT Abdomen and Pelvis No Cont (06.15.25 @ 22:14) thickening of sigmoid colonic wall with several no-inflamed diverticula, no peridiverticular fat stranding or inflammation to suggest acute diverticulitis; decompressed descending colon with mild wall thickening and minimal fat stranding in the mesocolon/sigmoid; findings compatible with mild acute colitis in the left descending and sigmoid colon which may be of inflammatory, infectious or ischemic etiology.   C Diff by PCR Result: NotDetec (06.16.25 @ 20:20)  GI PCR Panel: NotDetec (06.16.25 @ 20:20)  Ova and Parasites (06.16.25 @ 20:20) No Protozoa seen by trichrome stain, No Helminths or Protozoa seen   Culture - Stool (06.16.25 @ 20:20) No enteric pathogens isolated.  Acute hepatitis panel negative, EBV/CMV negative   US Abdomen Complete (US Abdomen Complete) (06.21.25) > Liver: Normal in size and echogenicity. No focal lesions are identified. Bile ducts: Normal caliber. Common bile duct measures 7 mm. Gallbladder: Cholecystectomy.  C Diff by PCR Result: NotDetec (06.21.25 @ 00:40)  Calprotectin, Stool (06.21.25 @ 00:40): 1940 ug/g      Colonoscopy (06.24.25) >moderate diverticulosis of left side of colon, rectum showed erythema of mucosa and possible pseudomembranous colitis starting 15cm from anal verge until 40cm    - Await pathology results  - Continue vancomycin for pseudomembranous colitis, will need 2 week course   - Trend CBC and BMP daily   - Trend electrolytes, replete as needed   - Continue bentyl PRN abdominal spasms  - Continue with cholestyramine BID   - Monitor stool count   - Will need outpatient GI follow up  _________________________________________________________________  Assessment and recommendations are final when note is signed by the attending physician.

## 2025-06-27 ENCOUNTER — NON-APPOINTMENT (OUTPATIENT)
Age: 60
End: 2025-06-27

## 2025-06-27 LAB
ALBUMIN SERPL ELPH-MCNC: 2.6 G/DL — LOW (ref 3.3–5.2)
ALP SERPL-CCNC: 87 U/L — SIGNIFICANT CHANGE UP (ref 40–120)
ALT FLD-CCNC: 19 U/L — SIGNIFICANT CHANGE UP
ANION GAP SERPL CALC-SCNC: 12 MMOL/L — SIGNIFICANT CHANGE UP (ref 5–17)
AST SERPL-CCNC: 19 U/L — SIGNIFICANT CHANGE UP
BILIRUB SERPL-MCNC: 0.3 MG/DL — LOW (ref 0.4–2)
BUN SERPL-MCNC: 9.3 MG/DL — SIGNIFICANT CHANGE UP (ref 8–20)
CALCIUM SERPL-MCNC: 8.3 MG/DL — LOW (ref 8.4–10.5)
CHLORIDE SERPL-SCNC: 105 MMOL/L — SIGNIFICANT CHANGE UP (ref 96–108)
CO2 SERPL-SCNC: 22 MMOL/L — SIGNIFICANT CHANGE UP (ref 22–29)
CREAT SERPL-MCNC: 2.07 MG/DL — HIGH (ref 0.5–1.3)
EGFR: 27 ML/MIN/1.73M2 — LOW
EGFR: 27 ML/MIN/1.73M2 — LOW
GLUCOSE SERPL-MCNC: 95 MG/DL — SIGNIFICANT CHANGE UP (ref 70–99)
HCT VFR BLD CALC: 24 % — LOW (ref 34.5–45)
HGB BLD-MCNC: 7.6 G/DL — LOW (ref 11.5–15.5)
MAGNESIUM SERPL-MCNC: 2 MG/DL — SIGNIFICANT CHANGE UP (ref 1.6–2.6)
MCHC RBC-ENTMCNC: 28.6 PG — SIGNIFICANT CHANGE UP (ref 27–34)
MCHC RBC-ENTMCNC: 31.7 G/DL — LOW (ref 32–36)
MCV RBC AUTO: 90.2 FL — SIGNIFICANT CHANGE UP (ref 80–100)
NRBC # BLD AUTO: 0 K/UL — SIGNIFICANT CHANGE UP (ref 0–0)
NRBC # FLD: 0 K/UL — SIGNIFICANT CHANGE UP (ref 0–0)
NRBC BLD AUTO-RTO: 0 /100 WBCS — SIGNIFICANT CHANGE UP (ref 0–0)
PHOSPHATE SERPL-MCNC: 3.8 MG/DL — SIGNIFICANT CHANGE UP (ref 2.4–4.7)
PLATELET # BLD AUTO: 427 K/UL — HIGH (ref 150–400)
PMV BLD: 9.2 FL — SIGNIFICANT CHANGE UP (ref 7–13)
POTASSIUM SERPL-MCNC: 3.6 MMOL/L — SIGNIFICANT CHANGE UP (ref 3.5–5.3)
POTASSIUM SERPL-SCNC: 3.6 MMOL/L — SIGNIFICANT CHANGE UP (ref 3.5–5.3)
PROT SERPL-MCNC: 5.1 G/DL — LOW (ref 6.6–8.7)
RBC # BLD: 2.66 M/UL — LOW (ref 3.8–5.2)
RBC # FLD: 13.3 % — SIGNIFICANT CHANGE UP (ref 10.3–14.5)
SODIUM SERPL-SCNC: 138 MMOL/L — SIGNIFICANT CHANGE UP (ref 135–145)
WBC # BLD: 10.26 K/UL — SIGNIFICANT CHANGE UP (ref 3.8–10.5)
WBC # FLD AUTO: 10.26 K/UL — SIGNIFICANT CHANGE UP (ref 3.8–10.5)

## 2025-06-27 PROCEDURE — 99233 SBSQ HOSP IP/OBS HIGH 50: CPT

## 2025-06-27 PROCEDURE — 72141 MRI NECK SPINE W/O DYE: CPT | Mod: 26

## 2025-06-27 RX ORDER — SUMATRIPTAN 100 MG/1
25 TABLET, FILM COATED ORAL DAILY
Refills: 0 | Status: DISCONTINUED | OUTPATIENT
Start: 2025-06-27 | End: 2025-06-27

## 2025-06-27 RX ORDER — SUMATRIPTAN 100 MG/1
25 TABLET, FILM COATED ORAL ONCE
Refills: 0 | Status: COMPLETED | OUTPATIENT
Start: 2025-06-27 | End: 2025-06-27

## 2025-06-27 RX ADMIN — Medication 20 MILLIGRAM(S): at 18:59

## 2025-06-27 RX ADMIN — Medication 1 TABLET(S): at 11:24

## 2025-06-27 RX ADMIN — Medication 4 GRAM(S): at 09:05

## 2025-06-27 RX ADMIN — Medication 2 MILLIGRAM(S): at 00:46

## 2025-06-27 RX ADMIN — Medication 125 MILLIGRAM(S): at 11:24

## 2025-06-27 RX ADMIN — Medication 125 MILLIGRAM(S): at 23:23

## 2025-06-27 RX ADMIN — Medication 4 GRAM(S): at 21:53

## 2025-06-27 RX ADMIN — Medication 125 MILLIGRAM(S): at 05:26

## 2025-06-27 RX ADMIN — Medication 1 APPLICATION(S): at 05:22

## 2025-06-27 RX ADMIN — SUMATRIPTAN 25 MILLIGRAM(S): 100 TABLET, FILM COATED ORAL at 05:20

## 2025-06-27 RX ADMIN — Medication 25 GRAM(S): at 05:26

## 2025-06-27 RX ADMIN — Medication 20 MILLIGRAM(S): at 09:22

## 2025-06-27 RX ADMIN — SUMATRIPTAN 25 MILLIGRAM(S): 100 TABLET, FILM COATED ORAL at 04:20

## 2025-06-27 RX ADMIN — Medication 2 MILLIGRAM(S): at 19:04

## 2025-06-27 RX ADMIN — Medication 500 MILLIGRAM(S): at 11:24

## 2025-06-27 RX ADMIN — Medication 2 MILLIGRAM(S): at 20:04

## 2025-06-27 RX ADMIN — SUMATRIPTAN 25 MILLIGRAM(S): 100 TABLET, FILM COATED ORAL at 17:37

## 2025-06-27 RX ADMIN — Medication 125 MILLIGRAM(S): at 17:36

## 2025-06-27 RX ADMIN — Medication 4 MILLIGRAM(S): at 12:00

## 2025-06-27 RX ADMIN — ESCITALOPRAM OXALATE 20 MILLIGRAM(S): 20 TABLET ORAL at 11:24

## 2025-06-27 RX ADMIN — Medication 4 MILLIGRAM(S): at 20:24

## 2025-06-27 RX ADMIN — SUMATRIPTAN 25 MILLIGRAM(S): 100 TABLET, FILM COATED ORAL at 18:37

## 2025-06-27 NOTE — PROGRESS NOTE ADULT - ASSESSMENT
59 y/o F w/ PMH of HTN, HLD, prior obesity (currently on mounjaro for 1.5 yrs w/ 80lb wt loss) and anxiety/depression (on lexapro) s/p L-LICHA (6/9/25 at [facility]) presents c/o nausea, NBNB emesis and large vol watery nonbloody diarrhea w/ associated LLQ abd pain x 3 days ago associated w/ anuria, admitted for acute renal failure and sepsis 2/2 colitis. Initially had some blood bowel movements, which transformed into multiple watery BM. Given IVF, started on zosyn. C diff was negative.     # Acute renal failure w/ mild rhabdo suspect multifactorial from N/V/D while on NSAIDs and ARB   # Hypovolemic mild hyponatremic hypochloremia likely 2/2 N/V/D  - monae catheter placed in ED  - s/p HD 6/17, 6/20, 6/22, 6/25  - last known Cr 0.85 5/2025   -  Holding outpt Naproxen, losartan   - encourage po intake  - Strict I/O's  - avoid nephrotoxic agents or renally dose if needed  - c/w prn antiemetics  - nephrology following     #Headache  - likely 2/2 dehydration in the setting of C.diff, HD  - ESGIC, acetaminophen, sumatriptan prn  - CTH: Cervical spondylosis, more notably at C4-5 and C5-6 where there is likely moderate to marked spinal canal narrowing with probable compression on the cord.  - MR c-spine ordered  -ortho: no acute ortho intervention at this time    # Sepsis (improving)  # pseudomembranous colitis  - CT a/p w/ findings compatible with mild acute colitis in the left descending and sigmoid colon  - 6/21 repeat C. diff PCR negative  - 6/24 flexible sigmoidoscopy found pseudomembranous colitis  - started on vancomycin   - zosyn (thru 6/26)  - placed on contact precaution  - d/c imodium   - bentyl, cholestyramine powder, for abd spasms and diarrhea  - tylenol 650mg q6h prn for fever  - low fiber diet  - f/u fecal calprotectin, celiac cascade  - ctm CBC, temp    - ID following  - GI following      # Transaminitis w/ hepatocellular distribution - resolved  Ischemic.   - Trend LFTs and avoid hepatotoxic medications       # HTN / HLD  - bp stable today  - hold losartan 50mg qd for sandy  - crestor 10mg qhs    # Former obesity  - On mounjaro w/ 80lb weight loss and now BMI 23  - Hold mounjaro while inpt     #Anxiety/depression  - lexapro 20mg po QD     s/p L-LICHA (6/9/25 at [facility])  - PT/OT, OOB  - ICS  - acetaminophen 975mg po q8h for moderate pain  - dilaudid 2mg po q4h prn for severe pain  - dilaudid 0.5mg IVP q4h prn for breakthrough pain      VTE ppx:  Heparin sq  Diet: low fiber  Dispositon: Acute. Pending renal recovery   61 y/o F w/ PMH of HTN, HLD, prior obesity (currently on mounjaro for 1.5 yrs w/ 80lb wt loss) and anxiety/depression (on lexapro) s/p L-LICHA (6/9/25 at [facility]) presents c/o nausea, NBNB emesis and large vol watery nonbloody diarrhea w/ associated LLQ abd pain x 3 days ago associated w/ anuria, admitted for acute renal failure and sepsis 2/2 colitis. Initially had some blood bowel movements, which transformed into multiple watery BM. Given IVF, started on zosyn. C diff was negative.     # Acute renal failure w/ mild rhabdo suspect multifactorial from N/V/D while on NSAIDs and ARB   # Hypovolemic mild hyponatremic hypochloremia likely 2/2 N/V/D  - monae catheter placed in ED  - s/p HD 6/17, 6/20, 6/22, 6/25  - last known Cr 0.85 5/2025   -  Holding outpt Naproxen, losartan   - encourage po intake  - Strict I/O's  - avoid nephrotoxic agents or renally dose if needed  - c/w prn antiemetics  - nephrology following     #Headache  - likely 2/2 dehydration in the setting of C.diff, HD  - ESGIC, acetaminophen, sumatriptan prn  - CTH: Cervical spondylosis, more notably at C4-5 and C5-6 where there is likely moderate to marked spinal canal narrowing with probable compression on the cord.  - MR c-spine ordered  -ortho: no acute ortho intervention at this time, f/u outpatient, pain control    # Sepsis (improving)  # pseudomembranous colitis  - CT a/p w/ findings compatible with mild acute colitis in the left descending and sigmoid colon  - 6/21 repeat C. diff PCR negative  - 6/24 flexible sigmoidoscopy found pseudomembranous colitis  - c/w vancomycin   - completed 7 day course of zosyn on 6/26  - placed on contact precaution  - d/c imodium   - bentyl, cholestyramine powder, for abd spasms and diarrhea  - tylenol 650mg q6h prn for fever  - low fiber diet  - f/u fecal calprotectin, celiac cascade  - ctm CBC, temp    - ID following  - GI following      # Transaminitis w/ hepatocellular distribution - resolved  Ischemic.   - Trend LFTs and avoid hepatotoxic medications       # HTN / HLD  - bp stable today  - hold losartan 50mg qd for sandy  - crestor 10mg qhs    # Former obesity  - On mounjaro w/ 80lb weight loss and now BMI 23  - Hold mounjaro while inpt     #Anxiety/depression  - lexapro 20mg po QD     s/p L-LICHA (6/9/25 at [facility])  - PT/OT, OOB  - ICS  - acetaminophen 975mg po q8h for moderate pain  - dilaudid 2mg po q4h prn for severe pain  - dilaudid 0.5mg IVP q4h prn for breakthrough pain      VTE ppx:  Heparin sq  Diet: low fiber  Dispositon: Acute. Pending renal recovery

## 2025-06-27 NOTE — PROGRESS NOTE ADULT - ASSESSMENT
61 y/o female pt w/ PMH of HTN, HLD, prior obesity (currently on mounjaro for 1.5 yrs w/ 80lb wt loss) and anxiety/depression (on lexapro) s/p L-LICHA (6/9/25 at [facility]) presents c/o nausea, NBNB emesis and large vol watery nonbloody diarrhea w/ associated LLQ abd pain x 3 days ago associated w/ anuria, admitted for acute renal failure and sepsis 2/2 colitis. Initially had some blood bowel movements, which transformed into multiple watery BM. Given IVF, started on zosyn. C diff was negative.     1.  Acute kidney injury with hyperkalemia and acidosis: Multifactorial likely combination of prerenal/ATN/NSAID nephrotoxicity along with ARB use.  Ultrasound shows no evidence of hydronephrosis.   HD started on 6/17 via [facility] non tdc.  ast HD was on wednesday.  SCr now improvement to 2.09.  Will order 24 hour urine for creatinine clearance.    2.  Hypertension; bp stable. continue to hold losartan.   3.  Status post hip replacement.  Hold NSAIDs per Ortho.  4.  Colitis noted on CT abdomen.  mgt as per GI

## 2025-06-27 NOTE — PROGRESS NOTE ADULT - ATTENDING COMMENTS
61 y/o F here for acute renal failure 2/ mild rhabdo w/ gastroenteritis    #ARF  #Hypovolemia 2/2 NVD  #sepsis 2/2 Pseudomembranous Colitis  #HTN  #C Spine Spondylosis  - HD as per nephro, improving kidney function  - zosyn to complete a week, PO Vanco as per GI  - cholestyramine  - As per Spine, no need for surgical intervention, obtain MRI, f/u outpatient, PRN pain control for arthorisis  - Dispo: Medically acute, pending improvement in diarrhea and plan for HD  - remaining plan as above. 59 y/o F here for acute renal failure 2/ mild rhabdo w/ gastroenteritis    #ARF  #Hypovolemia 2/2 NVD  #sepsis 2/2 Pseudomembranous Colitis  #HTN  #C Spine Spondylosis  - HD as per nephro, improving kidney function  - zosyn to complete a week, PO Vanco as per GI  - cholestyramine  - As per Spine, no need for surgical intervention, obtain MRI, f/u outpatient, PRN pain control for arthorisis  - Dispo: Medically acute, pending improvement in diarrhea and plan for HD  - remaining plan as above

## 2025-06-27 NOTE — PROGRESS NOTE ADULT - SUBJECTIVE AND OBJECTIVE BOX
[facility] DIVISION OF KIDNEY DISEASES AND HYPERTENSION -- FOLLOW UP NOTE  --------------------------------------------------------------------------------  Chief Complaint: Luis    24 hour events/subjective:  no acute event noted  diarrhea improving  pt reports she is voiding more        PAST HISTORY  --------------------------------------------------------------------------------  No significant changes to PMH, PSH, FHx, SHx, unless otherwise noted    ALLERGIES & MEDICATIONS  --------------------------------------------------------------------------------  Allergies    No Known Allergies        Standing Inpatient Medications  ascorbic acid 500 milliGRAM(s) Oral daily  chlorhexidine 2% Cloths 1 Application(s) Topical <User Schedule>  chlorhexidine 2% Cloths 1 Application(s) Topical <User Schedule>  cholestyramine Powder (Sugar-Free) 4 Gram(s) Oral two times a day  dextrose 5%. 1000 milliLiter(s) IV Continuous <Continuous>  dextrose 50% Injectable 25 Gram(s) IV Push once  dextrose 50% Injectable 12.5 Gram(s) IV Push once  dextrose 50% Injectable 25 Gram(s) IV Push once  dextrose Oral Gel 15 Gram(s) Oral once  escitalopram 20 milliGRAM(s) Oral daily  glucagon  Injectable 1 milliGRAM(s) IntraMuscular once  heparin   Injectable 5000 Unit(s) SubCutaneous every 8 hours  lactated ringers. 1000 milliLiter(s) IV Continuous <Continuous>  multivitamin 1 Tablet(s) Oral daily  vancomycin    Solution 125 milliGRAM(s) Oral every 6 hours    PRN Inpatient Medications  acetaminophen     Tablet .. 975 milliGRAM(s) Oral every 8 hours PRN  acetaminophen     Tablet .. 650 milliGRAM(s) Oral every 6 hours PRN  acetaminophen 325 mG/butalbital 50 mG/caffeine 40 mG 1 Tablet(s) Oral every 6 hours PRN  aluminum hydroxide/magnesium hydroxide/simethicone Suspension 30 milliLiter(s) Oral every 4 hours PRN  dicyclomine 20 milliGRAM(s) Oral four times a day before meals PRN  HYDROmorphone   Tablet 2 milliGRAM(s) Oral every 4 hours PRN  HYDROmorphone  Injectable 0.5 milliGRAM(s) IV Push every 4 hours PRN  melatonin 3 milliGRAM(s) Oral at bedtime PRN  ondansetron Injectable 4 milliGRAM(s) IV Push every 8 hours PRN  sodium chloride 0.9% lock flush 10 milliLiter(s) IV Push every 1 hour PRN      REVIEW OF SYSTEMS  --------------------------------------------------------------------------------  Gen: No weight changes, fatigue, fevers/chills, weakness  Skin: No rashes  Head/Eyes/Ears/Mouth: No headache; Normal hearing; Normal vision w/o blurriness; No sinus pain/discomfort, sore throat  Respiratory: No dyspnea, cough, wheezing, hemoptysis  CV: No chest pain, PND, orthopnea  GI: No abdominal pain, diarrhea, constipation, nausea, vomiting, melena, hematochezia  : No increased frequency, dysuria, hematuria, nocturia  MSK: No joint pain/swelling; no back pain; no edema  Neuro: No dizziness/lightheadedness, weakness, seizures, numbness, tingling  Heme: No easy bruising or bleeding  Endo: No heat/cold intolerance  Psych: No significant nervousness, anxiety, stress, depression    All other systems were reviewed and are negative, except as noted.    VITALS/PHYSICAL EXAM  --------------------------------------------------------------------------------  T(C): 37.4 (06-27-25 @ 07:25), Max: 37.4 (06-27-25 @ 07:25)  HR: 94 (06-27-25 @ 07:25) (90 - 96)  BP: 111/72 (06-27-25 @ 07:25) (111/72 - 120/75)  RR: 18 (06-27-25 @ 07:25) (18 - 18)  SpO2: 93% (06-27-25 @ 07:25) (93% - 94%)  Wt(kg): --        Physical Exam:  	Gen: NAD, well-appearing  	HEENT: supple neck, clear oropharynx  	Pulm: CTA B/L  	CV: RRR, S1S2; no rub  	Back: No spinal or CVA tenderness; no sacral edema  	Abd: +BS, soft, nontender/nondistended  	: No suprapubic tenderness  	UE: Warm, no edema  	LE: Warm,  no edema  	Neuro: No focal deficit  	Psych: Normal affect and mood  	Skin: Warm  	Vascular access: ij non tdc    LABS/STUDIES  --------------------------------------------------------------------------------              7.6    10.26 >-----------<  427      [06-27-25 @ 07:10]              24.0     138  |  105  |  9.3  ----------------------------<  95      [06-27-25 @ 07:10]  3.6   |  22.0  |  2.07        Ca     8.3     [06-27-25 @ 07:10]      Mg     2.0     [06-27-25 @ 07:10]      Phos  3.8     [06-27-25 @ 07:10]    TPro  5.1  /  Alb  2.6  /  TBili  0.3  /  DBili  x   /  AST  19  /  ALT  19  /  AlkPhos  87  [06-27-25 @ 07:10]        Creatinine Trend:  SCr 2.07 [06-27 @ 07:10]  SCr 1.91 [06-26 @ 05:18]  SCr 3.15 [06-25 @ 05:40]  SCr 2.92 [06-24 @ 04:00]  SCr 4.86 [06-23 @ 04:00]    Urinalysis - [06-27-25 @ 07:10]      Color  / Appearance  / SG  / pH       Gluc 95 / Ketone   / Bili  / Urobili        Blood  / Protein  / Leuk Est  / Nitrite       RBC  / WBC  / Hyaline  / Gran  / Sq Epi  / Non Sq Epi  / Bacteria       TSH 2.40      [06-21-25 @ 06:45]    HBsAb 5.2      [06-17-25 @ 20:30]  HBsAg Nonreact      [06-16-25 @ 09:45]  HCV 0.06, Nonreact      [06-16-25 @ 09:45]

## 2025-06-27 NOTE — PROGRESS NOTE ADULT - SUBJECTIVE AND OBJECTIVE BOX
SUBJECTIVE  BRIEF HOSPITAL COURSE SUMMARY:      LAST 24 HOURS:  yesterday CTH concerning for cervical spondylosis,   seen by ortho, no acute surgical intervention at this time. f/u MR Head  overnight patient complaining of headaches, received sumatriptan x 2    TODAY:  Pt seen at bedside in AM, hemodynamically stable on room air, AAOx3  Pt reports medication overnight was helpful and no longer has headache  Pt reports 4 formed BM yesterday, some abd cramping still present but significantly reduced from days prior  Endorses some nausea but controlled with zofran  PO intake is improving  No issues with urination  Adequate pain control  No acute medical complaints    OBJECTIVE    PHYSICAL EXAM:  GENERAL: No acute distress, comfortably in bed  HEENT: Atraumatic, normocephalic, non-icteric  NEURO: A&Ox3, no focal deficits, moving all extremities spontaneously, no dysarthria, CN II-XII grossly intact  PSYCH: Normal affect, calm, appropriate insight and judgment, fluent speech  LUNGS: CTAB, no wrr, non-labored breathing  HEART: RRR, no murmur appreciated  ABD: Soft, non-tender, non-distended, no organomegaly, no appreciable masses, +bs all 4 quadrants  EXTREMITIES: Nontender, no clubbing, cyanosis, or edema    Vital Signs Last 24 Hrs  T(C): 36.8 (27 Jun 2025 05:28), Max: 37.2 (26 Jun 2025 16:28)  T(F): 98.3 (27 Jun 2025 05:28), Max: 98.9 (26 Jun 2025 16:28)  HR: 90 (27 Jun 2025 05:28) (83 - 96)  BP: 120/75 (27 Jun 2025 05:28) (116/75 - 128/80)  BP(mean): --  RR: 18 (27 Jun 2025 05:28) (18 - 19)  SpO2: 94% (27 Jun 2025 05:28) (93% - 94%)    Parameters below as of 27 Jun 2025 05:28  Patient On (Oxygen Delivery Method): room air            MEDICATIONS  (STANDING):  ascorbic acid 500 milliGRAM(s) Oral daily  chlorhexidine 2% Cloths 1 Application(s) Topical <User Schedule>  chlorhexidine 2% Cloths 1 Application(s) Topical <User Schedule>  cholestyramine Powder (Sugar-Free) 4 Gram(s) Oral two times a day  dextrose 5%. 1000 milliLiter(s) (100 mL/Hr) IV Continuous <Continuous>  dextrose 50% Injectable 25 Gram(s) IV Push once  dextrose 50% Injectable 12.5 Gram(s) IV Push once  dextrose 50% Injectable 25 Gram(s) IV Push once  dextrose Oral Gel 15 Gram(s) Oral once  escitalopram 20 milliGRAM(s) Oral daily  glucagon  Injectable 1 milliGRAM(s) IntraMuscular once  heparin   Injectable 5000 Unit(s) SubCutaneous every 8 hours  lactated ringers. 1000 milliLiter(s) (75 mL/Hr) IV Continuous <Continuous>  multivitamin 1 Tablet(s) Oral daily  piperacillin/tazobactam IVPB.. 3.375 Gram(s) IV Intermittent every 12 hours  vancomycin    Solution 125 milliGRAM(s) Oral every 6 hours    MEDICATIONS  (PRN):  acetaminophen     Tablet .. 975 milliGRAM(s) Oral every 8 hours PRN Moderate Pain (4 - 6)  acetaminophen     Tablet .. 650 milliGRAM(s) Oral every 6 hours PRN Temp greater or equal to 38C (100.4F), Mild Pain (1 - 3)  acetaminophen 325 mG/butalbital 50 mG/caffeine 40 mG 1 Tablet(s) Oral every 6 hours PRN Moderate Pain (4 - 6)  aluminum hydroxide/magnesium hydroxide/simethicone Suspension 30 milliLiter(s) Oral every 4 hours PRN Dyspepsia  dicyclomine 20 milliGRAM(s) Oral four times a day before meals PRN abd spasms  HYDROmorphone   Tablet 2 milliGRAM(s) Oral every 4 hours PRN Severe Pain (7 - 10)  HYDROmorphone  Injectable 0.5 milliGRAM(s) IV Push every 4 hours PRN breakthrough pain  melatonin 3 milliGRAM(s) Oral at bedtime PRN Insomnia  ondansetron Injectable 4 milliGRAM(s) IV Push every 8 hours PRN Nausea and/or Vomiting  sodium chloride 0.9% lock flush 10 milliLiter(s) IV Push every 1 hour PRN Pre/post blood products, medications, blood draw, and to maintain line patency    Allergies    No Known Allergies    Intolerances        LABS:                        8.3    8.80  )-----------( 442      ( 26 Jun 2025 05:18 )             25.9     06-26    140  |  104  |  7.0[L]  ----------------------------<  105[H]  3.5   |  26.0  |  1.91[H]    Ca    8.1[L]      26 Jun 2025 05:18  Phos  3.1     06-26  Mg     1.7     06-26    TPro  5.2[L]  /  Alb  2.8[L]  /  TBili  0.4  /  DBili  x   /  AST  17  /  ALT  24  /  AlkPhos  95  06-26      Urinalysis Basic - ( 26 Jun 2025 05:18 )    Color: x / Appearance: x / SG: x / pH: x  Gluc: 105 mg/dL / Ketone: x  / Bili: x / Urobili: x   Blood: x / Protein: x / Nitrite: x   Leuk Esterase: x / RBC: x / WBC x   Sq Epi: x / Non Sq Epi: x / Bacteria: x      CAPILLARY BLOOD GLUCOSE          CULTURE DATA:    Culture - Blood (collected 06-15-25 @ 22:25)  Source: Blood Blood-Peripheral  Final Report (06-21-25 @ 01:00):    No growth at 5 days    Culture - Stool (collected 06-16-25 @ 20:20)  Source: Stool Feces  Final Report (06-19-25 @ 17:37):    No enteric pathogens isolated.    (Stool culture examined for Salmonella,    Shigella, Campylobacter, Aeromonas, Plesiomonas,    Vibrio, E.coli O157 and Yersinia)        RADIOLOGY & ADDITIONAL TESTS:  No new imaging to review

## 2025-06-28 LAB
ALBUMIN SERPL ELPH-MCNC: 2.6 G/DL — LOW (ref 3.3–5.2)
ALP SERPL-CCNC: 94 U/L — SIGNIFICANT CHANGE UP (ref 40–120)
ALT FLD-CCNC: 17 U/L — SIGNIFICANT CHANGE UP
ANION GAP SERPL CALC-SCNC: 11 MMOL/L — SIGNIFICANT CHANGE UP (ref 5–17)
AST SERPL-CCNC: 15 U/L — SIGNIFICANT CHANGE UP
BILIRUB SERPL-MCNC: <0.2 MG/DL — LOW (ref 0.4–2)
BUN SERPL-MCNC: 13.5 MG/DL — SIGNIFICANT CHANGE UP (ref 8–20)
CALCIUM SERPL-MCNC: 8.2 MG/DL — LOW (ref 8.4–10.5)
CHLORIDE SERPL-SCNC: 108 MMOL/L — SIGNIFICANT CHANGE UP (ref 96–108)
CO2 SERPL-SCNC: 21 MMOL/L — LOW (ref 22–29)
CREAT SERPL-MCNC: 1.97 MG/DL — HIGH (ref 0.5–1.3)
EGFR: 28 ML/MIN/1.73M2 — LOW
EGFR: 28 ML/MIN/1.73M2 — LOW
GLUCOSE SERPL-MCNC: 96 MG/DL — SIGNIFICANT CHANGE UP (ref 70–99)
HCT VFR BLD CALC: 24.1 % — LOW (ref 34.5–45)
HGB BLD-MCNC: 7.7 G/DL — LOW (ref 11.5–15.5)
MAGNESIUM SERPL-MCNC: 1.8 MG/DL — SIGNIFICANT CHANGE UP (ref 1.6–2.6)
MCHC RBC-ENTMCNC: 29.3 PG — SIGNIFICANT CHANGE UP (ref 27–34)
MCHC RBC-ENTMCNC: 32 G/DL — SIGNIFICANT CHANGE UP (ref 32–36)
MCV RBC AUTO: 91.6 FL — SIGNIFICANT CHANGE UP (ref 80–100)
NRBC # BLD AUTO: 0 K/UL — SIGNIFICANT CHANGE UP (ref 0–0)
NRBC # FLD: 0 K/UL — SIGNIFICANT CHANGE UP (ref 0–0)
NRBC BLD AUTO-RTO: 0 /100 WBCS — SIGNIFICANT CHANGE UP (ref 0–0)
PHOSPHATE SERPL-MCNC: 4 MG/DL — SIGNIFICANT CHANGE UP (ref 2.4–4.7)
PLATELET # BLD AUTO: 442 K/UL — HIGH (ref 150–400)
PMV BLD: 9.4 FL — SIGNIFICANT CHANGE UP (ref 7–13)
POTASSIUM SERPL-MCNC: 4.2 MMOL/L — SIGNIFICANT CHANGE UP (ref 3.5–5.3)
POTASSIUM SERPL-SCNC: 4.2 MMOL/L — SIGNIFICANT CHANGE UP (ref 3.5–5.3)
PROT SERPL-MCNC: 5.1 G/DL — LOW (ref 6.6–8.7)
RBC # BLD: 2.63 M/UL — LOW (ref 3.8–5.2)
RBC # FLD: 13.2 % — SIGNIFICANT CHANGE UP (ref 10.3–14.5)
SODIUM SERPL-SCNC: 140 MMOL/L — SIGNIFICANT CHANGE UP (ref 135–145)
WBC # BLD: 10.48 K/UL — SIGNIFICANT CHANGE UP (ref 3.8–10.5)
WBC # FLD AUTO: 10.48 K/UL — SIGNIFICANT CHANGE UP (ref 3.8–10.5)

## 2025-06-28 PROCEDURE — 86140 C-REACTIVE PROTEIN: CPT

## 2025-06-28 PROCEDURE — 87640 STAPH A DNA AMP PROBE: CPT

## 2025-06-28 PROCEDURE — 86900 BLOOD TYPING SEROLOGIC ABO: CPT

## 2025-06-28 PROCEDURE — 84443 ASSAY THYROID STIM HORMONE: CPT

## 2025-06-28 PROCEDURE — P9047: CPT

## 2025-06-28 PROCEDURE — 86901 BLOOD TYPING SEROLOGIC RH(D): CPT

## 2025-06-28 PROCEDURE — 83735 ASSAY OF MAGNESIUM: CPT

## 2025-06-28 PROCEDURE — 82653 EL-1 FECAL QUANTITATIVE: CPT

## 2025-06-28 PROCEDURE — 82330 ASSAY OF CALCIUM: CPT

## 2025-06-28 PROCEDURE — 85730 THROMBOPLASTIN TIME PARTIAL: CPT

## 2025-06-28 PROCEDURE — 87177 OVA AND PARASITES SMEARS: CPT

## 2025-06-28 PROCEDURE — 86364 TISS TRNSGLTMNASE EA IG CLAS: CPT

## 2025-06-28 PROCEDURE — 83690 ASSAY OF LIPASE: CPT

## 2025-06-28 PROCEDURE — 80053 COMPREHEN METABOLIC PANEL: CPT

## 2025-06-28 PROCEDURE — 72125 CT NECK SPINE W/O DYE: CPT

## 2025-06-28 PROCEDURE — 87641 MR-STAPH DNA AMP PROBE: CPT

## 2025-06-28 PROCEDURE — 86706 HEP B SURFACE ANTIBODY: CPT

## 2025-06-28 PROCEDURE — 76700 US EXAM ABDOM COMPLETE: CPT

## 2025-06-28 PROCEDURE — 70450 CT HEAD/BRAIN W/O DYE: CPT

## 2025-06-28 PROCEDURE — 87040 BLOOD CULTURE FOR BACTERIA: CPT

## 2025-06-28 PROCEDURE — 80048 BASIC METABOLIC PNL TOTAL CA: CPT

## 2025-06-28 PROCEDURE — 84295 ASSAY OF SERUM SODIUM: CPT

## 2025-06-28 PROCEDURE — 72141 MRI NECK SPINE W/O DYE: CPT

## 2025-06-28 PROCEDURE — 99233 SBSQ HOSP IP/OBS HIGH 50: CPT

## 2025-06-28 PROCEDURE — 82009 KETONE BODYS QUAL: CPT

## 2025-06-28 PROCEDURE — 82010 KETONE BODYS QUAN: CPT

## 2025-06-28 PROCEDURE — 85025 COMPLETE CBC W/AUTO DIFF WBC: CPT

## 2025-06-28 PROCEDURE — 82784 ASSAY IGA/IGD/IGG/IGM EACH: CPT

## 2025-06-28 PROCEDURE — 81376 HLA II TYPING 1 LOCUS LR: CPT

## 2025-06-28 PROCEDURE — 82550 ASSAY OF CK (CPK): CPT

## 2025-06-28 PROCEDURE — 87077 CULTURE AEROBIC IDENTIFY: CPT

## 2025-06-28 PROCEDURE — 86850 RBC ANTIBODY SCREEN: CPT

## 2025-06-28 PROCEDURE — 85652 RBC SED RATE AUTOMATED: CPT

## 2025-06-28 PROCEDURE — 84550 ASSAY OF BLOOD/URIC ACID: CPT

## 2025-06-28 PROCEDURE — 82803 BLOOD GASES ANY COMBINATION: CPT

## 2025-06-28 PROCEDURE — 87046 STOOL CULTR AEROBIC BACT EA: CPT

## 2025-06-28 PROCEDURE — 83993 ASSAY FOR CALPROTECTIN FECAL: CPT

## 2025-06-28 PROCEDURE — 85014 HEMATOCRIT: CPT

## 2025-06-28 PROCEDURE — 87493 C DIFF AMPLIFIED PROBE: CPT

## 2025-06-28 PROCEDURE — 80074 ACUTE HEPATITIS PANEL: CPT

## 2025-06-28 PROCEDURE — 84100 ASSAY OF PHOSPHORUS: CPT

## 2025-06-28 PROCEDURE — 82435 ASSAY OF BLOOD CHLORIDE: CPT

## 2025-06-28 PROCEDURE — 82962 GLUCOSE BLOOD TEST: CPT

## 2025-06-28 PROCEDURE — 36415 COLL VENOUS BLD VENIPUNCTURE: CPT

## 2025-06-28 PROCEDURE — 87045 FECES CULTURE AEROBIC BACT: CPT

## 2025-06-28 PROCEDURE — 83930 ASSAY OF BLOOD OSMOLALITY: CPT

## 2025-06-28 PROCEDURE — 88305 TISSUE EXAM BY PATHOLOGIST: CPT

## 2025-06-28 PROCEDURE — 93005 ELECTROCARDIOGRAM TRACING: CPT

## 2025-06-28 PROCEDURE — 80076 HEPATIC FUNCTION PANEL: CPT

## 2025-06-28 PROCEDURE — 85027 COMPLETE CBC AUTOMATED: CPT

## 2025-06-28 PROCEDURE — 71045 X-RAY EXAM CHEST 1 VIEW: CPT

## 2025-06-28 PROCEDURE — 85610 PROTHROMBIN TIME: CPT

## 2025-06-28 PROCEDURE — 82553 CREATINE MB FRACTION: CPT

## 2025-06-28 PROCEDURE — 0241U: CPT

## 2025-06-28 PROCEDURE — 87799 DETECT AGENT NOS DNA QUANT: CPT

## 2025-06-28 PROCEDURE — 74176 CT ABD & PELVIS W/O CONTRAST: CPT

## 2025-06-28 PROCEDURE — 82947 ASSAY GLUCOSE BLOOD QUANT: CPT

## 2025-06-28 PROCEDURE — 85018 HEMOGLOBIN: CPT

## 2025-06-28 PROCEDURE — 83605 ASSAY OF LACTIC ACID: CPT

## 2025-06-28 PROCEDURE — 84132 ASSAY OF SERUM POTASSIUM: CPT

## 2025-06-28 PROCEDURE — 87507 IADNA-DNA/RNA PROBE TQ 12-25: CPT

## 2025-06-28 RX ADMIN — BUTALBITAL, ACETAMINOPHEN AND CAFFEINE 1 TABLET(S): 50; 325; 40 TABLET ORAL at 19:35

## 2025-06-28 RX ADMIN — Medication 1 APPLICATION(S): at 05:07

## 2025-06-28 RX ADMIN — BUTALBITAL, ACETAMINOPHEN AND CAFFEINE 1 TABLET(S): 50; 325; 40 TABLET ORAL at 01:57

## 2025-06-28 RX ADMIN — BUTALBITAL, ACETAMINOPHEN AND CAFFEINE 1 TABLET(S): 50; 325; 40 TABLET ORAL at 12:17

## 2025-06-28 RX ADMIN — BUTALBITAL, ACETAMINOPHEN AND CAFFEINE 1 TABLET(S): 50; 325; 40 TABLET ORAL at 11:17

## 2025-06-28 RX ADMIN — Medication 4 MILLIGRAM(S): at 11:18

## 2025-06-28 RX ADMIN — Medication 1 TABLET(S): at 11:17

## 2025-06-28 RX ADMIN — BUTALBITAL, ACETAMINOPHEN AND CAFFEINE 1 TABLET(S): 50; 325; 40 TABLET ORAL at 02:57

## 2025-06-28 RX ADMIN — BUTALBITAL, ACETAMINOPHEN AND CAFFEINE 1 TABLET(S): 50; 325; 40 TABLET ORAL at 20:30

## 2025-06-28 RX ADMIN — Medication 4 GRAM(S): at 08:51

## 2025-06-28 RX ADMIN — Medication 125 MILLIGRAM(S): at 11:17

## 2025-06-28 RX ADMIN — Medication 20 MILLIGRAM(S): at 18:44

## 2025-06-28 RX ADMIN — Medication 500 MILLIGRAM(S): at 11:17

## 2025-06-28 RX ADMIN — Medication 125 MILLIGRAM(S): at 22:33

## 2025-06-28 RX ADMIN — Medication 125 MILLIGRAM(S): at 05:09

## 2025-06-28 RX ADMIN — Medication 4 MILLIGRAM(S): at 19:35

## 2025-06-28 RX ADMIN — ESCITALOPRAM OXALATE 20 MILLIGRAM(S): 20 TABLET ORAL at 11:17

## 2025-06-28 RX ADMIN — Medication 125 MILLIGRAM(S): at 17:12

## 2025-06-28 RX ADMIN — Medication 20 MILLIGRAM(S): at 11:48

## 2025-06-28 RX ADMIN — Medication 4 GRAM(S): at 22:33

## 2025-06-28 NOTE — CHART NOTE - NSCHARTNOTEFT_GEN_A_CORE
To Whom It May Concern,      Ro Bullock is a patient of mine and currently hospitalized at [facility] from June 16th 2025 onward. Please excuse her from any work or school related activities/examinations until she is discharged.      Respectfully,    Brianna Mackey M.D.  Hospitalist  [facility]

## 2025-06-28 NOTE — PROGRESS NOTE ADULT - SUBJECTIVE AND OBJECTIVE BOX
Hospitalist Progress Note    Chief Complaint:  Acute Renal Failure    SUBJECTIVE / OVERNIGHT EVENTS:  No events overnight, patient seen at bedside, in NAD, no new complaints today. Patient denies chest pain, SOB, abd pain, N/V, fever, chills, dysuria or any other complaints. All remainder ROS negative.     MEDICATIONS  (STANDING):  ascorbic acid 500 milliGRAM(s) Oral daily  chlorhexidine 2% Cloths 1 Application(s) Topical <User Schedule>  chlorhexidine 2% Cloths 1 Application(s) Topical <User Schedule>  cholestyramine Powder (Sugar-Free) 4 Gram(s) Oral two times a day  dextrose 5%. 1000 milliLiter(s) (100 mL/Hr) IV Continuous <Continuous>  dextrose 50% Injectable 25 Gram(s) IV Push once  dextrose 50% Injectable 12.5 Gram(s) IV Push once  dextrose 50% Injectable 25 Gram(s) IV Push once  dextrose Oral Gel 15 Gram(s) Oral once  escitalopram 20 milliGRAM(s) Oral daily  glucagon  Injectable 1 milliGRAM(s) IntraMuscular once  heparin   Injectable 5000 Unit(s) SubCutaneous every 8 hours  lactated ringers. 1000 milliLiter(s) (75 mL/Hr) IV Continuous <Continuous>  multivitamin 1 Tablet(s) Oral daily  vancomycin    Solution 125 milliGRAM(s) Oral every 6 hours    MEDICATIONS  (PRN):  acetaminophen     Tablet .. 975 milliGRAM(s) Oral every 8 hours PRN Moderate Pain (4 - 6)  acetaminophen     Tablet .. 650 milliGRAM(s) Oral every 6 hours PRN Temp greater or equal to 38C (100.4F), Mild Pain (1 - 3)  acetaminophen 325 mG/butalbital 50 mG/caffeine 40 mG 1 Tablet(s) Oral every 6 hours PRN headache  aluminum hydroxide/magnesium hydroxide/simethicone Suspension 30 milliLiter(s) Oral every 4 hours PRN Dyspepsia  dicyclomine 20 milliGRAM(s) Oral four times a day before meals PRN abd spasms  HYDROmorphone   Tablet 2 milliGRAM(s) Oral every 4 hours PRN Severe Pain (7 - 10)  HYDROmorphone  Injectable 0.5 milliGRAM(s) IV Push every 4 hours PRN breakthrough pain  melatonin 3 milliGRAM(s) Oral at bedtime PRN Insomnia  ondansetron Injectable 4 milliGRAM(s) IV Push every 8 hours PRN Nausea and/or Vomiting  sodium chloride 0.9% lock flush 10 milliLiter(s) IV Push every 1 hour PRN Pre/post blood products, medications, blood draw, and to maintain line patency        I&O's Summary    27 Jun 2025 07:01  -  28 Jun 2025 07:00  --------------------------------------------------------  IN: 720 mL / OUT: 0 mL / NET: 720 mL        PHYSICAL EXAM:  Vital Signs Last 24 Hrs  T(C): 37.2 (28 Jun 2025 05:08), Max: 37.2 (27 Jun 2025 17:02)  T(F): 98.9 (28 Jun 2025 05:08), Max: 98.9 (27 Jun 2025 17:02)  HR: 92 (28 Jun 2025 05:08) (92 - 101)  BP: 132/84 (28 Jun 2025 05:08) (128/82 - 137/81)  BP(mean): --  RR: 18 (28 Jun 2025 05:08) (18 - 18)  SpO2: 96% (28 Jun 2025 05:08) (95% - 97%)    Parameters below as of 28 Jun 2025 05:08  Patient On (Oxygen Delivery Method): room air      GENERAL: No acute distress, comfortably in bed  HEENT: Atraumatic, normocephalic, non-icteric  NEURO: A&Ox3, no focal deficits, moving all extremities spontaneously, no dysarthria, CN II-XII grossly intact  PSYCH: Normal affect, calm, appropriate insight and judgment, fluent speech  LUNGS: CTAB, no wrr, non-labored breathing  HEART: RRR, no murmur appreciated  ABD: Soft, non-tender, non-distended, no organomegaly, no appreciable masses, +bs all 4 quadrants  EXTREMITIES: Nontender, no clubbing, cyanosis, or edema    LABS:                        7.7    10.48 )-----------( 442      ( 28 Jun 2025 04:42 )             24.1     06-28    140  |  108  |  13.5  ----------------------------<  96  4.2   |  21.0[L]  |  1.97[H]    Ca    8.2[L]      28 Jun 2025 04:42  Phos  4.0     06-28  Mg     1.8     06-28    TPro  5.1[L]  /  Alb  2.6[L]  /  TBili  <0.2[L]  /  DBili  x   /  AST  15  /  ALT  17  /  AlkPhos  94  06-28          Urinalysis Basic - ( 28 Jun 2025 04:42 )    Color: x / Appearance: x / SG: x / pH: x  Gluc: 96 mg/dL / Ketone: x  / Bili: x / Urobili: x   Blood: x / Protein: x / Nitrite: x   Leuk Esterase: x / RBC: x / WBC x   Sq Epi: x / Non Sq Epi: x / Bacteria: x        CAPILLARY BLOOD GLUCOSE            RADIOLOGY & ADDITIONAL TESTS:  Results Reviewed: Y  Imaging Personally Reviewed: N  Electrocardiogram Personally Reviewed: ARLIN

## 2025-06-28 NOTE — PROGRESS NOTE ADULT - SUBJECTIVE AND OBJECTIVE BOX
The chart was reviewed and case discussed with Dr Miller  In brief, the patient is a 61 y/o female pt w/ PMH of HTN, HLD, prior obesity (currently on mounjaro for 1.5 yrs w/ 80lb wt loss) and anxiety/depression (on lexapro) s/p L-LICHA (6/9/25 at [facility]) presents c/o nausea, NBNB emesis and large vol watery nonbloody diarrhea w/ associated LLQ abd pain x 3 days ago associated w/ anuria, admitted for acute renal failure and sepsis 2/2 colitis. Initially had some blood bowel movements, which transformed into multiple watery BM. Given IVF, started on zosyn. C diff was negative. She was initiated on renal replacement therapy, but has since begun to recover function.    The patient was seen and evaluated in her room.  She is awake and alert, in no distress, and denies chest pain, shortness of breath, nausea, vomiting and diarrhea.     Vital Signs Last 24 Hrs  T(C): 36.8 (28 Jun 2025 09:46), Max: 37.2 (27 Jun 2025 22:00)  T(F): 98.2 (28 Jun 2025 09:46), Max: 98.9 (27 Jun 2025 22:00)  HR: 92 (28 Jun 2025 09:46) (92 - 101)  BP: 117/79 (28 Jun 2025 09:46) (117/79 - 137/81)  BP(mean): --  ABP: --  ABP(mean): --  RR: 18 (28 Jun 2025 09:46) (18 - 18)  SpO2: 96% (28 Jun 2025 09:46) (96% - 97%)    O2 Parameters below as of 28 Jun 2025 09:46  Patient On (Oxygen Delivery Method): room air    I&O's Summary    27 Jun 2025 07:01  -  28 Jun 2025 07:00  --------------------------------------------------------  IN: 720 mL / OUT: 0 mL / NET: 720 mL    On exam:  (-) JVD, temporary HD catheter in place  Lungs Bilateral CTA  S1, S2 no aud rubs  Abd soft  Extremities without edema    06-28    140  |  108  |  13.5  ----------------------------<  96  4.2   |  21.0[L]  |  1.97[H]    Ca    8.2[L]      28 Jun 2025 04:42  Phos  4.0     06-28  Mg     1.8     06-28    TPro  5.1[L]  /  Alb  2.6[L]  /  TBili  <0.2[L]  /  DBili  x   /  AST  15  /  ALT  17  /  AlkPhos  94  06-28      Creatinine Trend  1.97 mg/dL (06-28-25 @ 04:42)  2.07 mg/dL (06-27-25 @ 07:10)  1.91 mg/dL (06-26-25 @ 05:18)  3.15 mg/dL (06-25-25 @ 05:40)  2.92 mg/dL (06-24-25 @ 04:00)  4.86 mg/dL (06-23-25 @ 04:00)  4.83 mg/dL (06-22-25 @ 07:47)    MEDICATIONS  (STANDING):  ascorbic acid 500 milliGRAM(s) Oral daily  chlorhexidine 2% Cloths 1 Application(s) Topical <User Schedule>  chlorhexidine 2% Cloths 1 Application(s) Topical <User Schedule>  cholestyramine Powder (Sugar-Free) 4 Gram(s) Oral two times a day  dextrose 5%. 1000 milliLiter(s) (100 mL/Hr) IV Continuous <Continuous>  dextrose 50% Injectable 25 Gram(s) IV Push once  dextrose 50% Injectable 12.5 Gram(s) IV Push once  dextrose 50% Injectable 25 Gram(s) IV Push once  dextrose Oral Gel 15 Gram(s) Oral once  escitalopram 20 milliGRAM(s) Oral daily  glucagon  Injectable 1 milliGRAM(s) IntraMuscular once  heparin   Injectable 5000 Unit(s) SubCutaneous every 8 hours  lactated ringers. 1000 milliLiter(s) (75 mL/Hr) IV Continuous <Continuous>  multivitamin 1 Tablet(s) Oral daily  vancomycin    Solution 125 milliGRAM(s) Oral every 6 hours    MEDICATIONS  (PRN):  acetaminophen     Tablet .. 975 milliGRAM(s) Oral every 8 hours PRN Moderate Pain (4 - 6)  acetaminophen     Tablet .. 650 milliGRAM(s) Oral every 6 hours PRN Temp greater or equal to 38C (100.4F), Mild Pain (1 - 3)  acetaminophen 325 mG/butalbital 50 mG/caffeine 40 mG 1 Tablet(s) Oral every 6 hours PRN headache  aluminum hydroxide/magnesium hydroxide/simethicone Suspension 30 milliLiter(s) Oral every 4 hours PRN Dyspepsia  dicyclomine 20 milliGRAM(s) Oral four times a day before meals PRN abd spasms  HYDROmorphone   Tablet 2 milliGRAM(s) Oral every 4 hours PRN Severe Pain (7 - 10)  HYDROmorphone  Injectable 0.5 milliGRAM(s) IV Push every 4 hours PRN breakthrough pain  melatonin 3 milliGRAM(s) Oral at bedtime PRN Insomnia  ondansetron Injectable 4 milliGRAM(s) IV Push every 8 hours PRN Nausea and/or Vomiting  sodium chloride 0.9% lock flush 10 milliLiter(s) IV Push every 1 hour PRN Pre/post blood products, medications, blood draw, and to maintain line patency

## 2025-06-28 NOTE — PROGRESS NOTE ADULT - ASSESSMENT
61 y/o F w/ PMH of HTN, HLD, prior obesity (currently on mounjaro for 1.5 yrs w/ 80lb wt loss) and anxiety/depression (on lexapro) s/p L-LICHA (6/9/25 at [facility]) presents c/o nausea, NBNB emesis and large vol watery nonbloody diarrhea w/ associated LLQ abd pain x 3 days ago associated w/ anuria, admitted for acute renal failure and sepsis 2/2 colitis. Initially had some blood bowel movements, which transformed into multiple watery BM. Given IVF, started on zosyn. C diff was negative.     # Acute renal failure w/ mild rhabdo suspect multifactorial from N/V/D while on NSAIDs and ARB   # Hypovolemic mild hyponatremic hypochloremia likely 2/2 N/V/D  - monae catheter placed in ED  - s/p HD 6/17, 6/20, 6/22, 6/25  - last known Cr 0.85 5/2025, cr now improving  -  Holding outpt Naproxen, losartan   - encourage po intake  - Strict I/O's  - avoid nephrotoxic agents or renally dose if needed  - c/w prn antiemetics  - nephrology following, possibility of no further need for HD? will repeat BMP tomorrow    #Headache  - likely 2/2 dehydration in the setting of C.diff, HD  - ESGIC, acetaminophen, sumatriptan prn  - CTH: Cervical spondylosis, more notably at C4-5 and C5-6 where there is likely moderate to marked spinal canal narrowing with probable compression on the cord.  - MR reviewed - various disc bulges, no clinical symptoms, pain mgmt for now, f/u spine outpatient    # Sepsis (resolved)  # pseudomembranous colitis  - CT a/p w/ findings compatible with mild acute colitis in the left descending and sigmoid colon  - 6/21 repeat C. diff PCR negative  - 6/24 flexible sigmoidoscopy found pseudomembranous colitis  - c/w vancomycin PO  - completed 1 week zosyn on 6/26  - placed on contact precaution  - bentyl, cholestyramine powder, for abd spasms and diarrhea  - tylenol 650mg q6h prn for fever  - low fiber diet  - f/u fecal calprotectin, celiac cascade  - ctm CBC, temp    - GI following      # Transaminitis w/ hepatocellular distribution - resolved  Ischemic.   - Trend LFTs and avoid hepatotoxic medications       # HTN / HLD  - bp stable today  - hold losartan 50mg qd for asndy  - crestor 10mg qhs    # Former obesity  - On mounjaro w/ 80lb weight loss and now BMI 23  - Hold mounjaro while inpt     #Anxiety/depression  - lexapro 20mg po QD     s/p L-LICHA (6/9/25 at [facility])  - PT/OT, OOB  - ICS  - acetaminophen 975mg po q8h for moderate pain  - dilaudid 2mg po q4h prn for severe pain  - dilaudid 0.5mg IVP q4h prn for breakthrough pain      VTE ppx:  Heparin sq  Diet: low fiber  Dispositon: Acute. Pending renal recovery, likely DC during the week

## 2025-06-28 NOTE — PROGRESS NOTE ADULT - SUBJECTIVE AND OBJECTIVE BOX
C spine MRI reviewed by Dr. Riggins    No restrictions, WBAT, can participate with PT/OT as needed.   f/u outpatient Dr. Riggins 1-2 weeks

## 2025-06-28 NOTE — PROGRESS NOTE ADULT - ASSESSMENT
61 y/o female pt w/ PMH of HTN, HLD, prior obesity (currently on mounjaro for 1.5 yrs w/ 80lb wt loss) and anxiety/depression (on lexapro) s/p L-LICHA (6/9/25 at [facility]) presents c/o nausea, NBNB emesis and large vol watery nonbloody diarrhea w/ associated LLQ abd pain x 3 days ago associated w/ anuria, admitted for acute renal failure and sepsis 2/2 colitis. Initially had some blood bowel movements, which transformed into multiple watery BM. Given IVF, started on zosyn. C diff was negative. She was initiated on renal replacement therapy, but has since begun to recover function., based on the delta creatinine and increased U/O    Continue to hold hemodialysis  Monitor renal function, electrolytes and volume status  If renal function continues to improve I would discontinue the patients HD catheter    Diagnosis:      1.  Acute kidney injury with hyperkalemia and acidosis:ast HD was on wednesday  2.  Hypertension;  3.  Status post hip replacement.    4.  Colitis

## 2025-06-29 LAB
ANION GAP SERPL CALC-SCNC: 12 MMOL/L — SIGNIFICANT CHANGE UP (ref 5–17)
BASOPHILS # BLD AUTO: 0.1 K/UL — SIGNIFICANT CHANGE UP (ref 0–0.2)
BASOPHILS NFR BLD AUTO: 1 % — SIGNIFICANT CHANGE UP (ref 0–2)
BUN SERPL-MCNC: 16.5 MG/DL — SIGNIFICANT CHANGE UP (ref 8–20)
CALCIUM SERPL-MCNC: 8.1 MG/DL — LOW (ref 8.4–10.5)
CHLORIDE SERPL-SCNC: 109 MMOL/L — HIGH (ref 96–108)
CO2 SERPL-SCNC: 20 MMOL/L — LOW (ref 22–29)
CREAT SERPL-MCNC: 1.69 MG/DL — HIGH (ref 0.5–1.3)
EGFR: 34 ML/MIN/1.73M2 — LOW
EGFR: 34 ML/MIN/1.73M2 — LOW
EOSINOPHIL # BLD AUTO: 0.49 K/UL — SIGNIFICANT CHANGE UP (ref 0–0.5)
EOSINOPHIL NFR BLD AUTO: 4.9 % — SIGNIFICANT CHANGE UP (ref 0–6)
GLUCOSE SERPL-MCNC: 100 MG/DL — HIGH (ref 70–99)
HCT VFR BLD CALC: 22.9 % — LOW (ref 34.5–45)
HGB BLD-MCNC: 7.3 G/DL — LOW (ref 11.5–15.5)
IMM GRANULOCYTES # BLD AUTO: 0.03 K/UL — SIGNIFICANT CHANGE UP (ref 0–0.07)
IMM GRANULOCYTES NFR BLD AUTO: 0.3 % — SIGNIFICANT CHANGE UP (ref 0–0.9)
LYMPHOCYTES # BLD AUTO: 2.21 K/UL — SIGNIFICANT CHANGE UP (ref 1–3.3)
LYMPHOCYTES NFR BLD AUTO: 21.9 % — SIGNIFICANT CHANGE UP (ref 13–44)
MCHC RBC-ENTMCNC: 28.7 PG — SIGNIFICANT CHANGE UP (ref 27–34)
MCHC RBC-ENTMCNC: 31.9 G/DL — LOW (ref 32–36)
MCV RBC AUTO: 90.2 FL — SIGNIFICANT CHANGE UP (ref 80–100)
MONOCYTES # BLD AUTO: 0.98 K/UL — HIGH (ref 0–0.9)
MONOCYTES NFR BLD AUTO: 9.7 % — SIGNIFICANT CHANGE UP (ref 2–14)
NEUTROPHILS # BLD AUTO: 6.27 K/UL — SIGNIFICANT CHANGE UP (ref 1.8–7.4)
NEUTROPHILS NFR BLD AUTO: 62.2 % — SIGNIFICANT CHANGE UP (ref 43–77)
NRBC # BLD AUTO: 0 K/UL — SIGNIFICANT CHANGE UP (ref 0–0)
NRBC # FLD: 0 K/UL — SIGNIFICANT CHANGE UP (ref 0–0)
NRBC BLD AUTO-RTO: 0 /100 WBCS — SIGNIFICANT CHANGE UP (ref 0–0)
PLATELET # BLD AUTO: 427 K/UL — HIGH (ref 150–400)
PMV BLD: 9.7 FL — SIGNIFICANT CHANGE UP (ref 7–13)
POTASSIUM SERPL-MCNC: 3.8 MMOL/L — SIGNIFICANT CHANGE UP (ref 3.5–5.3)
POTASSIUM SERPL-SCNC: 3.8 MMOL/L — SIGNIFICANT CHANGE UP (ref 3.5–5.3)
RBC # BLD: 2.54 M/UL — LOW (ref 3.8–5.2)
RBC # FLD: 13.2 % — SIGNIFICANT CHANGE UP (ref 10.3–14.5)
SODIUM SERPL-SCNC: 141 MMOL/L — SIGNIFICANT CHANGE UP (ref 135–145)
WBC # BLD: 10.08 K/UL — SIGNIFICANT CHANGE UP (ref 3.8–10.5)
WBC # FLD AUTO: 10.08 K/UL — SIGNIFICANT CHANGE UP (ref 3.8–10.5)

## 2025-06-29 PROCEDURE — 99233 SBSQ HOSP IP/OBS HIGH 50: CPT

## 2025-06-29 RX ORDER — VANCOMYCIN HCL IN 5 % DEXTROSE 1.5G/250ML
1 PLASTIC BAG, INJECTION (ML) INTRAVENOUS
Qty: 32 | Refills: 0
Start: 2025-06-29 | End: 2025-07-06

## 2025-06-29 RX ADMIN — Medication 1 APPLICATION(S): at 05:05

## 2025-06-29 RX ADMIN — Medication 125 MILLIGRAM(S): at 16:54

## 2025-06-29 RX ADMIN — Medication 125 MILLIGRAM(S): at 21:19

## 2025-06-29 RX ADMIN — Medication 4 GRAM(S): at 09:17

## 2025-06-29 RX ADMIN — Medication 500 MILLIGRAM(S): at 11:40

## 2025-06-29 RX ADMIN — Medication 4 MILLIGRAM(S): at 09:23

## 2025-06-29 RX ADMIN — BUTALBITAL, ACETAMINOPHEN AND CAFFEINE 1 TABLET(S): 50; 325; 40 TABLET ORAL at 09:32

## 2025-06-29 RX ADMIN — ESCITALOPRAM OXALATE 20 MILLIGRAM(S): 20 TABLET ORAL at 11:40

## 2025-06-29 RX ADMIN — BUTALBITAL, ACETAMINOPHEN AND CAFFEINE 1 TABLET(S): 50; 325; 40 TABLET ORAL at 10:32

## 2025-06-29 RX ADMIN — Medication 125 MILLIGRAM(S): at 11:39

## 2025-06-29 RX ADMIN — Medication 2 MILLIGRAM(S): at 00:03

## 2025-06-29 RX ADMIN — Medication 2 MILLIGRAM(S): at 23:21

## 2025-06-29 RX ADMIN — Medication 2 MILLIGRAM(S): at 09:23

## 2025-06-29 RX ADMIN — Medication 2 MILLIGRAM(S): at 01:00

## 2025-06-29 RX ADMIN — Medication 20 MILLIGRAM(S): at 05:17

## 2025-06-29 RX ADMIN — Medication 20 MILLIGRAM(S): at 09:23

## 2025-06-29 RX ADMIN — Medication 4 MILLIGRAM(S): at 23:21

## 2025-06-29 RX ADMIN — Medication 4 GRAM(S): at 21:19

## 2025-06-29 RX ADMIN — Medication 125 MILLIGRAM(S): at 05:04

## 2025-06-29 RX ADMIN — Medication 1 TABLET(S): at 11:40

## 2025-06-29 NOTE — PROGRESS NOTE ADULT - SUBJECTIVE AND OBJECTIVE BOX
Progress reviewed, and patient was seen and evaluated in her room.  She is awake and alert, in no distress, and denies chest pain, shortness of breath, nausea, vomiting and diarrhea.  ICU Vital Signs Last 24 Hrs  T(C): 36.9 (29 Jun 2025 10:46), Max: 37.1 (28 Jun 2025 16:50)  T(F): 98.4 (29 Jun 2025 10:46), Max: 98.8 (28 Jun 2025 16:50)  HR: 83 (29 Jun 2025 10:46) (83 - 99)  BP: 115/73 (29 Jun 2025 10:46) (109/70 - 122/80)  BP(mean): --  ABP: --  ABP(mean): --  RR: 18 (29 Jun 2025 10:46) (17 - 18)  SpO2: 95% (29 Jun 2025 10:46) (92% - 96%)    O2 Parameters below as of 29 Jun 2025 10:46  Patient On (Oxygen Delivery Method): room air  I&O's Summary    28 Jun 2025 07:01  -  29 Jun 2025 07:00  --------------------------------------------------------  IN: 720 mL / OUT: 0 mL / NET: 720 mL      On exam:  (-) JVD, temporary HD catheter in place  Lungs Bilateral CTA  S1, S2 no aud rubs  Abd soft  Extremities without edema    06-29    141  |  109[H]  |  16.5  ----------------------------<  100[H]  3.8   |  20.0[L]  |  1.69[H]    Ca    8.1[L]      29 Jun 2025 04:42  Phos  4.0     06-28  Mg     1.8     06-28    TPro  5.1[L]  /  Alb  2.6[L]  /  TBili  <0.2[L]  /  DBili  x   /  AST  15  /  ALT  17  /  AlkPhos  94  06-28  Creatinine Trend  1.69 mg/dL (06-29-25 @ 04:42)  1.97 mg/dL (06-28-25 @ 04:42)  2.07 mg/dL (06-27-25 @ 07:10)  1.91 mg/dL (06-26-25 @ 05:18)  3.15 mg/dL (06-25-25 @ 05:40)  2.92 mg/dL (06-24-25 @ 04:00)  4.86 mg/dL (06-23-25 @ 04:00)

## 2025-06-29 NOTE — PROGRESS NOTE ADULT - SUBJECTIVE AND OBJECTIVE BOX
Hospitalist Progress Note    Chief Complaint:  Acute Renal Failure    SUBJECTIVE / OVERNIGHT EVENTS:  No events overnight, patient seen at bedside, in NAD, no new complaints today. Patient denies chest pain, SOB, abd pain, N/V, fever, chills, dysuria or any other complaints. All remainder ROS negative.     MEDICATIONS  (STANDING):  ascorbic acid 500 milliGRAM(s) Oral daily  chlorhexidine 2% Cloths 1 Application(s) Topical <User Schedule>  chlorhexidine 2% Cloths 1 Application(s) Topical <User Schedule>  cholestyramine Powder (Sugar-Free) 4 Gram(s) Oral two times a day  dextrose 5%. 1000 milliLiter(s) (100 mL/Hr) IV Continuous <Continuous>  dextrose 50% Injectable 25 Gram(s) IV Push once  dextrose 50% Injectable 12.5 Gram(s) IV Push once  dextrose 50% Injectable 25 Gram(s) IV Push once  dextrose Oral Gel 15 Gram(s) Oral once  escitalopram 20 milliGRAM(s) Oral daily  glucagon  Injectable 1 milliGRAM(s) IntraMuscular once  heparin   Injectable 5000 Unit(s) SubCutaneous every 8 hours  lactated ringers. 1000 milliLiter(s) (75 mL/Hr) IV Continuous <Continuous>  multivitamin 1 Tablet(s) Oral daily  vancomycin    Solution 125 milliGRAM(s) Oral every 6 hours    MEDICATIONS  (PRN):  acetaminophen     Tablet .. 975 milliGRAM(s) Oral every 8 hours PRN Moderate Pain (4 - 6)  acetaminophen     Tablet .. 650 milliGRAM(s) Oral every 6 hours PRN Temp greater or equal to 38C (100.4F), Mild Pain (1 - 3)  acetaminophen 325 mG/butalbital 50 mG/caffeine 40 mG 1 Tablet(s) Oral every 6 hours PRN headache  aluminum hydroxide/magnesium hydroxide/simethicone Suspension 30 milliLiter(s) Oral every 4 hours PRN Dyspepsia  dicyclomine 20 milliGRAM(s) Oral four times a day before meals PRN abd spasms  HYDROmorphone   Tablet 2 milliGRAM(s) Oral every 4 hours PRN Severe Pain (7 - 10)  HYDROmorphone  Injectable 0.5 milliGRAM(s) IV Push every 4 hours PRN breakthrough pain  melatonin 3 milliGRAM(s) Oral at bedtime PRN Insomnia  ondansetron Injectable 4 milliGRAM(s) IV Push every 8 hours PRN Nausea and/or Vomiting  sodium chloride 0.9% lock flush 10 milliLiter(s) IV Push every 1 hour PRN Pre/post blood products, medications, blood draw, and to maintain line patency        I&O's Summary    28 Jun 2025 07:01  -  29 Jun 2025 07:00  --------------------------------------------------------  IN: 720 mL / OUT: 0 mL / NET: 720 mL        PHYSICAL EXAM:  Vital Signs Last 24 Hrs  T(C): 36.8 (29 Jun 2025 04:39), Max: 37.1 (28 Jun 2025 16:50)  T(F): 98.3 (29 Jun 2025 04:39), Max: 98.8 (28 Jun 2025 16:50)  HR: 95 (29 Jun 2025 04:39) (92 - 99)  BP: 109/70 (29 Jun 2025 04:39) (109/70 - 122/80)  BP(mean): --  RR: 18 (29 Jun 2025 04:39) (17 - 18)  SpO2: 92% (29 Jun 2025 04:39) (92% - 96%)    Parameters below as of 29 Jun 2025 04:39  Patient On (Oxygen Delivery Method): room air        GENERAL: No acute distress, comfortably in bed  HEENT: Atraumatic, normocephalic, non-icteric  NEURO: A&Ox3, no focal deficits, moving all extremities spontaneously, no dysarthria, CN II-XII grossly intact  PSYCH: Normal affect, calm, appropriate insight and judgment, fluent speech  LUNGS: CTAB, no wrr, non-labored breathing  HEART: RRR, no murmur appreciated  ABD: Soft, non-tender, non-distended, no organomegaly, no appreciable masses, +bs all 4 quadrants  EXTREMITIES: Nontender, no clubbing, cyanosis, or edema      LABS:                        7.3    10.08 )-----------( 427      ( 29 Jun 2025 04:42 )             22.9     06-29    141  |  109[H]  |  16.5  ----------------------------<  100[H]  3.8   |  20.0[L]  |  1.69[H]    Ca    8.1[L]      29 Jun 2025 04:42  Phos  4.0     06-28  Mg     1.8     06-28    TPro  5.1[L]  /  Alb  2.6[L]  /  TBili  <0.2[L]  /  DBili  x   /  AST  15  /  ALT  17  /  AlkPhos  94  06-28          Urinalysis Basic - ( 29 Jun 2025 04:42 )    Color: x / Appearance: x / SG: x / pH: x  Gluc: 100 mg/dL / Ketone: x  / Bili: x / Urobili: x   Blood: x / Protein: x / Nitrite: x   Leuk Esterase: x / RBC: x / WBC x   Sq Epi: x / Non Sq Epi: x / Bacteria: x        CAPILLARY BLOOD GLUCOSE            RADIOLOGY & ADDITIONAL TESTS:  Results Reviewed: Y  Imaging Personally Reviewed: N  Electrocardiogram Personally Reviewed: ARLIN

## 2025-06-29 NOTE — PROGRESS NOTE ADULT - ASSESSMENT
61 y/o F w/ PMH of HTN, HLD, prior obesity (currently on mounjaro for 1.5 yrs w/ 80lb wt loss) and anxiety/depression (on lexapro) s/p L-LICHA (6/9/25 at [facility]) presents c/o nausea, NBNB emesis and large vol watery nonbloody diarrhea w/ associated LLQ abd pain x 3 days ago associated w/ anuria, admitted for acute renal failure and sepsis 2/2 colitis. Initially had some blood bowel movements, which transformed into multiple watery BM. Given IVF, started on zosyn. C diff was negative.     # Acute renal failure w/ mild rhabdo suspect multifactorial from N/V/D while on NSAIDs and ARB   # Hypovolemic mild hyponatremic hypochloremia likely 2/2 N/V/D  - monae catheter placed in ED  - s/p HD 6/17, 6/20, 6/22, 6/25  - last known Cr 0.85 5/2025, cr now improving  -  Holding outpt Naproxen, losartan   - encourage po intake  - Strict I/O's  - avoid nephrotoxic agents or renally dose if needed  - c/w prn antiemetics  - nephrology following, Cr 1.69, DC HD cath today, repeat BMP tomorrow    #Headache  - likely 2/2 dehydration in the setting of C.diff, HD  - ESGIC, acetaminophen, sumatriptan prn  - CTH: Cervical spondylosis, more notably at C4-5 and C5-6 where there is likely moderate to marked spinal canal narrowing with probable compression on the cord.  - MR reviewed - various disc bulges, no clinical symptoms, pain mgmt for now, f/u spine outpatient    # Sepsis (resolved)  # pseudomembranous colitis  - CT a/p w/ findings compatible with mild acute colitis in the left descending and sigmoid colon  - 6/21 repeat C. diff PCR negative  - 6/24 flexible sigmoidoscopy found pseudomembranous colitis  - c/w vancomycin PO, Stools are now formed  - completed 1 week zosyn on 6/26  - placed on contact precaution  - bentyl, cholestyramine powder, for abd spasms and diarrhea  - tylenol 650mg q6h prn for fever  - low fiber diet  - f/u fecal calprotectin, celiac cascade  - ctm CBC, temp    - GI following      # Transaminitis w/ hepatocellular distribution - resolved  Ischemic.   - Trend LFTs and avoid hepatotoxic medications       # HTN / HLD  - bp stable today  - hold losartan 50mg qd for sandy  - crestor 10mg qhs    # Former obesity  - On mounjaro w/ 80lb weight loss and now BMI 23  - Hold mounjaro while inpt     #Anxiety/depression  - lexapro 20mg po QD     s/p L-LICHA (6/9/25 at [facility])  - PT/OT, OOB  - ICS  - acetaminophen 975mg po q8h for moderate pain  - dilaudid 2mg po q4h prn for severe pain  - dilaudid 0.5mg IVP q4h prn for breakthrough pain      VTE ppx:  Heparin sq  Diet: low fiber  Dispositon: LIkely DC tomorrow after HD line removed and cr improving further

## 2025-06-29 NOTE — CHART NOTE - NSCHARTNOTEFT_GEN_A_CORE
R IJ temporary HD catheter was removed. Patient was placed in Trendelenburg, sutures were cut and catheter was removed without issue. Pressure was held for 15 minutes at site. Hemostasis was evident. Site was covered with clean gauze and tegaderm. Please call vascular surgery with any concerns.

## 2025-06-29 NOTE — CHART NOTE - NSCHARTNOTESELECT_GEN_ALL_CORE
Event Note
Nutrition Services
R IJ HD catheter removal/Event Note
VASCULAR SURGERY/Event Note
Letter for patient
Nutrition Services
Nutrition Services

## 2025-06-29 NOTE — PROGRESS NOTE ADULT - ASSESSMENT
61 y/o female pt w/ PMH of HTN, HLD, prior obesity (currently on mounjaro for 1.5 yrs w/ 80lb wt loss) and anxiety/depression (on lexapro) s/p L-LICHA (6/9/25 at [facility]) presents c/o nausea, NBNB emesis and large vol watery nonbloody diarrhea w/ associated LLQ abd pain x 3 days ago associated w/ anuria, admitted for acute renal failure and sepsis 2/2 colitis. Initially had some blood bowel movements, which transformed into multiple watery BM. Given IVF, started on zosyn. C diff was negative. She was initiated on renal replacement therapy, but has since begun to recover function., based on the delta creatinine and increased U/O    Discontinue HD catheter  Monitor renal function, electrolytes and volume status      Diagnosis:      1.  Acute kidney injury with hyperkalemia and acidosis  2.  Hypertension;  3.  Status post hip replacement.    4.  Colitis

## 2025-06-30 ENCOUNTER — TRANSCRIPTION ENCOUNTER (OUTPATIENT)
Age: 60
End: 2025-06-30

## 2025-06-30 VITALS
DIASTOLIC BLOOD PRESSURE: 78 MMHG | HEART RATE: 98 BPM | OXYGEN SATURATION: 98 % | RESPIRATION RATE: 17 BRPM | SYSTOLIC BLOOD PRESSURE: 126 MMHG | TEMPERATURE: 99 F

## 2025-06-30 LAB
ANION GAP SERPL CALC-SCNC: 12 MMOL/L — SIGNIFICANT CHANGE UP (ref 5–17)
BASOPHILS # BLD AUTO: 0.14 K/UL — SIGNIFICANT CHANGE UP (ref 0–0.2)
BASOPHILS NFR BLD AUTO: 1.5 % — SIGNIFICANT CHANGE UP (ref 0–2)
BUN SERPL-MCNC: 14 MG/DL — SIGNIFICANT CHANGE UP (ref 8–20)
CALCIUM SERPL-MCNC: 8.3 MG/DL — LOW (ref 8.4–10.5)
CHLORIDE SERPL-SCNC: 110 MMOL/L — HIGH (ref 96–108)
CO2 SERPL-SCNC: 19 MMOL/L — LOW (ref 22–29)
COLLECT DURATION TIME UR: 24 HR — SIGNIFICANT CHANGE UP
CREAT 24H UR-MCNC: 43 MG/DL — SIGNIFICANT CHANGE UP
CREAT SERPL-MCNC: 1.4 MG/DL — HIGH (ref 0.5–1.3)
CREAT UR-MCNC: 43 MG/DL — SIGNIFICANT CHANGE UP
EGFR: 43 ML/MIN/1.73M2 — LOW
EGFR: 43 ML/MIN/1.73M2 — LOW
EOSINOPHIL # BLD AUTO: 0.56 K/UL — HIGH (ref 0–0.5)
EOSINOPHIL NFR BLD AUTO: 5.8 % — SIGNIFICANT CHANGE UP (ref 0–6)
GLUCOSE SERPL-MCNC: 82 MG/DL — SIGNIFICANT CHANGE UP (ref 70–99)
HCT VFR BLD CALC: 24.8 % — LOW (ref 34.5–45)
HGB BLD-MCNC: 7.8 G/DL — LOW (ref 11.5–15.5)
IMM GRANULOCYTES # BLD AUTO: 0.03 K/UL — SIGNIFICANT CHANGE UP (ref 0–0.07)
IMM GRANULOCYTES NFR BLD AUTO: 0.3 % — SIGNIFICANT CHANGE UP (ref 0–0.9)
LYMPHOCYTES # BLD AUTO: 2.38 K/UL — SIGNIFICANT CHANGE UP (ref 1–3.3)
LYMPHOCYTES NFR BLD AUTO: 24.7 % — SIGNIFICANT CHANGE UP (ref 13–44)
Lab: 140 MG/G — SIGNIFICANT CHANGE UP
Lab: 6 MG/DL — SIGNIFICANT CHANGE UP
MAGNESIUM SERPL-MCNC: 1.6 MG/DL — SIGNIFICANT CHANGE UP (ref 1.6–2.6)
MCHC RBC-ENTMCNC: 28.3 PG — SIGNIFICANT CHANGE UP (ref 27–34)
MCHC RBC-ENTMCNC: 31.5 G/DL — LOW (ref 32–36)
MCV RBC AUTO: 89.9 FL — SIGNIFICANT CHANGE UP (ref 80–100)
MONOCYTES # BLD AUTO: 0.85 K/UL — SIGNIFICANT CHANGE UP (ref 0–0.9)
MONOCYTES NFR BLD AUTO: 8.8 % — SIGNIFICANT CHANGE UP (ref 2–14)
NEUTROPHILS # BLD AUTO: 5.66 K/UL — SIGNIFICANT CHANGE UP (ref 1.8–7.4)
NEUTROPHILS NFR BLD AUTO: 58.9 % — SIGNIFICANT CHANGE UP (ref 43–77)
NRBC # BLD AUTO: 0 K/UL — SIGNIFICANT CHANGE UP (ref 0–0)
NRBC # FLD: 0 K/UL — SIGNIFICANT CHANGE UP (ref 0–0)
NRBC BLD AUTO-RTO: 0 /100 WBCS — SIGNIFICANT CHANGE UP (ref 0–0)
PLATELET # BLD AUTO: 466 K/UL — HIGH (ref 150–400)
PMV BLD: 9.7 FL — SIGNIFICANT CHANGE UP (ref 7–13)
POTASSIUM SERPL-MCNC: 3.9 MMOL/L — SIGNIFICANT CHANGE UP (ref 3.5–5.3)
POTASSIUM SERPL-SCNC: 3.9 MMOL/L — SIGNIFICANT CHANGE UP (ref 3.5–5.3)
PROT 24H UR-MRATE: 160 MG/24HR — HIGH
RBC # BLD: 2.76 M/UL — LOW (ref 3.8–5.2)
RBC # FLD: 13.2 % — SIGNIFICANT CHANGE UP (ref 10.3–14.5)
SODIUM SERPL-SCNC: 141 MMOL/L — SIGNIFICANT CHANGE UP (ref 135–145)
TOTAL VOLUME - URINE: 2675 ML — SIGNIFICANT CHANGE UP
URINE CREATININE CALCULATION: 1.2 G/24 HR — SIGNIFICANT CHANGE UP (ref 0.8–1.6)
WBC # BLD: 9.62 K/UL — SIGNIFICANT CHANGE UP (ref 3.8–10.5)
WBC # FLD AUTO: 9.62 K/UL — SIGNIFICANT CHANGE UP (ref 3.8–10.5)

## 2025-06-30 PROCEDURE — 85014 HEMATOCRIT: CPT

## 2025-06-30 PROCEDURE — 74176 CT ABD & PELVIS W/O CONTRAST: CPT

## 2025-06-30 PROCEDURE — 86706 HEP B SURFACE ANTIBODY: CPT

## 2025-06-30 PROCEDURE — 84132 ASSAY OF SERUM POTASSIUM: CPT

## 2025-06-30 PROCEDURE — 80048 BASIC METABOLIC PNL TOTAL CA: CPT

## 2025-06-30 PROCEDURE — 80074 ACUTE HEPATITIS PANEL: CPT

## 2025-06-30 PROCEDURE — 81376 HLA II TYPING 1 LOCUS LR: CPT

## 2025-06-30 PROCEDURE — 80053 COMPREHEN METABOLIC PANEL: CPT

## 2025-06-30 PROCEDURE — 83690 ASSAY OF LIPASE: CPT

## 2025-06-30 PROCEDURE — 84100 ASSAY OF PHOSPHORUS: CPT

## 2025-06-30 PROCEDURE — 82009 KETONE BODYS QUAL: CPT

## 2025-06-30 PROCEDURE — 87493 C DIFF AMPLIFIED PROBE: CPT

## 2025-06-30 PROCEDURE — 0241U: CPT

## 2025-06-30 PROCEDURE — 85652 RBC SED RATE AUTOMATED: CPT

## 2025-06-30 PROCEDURE — 82330 ASSAY OF CALCIUM: CPT

## 2025-06-30 PROCEDURE — 87799 DETECT AGENT NOS DNA QUANT: CPT

## 2025-06-30 PROCEDURE — 82962 GLUCOSE BLOOD TEST: CPT

## 2025-06-30 PROCEDURE — P9047: CPT

## 2025-06-30 PROCEDURE — 70450 CT HEAD/BRAIN W/O DYE: CPT

## 2025-06-30 PROCEDURE — 87641 MR-STAPH DNA AMP PROBE: CPT

## 2025-06-30 PROCEDURE — 80076 HEPATIC FUNCTION PANEL: CPT

## 2025-06-30 PROCEDURE — 87046 STOOL CULTR AEROBIC BACT EA: CPT

## 2025-06-30 PROCEDURE — 82653 EL-1 FECAL QUANTITATIVE: CPT

## 2025-06-30 PROCEDURE — 36415 COLL VENOUS BLD VENIPUNCTURE: CPT

## 2025-06-30 PROCEDURE — 86140 C-REACTIVE PROTEIN: CPT

## 2025-06-30 PROCEDURE — 84443 ASSAY THYROID STIM HORMONE: CPT

## 2025-06-30 PROCEDURE — 82784 ASSAY IGA/IGD/IGG/IGM EACH: CPT

## 2025-06-30 PROCEDURE — 84156 ASSAY OF PROTEIN URINE: CPT

## 2025-06-30 PROCEDURE — 85018 HEMOGLOBIN: CPT

## 2025-06-30 PROCEDURE — 71045 X-RAY EXAM CHEST 1 VIEW: CPT

## 2025-06-30 PROCEDURE — 83930 ASSAY OF BLOOD OSMOLALITY: CPT

## 2025-06-30 PROCEDURE — 85027 COMPLETE CBC AUTOMATED: CPT

## 2025-06-30 PROCEDURE — 83605 ASSAY OF LACTIC ACID: CPT

## 2025-06-30 PROCEDURE — 82947 ASSAY GLUCOSE BLOOD QUANT: CPT

## 2025-06-30 PROCEDURE — 84550 ASSAY OF BLOOD/URIC ACID: CPT

## 2025-06-30 PROCEDURE — 82550 ASSAY OF CK (CPK): CPT

## 2025-06-30 PROCEDURE — 85610 PROTHROMBIN TIME: CPT

## 2025-06-30 PROCEDURE — 76700 US EXAM ABDOM COMPLETE: CPT

## 2025-06-30 PROCEDURE — 85025 COMPLETE CBC W/AUTO DIFF WBC: CPT

## 2025-06-30 PROCEDURE — 87077 CULTURE AEROBIC IDENTIFY: CPT

## 2025-06-30 PROCEDURE — 96376 TX/PRO/DX INJ SAME DRUG ADON: CPT

## 2025-06-30 PROCEDURE — 85730 THROMBOPLASTIN TIME PARTIAL: CPT

## 2025-06-30 PROCEDURE — 93005 ELECTROCARDIOGRAM TRACING: CPT

## 2025-06-30 PROCEDURE — 82010 KETONE BODYS QUAN: CPT

## 2025-06-30 PROCEDURE — 86900 BLOOD TYPING SEROLOGIC ABO: CPT

## 2025-06-30 PROCEDURE — 86850 RBC ANTIBODY SCREEN: CPT

## 2025-06-30 PROCEDURE — 88305 TISSUE EXAM BY PATHOLOGIST: CPT

## 2025-06-30 PROCEDURE — 96374 THER/PROPH/DIAG INJ IV PUSH: CPT

## 2025-06-30 PROCEDURE — 87040 BLOOD CULTURE FOR BACTERIA: CPT

## 2025-06-30 PROCEDURE — 87177 OVA AND PARASITES SMEARS: CPT

## 2025-06-30 PROCEDURE — 72125 CT NECK SPINE W/O DYE: CPT

## 2025-06-30 PROCEDURE — 96375 TX/PRO/DX INJ NEW DRUG ADDON: CPT | Mod: XU

## 2025-06-30 PROCEDURE — 51702 INSERT TEMP BLADDER CATH: CPT

## 2025-06-30 PROCEDURE — 83993 ASSAY FOR CALPROTECTIN FECAL: CPT

## 2025-06-30 PROCEDURE — 87507 IADNA-DNA/RNA PROBE TQ 12-25: CPT

## 2025-06-30 PROCEDURE — 86364 TISS TRNSGLTMNASE EA IG CLAS: CPT

## 2025-06-30 PROCEDURE — 82435 ASSAY OF BLOOD CHLORIDE: CPT

## 2025-06-30 PROCEDURE — 87045 FECES CULTURE AEROBIC BACT: CPT

## 2025-06-30 PROCEDURE — 87640 STAPH A DNA AMP PROBE: CPT

## 2025-06-30 PROCEDURE — 83735 ASSAY OF MAGNESIUM: CPT

## 2025-06-30 PROCEDURE — 82803 BLOOD GASES ANY COMBINATION: CPT

## 2025-06-30 PROCEDURE — 72141 MRI NECK SPINE W/O DYE: CPT

## 2025-06-30 PROCEDURE — 87637 SARSCOV2&INF A&B&RSV AMP PRB: CPT

## 2025-06-30 PROCEDURE — 82553 CREATINE MB FRACTION: CPT

## 2025-06-30 PROCEDURE — 84295 ASSAY OF SERUM SODIUM: CPT

## 2025-06-30 PROCEDURE — 86901 BLOOD TYPING SEROLOGIC RH(D): CPT

## 2025-06-30 PROCEDURE — 99239 HOSP IP/OBS DSCHRG MGMT >30: CPT | Mod: GC

## 2025-06-30 PROCEDURE — 99285 EMERGENCY DEPT VISIT HI MDM: CPT | Mod: 25

## 2025-06-30 PROCEDURE — 99261: CPT

## 2025-06-30 PROCEDURE — 99232 SBSQ HOSP IP/OBS MODERATE 35: CPT

## 2025-06-30 RX ORDER — BUTALBITAL, ACETAMINOPHEN AND CAFFEINE 50; 325; 40 MG/1; MG/1; MG/1
1 TABLET ORAL ONCE
Refills: 0 | Status: COMPLETED | OUTPATIENT
Start: 2025-06-30 | End: 2025-06-30

## 2025-06-30 RX ORDER — NALOXONE HYDROCHLORIDE 0.4 MG/ML
1 INJECTION, SOLUTION INTRAMUSCULAR; INTRAVENOUS; SUBCUTANEOUS
Qty: 1 | Refills: 0
Start: 2025-06-30

## 2025-06-30 RX ORDER — HYDROMORPHONE/SOD CHLOR,ISO/PF 2 MG/10 ML
1 SYRINGE (ML) INJECTION
Qty: 9 | Refills: 0
Start: 2025-06-30 | End: 2025-07-02

## 2025-06-30 RX ORDER — BUTALBITAL, ACETAMINOPHEN AND CAFFEINE 50; 325; 40 MG/1; MG/1; MG/1
1 TABLET ORAL
Qty: 20 | Refills: 0
Start: 2025-06-30 | End: 2025-07-04

## 2025-06-30 RX ADMIN — Medication 2 MILLIGRAM(S): at 06:53

## 2025-06-30 RX ADMIN — Medication 2 MILLIGRAM(S): at 05:04

## 2025-06-30 RX ADMIN — BUTALBITAL, ACETAMINOPHEN AND CAFFEINE 1 TABLET(S): 50; 325; 40 TABLET ORAL at 02:21

## 2025-06-30 RX ADMIN — BUTALBITAL, ACETAMINOPHEN AND CAFFEINE 1 TABLET(S): 50; 325; 40 TABLET ORAL at 03:34

## 2025-06-30 RX ADMIN — Medication 125 MILLIGRAM(S): at 13:49

## 2025-06-30 RX ADMIN — Medication 20 MILLIGRAM(S): at 05:05

## 2025-06-30 RX ADMIN — Medication 125 MILLIGRAM(S): at 05:04

## 2025-06-30 RX ADMIN — Medication 2 MILLIGRAM(S): at 13:48

## 2025-06-30 RX ADMIN — Medication 2 MILLIGRAM(S): at 00:15

## 2025-06-30 RX ADMIN — Medication 4 GRAM(S): at 08:25

## 2025-06-30 RX ADMIN — Medication 1 TABLET(S): at 13:49

## 2025-06-30 RX ADMIN — Medication 1 APPLICATION(S): at 05:07

## 2025-06-30 RX ADMIN — ESCITALOPRAM OXALATE 20 MILLIGRAM(S): 20 TABLET ORAL at 13:49

## 2025-06-30 RX ADMIN — Medication 500 MILLIGRAM(S): at 13:49

## 2025-06-30 NOTE — DISCHARGE NOTE PROVIDER - PROVIDER TOKENS
PROVIDER:[TOKEN:[[id number]],FOLLOWUP:[2 weeks]],FREE:[LAST:[De La Cruz],FIRST:[Alexa],PHONE:[phone]],FAX:[(   )    -],ADDRESS:[[address] [place] [address]],FOLLOWUP:[1 week]],PROVIDER:[TOKEN:[[id number]]]

## 2025-06-30 NOTE — DISCHARGE NOTE PROVIDER - HOSPITAL COURSE
HPI: 60-year-old female with hypertension, hyperlipidemia, and anxiety/depression presented after left hip replacement (6/9/25) with 3 days of nausea, vomiting, diarrhea, LLQ pain, and anuria. She had been taking naproxen 500mg BID and losartan. On admission, she was hypotensive, tachycardic, and tachypneic with laboratory evidence of acute kidney injury (Cr 5.22, baseline 0.85), hyperkalemia (K 6.5), and leukocytosis (WBC 23K).    Hospital Course: Patient was diagnosed with multifactorial acute kidney injury due to dehydration, NSAID use, and ARB therapy. Despite aggressive fluid resuscitation, her renal function worsened (peak Cr 7.38), requiring hemodialysis initiation on 6/17/25. CT abdomen showed colitis; despite negative C. difficile testing, sigmoidoscopy on 6/24 revealed pseudomembranous colitis, treated with oral vancomycin. She also developed headaches, with imaging showing cervical spondylosis with spinal canal narrowing. Her renal function improved with dialysis, with creatinine decreasing to 1.69 by 6/29/25, allowing discontinuation of dialysis. Her colitis symptoms and headaches also improved with treatment    Important Medication Changes and Reason:  Continue oral vancomycin for pseudomembranous colitis  Continue all other medications as seen in medication reconciliation.    Active or Pending Issues Requiring Follow-up:  Outpatient follow-up with nephrology  Outpatient colonoscopy in 6-8 weeks  Hold NSAIDs and ARB until cleared by nephrology    Advanced Directives:   Full code    Discharge Diagnoses:  Acute kidney injury (multifactorial: prerenal due to dehydration, NSAID nephrotoxicity, ARB use)  Pseudomembranous colitis  Hyperkalemia (resolved)  Transaminitis (resolved)  Hypertension  Hyperlipidemia  Status post left total hip arthroplasty (6/9/25)  Anxiety/depression

## 2025-06-30 NOTE — DISCHARGE NOTE PROVIDER - CARE PROVIDER_API CALL
Otoniel Schmitz [facility]  Nephrology  [address] [place] [address]  Phone: [phone]  Fax: [phone]  Follow Up Time: 2 weeks    [place] [address]  Phone: [phone]  Fax: (   )    -  Follow Up Time: 1 week    Adrian Mi  Orthopaedic Surgery  [address] [place] [id number]  Phone: [phone]  Fax: [phone]  Follow Up Time:

## 2025-06-30 NOTE — PROGRESS NOTE ADULT - REASON FOR ADMISSION
acute renal failure
Diarrhea
acute renal failure

## 2025-06-30 NOTE — DISCHARGE NOTE PROVIDER - NSDCFUADDAPPT_GEN_ALL_CORE_FT
APPTS ARE READY TO BE MADE: [] YES    Best Family or Patient Contact (if needed):    Additional Information about above appointments (if needed):    1: Needs appt with Nephrology  2: Needs appt with PCP  3: Needs appt with spine    Other comments or requests:

## 2025-06-30 NOTE — PROGRESS NOTE ADULT - SUBJECTIVE AND OBJECTIVE BOX
Reason for visit: NAVI    Subjective: No acute overnight event. Patient denied any cardiac or urinary complains. No fever/chills. Temp HD catheter d/c'ed.     ROS: All systems were reviewed in detail pertinent positive and negative mentioned above, rest are negative.    Physical Exam:  Gen: no acute distress  MS: alert, conversing normally  HENT: NCAT, MMM  CV: rhythm reg reg, rate normal, no m/g/r  Chest: CTAB, no w/r/r,  Abd: soft, NT, ND  Extremities: No edema    =======================================================  Vital Signs Last 24 Hrs  T(C): 36.6 (30 Jun 2025 08:13), Max: 37.6 (29 Jun 2025 17:14)  T(F): 97.9 (30 Jun 2025 08:13), Max: 99.7 (29 Jun 2025 17:14)  HR: 88 (30 Jun 2025 08:13) (88 - 98)  BP: 118/61 (30 Jun 2025 08:13) (110/65 - 121/79)  BP(mean): --  RR: 17 (30 Jun 2025 08:13) (17 - 18)  SpO2: 94% (30 Jun 2025 08:13) (94% - 97%)    Parameters below as of 30 Jun 2025 08:13  Patient On (Oxygen Delivery Method): room air      I&O's Summary    29 Jun 2025 07:01  -  30 Jun 2025 07:00  --------------------------------------------------------  IN: 420 mL / OUT: 0 mL / NET: 420 mL      =======================================================  Current Antibiotics:  vancomycin    Solution 125 milliGRAM(s) Oral every 6 hours    Other medications:  ascorbic acid 500 milliGRAM(s) Oral daily  chlorhexidine 2% Cloths 1 Application(s) Topical <User Schedule>  chlorhexidine 2% Cloths 1 Application(s) Topical <User Schedule>  cholestyramine Powder (Sugar-Free) 4 Gram(s) Oral two times a day  dextrose 5%. 1000 milliLiter(s) IV Continuous <Continuous>  dextrose 50% Injectable 25 Gram(s) IV Push once  dextrose 50% Injectable 12.5 Gram(s) IV Push once  dextrose 50% Injectable 25 Gram(s) IV Push once  dextrose Oral Gel 15 Gram(s) Oral once  escitalopram 20 milliGRAM(s) Oral daily  glucagon  Injectable 1 milliGRAM(s) IntraMuscular once  heparin   Injectable 5000 Unit(s) SubCutaneous every 8 hours  lactated ringers. 1000 milliLiter(s) IV Continuous <Continuous>  multivitamin 1 Tablet(s) Oral daily    =======================================================  06-30    141  |  110[H]  |  14.0  ----------------------------<  82  3.9   |  19.0[L]  |  1.40[H]    Ca    8.3[L]      30 Jun 2025 04:40  Mg     1.6     06-30      Creatinine: 1.40 mg/dL (06-30-25 @ 04:40)  Creatinine: 1.69 mg/dL (06-29-25 @ 04:42)  Creatinine: 1.97 mg/dL (06-28-25 @ 04:42)  Creatinine: 2.07 mg/dL (06-27-25 @ 07:10)  Creatinine: 1.91 mg/dL (06-26-25 @ 05:18)    =======================================================

## 2025-06-30 NOTE — PROGRESS NOTE ADULT - PROVIDER SPECIALTY LIST ADULT
Gastroenterology
Infectious Disease
Internal Medicine
Nephrology
Orthopedics
Gastroenterology
Hospitalist
Internal Medicine
Nephrology
Nephrology
Gastroenterology
Hospitalist
Infectious Disease
Internal Medicine
Internal Medicine
Nephrology
Orthopedics
Hospitalist
Infectious Disease
Internal Medicine
Internal Medicine
Nephrology
Nephrology
Infectious Disease
Hospitalist
Hospitalist
Gastroenterology

## 2025-06-30 NOTE — DISCHARGE NOTE PROVIDER - CARE PROVIDERS DIRECT ADDRESSES
,DirectAddress_Unknown,DirectAddress_Unknown,[email or web address]

## 2025-06-30 NOTE — DISCHARGE NOTE PROVIDER - ATTENDING DISCHARGE PHYSICAL EXAMINATION:
GENERAL: no acute distress, comfortably in bed  HEAD: NC/AT  EYES: PERRLA, EOMI, Non-icteric  ENT: Mucous membranes moist, neck supple  NECK: Dressing over prior shiley site, dry, clean, nontender  NEURO: No focal deficits, moving all extremities spontaneously, A&Ox3, no dysarthria, CN II-XII grossly intact  PSYCH: Normal affect, calm, appropriate insight and judgment, fluent speech  RESP: CTAB, no wrr, non-labored breathing  CVS: RRR, no murmur appreciated  GI: Soft, non-tender, non-distended, no organomegaly, no appreciable masses, +bs all 4 quadrants  : No monae, no suprapubic tenderness  EXTREMITIES: Nontender, no clubbing, cyanosis, or edema  SKIN: No rashes or lesions

## 2025-06-30 NOTE — PROGRESS NOTE ADULT - TIME BILLING
Chart review, interpretation of laboratory and imaging results,  patient evaluation, medication review, documentation, coordination with medical team. Interpretation  and review of microbiologic data. Overseeing antibiotic therapy.
During the encounter, significant time was dedicated to reviewing chart documentation, analyzing labs and imaging studies, evaluating the patient, coordinating with the medical team and/or consultants concerning the plan of care, and completing documentation. This time encompasses direct care activities while distinguishing from any separately reported procedure and time spent teaching
During the encounter, significant time was dedicated to reviewing chart documentation, analyzing labs and imaging studies, evaluating the patient, coordinating with the medical team and/or consultants concerning the plan of care, and completing documentation. This time encompasses direct care activities while distinguishing from any separately reported procedure and time spent teaching
Time spent reviewing the chart documentation, reviewing labs and imaging studies, evaluating the patient, clinical exam, discussing the plan of care with the medical team and/or all necessary consultants, and documenting.
During the encounter, significant time was dedicated to reviewing chart documentation, analyzing labs and imaging studies, evaluating the patient, coordinating with the medical team and/or consultants concerning the plan of care, and completing documentation. This time encompasses direct care activities while distinguishing from any separately reported procedure and time spent teaching
chart review, pt eval, documentation
During the encounter, significant time was dedicated to reviewing chart documentation, analyzing labs and imaging studies, evaluating the patient, coordinating with the medical team and/or consultants concerning the plan of care, and completing documentation. This time encompasses direct care activities while distinguishing from any separately reported procedure and time spent teaching
During the encounter, significant time was dedicated to reviewing chart documentation, analyzing labs and imaging studies, evaluating the patient, coordinating with the medical team and/or consultants concerning the plan of care, and completing documentation. This time encompasses direct care activities while distinguishing from any separately reported procedure and time spent teaching
Reviewing her CT images and lab work.
Time spent reviewing the chart documentation, reviewing labs and imaging studies, evaluating the patient, discussing the plan of care with the medical team and/or all necessary consultants, and documenting.
chart review, pt eval, documentation, coordination of care
See above. She states that she has a diarrheal BM every two hours and is presently on a low fat low fiber diet. Cholestyramine was ordered yesterday but she never received it for unknown reasons. Will increase dosing from once to twice daily and decrease diet back down to clear liquids. Repeat labs ordered for the AM
During the encounter, significant time was dedicated to reviewing chart documentation, analyzing labs and imaging studies, evaluating the patient, coordinating with the medical team and/or consultants concerning the plan of care, and completing documentation. This time encompasses direct care activities while distinguishing from any separately reported procedure and time spent teaching
During the encounter, significant time was dedicated to reviewing chart documentation, analyzing labs and imaging studies, evaluating the patient, coordinating with the medical team and/or consultants concerning the plan of care, and completing documentation. This time encompasses direct care activities while distinguishing from any separately reported procedure and time spent teaching
Time spent on patient encounter including emergency & inpatient department chart review, history taking and physical exam, review of laboratory data & radiology results, developing patient plan and tailoring H&P, as well as discussion with patient, family, RN, and other providers involved in patient's care.
During the encounter, significant time was dedicated to reviewing chart documentation, analyzing labs and imaging studies, evaluating the patient, coordinating with the medical team and/or consultants concerning the plan of care, and completing documentation. This time encompasses direct care activities while distinguishing from any separately reported procedure and time spent teaching
Reviewing her CT images and lab work.

## 2025-06-30 NOTE — DISCHARGE NOTE PROVIDER - NSDCCPCAREPLAN_GEN_ALL_CORE_FT
PRINCIPAL DISCHARGE DIAGNOSIS  Diagnosis: Pseudomembranous colitis  Assessment and Plan of Treatment: While in the hospital, you developed severe inflammation of your colon (large intestine) called pseudomembranous colitis. This was diagnosed by a procedure called flexible sigmoidoscopy where we looked inside your colon with a camera. You had symptoms of abdominal pain and diarrhea that sometimes contained blood. You were started on an antibiotic called vancomycin by mouth to treat this condition.  You should complete the full course of vancomycin as prescribed. Continue with a low-fiber diet for the next few weeks to allow your colon to heal. Avoid spicy foods, high-fat foods, and dairy products as these may worsen diarrhea. Report any return of diarrhea, especially if it contains blood, or severe abdominal pain to your doctor immediately. You should follow up with your gastroenterologist, for a colonoscopy in 6-8 weeks as recommended.      SECONDARY DISCHARGE DIAGNOSES  Diagnosis: Sepsis with acute renal failure  Assessment and Plan of Treatment: You were admitted to the hospital with acute kidney failure. Your kidney function was severely affected due to a combination of dehydration from diarrhea and vomiting, as well as some medications you were taking (naproxen and losartan). Your kidney function was so severely affected that you required temporary dialysis through a special catheter in your neck. After several dialysis treatments and stopping the medications that were affecting your kidneys, your kidney function has significantly improved. The dialysis catheter has been removed as you no longer need dialysis.  You should continue drinking plenty of fluids (at least 8 glasses of water daily) to keep your kidneys healthy. Avoid taking NSAIDs like ibuprofen, naproxen, or Aleve as these can harm your kidneys. You should also not restart your blood pressure medication (losartan) until your kidney doctor says it's safe. Follow up with your kidney doctor (nephrologist) as scheduled to monitor your kidney function.    Diagnosis: Tension headache  Assessment and Plan of Treatment: During your hospital stay, you experienced headaches. Imaging of your neck and head showed cervical spondylosis (arthritis in your neck) at C4-5 and C5-6 levels with narrowing of your spinal canal that may be pressing on your spinal cord. We treated your headaches with acetaminophen (Tylenol), ESGIC (a combination medication), and sumatriptan as needed, which helped improve your symptoms.  You have been given a small supply of Fioricet (butalbital/acetaminophen/caffeine) for your headaches. Take this medication as directed when you experience headache pain. This is a temporary supply, so please follow up with your primary care physician and a spine specialist to establish a long-term plan for managing your headaches and neck condition. If you experience severe headaches, neck pain, numbness or tingling in your arms, or weakness, please seek medical attention immediately.

## 2025-06-30 NOTE — PROGRESS NOTE ADULT - ASSESSMENT
59 y/o female pt w/ PMH of HTN, HLD) admitted for acute renal failure and sepsis 2/2 colitis. Initially had some blood bowel movements, which transformed into multiple watery BM. Given IVF, started on zosyn. C diff was negative. She was initiated on renal replacement therapy, but has since begun to recover function., based on the delta creatinine and increased U/O HD catheter discontinued.    1.  Acute kidney injury with hyperkalemia and acidosis, improved. PO intake improving. Can d/c IVF. Consider discharging on PO odium bicarbonate 650 mg PO BID x 1 week.   2.  Hypertension; BP WNL  3.  Status post hip replacement.    4.  Colitis improving on Abx      Stable from nephrology stand point for discharge   D/w primary team.

## 2025-06-30 NOTE — DISCHARGE NOTE PROVIDER - NSDCMRMEDTOKEN_GEN_ALL_CORE_FT
dicyclomine 10 mg oral capsule: 2 cap(s) orally 4 times a day (before meals and at bedtime) as needed for abd spasms  Fioricet 50 mg-300 mg-40 mg oral capsule: 1 cap(s) orally every 6 hours as needed for  headache  Lexapro 20 mg oral tablet: 1 tab(s) orally once a day  methocarbamol 500 mg oral tablet: 2 tab(s) orally every 6 hours as needed for  moderate pain  rosuvastatin 10 mg oral capsule: 1 cap(s) orally once a day  vancomycin 125 mg oral capsule: 1 cap(s) orally 4 times a day  Vitamin D3 125 mcg (5000 intl units) oral capsule: 1 cap(s) orally once a day

## 2025-07-09 ENCOUNTER — APPOINTMENT (OUTPATIENT)
Facility: CLINIC | Age: 60
End: 2025-07-09
Payer: COMMERCIAL

## 2025-07-09 VITALS
HEART RATE: 108 BPM | RESPIRATION RATE: 16 BRPM | HEIGHT: 64.5 IN | SYSTOLIC BLOOD PRESSURE: 120 MMHG | BODY MASS INDEX: 22.83 KG/M2 | WEIGHT: 135.38 LBS | OXYGEN SATURATION: 96 % | TEMPERATURE: 98 F | DIASTOLIC BLOOD PRESSURE: 98 MMHG

## 2025-07-09 PROBLEM — A04.72 PSEUDOMEMBRANOUS COLITIS: Status: ACTIVE | Noted: 2025-07-09

## 2025-07-09 PROBLEM — D50.8 OTHER IRON DEFICIENCY ANEMIA: Status: ACTIVE | Noted: 2025-07-09

## 2025-07-09 PROBLEM — M43.02 CERVICAL SPONDYLOLYSIS: Status: ACTIVE | Noted: 2025-07-09

## 2025-07-09 PROCEDURE — 99204 OFFICE O/P NEW MOD 45 MIN: CPT

## 2025-07-09 PROCEDURE — 36415 COLL VENOUS BLD VENIPUNCTURE: CPT

## 2025-07-11 ENCOUNTER — APPOINTMENT (OUTPATIENT)
Dept: ORTHOPEDIC SURGERY | Facility: CLINIC | Age: 60
End: 2025-07-11
Payer: COMMERCIAL

## 2025-07-11 VITALS — WEIGHT: 135 LBS | HEIGHT: 64.5 IN | BODY MASS INDEX: 22.77 KG/M2

## 2025-07-11 PROCEDURE — 73502 X-RAY EXAM HIP UNI 2-3 VIEWS: CPT

## 2025-07-11 PROCEDURE — 99024 POSTOP FOLLOW-UP VISIT: CPT

## 2025-07-14 ENCOUNTER — NON-APPOINTMENT (OUTPATIENT)
Age: 60
End: 2025-07-14

## 2025-07-15 ENCOUNTER — NON-APPOINTMENT (OUTPATIENT)
Age: 60
End: 2025-07-15

## 2025-07-15 ENCOUNTER — APPOINTMENT (OUTPATIENT)
Dept: NEPHROLOGY | Facility: CLINIC | Age: 60
End: 2025-07-15
Payer: COMMERCIAL

## 2025-07-15 VITALS
WEIGHT: 136 LBS | HEART RATE: 103 BPM | BODY MASS INDEX: 23.22 KG/M2 | SYSTOLIC BLOOD PRESSURE: 114 MMHG | HEIGHT: 64 IN | OXYGEN SATURATION: 96 % | DIASTOLIC BLOOD PRESSURE: 62 MMHG

## 2025-07-15 PROBLEM — N17.9 ACUTE KIDNEY INJURY: Status: ACTIVE | Noted: 2025-07-09

## 2025-07-15 PROBLEM — I10 HYPERTENSION, UNSPECIFIED TYPE: Status: ACTIVE | Noted: 2018-10-24

## 2025-07-15 LAB
ALBUMIN SERPL ELPH-MCNC: 4.1 G/DL
ALP BLD-CCNC: 118 U/L
ALT SERPL-CCNC: 36 U/L
ANION GAP SERPL CALC-SCNC: 15 MMOL/L
APPEARANCE: CLEAR
AST SERPL-CCNC: 28 U/L
BACTERIA UR CULT: ABNORMAL
BASOPHILS # BLD AUTO: 0.19 K/UL
BASOPHILS NFR BLD AUTO: 1.8 %
BILIRUB SERPL-MCNC: 0.2 MG/DL
BILIRUBIN URINE: NEGATIVE
BLOOD URINE: NEGATIVE
BUN SERPL-MCNC: 20 MG/DL
CALCIUM SERPL-MCNC: 10.3 MG/DL
CHLORIDE SERPL-SCNC: 105 MMOL/L
CHOLEST SERPL-MCNC: 282 MG/DL
CO2 SERPL-SCNC: 22 MMOL/L
COLOR: YELLOW
CREAT SERPL-MCNC: 1.11 MG/DL
EGFRCR SERPLBLD CKD-EPI 2021: 56 ML/MIN/1.73M2
EOSINOPHIL # BLD AUTO: 0.56 K/UL
EOSINOPHIL NFR BLD AUTO: 5.2 %
ESTIMATED AVERAGE GLUCOSE: 111 MG/DL
FERRITIN SERPL-MCNC: 444 NG/ML
GLUCOSE QUALITATIVE U: NEGATIVE MG/DL
GLUCOSE SERPL-MCNC: 100 MG/DL
HBA1C MFR BLD HPLC: 5.5 %
HCT VFR BLD CALC: 33.4 %
HDLC SERPL-MCNC: 39 MG/DL
HGB BLD-MCNC: 10.3 G/DL
IMM GRANULOCYTES NFR BLD AUTO: 0.6 %
IRON SATN MFR SERPL: 21 %
IRON SERPL-MCNC: 60 UG/DL
KETONES URINE: NEGATIVE MG/DL
LDLC SERPL-MCNC: 211 MG/DL
LEUKOCYTE ESTERASE URINE: NEGATIVE
LYMPHOCYTES # BLD AUTO: 1.96 K/UL
LYMPHOCYTES NFR BLD AUTO: 18.1 %
MAN DIFF?: NORMAL
MCHC RBC-ENTMCNC: 29.2 PG
MCHC RBC-ENTMCNC: 30.8 G/DL
MCV RBC AUTO: 94.6 FL
MONOCYTES # BLD AUTO: 0.89 K/UL
MONOCYTES NFR BLD AUTO: 8.2 %
NEUTROPHILS # BLD AUTO: 7.18 K/UL
NEUTROPHILS NFR BLD AUTO: 66.1 %
NITRITE URINE: NEGATIVE
NONHDLC SERPL-MCNC: 243 MG/DL
PH URINE: 6
PLATELET # BLD AUTO: 485 K/UL
POTASSIUM SERPL-SCNC: 4.8 MMOL/L
PROT SERPL-MCNC: 7 G/DL
PROTEIN URINE: NEGATIVE MG/DL
RBC # BLD: 3.53 M/UL
RBC # FLD: 14 %
SODIUM SERPL-SCNC: 142 MMOL/L
SPECIFIC GRAVITY URINE: 1.02
TIBC SERPL-MCNC: 290 UG/DL
TRIGL SERPL-MCNC: 168 MG/DL
TSH SERPL-ACNC: 1.71 UIU/ML
UIBC SERPL-MCNC: 231 UG/DL
UROBILINOGEN URINE: 0.2 MG/DL
WBC # FLD AUTO: 10.84 K/UL

## 2025-07-15 PROCEDURE — 99213 OFFICE O/P EST LOW 20 MIN: CPT

## 2025-07-16 ENCOUNTER — APPOINTMENT (OUTPATIENT)
Dept: GASTROENTEROLOGY | Facility: CLINIC | Age: 60
End: 2025-07-16
Payer: COMMERCIAL

## 2025-07-16 VITALS
WEIGHT: 136 LBS | OXYGEN SATURATION: 99 % | HEART RATE: 100 BPM | BODY MASS INDEX: 23.22 KG/M2 | DIASTOLIC BLOOD PRESSURE: 80 MMHG | HEIGHT: 64 IN | SYSTOLIC BLOOD PRESSURE: 110 MMHG

## 2025-07-16 PROBLEM — Z09 HOSPITAL DISCHARGE FOLLOW-UP: Status: ACTIVE | Noted: 2025-07-09

## 2025-07-16 PROBLEM — R10.9 ABDOMINAL CRAMPING: Status: ACTIVE | Noted: 2025-07-16

## 2025-07-16 PROCEDURE — 99214 OFFICE O/P EST MOD 30 MIN: CPT

## 2025-07-16 RX ORDER — ONDANSETRON 4 MG/1
4 TABLET ORAL
Qty: 21 | Refills: 0 | Status: ACTIVE | COMMUNITY
Start: 2025-07-16 | End: 1900-01-01

## 2025-07-16 RX ORDER — DICYCLOMINE HYDROCHLORIDE 12.5 MG/1
10 CAPSULE ORAL EVERY 6 HOURS
Qty: 80 | Refills: 0 | Status: ACTIVE | COMMUNITY
Start: 2025-07-16 | End: 1900-01-01

## 2025-07-16 RX ORDER — POLYETHYLENE GLYCOL 3350 AND ELECTROLYTES WITH LEMON FLAVOR 236; 22.74; 6.74; 5.86; 2.97 G/4L; G/4L; G/4L; G/4L; G/4L
236 POWDER, FOR SOLUTION ORAL
Qty: 1 | Refills: 0 | Status: ACTIVE | COMMUNITY
Start: 2025-07-16 | End: 1900-01-01

## 2025-07-24 ENCOUNTER — APPOINTMENT (OUTPATIENT)
Dept: ORTHOPEDIC SURGERY | Facility: CLINIC | Age: 60
End: 2025-07-24
Payer: COMMERCIAL

## 2025-07-24 VITALS — HEIGHT: 64 IN | WEIGHT: 138 LBS | BODY MASS INDEX: 23.56 KG/M2

## 2025-07-24 DIAGNOSIS — M50.30 OTHER CERVICAL DISC DEGENERATION, UNSPECIFIED CERVICAL REGION: ICD-10-CM

## 2025-07-24 PROCEDURE — 99214 OFFICE O/P EST MOD 30 MIN: CPT

## 2025-08-12 ENCOUNTER — APPOINTMENT (OUTPATIENT)
Dept: ORTHOPEDIC SURGERY | Facility: CLINIC | Age: 60
End: 2025-08-12
Payer: COMMERCIAL

## 2025-08-12 VITALS — BODY MASS INDEX: 23.56 KG/M2 | HEIGHT: 64 IN | WEIGHT: 138 LBS

## 2025-08-12 DIAGNOSIS — Z96.642 PRESENCE OF LEFT ARTIFICIAL HIP JOINT: ICD-10-CM

## 2025-08-12 PROCEDURE — 99024 POSTOP FOLLOW-UP VISIT: CPT

## 2025-08-23 ENCOUNTER — NON-APPOINTMENT (OUTPATIENT)
Age: 60
End: 2025-08-23

## 2025-08-28 ENCOUNTER — NON-APPOINTMENT (OUTPATIENT)
Age: 60
End: 2025-08-28

## 2025-09-04 ENCOUNTER — APPOINTMENT (OUTPATIENT)
Facility: CLINIC | Age: 60
End: 2025-09-04
Payer: COMMERCIAL

## 2025-09-04 VITALS
HEART RATE: 102 BPM | DIASTOLIC BLOOD PRESSURE: 74 MMHG | TEMPERATURE: 98.9 F | OXYGEN SATURATION: 99 % | WEIGHT: 147.31 LBS | SYSTOLIC BLOOD PRESSURE: 100 MMHG | HEIGHT: 64 IN | RESPIRATION RATE: 16 BRPM | BODY MASS INDEX: 25.15 KG/M2

## 2025-09-04 DIAGNOSIS — E66.9 OBESITY, UNSPECIFIED: ICD-10-CM

## 2025-09-04 DIAGNOSIS — N17.9 ACUTE KIDNEY FAILURE, UNSPECIFIED: ICD-10-CM

## 2025-09-04 DIAGNOSIS — I10 ESSENTIAL (PRIMARY) HYPERTENSION: ICD-10-CM

## 2025-09-04 PROCEDURE — 99214 OFFICE O/P EST MOD 30 MIN: CPT

## 2025-09-04 PROCEDURE — 36415 COLL VENOUS BLD VENIPUNCTURE: CPT

## 2025-09-05 LAB
25(OH)D3 SERPL-MCNC: 30.5 NG/ML
ALBUMIN SERPL ELPH-MCNC: 4.2 G/DL
ALP BLD-CCNC: 87 U/L
ALT SERPL-CCNC: 24 U/L
AMYLASE/CREAT SERPL: 59 U/L
ANION GAP SERPL CALC-SCNC: 13 MMOL/L
AST SERPL-CCNC: 23 U/L
BILIRUB SERPL-MCNC: 0.2 MG/DL
BUN SERPL-MCNC: 27 MG/DL
CALCIUM SERPL-MCNC: 9.6 MG/DL
CHLORIDE SERPL-SCNC: 104 MMOL/L
CHOLEST SERPL-MCNC: 163 MG/DL
CO2 SERPL-SCNC: 23 MMOL/L
CREAT SERPL-MCNC: 1.05 MG/DL
EGFRCR SERPLBLD CKD-EPI 2021: 60 ML/MIN/1.73M2
ESTIMATED AVERAGE GLUCOSE: 97 MG/DL
FOLATE SERPL-MCNC: 7.8 NG/ML
GLUCOSE SERPL-MCNC: 102 MG/DL
HBA1C MFR BLD HPLC: 5 %
HDLC SERPL-MCNC: 46 MG/DL
LDLC SERPL-MCNC: 90 MG/DL
LPL SERPL-CCNC: 44 U/L
NONHDLC SERPL-MCNC: 118 MG/DL
POTASSIUM SERPL-SCNC: 4.4 MMOL/L
PROT SERPL-MCNC: 6.6 G/DL
SODIUM SERPL-SCNC: 140 MMOL/L
TRIGL SERPL-MCNC: 156 MG/DL
TSH SERPL-ACNC: 1.29 UIU/ML
VIT B12 SERPL-MCNC: 459 PG/ML

## 2025-09-14 ENCOUNTER — NON-APPOINTMENT (OUTPATIENT)
Age: 60
End: 2025-09-14

## (undated) DEVICE — SYR IV FLUSH SALINE 10ML 30/TY

## (undated) DEVICE — FORCEP RADIAL JAW 4 W NDL 2.4MM 2.8MM 240CM ORANGE DISP

## (undated) DEVICE — GLV 6.5 PROTEXIS

## (undated) DEVICE — PACK IV START WITH CHG

## (undated) DEVICE — SUT PDO 2 1/2 CIRCLE 40MM NDL 45CM

## (undated) DEVICE — SUT MONOCRYL 2-0 27" SH UNDYED

## (undated) DEVICE — SAW BLADE STRYKER DUAL CUT 1.27X11 X90MM

## (undated) DEVICE — DRAPE 1/2 SHEET 40X57"

## (undated) DEVICE — UNDERPAD LINEN SAVER 23 X 36"

## (undated) DEVICE — DRAPE 3/4 SHEET W REINFORCEMENT 56X77"

## (undated) DEVICE — SPONGE LAP PAD W RING 18X18"

## (undated) DEVICE — WRAP COMPRESSION CALF MED

## (undated) DEVICE — SUT QUILL MONODERM 0 1/2 CIRCLE TAPR 45CM 26MM

## (undated) DEVICE — DENTURE CUP PINK

## (undated) DEVICE — WARMING BLANKET UPPER ADULT

## (undated) DEVICE — GLV 8.5 PROTEXIS (WHITE)

## (undated) DEVICE — GLV 8.5 PROTEXIS (BLUE)

## (undated) DEVICE — MASK PROCEDURE EARLOOP LVL 2 50/BX

## (undated) DEVICE — CATH IV SAFE BC 22G X 1" (BLUE)

## (undated) DEVICE — DRAPE C ARM UNIVERSAL

## (undated) DEVICE — DRAPE SUPINE ESYSUIT

## (undated) DEVICE — STRYKER PULSE LAVAGE WITH HIGH FLOW TIP

## (undated) DEVICE — ELCTR BOVIE PENCIL SMOKE EVACUATION

## (undated) DEVICE — SYR LUER LOK 10CC

## (undated) DEVICE — SOL IRR BAG NS 0.9% 1000ML

## (undated) DEVICE — SENSOR O2 FINGER ADULT

## (undated) DEVICE — GOWN XL

## (undated) DEVICE — SOL IRR POUR NS 0.9% 500ML

## (undated) DEVICE — HOOD FLYTE STRYKER HELMET SHIELD

## (undated) DEVICE — HOOD FLYTE STRYKER SURGICOOL W PEELAWAY

## (undated) DEVICE — SOL IRR BAG H2O 1000ML

## (undated) DEVICE — PACK CYSTO

## (undated) DEVICE — DRSG AQUACEL 3.5 X 6"

## (undated) DEVICE — LIGHT PIPE

## (undated) DEVICE — SYR LUER LOK 20CC

## (undated) DEVICE — DRSG 2X2

## (undated) DEVICE — SYR SLIP 10CC

## (undated) DEVICE — SOL BAG NS 0.9% 1000ML

## (undated) DEVICE — DRSG DERMABOND 0.7ML

## (undated) DEVICE — BLANKET WARMER UPPER ADULT

## (undated) DEVICE — VENODYNE/SCD SLEEVE CALF LARGE

## (undated) DEVICE — DRSG CURITY GAUZE SPONGE 4 X 4" 12-PLY NON-STERILE

## (undated) DEVICE — TUBING SET TUR BLADDER IRRIGATION Y-TYPE 81"

## (undated) DEVICE — TUBING IV EXTENSION MACRO W CLAVE 7"

## (undated) DEVICE — DRAPE U (CLEAR) 47 X 51"

## (undated) DEVICE — SOL IRR POUR H2O 1500ML

## (undated) DEVICE — TUBING ALARIS PUMP MODULE NON-DEHP

## (undated) DEVICE — TUBING TUR 2 PRONG

## (undated) DEVICE — SOL IRR POUR H2O 1000ML

## (undated) DEVICE — SUT STRATAFIX SYMMETRIC PDS PLUS 1 18" CTX VIOLET

## (undated) DEVICE — GRIPPER MEDENVISION

## (undated) DEVICE — FOLEY TRAY 16FR 5CC LF UMETER CLOSED

## (undated) DEVICE — GOWN IMPERV XL

## (undated) DEVICE — FORCEP RADIAL JAW 4 JUMBO 2.8MM 3.2MM 240CM ORANGE DISP

## (undated) DEVICE — ELCTR GROUNDING PAD ADULT COVIDIEN

## (undated) DEVICE — SYR LUER SLIP TIP 50CC

## (undated) DEVICE — SOL IRR BAG NS 0.9% 3000ML

## (undated) DEVICE — SUT QUILL MONODERM 2-0 3/8 CIRCLE 45CM

## (undated) DEVICE — SPECIMEN CONTAINER 100ML